# Patient Record
Sex: FEMALE | Race: WHITE | Employment: OTHER | URBAN - METROPOLITAN AREA
[De-identification: names, ages, dates, MRNs, and addresses within clinical notes are randomized per-mention and may not be internally consistent; named-entity substitution may affect disease eponyms.]

---

## 2017-05-24 ENCOUNTER — HOSPITAL ENCOUNTER (OUTPATIENT)
Dept: MRI IMAGING | Age: 67
Discharge: HOME | End: 2017-05-24
Payer: MEDICARE

## 2017-05-24 VITALS — BODY MASS INDEX: 39.87 KG/M2 | WEIGHT: 270 LBS

## 2017-05-24 DIAGNOSIS — M75.102 TEAR OF LEFT ROTATOR CUFF, UNSPECIFIED TEAR EXTENT: ICD-10-CM

## 2017-05-24 PROCEDURE — 73221 MRI JOINT UPR EXTREM W/O DYE: CPT

## 2017-06-01 ENCOUNTER — OFFICE VISIT (OUTPATIENT)
Dept: SURGERY | Age: 67
End: 2017-06-01

## 2017-06-01 VITALS
SYSTOLIC BLOOD PRESSURE: 138 MMHG | WEIGHT: 271 LBS | DIASTOLIC BLOOD PRESSURE: 66 MMHG | HEIGHT: 69 IN | BODY MASS INDEX: 40.14 KG/M2 | HEART RATE: 60 BPM

## 2017-06-01 DIAGNOSIS — R73.9 HYPERGLYCEMIA: ICD-10-CM

## 2017-06-01 DIAGNOSIS — M75.102 TEAR OF LEFT ROTATOR CUFF, UNSPECIFIED TEAR EXTENT: ICD-10-CM

## 2017-06-01 DIAGNOSIS — E66.01 MORBID OBESITY DUE TO EXCESS CALORIES (HCC): ICD-10-CM

## 2017-06-01 DIAGNOSIS — E27.40 ADRENAL INSUFFICIENCY (HCC): Primary | ICD-10-CM

## 2017-06-01 DIAGNOSIS — E04.9 GOITER: ICD-10-CM

## 2017-06-01 DIAGNOSIS — M81.0 OSTEOPOROSIS: ICD-10-CM

## 2017-06-01 PROBLEM — I10 HYPERTENSION: Status: ACTIVE | Noted: 2017-06-01

## 2017-06-01 PROBLEM — M06.9 RA (RHEUMATOID ARTHRITIS) (HCC): Status: ACTIVE | Noted: 2017-06-01

## 2017-06-01 PROBLEM — I25.10 CAD (CORONARY ARTERY DISEASE): Status: ACTIVE | Noted: 2017-06-01

## 2017-06-01 PROBLEM — M75.80 ROTATOR CUFF TENDINITIS: Status: ACTIVE | Noted: 2017-06-01

## 2017-06-01 PROCEDURE — 1123F ACP DISCUSS/DSCN MKR DOCD: CPT | Performed by: INTERNAL MEDICINE

## 2017-06-01 PROCEDURE — 4040F PNEUMOC VAC/ADMIN/RCVD: CPT | Performed by: INTERNAL MEDICINE

## 2017-06-01 PROCEDURE — 99203 OFFICE O/P NEW LOW 30 MIN: CPT | Performed by: INTERNAL MEDICINE

## 2017-06-01 PROCEDURE — 4005F PHARM THX FOR OP RXD: CPT | Performed by: INTERNAL MEDICINE

## 2017-06-01 PROCEDURE — G8400 PT W/DXA NO RESULTS DOC: HCPCS | Performed by: INTERNAL MEDICINE

## 2017-06-01 PROCEDURE — 1090F PRES/ABSN URINE INCON ASSESS: CPT | Performed by: INTERNAL MEDICINE

## 2017-06-01 PROCEDURE — G8417 CALC BMI ABV UP PARAM F/U: HCPCS | Performed by: INTERNAL MEDICINE

## 2017-06-01 PROCEDURE — 1036F TOBACCO NON-USER: CPT | Performed by: INTERNAL MEDICINE

## 2017-06-01 PROCEDURE — 3014F SCREEN MAMMO DOC REV: CPT | Performed by: INTERNAL MEDICINE

## 2017-06-01 PROCEDURE — G8598 ASA/ANTIPLAT THER USED: HCPCS | Performed by: INTERNAL MEDICINE

## 2017-06-01 PROCEDURE — G8427 DOCREV CUR MEDS BY ELIG CLIN: HCPCS | Performed by: INTERNAL MEDICINE

## 2017-06-01 PROCEDURE — 3017F COLORECTAL CA SCREEN DOC REV: CPT | Performed by: INTERNAL MEDICINE

## 2017-06-01 RX ORDER — OXYCODONE HYDROCHLORIDE AND ACETAMINOPHEN 5; 325 MG/1; MG/1
1 TABLET ORAL EVERY 8 HOURS PRN
COMMUNITY
End: 2018-03-28

## 2017-06-01 RX ORDER — PAROXETINE HYDROCHLORIDE 20 MG/1
20 TABLET, FILM COATED ORAL EVERY MORNING
COMMUNITY
End: 2018-03-28

## 2017-06-01 RX ORDER — ANTIOX #8/OM3/DHA/EPA/LUT/ZEAX 250-2.5 MG
1 CAPSULE ORAL 2 TIMES DAILY
COMMUNITY

## 2017-06-01 RX ORDER — CLOPIDOGREL BISULFATE 75 MG/1
75 TABLET ORAL DAILY
COMMUNITY
End: 2021-01-08 | Stop reason: ALTCHOICE

## 2017-06-01 RX ORDER — ALBUTEROL SULFATE 90 UG/1
2 AEROSOL, METERED RESPIRATORY (INHALATION) EVERY 6 HOURS PRN
COMMUNITY

## 2017-06-01 RX ORDER — AZATHIOPRINE 50 MG/1
100 TABLET ORAL DAILY
COMMUNITY

## 2017-06-01 RX ORDER — NITROGLYCERIN 0.4 MG/1
0.4 TABLET SUBLINGUAL EVERY 5 MIN PRN
COMMUNITY

## 2017-06-01 RX ORDER — ATORVASTATIN CALCIUM 20 MG/1
20 TABLET, FILM COATED ORAL DAILY
COMMUNITY
End: 2018-06-18 | Stop reason: DRUGHIGH

## 2017-06-01 RX ORDER — CETIRIZINE HYDROCHLORIDE 10 MG/1
10 TABLET ORAL DAILY
COMMUNITY

## 2017-06-01 RX ORDER — DILTIAZEM HYDROCHLORIDE 300 MG/1
300 CAPSULE, EXTENDED RELEASE ORAL DAILY
COMMUNITY
End: 2019-09-24

## 2017-06-01 ASSESSMENT — ENCOUNTER SYMPTOMS
TROUBLE SWALLOWING: 1
VOMITING: 0
EYES NEGATIVE: 1

## 2017-06-05 ENCOUNTER — TELEPHONE (OUTPATIENT)
Dept: SURGERY | Age: 67
End: 2017-06-05

## 2017-06-12 DIAGNOSIS — E04.9 GOITER: ICD-10-CM

## 2017-06-12 DIAGNOSIS — R73.9 HYPERGLYCEMIA: ICD-10-CM

## 2017-06-12 LAB
ANION GAP SERPL CALCULATED.3IONS-SCNC: 11 MEQ/L (ref 7–13)
BUN BLDV-MCNC: 15 MG/DL (ref 8–23)
CALCIUM SERPL-MCNC: 9.6 MG/DL (ref 8.6–10.2)
CHLORIDE BLD-SCNC: 99 MEQ/L (ref 98–107)
CO2: 29 MEQ/L (ref 22–29)
CREAT SERPL-MCNC: 0.86 MG/DL (ref 0.5–0.9)
GFR AFRICAN AMERICAN: >60
GFR NON-AFRICAN AMERICAN: >60
GLUCOSE BLD-MCNC: 102 MG/DL (ref 74–109)
HBA1C MFR BLD: 6.5 % (ref 4.8–5.9)
POTASSIUM SERPL-SCNC: 3.7 MEQ/L (ref 3.5–5.1)
SODIUM BLD-SCNC: 139 MEQ/L (ref 132–144)
T4 FREE: 1.21 NG/DL (ref 0.93–1.7)
TSH SERPL DL<=0.05 MIU/L-ACNC: 4.46 UIU/ML (ref 0.27–4.2)

## 2017-06-13 ENCOUNTER — HOSPITAL ENCOUNTER (OUTPATIENT)
Dept: ULTRASOUND IMAGING | Age: 67
Discharge: HOME | End: 2017-06-13
Payer: MEDICARE

## 2017-06-13 ENCOUNTER — HOSPITAL ENCOUNTER (OUTPATIENT)
Dept: WOMENS IMAGING | Age: 67
Discharge: HOME | End: 2017-06-13
Payer: MEDICARE

## 2017-06-13 DIAGNOSIS — M81.0 OSTEOPOROSIS: ICD-10-CM

## 2017-06-13 PROCEDURE — 77080 DXA BONE DENSITY AXIAL: CPT

## 2017-06-13 PROCEDURE — 76536 US EXAM OF HEAD AND NECK: CPT

## 2017-07-10 ENCOUNTER — OFFICE VISIT (OUTPATIENT)
Dept: SURGERY | Age: 67
End: 2017-07-10

## 2017-07-10 VITALS
WEIGHT: 271 LBS | SYSTOLIC BLOOD PRESSURE: 138 MMHG | BODY MASS INDEX: 40.14 KG/M2 | HEIGHT: 69 IN | HEART RATE: 82 BPM | DIASTOLIC BLOOD PRESSURE: 78 MMHG

## 2017-07-10 DIAGNOSIS — E27.40 ADRENAL INSUFFICIENCY (HCC): ICD-10-CM

## 2017-07-10 DIAGNOSIS — E11.8 UNCONTROLLED TYPE 2 DIABETES MELLITUS WITH COMPLICATION, UNSPECIFIED LONG TERM INSULIN USE STATUS: Primary | ICD-10-CM

## 2017-07-10 DIAGNOSIS — E11.65 UNCONTROLLED TYPE 2 DIABETES MELLITUS WITH COMPLICATION, UNSPECIFIED LONG TERM INSULIN USE STATUS: Primary | ICD-10-CM

## 2017-07-10 DIAGNOSIS — E04.2 MULTIPLE THYROID NODULES: ICD-10-CM

## 2017-07-10 DIAGNOSIS — E66.09 NON MORBID OBESITY DUE TO EXCESS CALORIES: ICD-10-CM

## 2017-07-10 DIAGNOSIS — E03.9 HYPOTHYROIDISM, UNSPECIFIED TYPE: ICD-10-CM

## 2017-07-10 DIAGNOSIS — R61 HYPERHIDROSIS: ICD-10-CM

## 2017-07-10 PROCEDURE — 3014F SCREEN MAMMO DOC REV: CPT | Performed by: INTERNAL MEDICINE

## 2017-07-10 PROCEDURE — 3017F COLORECTAL CA SCREEN DOC REV: CPT | Performed by: INTERNAL MEDICINE

## 2017-07-10 PROCEDURE — 1090F PRES/ABSN URINE INCON ASSESS: CPT | Performed by: INTERNAL MEDICINE

## 2017-07-10 PROCEDURE — G8417 CALC BMI ABV UP PARAM F/U: HCPCS | Performed by: INTERNAL MEDICINE

## 2017-07-10 PROCEDURE — G8598 ASA/ANTIPLAT THER USED: HCPCS | Performed by: INTERNAL MEDICINE

## 2017-07-10 PROCEDURE — 4040F PNEUMOC VAC/ADMIN/RCVD: CPT | Performed by: INTERNAL MEDICINE

## 2017-07-10 PROCEDURE — 99214 OFFICE O/P EST MOD 30 MIN: CPT | Performed by: INTERNAL MEDICINE

## 2017-07-10 PROCEDURE — G8399 PT W/DXA RESULTS DOCUMENT: HCPCS | Performed by: INTERNAL MEDICINE

## 2017-07-10 PROCEDURE — G8427 DOCREV CUR MEDS BY ELIG CLIN: HCPCS | Performed by: INTERNAL MEDICINE

## 2017-07-10 PROCEDURE — 1123F ACP DISCUSS/DSCN MKR DOCD: CPT | Performed by: INTERNAL MEDICINE

## 2017-07-10 PROCEDURE — 1036F TOBACCO NON-USER: CPT | Performed by: INTERNAL MEDICINE

## 2017-07-10 PROCEDURE — 3046F HEMOGLOBIN A1C LEVEL >9.0%: CPT | Performed by: INTERNAL MEDICINE

## 2017-07-10 RX ORDER — LEVOTHYROXINE SODIUM 0.03 MG/1
25 TABLET ORAL DAILY
Qty: 30 TABLET | Refills: 3 | Status: SHIPPED | OUTPATIENT
Start: 2017-07-10 | End: 2017-10-25 | Stop reason: SDUPTHER

## 2017-07-10 RX ORDER — BLOOD-GLUCOSE METER
EACH MISCELLANEOUS
Qty: 1 KIT | Refills: 0 | Status: SHIPPED | OUTPATIENT
Start: 2017-07-10 | End: 2017-07-12 | Stop reason: SDUPTHER

## 2017-07-10 RX ORDER — LANCETS 33 GAUGE
EACH MISCELLANEOUS
Qty: 100 EACH | Refills: 3 | Status: SHIPPED | OUTPATIENT
Start: 2017-07-10 | End: 2017-07-12 | Stop reason: SDUPTHER

## 2017-07-10 ASSESSMENT — ENCOUNTER SYMPTOMS: TROUBLE SWALLOWING: 1

## 2017-07-12 PROBLEM — E66.09 NON MORBID OBESITY DUE TO EXCESS CALORIES: Status: ACTIVE | Noted: 2017-07-12

## 2017-07-12 RX ORDER — BLOOD-GLUCOSE METER
EACH MISCELLANEOUS
Qty: 1 KIT | Refills: 0 | Status: SHIPPED | OUTPATIENT
Start: 2017-07-12

## 2017-07-12 RX ORDER — LANCETS 33 GAUGE
EACH MISCELLANEOUS
Qty: 100 EACH | Refills: 3 | Status: SHIPPED | OUTPATIENT
Start: 2017-07-12

## 2017-08-07 ENCOUNTER — TELEPHONE (OUTPATIENT)
Dept: SURGERY | Age: 67
End: 2017-08-07

## 2017-08-23 ENCOUNTER — OFFICE VISIT (OUTPATIENT)
Dept: SURGERY | Age: 67
End: 2017-08-23

## 2017-08-23 VITALS
HEIGHT: 69 IN | HEART RATE: 64 BPM | WEIGHT: 266 LBS | BODY MASS INDEX: 39.4 KG/M2 | DIASTOLIC BLOOD PRESSURE: 74 MMHG | SYSTOLIC BLOOD PRESSURE: 138 MMHG

## 2017-08-23 DIAGNOSIS — E27.40 ADRENAL INSUFFICIENCY (HCC): ICD-10-CM

## 2017-08-23 DIAGNOSIS — E03.9 HYPOTHYROIDISM, UNSPECIFIED TYPE: ICD-10-CM

## 2017-08-23 LAB — GLUCOSE BLD-MCNC: 132 MG/DL

## 2017-08-23 PROCEDURE — 82962 GLUCOSE BLOOD TEST: CPT | Performed by: INTERNAL MEDICINE

## 2017-08-23 PROCEDURE — 1090F PRES/ABSN URINE INCON ASSESS: CPT | Performed by: INTERNAL MEDICINE

## 2017-08-23 PROCEDURE — 1036F TOBACCO NON-USER: CPT | Performed by: INTERNAL MEDICINE

## 2017-08-23 PROCEDURE — 1123F ACP DISCUSS/DSCN MKR DOCD: CPT | Performed by: INTERNAL MEDICINE

## 2017-08-23 PROCEDURE — 3046F HEMOGLOBIN A1C LEVEL >9.0%: CPT | Performed by: INTERNAL MEDICINE

## 2017-08-23 PROCEDURE — 4040F PNEUMOC VAC/ADMIN/RCVD: CPT | Performed by: INTERNAL MEDICINE

## 2017-08-23 PROCEDURE — 3014F SCREEN MAMMO DOC REV: CPT | Performed by: INTERNAL MEDICINE

## 2017-08-23 PROCEDURE — 3017F COLORECTAL CA SCREEN DOC REV: CPT | Performed by: INTERNAL MEDICINE

## 2017-08-23 PROCEDURE — G8417 CALC BMI ABV UP PARAM F/U: HCPCS | Performed by: INTERNAL MEDICINE

## 2017-08-23 PROCEDURE — G8399 PT W/DXA RESULTS DOCUMENT: HCPCS | Performed by: INTERNAL MEDICINE

## 2017-08-23 PROCEDURE — G8598 ASA/ANTIPLAT THER USED: HCPCS | Performed by: INTERNAL MEDICINE

## 2017-08-23 PROCEDURE — G8427 DOCREV CUR MEDS BY ELIG CLIN: HCPCS | Performed by: INTERNAL MEDICINE

## 2017-08-23 PROCEDURE — 99213 OFFICE O/P EST LOW 20 MIN: CPT | Performed by: INTERNAL MEDICINE

## 2017-10-11 DIAGNOSIS — E03.9 HYPOTHYROIDISM, UNSPECIFIED TYPE: ICD-10-CM

## 2017-10-11 LAB
ANION GAP SERPL CALCULATED.3IONS-SCNC: 15 MEQ/L (ref 7–13)
BUN BLDV-MCNC: 11 MG/DL (ref 8–23)
CALCIUM SERPL-MCNC: 9.3 MG/DL (ref 8.6–10.2)
CHLORIDE BLD-SCNC: 99 MEQ/L (ref 98–107)
CO2: 29 MEQ/L (ref 22–29)
CREAT SERPL-MCNC: 0.73 MG/DL (ref 0.5–0.9)
GFR AFRICAN AMERICAN: >60
GFR NON-AFRICAN AMERICAN: >60
GLUCOSE BLD-MCNC: 101 MG/DL (ref 74–109)
HBA1C MFR BLD: 6.2 % (ref 4.8–5.9)
POTASSIUM SERPL-SCNC: 3.2 MEQ/L (ref 3.5–5.1)
SODIUM BLD-SCNC: 143 MEQ/L (ref 132–144)
T4 FREE: 1.19 NG/DL (ref 0.93–1.7)
TSH SERPL DL<=0.05 MIU/L-ACNC: 5.83 UIU/ML (ref 0.27–4.2)

## 2017-10-25 ENCOUNTER — OFFICE VISIT (OUTPATIENT)
Dept: SURGERY | Age: 67
End: 2017-10-25

## 2017-10-25 VITALS
HEIGHT: 69 IN | HEART RATE: 69 BPM | WEIGHT: 273 LBS | BODY MASS INDEX: 40.43 KG/M2 | DIASTOLIC BLOOD PRESSURE: 81 MMHG | SYSTOLIC BLOOD PRESSURE: 172 MMHG

## 2017-10-25 DIAGNOSIS — E87.6 HYPOKALEMIA: ICD-10-CM

## 2017-10-25 DIAGNOSIS — E27.40 ADRENAL INSUFFICIENCY (HCC): ICD-10-CM

## 2017-10-25 DIAGNOSIS — E03.9 HYPOTHYROIDISM, UNSPECIFIED TYPE: ICD-10-CM

## 2017-10-25 LAB — GLUCOSE BLD-MCNC: 103 MG/DL

## 2017-10-25 PROCEDURE — G8427 DOCREV CUR MEDS BY ELIG CLIN: HCPCS | Performed by: INTERNAL MEDICINE

## 2017-10-25 PROCEDURE — 3044F HG A1C LEVEL LT 7.0%: CPT | Performed by: INTERNAL MEDICINE

## 2017-10-25 PROCEDURE — 3014F SCREEN MAMMO DOC REV: CPT | Performed by: INTERNAL MEDICINE

## 2017-10-25 PROCEDURE — 4040F PNEUMOC VAC/ADMIN/RCVD: CPT | Performed by: INTERNAL MEDICINE

## 2017-10-25 PROCEDURE — 1090F PRES/ABSN URINE INCON ASSESS: CPT | Performed by: INTERNAL MEDICINE

## 2017-10-25 PROCEDURE — G8598 ASA/ANTIPLAT THER USED: HCPCS | Performed by: INTERNAL MEDICINE

## 2017-10-25 PROCEDURE — 3017F COLORECTAL CA SCREEN DOC REV: CPT | Performed by: INTERNAL MEDICINE

## 2017-10-25 PROCEDURE — 82962 GLUCOSE BLOOD TEST: CPT | Performed by: INTERNAL MEDICINE

## 2017-10-25 PROCEDURE — 99213 OFFICE O/P EST LOW 20 MIN: CPT | Performed by: INTERNAL MEDICINE

## 2017-10-25 PROCEDURE — G8484 FLU IMMUNIZE NO ADMIN: HCPCS | Performed by: INTERNAL MEDICINE

## 2017-10-25 PROCEDURE — 1036F TOBACCO NON-USER: CPT | Performed by: INTERNAL MEDICINE

## 2017-10-25 PROCEDURE — G8399 PT W/DXA RESULTS DOCUMENT: HCPCS | Performed by: INTERNAL MEDICINE

## 2017-10-25 PROCEDURE — G8417 CALC BMI ABV UP PARAM F/U: HCPCS | Performed by: INTERNAL MEDICINE

## 2017-10-25 PROCEDURE — 1123F ACP DISCUSS/DSCN MKR DOCD: CPT | Performed by: INTERNAL MEDICINE

## 2017-10-25 RX ORDER — PREDNISONE 10 MG/1
TABLET ORAL
Qty: 30 TABLET | Refills: 1
Start: 2017-10-25 | End: 2020-10-15 | Stop reason: SDUPTHER

## 2017-10-25 RX ORDER — LEVOTHYROXINE SODIUM 0.05 MG/1
50 TABLET ORAL DAILY
Qty: 90 TABLET | Refills: 3 | Status: SHIPPED | OUTPATIENT
Start: 2017-10-25 | End: 2017-12-27 | Stop reason: SDUPTHER

## 2017-10-25 RX ORDER — POTASSIUM CHLORIDE 20 MEQ/1
20 TABLET, EXTENDED RELEASE ORAL DAILY
Qty: 90 TABLET | Refills: 3 | Status: SHIPPED | OUTPATIENT
Start: 2017-10-25 | End: 2019-03-12 | Stop reason: SDUPTHER

## 2017-10-25 RX ORDER — VALSARTAN 80 MG/1
80 TABLET ORAL DAILY
COMMUNITY
End: 2019-09-24

## 2017-10-25 NOTE — PROGRESS NOTES
Subjective:      Patient ID: Bin Geller is a 78 y.o. female. Diabetes   She presents for her follow-up diabetic visit. She has type 2 diabetes mellitus. Hypoglycemia symptoms include nervousness/anxiousness. Associated symptoms include fatigue and weakness. Risk factors for coronary artery disease include diabetes mellitus, obesity, sedentary lifestyle and post-menopausal. Current diabetic treatment includes oral agent (monotherapy) and insulin injections (metformin lantus plus novolog ). She is currently taking insulin pre-breakfast, pre-lunch, pre-dinner and at bedtime. Her weight is fluctuating minimally. She never participates in exercise. Her overall blood glucose range is 130-140 mg/dl. (Lab Results       Component                Value               Date                       LABA1C                   6.2 (H)             10/11/2017              )   Other   This is a chronic (Adrenal insufficiency) problem. The current episode started more than 1 year ago. The problem occurs intermittently. The problem has been waxing and waning. Associated symptoms include diaphoresis, fatigue and weakness. Exacerbated by: Long-term prednisone use. Treatments tried: prednisone 5 mg daily.          S/p left shoulder surgery       Hypothyroidism on replacement with synthroid 25 mcg daily         Patient Active Problem List   Diagnosis    Rotator cuff tendinitis    RA (rheumatoid arthritis) ([address]    Hypertension    CAD (coronary artery disease)    Morbid obesity due to excess calories ([address]    Non morbid obesity due to excess calories    Uncontrolled type 2 diabetes mellitus (HCC)         Allergies   Allergen Reactions    Latex     Accupril [Quinapril Hcl]     Other      ACE BANDAGES    Tape [Adhesive Tape]      PLASTIC TAPE AND ACE BANDAGES       Current Outpatient Prescriptions:     valsartan (DIOVAN) 80 MG tablet, Take 80 mg by mouth daily, Disp: , Rfl:     insulin glargine (LANTUS SOLOSTAR) 100 UNIT/ML injection pen, Inject 10 Units into the skin daily (with breakfast), Disp: , Rfl:     insulin lispro (HUMALOG KWIKPEN) 100 UNIT/ML pen, Inject into the skin See Admin Instructions Inject subcut per sliding scale, Disp: , Rfl:     glucose blood VI test strips (ONETOUCH VERIO) strip, Use bid to check bs Dx e11.65, Disp: 100 each, Rfl: 3    Blood Glucose Monitoring Suppl (ONETOUCH VERIO) w/Device KIT, Give 1 meter to check bs Dx E11.65, Disp: 1 kit, Rfl: 0    ONETOUCH DELICA LANCETS 92M MISC, Use bid to check bs Dx E11.65, Disp: 100 each, Rfl: 03    levothyroxine (SYNTHROID) 25 MCG tablet, Take 1 tablet by mouth Daily, Disp: 30 tablet, Rfl: 3    azaTHIOprine (IMURAN) 50 MG tablet, Take 100 mg by mouth daily, Disp: , Rfl:     diltiazem (TIAZAC) 300 MG extended release capsule, Take 300 mg by mouth daily, Disp: , Rfl:     nitroGLYCERIN (NITROSTAT) 0.4 MG SL tablet, Place 0.4 mg under the tongue every 5 minutes as needed for Chest pain up to max of 3 total doses.  If no relief after 1 dose, call 911., Disp: , Rfl:     clopidogrel (PLAVIX) 75 MG tablet, Take 75 mg by mouth daily, Disp: , Rfl:     atorvastatin (LIPITOR) 20 MG tablet, Take 20 mg by mouth daily, Disp: , Rfl:     PARoxetine (PAXIL) 20 MG tablet, Take 20 mg by mouth every morning, Disp: , Rfl:     oxyCODONE-acetaminophen (PERCOCET) 5-325 MG per tablet, Take 1 tablet by mouth every 8 hours as needed for Pain ., Disp: , Rfl:     Multiple Vitamins-Minerals (PRESERVISION AREDS 2) CAPS, Take 1 capsule by mouth 2 times daily, Disp: , Rfl:     aspirin 81 MG tablet, Take 81 mg by mouth daily, Disp: , Rfl:     cetirizine (ZYRTEC) 10 MG tablet, Take 10 mg by mouth 2 times daily, Disp: , Rfl:     cimetidine (TAGAMET HB) 200 MG tablet, Take 200 mg by mouth 2 times daily, Disp: , Rfl:     Lactobacillus (PROBIOTIC ACIDOPHILUS PO), Take by mouth daily, Disp: , Rfl:     FOLIC ACID PO, Take 671 mg by mouth 2 times daily, Disp: , Rfl:     B Complex Vitamins Ref Range: 8 - 23 mg/dL 11   Creatinine Latest Ref Range: 0.50 - 0.90 mg/dL 0.73   Anion Gap Latest Ref Range: 7 - 13 mEq/L 15 (H)   GFR Non- Latest Ref Range: >60  >60.0   GFR African American Latest Ref Range: >60  >60.0   Glucose Latest Ref Range: 74 - 109 mg/dL 101   Calcium Latest Ref Range: 8.6 - 10.2 mg/dL 9.3   Hemoglobin A1C Latest Ref Range: 4.8 - 5.9 % 6.2 (H)   TSH Latest Ref Range: 0.270 - 4.200 uIU/mL 5.830 (H)   T4 Free Latest Ref Range: 0.93 - 1.70 ng/dL 1.19     Assessment:      1. Uncontrolled type 2 diabetes mellitus with complication, without long-term current use of insulin ([facility]  POCT Glucose    Basic Metabolic Panel    Internal Referral to Diabetic [facility]    CANCELED: Internal Referral to Diabetic [facility]   2. Hypothyroidism, unspecified type  T4, Free    TSH without Reflex   3. Hypokalemia  Internal Referral to [facility]abetic [facility]    CANCELED: Internal Referral to Diabetic [facility]   4.  Adrenal insufficiency ([facility]             Plan:      Orders Placed This Encounter   Procedures    T4, Free     Standing Status:   Future     Standing Expiration Date:   10/25/2018    TSH without Reflex     Standing Status:   Future     Standing Expiration Date:   10/25/2018    Basic Metabolic Panel     Standing Status:   Future     Standing Expiration Date:   10/25/2018    Internal Referral to [facility]     Referral Priority:   Routine     Referral Type:   Consult for Advice and Opinion     Referral Reason:   Specialty Services Required     Number of Visits Requested:   1    POCT Glucose     Orders Placed This Encounter   Medications    levothyroxine (SYNTHROID) 50 MCG tablet     Sig: Take 1 tablet by mouth Daily     Dispense:  90 tablet     Refill:  03    potassium chloride (KLOR-CON M) 20 MEQ extended release tablet     Sig: Take 1 tablet by mouth daily     Dispense:  90 tablet     Refill:  3    insulin glargine (LANTUS SOLOSTAR) 100 UNIT/ML injection pen     Sig: Inject 10 Units into the skin nightly     Dispense:  5 Pen     Refill:  3    predniSONE (DELTASONE) 10 MG tablet     Sig: Once a day     Dispense:  30 tablet     Refill:  01     The current medical regimen is effective;  continue present plan and medications.

## 2017-11-02 ENCOUNTER — HOSPITAL ENCOUNTER (OUTPATIENT)
Dept: DIABETES SERVICES | Age: 67
Setting detail: THERAPIES SERIES
Discharge: HOME | End: 2017-11-02
Payer: MEDICARE

## 2017-11-07 ENCOUNTER — APPOINTMENT (OUTPATIENT)
Dept: DIABETES SERVICES | Age: 67
End: 2017-11-07
Payer: MEDICARE

## 2017-11-08 ENCOUNTER — APPOINTMENT (OUTPATIENT)
Dept: DIABETES SERVICES | Age: 67
End: 2017-11-08
Payer: MEDICARE

## 2017-11-09 ENCOUNTER — APPOINTMENT (OUTPATIENT)
Dept: DIABETES SERVICES | Age: 67
End: 2017-11-09
Payer: MEDICARE

## 2017-11-21 ENCOUNTER — HOSPITAL ENCOUNTER (OUTPATIENT)
Dept: DIABETES SERVICES | Age: 67
Setting detail: THERAPIES SERIES
Discharge: HOME | End: 2017-11-21
Payer: MEDICARE

## 2017-11-21 VITALS — BODY MASS INDEX: 40.43 KG/M2 | WEIGHT: 273 LBS | HEIGHT: 69 IN

## 2017-11-21 PROCEDURE — G0108 DIAB MANAGE TRN  PER INDIV: HCPCS

## 2017-11-21 ASSESSMENT — PATIENT HEALTH QUESTIONNAIRE - PHQ9
6. FEELING BAD ABOUT YOURSELF - OR THAT YOU ARE A FAILURE OR HAVE LET YOURSELF OR YOUR FAMILY DOWN: 2
8. MOVING OR SPEAKING SO SLOWLY THAT OTHER PEOPLE COULD HAVE NOTICED. OR THE OPPOSITE, BEING SO FIGETY OR RESTLESS THAT YOU HAVE BEEN MOVING AROUND A LOT MORE THAN USUAL: 0
9. THOUGHTS THAT YOU WOULD BE BETTER OFF DEAD, OR OF HURTING YOURSELF: 0
10. IF YOU CHECKED OFF ANY PROBLEMS, HOW DIFFICULT HAVE THESE PROBLEMS MADE IT FOR YOU TO DO YOUR WORK, TAKE CARE OF THINGS AT HOME, OR GET ALONG WITH OTHER PEOPLE: 1
3. TROUBLE FALLING OR STAYING ASLEEP: 3
1. LITTLE INTEREST OR PLEASURE IN DOING THINGS: 1
SUM OF ALL RESPONSES TO PHQ QUESTIONS 1-9: 14
5. POOR APPETITE OR OVEREATING: 2
7. TROUBLE CONCENTRATING ON THINGS, SUCH AS READING THE NEWSPAPER OR WATCHING TELEVISION: 2
SUM OF ALL RESPONSES TO PHQ9 QUESTIONS 1 & 2: 2
4. FEELING TIRED OR HAVING LITTLE ENERGY: 3
2. FEELING DOWN, DEPRESSED OR HOPELESS: 1

## 2017-11-21 NOTE — PROGRESS NOTES
Diabetes Self- Management Education Program Assessment and Education Record-   Also see Diabetic Screening    Patient, Chayito Carrion,  here for diabetes self-management education  visit/ assessment. Today's visit was in an individual setting. Diet History :  Diet Questionnaire and typical meal /portion sheet completed  []Yes  [x] NO    Goals setting:  Initial Goal Set with Patient  [x]Yes  [] NO  (SEE  EDUCATION AND GOALS)    MEDICAL HISTORY:  Past Medical History:   Diagnosis Date    CAD (coronary artery disease)     [facility] mellitus [facility]     DJD (degenerative joint disease)     Hypertension     RA (rheumatoid arthritis) ([facility]     Rotator cuff tendinitis     RIGHT     No family history on file. Latex; Accupril [quinapril hcl]; Other; and Tape [adhesive tape]     There is no immunization history on file for this patient.     Current Medications  Current Outpatient Prescriptions   Medication Sig Dispense Refill    valsartan (DIOVAN) 80 MG tablet Take 80 mg by mouth daily      levothyroxine (SYNTHROID) 50 MCG tablet Take 1 tablet by mouth Daily 90 tablet 03    potassium chloride (KLOR-CON M) 20 MEQ extended release tablet Take 1 tablet by mouth daily 90 tablet 3    insulin glargine (LANTUS SOLOSTAR) 100 UNIT/ML injection pen Inject 10 Units into the skin nightly 5 Pen 3    predniSONE (DELTASONE) 10 MG tablet Once a day 30 tablet 01    insulin lispro (HUMALOG KWIKPEN) 100 UNIT/ML pen Inject into the skin See Admin Instructions Inject subcut per sliding scale      glucose blood VI test strips (ONETOUCH VERIO) strip Use bid to check bs Dx e11.65 100 each 3    Blood Glucose Monitoring Suppl (ONETOUCH VERIO) w/Device KIT Give 1 meter to check bs Dx E11.65 1 kit 0    ONETOUCH DELICA LANCETS 85U MISC Use bid to check bs Dx E11.65 100 each 03    azaTHIOprine (IMURAN) 50 MG tablet Take 100 mg by mouth daily      diltiazem (TIAZAC) 300 MG extended release capsule Take 300 mg by mouth daily      Encounters:   10/25/17 (!) 172/81   08/23/17 138/74   07/10/17 138/78        Cholesterol ( LDL under  100)   No results found for: LDLCALC, LDLCHOLESTEROL, LDLDIRECT    4 . Smoking ? []Yes   [x]No    5. Taking an Asprin daily? [x]Yes   []No            Diabetes Self- Management Education Record    Participant Name: Kike Sinclair  Referring Provider: Pj Trejo MD   Assessment/Evaluation Ratings:  1=Needs Instruction   4=Demonstrates Understanding/Competency  2=Needs Review   NC=Not Covered    3=Comprehends Key Points  N/A=Not Applicable    Topics/Learning Objectives Initial Assessment Date:   Instr. Date Reinforce Date Post- session Eval Comments   Diabetes disease process & Treatment process: Define diabetes & pre-diabetes; Identify own type of diabetes; role of the pancreas; signs/symptoms; diagnostic criteria; prevention & treatment options; contributing factors. 11-21-17  #2  Shona Hassan RN      77-03-83 Briefly discussed the pathophysiology of diabetes. Touched on the role of the pancreas and liver. Shona Hassan RN     Incorporating nutritional management into lifestyle: Describe effect of type, amount & timing of food on blood glucose; Describe basic meal planning techniques & current nutrition guideline   11-21-17  #1  Shona Hassan RN      What to eat - Food groups, When to eat - timing of meals and snacks, and How much to eat - portions control. calories/ day   CHO choices/ meal   CHO choices/  day   grams of protein /day   gram of fat /day     Correctly read food labels & demonstrate CHO counting & portion control with personalized meal plan. Identify dining out strategies, & dietary sick day guidelines. 11-21-17  #1  Shona Hassan RN         Incorporating physical activity into lifestyle:   Verbalize effect of exercise on blood glucose levels; benefits of regular exercise; safety considerations; contraindications; maintenance of activity.    7242-31  #1  Ciro Gonzales FRANCES Roberto      28-57-60 Briefly touched on the need to increase activity as able. Discussed chair exercises. Elizabeth Arriaga RN     Using medications safely:  Identify effects of diabetes medicines on blood glucose levels; List diabetes medication taken, action & side effects;    11-21-17  #1  Elizabeth Arriaga RN         Insulin / Injectable - Appropriate injection sites; proper storage; supplies needed; proper technique; safe needle disposal guidelines. 74-70-33  #1  Elizabeth Arriaga RN      37-48-89 Taking Lantas and Humalog. Doing pen/needle patency check with Humalog but not Lantus. Discussed the need to do both. Holding for 10 seconds after injection before pulling needle out. Elizabeth Arriaga RN     Monitoring blood glucose, interpreting and using results:  Identify recommended & personal blood glucose targets; importance of testing; testing supplies; HgbA1C target levels; Factors affecting blood glucose; Importance of logging blood glucose levels for pattern recognition; ketone testing; safe lancet disposal.   11-21-17  #1  Elizabeth Arriaga RN      63-07-82 Testing 3 times a day instead of 4 and not logging. Discussed the need to check BG and log results to help use the info to take better care of herself. BG guidelines discussed. Disposing of lancets/needles correctly. Elizabeth Arriaga RN     Prevention, detection & treatment of acute complications:  Identify symptoms of hyper & hypoglycemia, and prevention & treatment strategies. 11-21-17  #1  Elizabeth Arriaga RN      5258-67 Discussed signs and symptoms and causes of low and high BG. Also discussed Rule of 15 and handouts given. Elizabeth Arriaga RN     Describe sick day guidelines & indications for physician notification. Identify short term consequences of poor control.    11-21-17  #1  Elizabeth Arriaga RN         Prevention, detection & treatment of chronic complications:  Define the natural course of diabetes & describe the relationship of blood glucose levels to long term complications of diabetes. Identify preventative measures & standards of care. 11-21-17  #1  Kishor Faulkner RN      7369-32 Has tingling in toes, unsure if neuropathy or from RA. Has a nonhealing leg ulcer and being worked up for PVD. Described what sounds like sleep apnea and recommended she talk to doctor about a sleep study. Kishor Faulkner RN     Developing strategies to address psychosocial issues:  Describe feelings about living with diabetes; Describe how stress, depression & anxiety affect blood glucose; Identify coping strategies; Identify support needed & support network available. 11-21-17  #1  Kishor Faulkner RN      3377-22 Scored high on the depression screen. Is being treated for depression. Stress from multiple health issues. Kishor Faulkner RN     Developing strategies to promote health/change behavior: Identify 7 self-care behaviors; Personal health risk factors; Benefits, challenges & strategies for behavioral change;    11-21-17  #1  Kishor Faulkner RN           Individualized goal selection. My goal , to help me improve my health, I will:   1.      2.       Plan  Follow-up Appointments planned in group setting. Next Appointment in 1 weeks. Instruction Method: [x]Lecture/Discussion  []Power Point Presentation  [x]Handouts  []Return Demonstration      Education Materials/Equipment Provided:      [x]Self-Management  - Initial Assessment - Where do I Begin booklet and Counting Carbs from the ADA.     []Self-Management  Class 1 - On the Road to better managing your diabetes - Packet of Handouts from Diabetes Department    [] Self-Management  Class 2 - Meal Plan,  Choose your Foods - Food Lists for [employer] and Nutrition in the [employer] - fast facts about fast food    [] Self-Management  Class 3 -  Diabetes ID card,  foot care tips sheet,  Continuing Your Journey with Diabetes, Individualized Diabetes report card, Sick Day Rules, Diabetes Cookbooks, Magazines and Pedometers when available    []Glucose Meter     []Insulin Kit     []Other      Encounter Type Date Start Time End Time Comments No Show Dates   Assessment 11-21-17  Sherrell Mayes RN     [id number] Has many health issues and is on prednisone daily.       Class 1 - Understanding diabetes         Class 2- Nutrition and diabetes          Class 3 - Preventing Complications         Individual MNT         3 Month Follow-up      []In Person  []Telephone    Meter Instrx        Insulin Instrx      []Pen  []Vial & Syringe      DSMS Support Plan:  Follow-up plan:     [] MNT referral request / Appointment     [] Annual update referral request and appointment after      []ADA  Where do I Begin, Living with Type 2 Diabetes ADA home support program     [] [address] [place] [address] [phone]     [] Fit Walks : FREE \[facility]\"" Zone or [facility]    []  Diabetes support Group      []   Emotional Support      [] Joana on phone      []  Internet web sites - ADA and D- life    []  Journals/Magazines    []  Other ___________________________      Post Education Referrals:      [] [address] information sheet and [address] [facility] , [address]       [] Dental care    [] Podiatrist     []  Opthamologist      [x]Sleep Study    Sherrell Mayes RN

## 2017-11-28 ENCOUNTER — HOSPITAL ENCOUNTER (OUTPATIENT)
Dept: DIABETES SERVICES | Age: 67
Setting detail: THERAPIES SERIES
Discharge: HOME | End: 2017-11-28
Payer: MEDICARE

## 2017-11-28 PROCEDURE — G0109 DIAB MANAGE TRN IND/GROUP: HCPCS

## 2017-11-29 ENCOUNTER — HOSPITAL ENCOUNTER (OUTPATIENT)
Dept: DIABETES SERVICES | Age: 67
Setting detail: THERAPIES SERIES
Discharge: HOME | End: 2017-11-29
Payer: MEDICARE

## 2017-11-29 PROCEDURE — G0109 DIAB MANAGE TRN IND/GROUP: HCPCS

## 2017-11-29 NOTE — PROGRESS NOTES
nitroGLYCERIN (NITROSTAT) 0.4 MG SL tablet Place 0.4 mg under the tongue every 5 minutes as needed for Chest pain up to max of 3 total doses. If no relief after 1 dose, call 911.  clopidogrel (PLAVIX) 75 MG tablet Take 75 mg by mouth daily      atorvastatin (LIPITOR) 20 MG tablet Take 20 mg by mouth daily      PARoxetine (PAXIL) 20 MG tablet Take 20 mg by mouth every morning      oxyCODONE-acetaminophen (PERCOCET) 5-325 MG per tablet Take 1 tablet by mouth every 8 hours as needed for Pain .  Multiple Vitamins-Minerals (PRESERVISION AREDS 2) CAPS Take 1 capsule by mouth 2 times daily      aspirin 81 MG tablet Take 81 mg by mouth daily      cetirizine (ZYRTEC) 10 MG tablet Take 10 mg by mouth 2 times daily      cimetidine (TAGAMET HB) 200 MG tablet Take 200 mg by mouth 2 times daily      Lactobacillus (PROBIOTIC ACIDOPHILUS PO) Take by mouth daily      FOLIC ACID PO Take 985 mg by mouth 2 times daily      B Complex Vitamins (VITAMIN B COMPLEX PO) Take by mouth daily      Cholecalciferol (VITAMIN D3) 5000 UNITS TABS Take 1 tablet by mouth daily      albuterol sulfate HFA (VENTOLIN HFA) 108 (90 BASE) MCG/ACT inhaler Inhale 2 puffs into the lungs every 6 hours as needed for Wheezing      furosemide (LASIX) 40 MG tablet Take 40 mg by mouth daily.  methotrexate 2.5 MG tablet Take 2.5 mg by mouth See Admin Instructions TAKE 8 TABS EVERY WEEK       No current facility-administered medications for this encounter.    :     Comments:  Allergies:     Allergies   Allergen Reactions    Latex     Accupril [Quinapril Hcl]     Other      ACE BANDAGES    Tape [place] Tape]      PLASTIC TAPE AND ACE BANDAGES       Diabetes 5  / Health Status    A1C blood level - at goal < 7%   Lab Results   Component Value Date    LABA1C 6.2 (H) 10/11/2017    LABA1C 6.5 (H) 06/12/2017     Lab Results   Component Value Date    CREATININE 0.73 10/11/2017       Blood pressure (140/90)  Or less  BP Readings from Last 3 Encounters:   10/25/17 (!) 172/81   08/23/17 138/74   07/10/17 138/78        Cholesterol ( LDL under  100)   No results found for: LDLCALC, LDLCHOLESTEROL, LDLDIRECT    4 . Smoking ? []Yes   [x]No    5. Taking an Asprin daily? [x]Yes   []No            Diabetes Self- Management Education Record    Participant Name: Rolf Lopez  Referring Provider: Wendi Gonzalez MD   Assessment/Evaluation Ratings:  1=Needs Instruction   4=Demonstrates Understanding/Competency  2=Needs Review   NC=Not Covered    3=Comprehends Key Points  N/A=Not Applicable    Topics/Learning Objectives Initial Assessment Date:   Instr. Date Reinforce Date Post- session Eval Comments   Diabetes disease process & Treatment process: Define diabetes & pre-diabetes; Identify own type of diabetes; role of the pancreas; signs/symptoms; diagnostic criteria; prevention & treatment options; contributing factors. 11-21-17  #2  Renate Clay RN   [birth year]  Renate Clay RN     60-21-14 Briefly discussed the pathophysiology of diabetes. Touched on the role of the pancreas and liver. Renate Clay RN    20-13-46 Discussed pathophysiology of DM, types, risk factors, treatment options and insulin resistance. Rentae Clay RN     Incorporating nutritional management into lifestyle: Describe effect of type, amount & timing of food on blood glucose; Describe basic meal planning techniques & current nutrition guideline   11-21-17  #1  Renate Clay RN      What to eat - Food groups, When to eat - timing of meals and snacks, and How much to eat - portions control. calories/ day   CHO choices/ meal   CHO choices/  day   grams of protein /day   gram of fat /day     Correctly read food labels & demonstrate CHO counting & portion control with personalized meal plan. Identify dining out strategies, & dietary sick day guidelines.    11-21-17  #1  Renate Clay RN         Incorporating physical activity into lifestyle:   Verbalize effect of exercise on blood glucose levels; benefits of regular exercise; safety considerations; contraindications; maintenance of activity. [id number]  #1  Katja Ash RN   77-84-05  Katja Ash RN     07-67-58 Briefly touched on the need to increase activity as able. Discussed chair exercises. Katja Ash RN    40-17-07  Reviewed the importance of physical activity, effect on BG and risks associated with activity. Food and exercises discussed. Katja Ash RN     Using medications safely:  Identify effects of diabetes medicines on blood glucose levels; List diabetes medication taken, action & side effects;    11-21-17  #1  Katja Ash RN         Insulin / Injectable - Appropriate injection sites; proper storage; supplies needed; proper technique; safe needle disposal guidelines. [id number]  #1  Katja Ash RN      78-31-02 Taking Lantas and Humalog. Doing pen/needle patency check with Humalog but not Lantus. Discussed the need to do both. Holding for 10 seconds after injection before pulling needle out. Katja Ash RN     Monitoring blood glucose, interpreting and using results:  Identify recommended & personal blood glucose targets; importance of testing; testing supplies; HgbA1C target levels; Factors affecting blood glucose; Importance of logging blood glucose levels for pattern recognition; ketone testing; safe lancet disposal.   11-21-17  #1  Katja Ash RN   11-28-17  Katja Ash RN     14-27-99 Testing 3 times a day instead of 4 and not logging. Discussed the need to check BG and log results to help use the info to take better care of herself. BG guidelines discussed. Disposing of lancets/needles correctly. Katja Ash RN    94-49-79 Discussed how to use results of  BG monitoring to manage diabetes. Discussed proper testing technique and proper lancet disposal.  Discussed when to test and what target numbers they should be aiming for.   Encouraged to

## 2017-11-30 ENCOUNTER — HOSPITAL ENCOUNTER (OUTPATIENT)
Dept: DIABETES SERVICES | Age: 67
Setting detail: THERAPIES SERIES
Discharge: HOME | End: 2017-11-30
Payer: MEDICARE

## 2017-11-30 PROCEDURE — G0109 DIAB MANAGE TRN IND/GROUP: HCPCS

## 2017-11-30 NOTE — PROGRESS NOTES
Diabetes Self- Management Education Program Assessment and Education Record-   Also see Diabetic Screening    Patient, Liza Wallis,  here for diabetes self-management education  visit/ assessment. Today's visit was in an individual setting. Diet History :  Diet Questionnaire and typical meal /portion sheet completed  []Yes  [x] NO    Goals setting:  Initial Goal Set with Patient  [x]Yes  [] NO  (SEE  EDUCATION AND GOALS)    MEDICAL HISTORY:  Past Medical History:   Diagnosis Date    CAD (coronary artery disease)     [facility] ([facility]     DJD (degenerative joint disease)     Hypertension     RA (rheumatoid arthritis) ([facility]     Rotator cuff tendinitis     RIGHT     No family history on file. Latex; Accupril [quinapril hcl]; Other; and Tape [adhesive tape]     There is no immunization history on file for this patient.     Current Medications  Current Outpatient Prescriptions   Medication Sig Dispense Refill    valsartan (DIOVAN) 80 MG tablet Take 80 mg by mouth daily      levothyroxine (SYNTHROID) 50 MCG tablet Take 1 tablet by mouth Daily 90 tablet 03    potassium chloride (KLOR-CON M) 20 MEQ extended release tablet Take 1 tablet by mouth daily 90 tablet 3    insulin glargine (LANTUS SOLOSTAR) 100 UNIT/ML injection pen Inject 10 Units into the skin nightly 5 Pen 3    predniSONE (DELTASONE) 10 MG tablet Once a day 30 tablet 01    insulin lispro (HUMALOG KWIKPEN) 100 UNIT/ML pen Inject into the skin See Admin Instructions Inject subcut per sliding scale      glucose blood VI test strips (ONETOUCH VERIO) strip Use bid to check bs Dx e11.65 100 each 3    Blood Glucose Monitoring Suppl (ONETOUCH VERIO) w/Device KIT Give 1 meter to check bs Dx E11.65 1 kit 0    ONETOUCH DELICA LANCETS 35K MISC Use bid to check bs Dx E11.65 100 each 03    azaTHIOprine (IMURAN) 50 MG tablet Take 100 mg by mouth daily      diltiazem (TIAZAC) 300 MG extended release capsule Take 300 mg by mouth daily      Encounters:   10/25/17 (!) 172/81   08/23/17 138/74   07/10/17 138/78        Cholesterol ( LDL under  100)   No results found for: LDLCALC, LDLCHOLESTEROL, LDLDIRECT    4 . Smoking ? []Yes   [x]No    5. Taking an Asprin daily? [x]Yes   []No            Diabetes Self- Management Education Record    Participant Name: Ramirez Landaverde  Referring Provider: Audrey Barboza MD   Assessment/Evaluation Ratings:  1=Needs Instruction   4=Demonstrates Understanding/Competency  2=Needs Review   NC=Not Covered    3=Comprehends Key Points  N/A=Not Applicable    Topics/Learning Objectives Initial Assessment Date:   Instr. Date Reinforce Date Post- session Eval Comments   Diabetes disease process & Treatment process: Define diabetes & pre-diabetes; Identify own type of diabetes; role of the pancreas; signs/symptoms; diagnostic criteria; prevention & treatment options; contributing factors. 11-21-17  #2  Tobin Julian RN   95-48-92  Tobin Julian RN     87-03-59 Briefly discussed the pathophysiology of diabetes. Touched on the role of the pancreas and liver. Tobin Julian RN    13-00-39 Discussed pathophysiology of DM, types, risk factors, treatment options and insulin resistance. Tobin Julian RN     Incorporating nutritional management into lifestyle: Describe effect of type, amount & timing of food on blood glucose; Describe basic meal planning techniques & current nutrition guideline   11-21-17  #1  Tobin Julian RN   52-83-96  Provided with 1600 calorie meal pattern, 13 carb. exchanges per day for weight loss. Rolly Alamo RDN, LD   What to eat - Food groups, When to eat - timing of meals and snacks, and How much to eat - portions control. calories/ day   CHO choices/ meal   CHO choices/  day   grams of protein /day   gram of fat /day     Correctly read food labels & demonstrate CHO counting & portion control with personalized meal plan.  Identify dining out strategies, & dietary sick day guidelines. 11-21-17  #1  Tobin Julian RN   11-29-17  Rolly Alamo RDN, LD   04-53-64  Detailed instruction about identifying serving size, servings per container, grams of total carbohydrate and how to convert to carbohydrate exchanges. Defined low sodium and low fat guidelines and how to use food label to identify sodium, fat and saturated fat  Rolly Alamo RDN, LD   Incorporating physical activity into lifestyle:   Verbalize effect of exercise on blood glucose levels; benefits of regular exercise; safety considerations; contraindications; maintenance of activity. 58-19-42  #1  Tobin Julian RN   4350-49  Tobin Julian RN     8216-40 Briefly touched on the need to increase activity as able. Discussed chair exercises. Tobin Julian RN    48-76-62  Reviewed the importance of physical activity, effect on BG and risks associated with activity. Food and exercises discussed. Tobin Julian RN     Using medications safely:  Identify effects of diabetes medicines on blood glucose levels; List diabetes medication taken, action & side effects;    11-21-17  #1  Tobin Julian RN         Insulin / Injectable - Appropriate injection sites; proper storage; supplies needed; proper technique; safe needle disposal guidelines. 04-58-63  #1  Tobin Julian RN      64-11-71 Taking Lantas and Humalog. Doing pen/needle patency check with Humalog but not Lantus. Discussed the need to do both. Holding for 10 seconds after injection before pulling needle out. Tobin Julian RN     Monitoring blood glucose, interpreting and using results:  Identify recommended & personal blood glucose targets; importance of testing; testing supplies; HgbA1C target levels; Factors affecting blood glucose;  Importance of logging blood glucose levels for pattern recognition; ketone testing; safe lancet disposal.   11-21-17  #1  Tobin Julian RN   11-28-17  Tobin Julian RN     14-77-29 Testing 3 times a day instead of 4 and not logging. Discussed the need to check BG and log results to help use the info to take better care of herself. BG guidelines discussed. Disposing of lancets/needles correctly. Bharathi Fowler RN    11-91-36 Discussed how to use results of  BG monitoring to manage diabetes. Discussed proper testing technique and proper lancet disposal.  Discussed when to test and what target numbers they should be aiming for. Encouraged to log results and keep a food diary. Discussed current A1c results and what the A1c is and what it measures. Bharathi Fowler RN     Prevention, detection & treatment of acute complications:  Identify symptoms of hyper & hypoglycemia, and prevention & treatment strategies. 11-21-17  #1  Bharathi Fowler RN   7780-60  Bharathi Fowler RN     00-91-76 Discussed signs and symptoms and causes of low and high BG. Also discussed Rule of 15 and handouts given. Bharathi Fowler RN    70-45-09  Reviewed causes of high and low BG. Discussed strategies to prevent. Bharathi Fowler RN     Describe sick day guidelines & indications for physician notification. Identify short term consequences of poor control. 11-21-17  #1  Bharathi Fowler RN         Prevention, detection & treatment of chronic complications:  Define the natural course of diabetes & describe the relationship of blood glucose levels to long term complications of diabetes. Identify preventative measures & standards of care. 11-21-17  #1  Bharathi Fowler RN      22-59-36 Has tingling in toes, unsure if neuropathy or from RA. Has a nonhealing leg ulcer and being worked up for PVD. Described what sounds like sleep apnea and recommended she talk to doctor about a sleep study. Bharathi Fowler RN     Developing strategies to address psychosocial issues:  Describe feelings about living with diabetes; Describe how stress, depression & anxiety affect blood glucose; Identify coping strategies;  Identify support needed & support network available. 11-21-17  #1  Robin Thomas RN   74-38-39  Robin Thomas RN     23-32-14 Scored high on the depression screen. Is being treated for depression. Stress from multiple health issues. Robin Thomas RN    49-31-04 Reviewed healthy coping and unhealthy coping with the group. Discussed what ways of coping each person usually uses and brainstormed ways to change to a healthy coping mechanism with the group. Stressed the importance of stress reduction and the negative effects of stress on the body including elevated BG. Community resources and support networks reviewed and handout provided. Robin Thomas RN     Developing strategies to promote health/change behavior: Identify 7 self-care behaviors; Personal health risk factors; Benefits, challenges & strategies for behavioral change;    11-21-17  #1  Robin Thomas RN           Individualized goal selection. My goal , to help me improve my health, I will:   1.      2.       Plan  Follow-up Appointments planned in group setting. Next Appointment in 1 days. Instruction Method: [x]Lecture/Discussion  [x]Power Point Presentation  [x]Handouts  []Return Demonstration      Education Materials/Equipment Provided:      [x]Self-Management  - Initial Assessment - Where do I Begin booklet and Counting Carbs from the ADA.     [x]Self-Management  Class 1 - On the Road to better managing your diabetes - Packet of Handouts from Diabetes Department    [x] Self-Management  Class 2 - Meal Plan,  Choose your Foods - Food Lists for [employer] and Nutrition in the [place] [address] - fast facts about fast food    [] Self-Management  Class 3 -  Diabetes ID card,  foot care tips sheet,  Continuing Your Journey with Diabetes, Individualized Diabetes report card, Sick Day Rules, Diabetes Cookbooks, Magazines and Pedometers when available    []Glucose Meter     []Insulin Kit     []Other      Encounter Type Date Start Time End Time

## 2017-12-01 NOTE — PROGRESS NOTES
nitroGLYCERIN (NITROSTAT) 0.4 MG SL tablet Place 0.4 mg under the tongue every 5 minutes as needed for Chest pain up to max of 3 total doses. If no relief after 1 dose, call 911.  clopidogrel (PLAVIX) 75 MG tablet Take 75 mg by mouth daily      atorvastatin (LIPITOR) 20 MG tablet Take 20 mg by mouth daily      PARoxetine (PAXIL) 20 MG tablet Take 20 mg by mouth every morning      oxyCODONE-acetaminophen (PERCOCET) 5-325 MG per tablet Take 1 tablet by mouth every 8 hours as needed for Pain .  Multiple Vitamins-Minerals (PRESERVISION AREDS 2) CAPS Take 1 capsule by mouth 2 times daily      aspirin 81 MG tablet Take 81 mg by mouth daily      cetirizine (ZYRTEC) 10 MG tablet Take 10 mg by mouth 2 times daily      cimetidine (TAGAMET HB) 200 MG tablet Take 200 mg by mouth 2 times daily      Lactobacillus (PROBIOTIC ACIDOPHILUS PO) Take by mouth daily      FOLIC ACID PO Take 976 mg by mouth 2 times daily      B Complex Vitamins (VITAMIN B COMPLEX PO) Take by mouth daily      Cholecalciferol (VITAMIN D3) 5000 UNITS TABS Take 1 tablet by mouth daily      albuterol sulfate HFA (VENTOLIN HFA) 108 (90 BASE) MCG/ACT inhaler Inhale 2 puffs into the lungs every 6 hours as needed for Wheezing      furosemide (LASIX) 40 MG tablet Take 40 mg by mouth daily.  methotrexate 2.5 MG tablet Take 2.5 mg by mouth See Admin Instructions TAKE 8 TABS EVERY WEEK       No current facility-administered medications for this encounter.    :     Comments:  Allergies:     Allergies   Allergen Reactions    Latex     Accupril [Quinapril Hcl]     Other      ACE BANDAGES    Tape Gwendalyn Kocher Tape]      PLASTIC TAPE AND ACE BANDAGES       Diabetes 5  / Health Status    A1C blood level - at goal < 7%   Lab Results   Component Value Date    LABA1C 6.2 (H) 10/11/2017    LABA1C 6.5 (H) 06/12/2017     Lab Results   Component Value Date    CREATININE 0.73 10/11/2017       Blood pressure (140/90)  Or less  BP Readings from Last 3 guidelines. 11-21-17  #1  Sumner Hatchet, RN   11-29-17  Xi Salazar RDN, [place]   09-01-12  Detailed instruction about identifying serving size, servings per container, grams of total carbohydrate and how to convert to carbohydrate exchanges. Defined low sodium and low fat guidelines and how to use food label to identify sodium, fat and saturated fat  Xi Salazar RDN, LD   Incorporating physical activity into lifestyle:   Verbalize effect of exercise on blood glucose levels; benefits of regular exercise; safety considerations; contraindications; maintenance of activity. 54-52-12  #1  Sumner Hatchet, RN   6143-34  Sumner Hatchet, RN     51-33-93 Briefly touched on the need to increase activity as able. Discussed chair exercises. Sumner Hatchet, RN    28-91-90  Reviewed the importance of physical activity, effect on BG and risks associated with activity. Food and exercises discussed. Sumner Hatchet, RN     Using medications safely:  Identify effects of diabetes medicines on blood glucose levels; List diabetes medication taken, action & side effects;    11-21-17  #1  Sumner Hatchet, RN   11-30-17  Sumner Hatchet, RN     52-89-20 Discussed all medication classes with group and modes of action including insulin and other injectables. Patient aware of BG medication they are taking, side effects and mode of action. Sumner Hatchet, RN     Insulin / Injectable - Appropriate injection sites; proper storage; supplies needed; proper technique; safe needle disposal guidelines. 12-91-96  #1  Sumner Hatchet, RN   -1  Sumner Hatchet, RN     10-13-64 Taking Lantas and Humalog. Doing pen/needle patency check with Humalog but not Lantus. Discussed the need to do both. Holding for 10 seconds after injection before pulling needle out. Sumner Hatchet, RN    89-72-43 Discussed insulin stigma and proper technique including site selection and self injection. Reviewed both pen and vial/syringe use. Discussed needle disposal and proper insulin storage. Bharathi Fowler RN     Monitoring blood glucose, interpreting and using results:  Identify recommended & personal blood glucose targets; importance of testing; testing supplies; HgbA1C target levels; Factors affecting blood glucose; Importance of logging blood glucose levels for pattern recognition; ketone testing; safe lancet disposal.   11-21-17  #1  Bharathi Fowler RN   11-28-17  Bharathi Fowler RN     99-23-46 Testing 3 times a day instead of 4 and not logging. Discussed the need to check BG and log results to help use the info to take better care of herself. BG guidelines discussed. Disposing of lancets/needles correctly. Bharathi Fowler RN    29-27-02 Discussed how to use results of  BG monitoring to manage diabetes. Discussed proper testing technique and proper lancet disposal.  Discussed when to test and what target numbers they should be aiming for. Encouraged to log results and keep a food diary. Discussed current A1c results and what the A1c is and what it measures. Bharathi Fowler RN     Prevention, detection & treatment of acute complications:  Identify symptoms of hyper & hypoglycemia, and prevention & treatment strategies. 11-21-17  #1  Bharathi Fowler RN   87-11-32  Bharathi Fowler RN     38-52-91 Discussed signs and symptoms and causes of low and high BG. Also discussed Rule of 15 and handouts given. Bharathi Fowler RN    99-35-06  Reviewed causes of high and low BG. Discussed strategies to prevent. Bharathi Fowler RN     Describe sick day guidelines & indications for physician notification. Identify short term consequences of poor control. 48-71-43  #1  Bharathi Fowler RN   11-30-17  Bharathi Fowler RN     02-92-93 Reviewed sick days with group and what to do when sick. Including continuing medications, tips for staying hydrated and when to call the doctor. Handout given.  Bharathi Fowler RN     Prevention, detection & treatment of chronic complications:  Define the natural course of diabetes & describe the relationship of blood glucose levels to long term complications of diabetes. Identify preventative measures & standards of care. 11-21-17  #1  Renate Clay RN   11-30-17  Renate Clay RN     [id number] Has tingling in toes, unsure if neuropathy or from RA. Has a nonhealing leg ulcer and being worked up for PVD. Described what sounds like sleep apnea and recommended she talk to doctor about a sleep study. Renate Clay RN  73-48-14 Discussed A1c,blood pressure and lipid panel. Discussed need to keep blood glucose in target range to decrease risk of complications. Daily foot care discussed as well as other system complications. Needs lipid panel drawn. Renate Clay RN     Developing strategies to address psychosocial issues:  Describe feelings about living with diabetes; Describe how stress, depression & anxiety affect blood glucose; Identify coping strategies; Identify support needed & support network available. 11-21-17  #1  Renate Clay RN   47-01-51  Renate Clay RN     94-02-15 Scored high on the depression screen. Is being treated for depression. Stress from multiple health issues. Renate Clay RN    50-66-87 Reviewed healthy coping and unhealthy coping with the group. Discussed what ways of coping each person usually uses and brainstormed ways to change to a healthy coping mechanism with the group. Stressed the importance of stress reduction and the negative effects of stress on the body including elevated BG. Community resources and support networks reviewed and handout provided. Renate Clay RN     Developing strategies to promote health/change behavior: Identify 7 self-care behaviors; Personal health risk factors;  Benefits, challenges & strategies for behavioral change;    11-21-17  #1  Renate Clay RN   11-30-17  Renate Clay RN     52-03-79 Discussed & Syringe      DSMS Support Plan:  Follow-up plan:     [] MNT referral request / Appointment     [] Annual update referral request and appointment after      [x]ADA  Where do I Begin, Living with Type 2 Diabetes ADA home support program     [x] [address]     [] Fit Walks : [facility] [employer] or [facility]    []  Diabetes support Group      []   Emotional Support      [x] Joana on phone      []  Internet web sites - ADA and D- life    [x]  Journals/Magazines    []  Other ___________________________      Post Education Referrals:      [] [facility] information sheet and [address], located within [facility] , 21       [] Dental care    [x] Podiatrist-See within 3 months     []  Opthamologist      [x]Sleep Study  Gavin Ram, PONCHON, LD

## 2017-12-23 DIAGNOSIS — E03.9 HYPOTHYROIDISM, UNSPECIFIED TYPE: ICD-10-CM

## 2017-12-23 LAB
ANION GAP SERPL CALCULATED.3IONS-SCNC: 11 MEQ/L (ref 7–13)
BUN BLDV-MCNC: 13 MG/DL (ref 8–23)
CALCIUM SERPL-MCNC: 9.4 MG/DL (ref 8.6–10.2)
CHLORIDE BLD-SCNC: 103 MEQ/L (ref 98–107)
CO2: 29 MEQ/L (ref 22–29)
CREAT SERPL-MCNC: 0.77 MG/DL (ref 0.5–0.9)
GFR AFRICAN AMERICAN: >60
GFR NON-AFRICAN AMERICAN: >60
GLUCOSE BLD-MCNC: 95 MG/DL (ref 74–109)
POTASSIUM SERPL-SCNC: 4.2 MEQ/L (ref 3.5–5.1)
SODIUM BLD-SCNC: 143 MEQ/L (ref 132–144)
T4 FREE: 1.2 NG/DL (ref 0.93–1.7)
TSH SERPL DL<=0.05 MIU/L-ACNC: 4.36 UIU/ML (ref 0.27–4.2)

## 2017-12-27 ENCOUNTER — OFFICE VISIT (OUTPATIENT)
Dept: SURGERY | Age: 67
End: 2017-12-27

## 2017-12-27 VITALS
WEIGHT: 274 LBS | SYSTOLIC BLOOD PRESSURE: 138 MMHG | DIASTOLIC BLOOD PRESSURE: 80 MMHG | BODY MASS INDEX: 40.58 KG/M2 | HEIGHT: 69 IN | HEART RATE: 72 BPM

## 2017-12-27 DIAGNOSIS — E03.9 HYPOTHYROIDISM, UNSPECIFIED TYPE: ICD-10-CM

## 2017-12-27 DIAGNOSIS — E27.40 ADRENAL INSUFFICIENCY (HCC): Primary | ICD-10-CM

## 2017-12-27 LAB — GLUCOSE BLD-MCNC: 98 MG/DL

## 2017-12-27 PROCEDURE — 3014F SCREEN MAMMO DOC REV: CPT | Performed by: INTERNAL MEDICINE

## 2017-12-27 PROCEDURE — 82962 GLUCOSE BLOOD TEST: CPT | Performed by: INTERNAL MEDICINE

## 2017-12-27 PROCEDURE — G8598 ASA/ANTIPLAT THER USED: HCPCS | Performed by: INTERNAL MEDICINE

## 2017-12-27 PROCEDURE — 4040F PNEUMOC VAC/ADMIN/RCVD: CPT | Performed by: INTERNAL MEDICINE

## 2017-12-27 PROCEDURE — G8417 CALC BMI ABV UP PARAM F/U: HCPCS | Performed by: INTERNAL MEDICINE

## 2017-12-27 PROCEDURE — G8484 FLU IMMUNIZE NO ADMIN: HCPCS | Performed by: INTERNAL MEDICINE

## 2017-12-27 PROCEDURE — 3044F HG A1C LEVEL LT 7.0%: CPT | Performed by: INTERNAL MEDICINE

## 2017-12-27 PROCEDURE — G8399 PT W/DXA RESULTS DOCUMENT: HCPCS | Performed by: INTERNAL MEDICINE

## 2017-12-27 PROCEDURE — 3017F COLORECTAL CA SCREEN DOC REV: CPT | Performed by: INTERNAL MEDICINE

## 2017-12-27 PROCEDURE — G8427 DOCREV CUR MEDS BY ELIG CLIN: HCPCS | Performed by: INTERNAL MEDICINE

## 2017-12-27 PROCEDURE — 1123F ACP DISCUSS/DSCN MKR DOCD: CPT | Performed by: INTERNAL MEDICINE

## 2017-12-27 PROCEDURE — 1090F PRES/ABSN URINE INCON ASSESS: CPT | Performed by: INTERNAL MEDICINE

## 2017-12-27 PROCEDURE — 99213 OFFICE O/P EST LOW 20 MIN: CPT | Performed by: INTERNAL MEDICINE

## 2017-12-27 PROCEDURE — 1036F TOBACCO NON-USER: CPT | Performed by: INTERNAL MEDICINE

## 2017-12-27 RX ORDER — LEVOTHYROXINE SODIUM 0.07 MG/1
75 TABLET ORAL DAILY
Qty: 30 TABLET | Refills: 6 | Status: SHIPPED | OUTPATIENT
Start: 2017-12-27 | End: 2018-06-18 | Stop reason: SDUPTHER

## 2017-12-31 NOTE — PROGRESS NOTES
release tablet, Take 1 tablet by mouth daily, Disp: 90 tablet, Rfl: 3    insulin glargine (LANTUS SOLOSTAR) 100 UNIT/ML injection pen, Inject 10 Units into the skin nightly, Disp: 5 Pen, Rfl: 3    predniSONE (DELTASONE) 10 MG tablet, Once a day, Disp: 30 tablet, Rfl: 01    glucose blood VI test strips (ONETOUCH VERIO) strip, Use bid to check bs Dx e11.65, Disp: 100 each, Rfl: 3    Blood Glucose Monitoring Suppl (ONETOUCH VERIO) w/Device KIT, Give 1 meter to check bs Dx E11.65, Disp: 1 kit, Rfl: 0    ONETOUCH DELICA LANCETS 87B MISC, Use bid to check bs Dx E11.65, Disp: 100 each, Rfl: 03    azaTHIOprine (IMURAN) 50 MG tablet, Take 100 mg by mouth daily, Disp: , Rfl:     diltiazem (TIAZAC) 300 MG extended release capsule, Take 300 mg by mouth daily, Disp: , Rfl:     nitroGLYCERIN (NITROSTAT) 0.4 MG SL tablet, Place 0.4 mg under the tongue every 5 minutes as needed for Chest pain up to max of 3 total doses.  If no relief after 1 dose, call 911., Disp: , Rfl:     clopidogrel (PLAVIX) 75 MG tablet, Take 75 mg by mouth daily, Disp: , Rfl:     atorvastatin (LIPITOR) 20 MG tablet, Take 20 mg by mouth daily, Disp: , Rfl:     PARoxetine (PAXIL) 20 MG tablet, Take 20 mg by mouth every morning, Disp: , Rfl:     oxyCODONE-acetaminophen (PERCOCET) 5-325 MG per tablet, Take 1 tablet by mouth every 8 hours as needed for Pain ., Disp: , Rfl:     Multiple Vitamins-Minerals (PRESERVISION AREDS 2) CAPS, Take 1 capsule by mouth 2 times daily, Disp: , Rfl:     aspirin 81 MG tablet, Take 81 mg by mouth daily, Disp: , Rfl:     cetirizine (ZYRTEC) 10 MG tablet, Take 10 mg by mouth 2 times daily, Disp: , Rfl:     cimetidine (TAGAMET HB) 200 MG tablet, Take 200 mg by mouth 2 times daily, Disp: , Rfl:     Lactobacillus (PROBIOTIC ACIDOPHILUS PO), Take by mouth daily, Disp: , Rfl:     FOLIC ACID PO, Take 993 mg by mouth 2 times daily, Disp: , Rfl:     B Complex Vitamins (VITAMIN B COMPLEX PO), Take by mouth daily, Disp: , Rfl:   Cholecalciferol (VITAMIN D3) 5000 UNITS TABS, Take 1 tablet by mouth daily, Disp: , Rfl:     albuterol sulfate HFA (VENTOLIN HFA) 108 (90 BASE) MCG/ACT inhaler, Inhale 2 puffs into the lungs every 6 hours as needed for Wheezing, Disp: , Rfl:     furosemide (LASIX) 40 MG tablet, Take 40 mg by mouth daily. , Disp: , Rfl:     methotrexate 2.5 MG tablet, Take 2.5 mg by mouth See Admin Instructions TAKE 8 TABS EVERY WEEK, Disp: , Rfl:     valsartan (DIOVAN) 80 MG tablet, Take 80 mg by mouth daily, Disp: , Rfl:     Review of Systems   Constitutional: Positive for fatigue and unexpected weight change. Psychiatric/Behavioral: Positive for dysphoric mood. The patient is nervous/anxious. All other systems reviewed and are negative. Vitals:    12/27/17 1102   BP: 138/80   Site: Right Arm   Position: Sitting   Cuff Size: Large Adult   Pulse: 72   Weight: 274 lb (124.3 kg)   Height: 5' 9\" (1.753 m)       Objective:   Physical Exam   Constitutional: She appears well-developed and well-nourished. HENT:   Head: Normocephalic and atraumatic. Cardiovascular: Normal rate. Abdominal:   Obese    Neurological: She is alert. Skin: Skin is warm and dry. Psychiatric: She has a normal mood and affect. Results for Fred Huang (MRN [id number]) as of 12/31/2017 16:05   Ref.  Range 12/23/2017 10:06   Sodium Latest Ref Range: 132 - 144 mEq/L 143   Potassium Latest Ref Range: 3.5 - 5.1 mEq/L 4.2   Chloride Latest Ref Range: 98 - 107 mEq/L 103   CO2 Latest Ref Range: 22 - 29 mEq/L 29   BUN Latest Ref Range: 8 - 23 mg/dL 13   Creatinine Latest Ref Range: 0.50 - 0.90 mg/dL 0.77   Anion Gap Latest Ref Range: 7 - 13 mEq/L 11   GFR Non- Latest Ref Range: >60  >60.0   GFR African American Latest Ref Range: >60  >60.0   Glucose Latest Ref Range: 74 - 109 mg/dL 95   Calcium Latest Ref Range: 8.6 - 10.2 mg/dL 9.4   TSH Latest Ref Range: 0.270 - 4.200 uIU/mL 4.360 (H)   T4 Free Latest Ref Range: 0.93 - 1.70 ng/dL 1.20       Assessment:      1. Adrenal insufficiency ([facility]     2. Hypothyroidism, unspecified type  T4, Free    TSH without Reflex   3.  Uncontrolled type 2 diabetes mellitus with complication, with long-term current use of insulin ([facility]             Plan:      Orders Placed This Encounter   Procedures    T4, Free     Standing Status:   Future     Standing Expiration Date:   12/27/2018    TSH without Reflex     Standing Status:   Future     Standing Expiration Date:   12/27/2018    Basic Metabolic Panel     Standing Status:   Future     Standing Expiration Date:   12/27/2018    Hemoglobin A1C     Standing Status:   Future     Standing Expiration Date:   12/27/2018    POCT Glucose     Increase synthroid dose   Orders Placed This Encounter   Medications    levothyroxine (SYNTHROID) 75 MCG tablet     Sig: Take 1 tablet by mouth Daily     Dispense:  30 tablet     Refill:  06    insulin lispro (HUMALOG KWIKPEN) 100 UNIT/ML pen     Sig: Less than 150 none  151-200 2 units  201-250 4 units   251-300 6 units   301-400 8 units     Dispense:  5 pen     Refill:  3     Adjust Humalog coverage   continue current dose of lantus 10 units at bedtime plus prednisone 10 mg daily

## 2018-03-12 DIAGNOSIS — E03.9 HYPOTHYROIDISM, UNSPECIFIED TYPE: ICD-10-CM

## 2018-03-12 LAB
ANION GAP SERPL CALCULATED.3IONS-SCNC: 15 MEQ/L (ref 7–13)
BUN BLDV-MCNC: 16 MG/DL (ref 8–23)
CALCIUM SERPL-MCNC: 9.8 MG/DL (ref 8.6–10.2)
CHLORIDE BLD-SCNC: 102 MEQ/L (ref 98–107)
CO2: 26 MEQ/L (ref 22–29)
CREAT SERPL-MCNC: 0.93 MG/DL (ref 0.5–0.9)
GFR AFRICAN AMERICAN: >60
GFR NON-AFRICAN AMERICAN: >60
GLUCOSE BLD-MCNC: 87 MG/DL (ref 74–109)
HBA1C MFR BLD: 6.8 % (ref 4.8–5.9)
POTASSIUM SERPL-SCNC: 4 MEQ/L (ref 3.5–5.1)
SODIUM BLD-SCNC: 143 MEQ/L (ref 132–144)
T4 FREE: 1.37 NG/DL (ref 0.93–1.7)
TSH SERPL DL<=0.05 MIU/L-ACNC: 2.96 UIU/ML (ref 0.27–4.2)

## 2018-03-28 ENCOUNTER — OFFICE VISIT (OUTPATIENT)
Dept: ENDOCRINOLOGY | Age: 68
End: 2018-03-28
Payer: MEDICARE

## 2018-03-28 VITALS
HEART RATE: 82 BPM | DIASTOLIC BLOOD PRESSURE: 82 MMHG | WEIGHT: 276 LBS | SYSTOLIC BLOOD PRESSURE: 138 MMHG | HEIGHT: 69 IN | BODY MASS INDEX: 40.88 KG/M2

## 2018-03-28 DIAGNOSIS — E03.9 HYPOTHYROIDISM, UNSPECIFIED TYPE: ICD-10-CM

## 2018-03-28 DIAGNOSIS — E27.40 ADRENAL INSUFFICIENCY (HCC): ICD-10-CM

## 2018-03-28 LAB — GLUCOSE BLD-MCNC: 149 MG/DL

## 2018-03-28 PROCEDURE — 99213 OFFICE O/P EST LOW 20 MIN: CPT | Performed by: INTERNAL MEDICINE

## 2018-03-28 PROCEDURE — G8427 DOCREV CUR MEDS BY ELIG CLIN: HCPCS | Performed by: INTERNAL MEDICINE

## 2018-03-28 PROCEDURE — 82962 GLUCOSE BLOOD TEST: CPT | Performed by: INTERNAL MEDICINE

## 2018-03-28 PROCEDURE — 3014F SCREEN MAMMO DOC REV: CPT | Performed by: INTERNAL MEDICINE

## 2018-03-28 PROCEDURE — 3017F COLORECTAL CA SCREEN DOC REV: CPT | Performed by: INTERNAL MEDICINE

## 2018-03-28 PROCEDURE — 3044F HG A1C LEVEL LT 7.0%: CPT | Performed by: INTERNAL MEDICINE

## 2018-03-28 PROCEDURE — G8417 CALC BMI ABV UP PARAM F/U: HCPCS | Performed by: INTERNAL MEDICINE

## 2018-03-28 PROCEDURE — 1090F PRES/ABSN URINE INCON ASSESS: CPT | Performed by: INTERNAL MEDICINE

## 2018-03-28 PROCEDURE — G8399 PT W/DXA RESULTS DOCUMENT: HCPCS | Performed by: INTERNAL MEDICINE

## 2018-03-28 PROCEDURE — G8598 ASA/ANTIPLAT THER USED: HCPCS | Performed by: INTERNAL MEDICINE

## 2018-03-28 PROCEDURE — 4040F PNEUMOC VAC/ADMIN/RCVD: CPT | Performed by: INTERNAL MEDICINE

## 2018-03-28 PROCEDURE — G8484 FLU IMMUNIZE NO ADMIN: HCPCS | Performed by: INTERNAL MEDICINE

## 2018-03-28 PROCEDURE — 1036F TOBACCO NON-USER: CPT | Performed by: INTERNAL MEDICINE

## 2018-03-28 PROCEDURE — 1123F ACP DISCUSS/DSCN MKR DOCD: CPT | Performed by: INTERNAL MEDICINE

## 2018-03-28 ASSESSMENT — ENCOUNTER SYMPTOMS: TROUBLE SWALLOWING: 1

## 2018-03-28 NOTE — PROGRESS NOTES
Subjective:      Patient ID: Henry Saba is a 80 y.o. female. Diabetes   She presents for her follow-up diabetic visit. She has type 2 diabetes mellitus. Her disease course has been stable. Hypoglycemia symptoms include nervousness/anxiousness. Associated symptoms include fatigue. Risk factors for coronary artery disease include diabetes mellitus, obesity, sedentary lifestyle and post-menopausal. Current diabetic treatment includes insulin injections (metformin lantus plus humalog ). She is currently taking insulin pre-breakfast, pre-lunch, pre-dinner and at bedtime. Her weight is fluctuating minimally. She never participates in exercise. Her overall blood glucose range is 140-180 mg/dl. (Lab Results       Component                Value               Date                       LABA1C                   6.8 (H)             03/12/2018            ) An ACE inhibitor/angiotensin II receptor blocker is being taken. Other   This is a chronic (Adrenal insufficiency) problem. The current episode started more than 1 year ago. The problem occurs intermittently. The problem has been waxing and waning. Associated symptoms include fatigue. Exacerbated by: Long-term prednisone use. Treatments tried: prednisone            Hypothyroidism on replacement with synthroid reviewed labs    Obesity Body mass index is 40.76 kg/m².  weight has increased     Pt c/o dysphagia         Patient Active Problem List   Diagnosis    Rotator cuff tendinitis    RA (rheumatoid arthritis) ([address]    Hypertension    CAD (coronary artery disease)    Morbid obesity due to excess calories ([address]    Non morbid obesity due to excess calories    Uncontrolled type 2 diabetes mellitus (HCC)         Allergies   Allergen Reactions    Latex     Accupril [Quinapril Hcl]     Other      ACE BANDAGES    Tape [Adhesive Tape]      PLASTIC TAPE AND ACE BANDAGES       Current Outpatient Prescriptions:     insulin glargine (LANTUS SOLOSTAR) 100 UNIT/ML injection pen, Inject 10 Units into the skin nightly, Disp: 5 pen, Rfl: 3    levothyroxine (SYNTHROID) 75 MCG tablet, Take 1 tablet by mouth Daily, Disp: 30 tablet, Rfl: 06    insulin lispro (HUMALOG KWIKPEN) 100 UNIT/ML pen, Less than 150 none 151-200 2 units 201-250 4 units  251-300 6 units  301-400 8 units, Disp: 5 pen, Rfl: 3    valsartan (DIOVAN) 80 MG tablet, Take 80 mg by mouth daily, Disp: , Rfl:     potassium chloride (KLOR-CON M) 20 MEQ extended release tablet, Take 1 tablet by mouth daily, Disp: 90 tablet, Rfl: 3    predniSONE (DELTASONE) 10 MG tablet, Once a day, Disp: 30 tablet, Rfl: 01    glucose blood VI test strips (ONETOUCH VERIO) strip, Use bid to check bs Dx e11.65, Disp: 100 each, Rfl: 3    Blood Glucose Monitoring Suppl (ONETOUCH VERIO) w/Device KIT, Give 1 meter to check bs Dx E11.65, Disp: 1 kit, Rfl: 0    ONETOUCH DELICA LANCETS 73Y MISC, Use bid to check bs Dx E11.65, Disp: 100 each, Rfl: 03    azaTHIOprine (IMURAN) 50 MG tablet, Take 100 mg by mouth daily, Disp: , Rfl:     diltiazem (TIAZAC) 300 MG extended release capsule, Take 300 mg by mouth daily, Disp: , Rfl:     nitroGLYCERIN (NITROSTAT) 0.4 MG SL tablet, Place 0.4 mg under the tongue every 5 minutes as needed for Chest pain up to max of 3 total doses.  If no relief after 1 dose, call 911., Disp: , Rfl:     clopidogrel (PLAVIX) 75 MG tablet, Take 75 mg by mouth daily, Disp: , Rfl:     atorvastatin (LIPITOR) 20 MG tablet, Take 20 mg by mouth daily, Disp: , Rfl:     Multiple Vitamins-Minerals (PRESERVISION AREDS 2) CAPS, Take 1 capsule by mouth 2 times daily, Disp: , Rfl:     aspirin 81 MG tablet, Take 81 mg by mouth daily, Disp: , Rfl:     cetirizine (ZYRTEC) 10 MG tablet, Take 10 mg by mouth 2 times daily, Disp: , Rfl:     cimetidine (TAGAMET HB) 200 MG tablet, Take 200 mg by mouth 2 times daily, Disp: , Rfl:     Lactobacillus (PROBIOTIC ACIDOPHILUS PO), Take by mouth daily, Disp: , Rfl:     FOLIC ACID PO, Take 926 mg by mouth 2 times daily, Disp: , Rfl:     B Complex Vitamins (VITAMIN B COMPLEX PO), Take by mouth daily, Disp: , Rfl:     Cholecalciferol (VITAMIN D3) 5000 UNITS TABS, Take 1 tablet by mouth daily, Disp: , Rfl:     albuterol sulfate HFA (VENTOLIN HFA) 108 (90 BASE) MCG/ACT inhaler, Inhale 2 puffs into the lungs every 6 hours as needed for Wheezing, Disp: , Rfl:     furosemide (LASIX) 40 MG tablet, Take 40 mg by mouth daily. , Disp: , Rfl:     methotrexate 2.5 MG tablet, Take 2.5 mg by mouth See Admin Instructions TAKE 8 TABS EVERY WEEK, Disp: , Rfl:     Review of Systems   Constitutional: Positive for fatigue and unexpected weight change. HENT: Positive for trouble swallowing. Psychiatric/Behavioral: Positive for dysphoric mood. The patient is nervous/anxious. All other systems reviewed and are negative. Vitals:    03/28/18 0954   BP: 138/82   Site: Left Arm   Position: Sitting   Cuff Size: Large Adult   Pulse: 82   Weight: 276 lb (125.2 kg)   Height: 5' 9\" (1.753 m)       Objective:   Physical Exam   Constitutional: She appears well-developed and well-nourished. HENT:   Head: Normocephalic and atraumatic. Cardiovascular: Normal rate. Pulmonary/Chest: Effort normal.   Abdominal:   Obese    Musculoskeletal: She exhibits edema. Neurological: She is alert. Skin: Skin is warm and dry. Psychiatric: She has a normal mood and affect. Assessment:      1. Uncontrolled type 2 diabetes mellitus with complication, without long-term current use of insulin (HCC)  POCT Glucose    Hemoglobin A1C    Microalbumin / Creatinine Urine Ratio   2. Hypothyroidism, unspecified type  Basic Metabolic Panel    T4, Free    TSH without Reflex    US HEAD NECK SOFT TISSUE THYROID   3. Adrenal insufficiency (HCC)             Plan:        The current medical regimen is effective;  continue present plan and medications.     Orders Placed This Encounter   Procedures    US HEAD NECK SOFT TISSUE THYROID

## 2018-04-14 ENCOUNTER — HOSPITAL ENCOUNTER (OUTPATIENT)
Dept: ULTRASOUND IMAGING | Age: 68
Discharge: HOME | End: 2018-04-16
Payer: MEDICARE

## 2018-04-14 DIAGNOSIS — E03.9 HYPOTHYROIDISM, UNSPECIFIED TYPE: ICD-10-CM

## 2018-04-14 PROCEDURE — 76536 US EXAM OF HEAD AND NECK: CPT

## 2018-05-17 ENCOUNTER — TELEPHONE (OUTPATIENT)
Dept: DIABETES SERVICES | Age: 68
End: 2018-05-17

## 2018-05-21 DIAGNOSIS — E03.9 HYPOTHYROIDISM, UNSPECIFIED TYPE: ICD-10-CM

## 2018-05-21 LAB
ANION GAP SERPL CALCULATED.3IONS-SCNC: 16 MEQ/L (ref 7–13)
BUN BLDV-MCNC: 13 MG/DL (ref 8–23)
CALCIUM SERPL-MCNC: 9.9 MG/DL (ref 8.6–10.2)
CHLORIDE BLD-SCNC: 101 MEQ/L (ref 98–107)
CO2: 29 MEQ/L (ref 22–29)
CREAT SERPL-MCNC: 0.78 MG/DL (ref 0.5–0.9)
CREATININE URINE: 166.9 MG/DL
GFR AFRICAN AMERICAN: >60
GFR NON-AFRICAN AMERICAN: >60
GLUCOSE BLD-MCNC: 116 MG/DL (ref 74–109)
HBA1C MFR BLD: 6.9 % (ref 4.8–5.9)
MICROALBUMIN UR-MCNC: 17.9 MG/DL
MICROALBUMIN/CREAT UR-RTO: 107.2 MG/G (ref 0–30)
POTASSIUM SERPL-SCNC: 4.1 MEQ/L (ref 3.5–5.1)
SODIUM BLD-SCNC: 146 MEQ/L (ref 132–144)
T4 FREE: 1.42 NG/DL (ref 0.93–1.7)
TSH SERPL DL<=0.05 MIU/L-ACNC: 2.93 UIU/ML (ref 0.27–4.2)

## 2018-06-18 ENCOUNTER — OFFICE VISIT (OUTPATIENT)
Dept: ENDOCRINOLOGY | Age: 68
End: 2018-06-18
Payer: MEDICARE

## 2018-06-18 VITALS
SYSTOLIC BLOOD PRESSURE: 134 MMHG | DIASTOLIC BLOOD PRESSURE: 80 MMHG | HEART RATE: 86 BPM | HEIGHT: 69 IN | WEIGHT: 276 LBS | BODY MASS INDEX: 40.88 KG/M2

## 2018-06-18 DIAGNOSIS — E27.40 ADRENAL INSUFFICIENCY (HCC): ICD-10-CM

## 2018-06-18 DIAGNOSIS — E03.8 OTHER SPECIFIED HYPOTHYROIDISM: ICD-10-CM

## 2018-06-18 DIAGNOSIS — E03.9 HYPOTHYROIDISM, UNSPECIFIED TYPE: ICD-10-CM

## 2018-06-18 LAB — GLUCOSE BLD-MCNC: 267 MG/DL

## 2018-06-18 PROCEDURE — 82962 GLUCOSE BLOOD TEST: CPT | Performed by: INTERNAL MEDICINE

## 2018-06-18 PROCEDURE — G8399 PT W/DXA RESULTS DOCUMENT: HCPCS | Performed by: INTERNAL MEDICINE

## 2018-06-18 PROCEDURE — 3044F HG A1C LEVEL LT 7.0%: CPT | Performed by: INTERNAL MEDICINE

## 2018-06-18 PROCEDURE — 1123F ACP DISCUSS/DSCN MKR DOCD: CPT | Performed by: INTERNAL MEDICINE

## 2018-06-18 PROCEDURE — G8417 CALC BMI ABV UP PARAM F/U: HCPCS | Performed by: INTERNAL MEDICINE

## 2018-06-18 PROCEDURE — G8598 ASA/ANTIPLAT THER USED: HCPCS | Performed by: INTERNAL MEDICINE

## 2018-06-18 PROCEDURE — G8427 DOCREV CUR MEDS BY ELIG CLIN: HCPCS | Performed by: INTERNAL MEDICINE

## 2018-06-18 PROCEDURE — 99214 OFFICE O/P EST MOD 30 MIN: CPT | Performed by: INTERNAL MEDICINE

## 2018-06-18 PROCEDURE — 2022F DILAT RTA XM EVC RTNOPTHY: CPT | Performed by: INTERNAL MEDICINE

## 2018-06-18 PROCEDURE — 1036F TOBACCO NON-USER: CPT | Performed by: INTERNAL MEDICINE

## 2018-06-18 PROCEDURE — 1090F PRES/ABSN URINE INCON ASSESS: CPT | Performed by: INTERNAL MEDICINE

## 2018-06-18 PROCEDURE — 3017F COLORECTAL CA SCREEN DOC REV: CPT | Performed by: INTERNAL MEDICINE

## 2018-06-18 PROCEDURE — 4040F PNEUMOC VAC/ADMIN/RCVD: CPT | Performed by: INTERNAL MEDICINE

## 2018-06-18 RX ORDER — ATORVASTATIN CALCIUM 40 MG/1
40 TABLET, FILM COATED ORAL DAILY
COMMUNITY

## 2018-06-18 RX ORDER — LEVOTHYROXINE SODIUM 0.07 MG/1
75 TABLET ORAL DAILY
Qty: 90 TABLET | Refills: 3 | Status: SHIPPED | OUTPATIENT
Start: 2018-06-18 | End: 2018-08-15 | Stop reason: SDUPTHER

## 2018-06-20 PROBLEM — E03.8 OTHER SPECIFIED HYPOTHYROIDISM: Status: ACTIVE | Noted: 2018-06-20

## 2018-06-20 PROBLEM — E27.40 ADRENAL INSUFFICIENCY (HCC): Status: ACTIVE | Noted: 2018-06-20

## 2018-08-15 DIAGNOSIS — E03.9 HYPOTHYROIDISM, UNSPECIFIED TYPE: ICD-10-CM

## 2018-08-15 RX ORDER — LEVOTHYROXINE SODIUM 0.07 MG/1
75 TABLET ORAL DAILY
Qty: 90 TABLET | Refills: 3 | Status: SHIPPED | OUTPATIENT
Start: 2018-08-15 | End: 2019-03-18 | Stop reason: SDUPTHER

## 2018-09-13 DIAGNOSIS — E03.9 HYPOTHYROIDISM, UNSPECIFIED TYPE: ICD-10-CM

## 2018-09-13 LAB
ANION GAP SERPL CALCULATED.3IONS-SCNC: 15 MEQ/L (ref 7–13)
BUN BLDV-MCNC: 12 MG/DL (ref 8–23)
CALCIUM SERPL-MCNC: 9.4 MG/DL (ref 8.6–10.2)
CHLORIDE BLD-SCNC: 100 MEQ/L (ref 98–107)
CO2: 28 MEQ/L (ref 22–29)
CREAT SERPL-MCNC: 0.87 MG/DL (ref 0.5–0.9)
GFR AFRICAN AMERICAN: >60
GFR NON-AFRICAN AMERICAN: >60
GLUCOSE BLD-MCNC: 98 MG/DL (ref 74–109)
HBA1C MFR BLD: 8.2 % (ref 4.8–5.9)
POTASSIUM SERPL-SCNC: 3.7 MEQ/L (ref 3.5–5.1)
SODIUM BLD-SCNC: 143 MEQ/L (ref 132–144)
T4 FREE: 1.56 NG/DL (ref 0.93–1.7)
TSH SERPL DL<=0.05 MIU/L-ACNC: 3.34 UIU/ML (ref 0.27–4.2)

## 2018-09-18 ENCOUNTER — OFFICE VISIT (OUTPATIENT)
Dept: ENDOCRINOLOGY | Age: 68
End: 2018-09-18
Payer: MEDICARE

## 2018-09-18 VITALS
DIASTOLIC BLOOD PRESSURE: 85 MMHG | WEIGHT: 271 LBS | HEART RATE: 84 BPM | HEIGHT: 69 IN | BODY MASS INDEX: 40.14 KG/M2 | SYSTOLIC BLOOD PRESSURE: 137 MMHG

## 2018-09-18 DIAGNOSIS — B35.1 ONYCHOMYCOSIS: ICD-10-CM

## 2018-09-18 DIAGNOSIS — E27.1 ADRENAL INSUFFICIENCY (ADDISON'S DISEASE) (HCC): ICD-10-CM

## 2018-09-18 DIAGNOSIS — E03.9 HYPOTHYROIDISM, UNSPECIFIED TYPE: ICD-10-CM

## 2018-09-18 LAB — GLUCOSE BLD-MCNC: 200 MG/DL

## 2018-09-18 PROCEDURE — G8598 ASA/ANTIPLAT THER USED: HCPCS | Performed by: INTERNAL MEDICINE

## 2018-09-18 PROCEDURE — G8427 DOCREV CUR MEDS BY ELIG CLIN: HCPCS | Performed by: INTERNAL MEDICINE

## 2018-09-18 PROCEDURE — 82962 GLUCOSE BLOOD TEST: CPT | Performed by: INTERNAL MEDICINE

## 2018-09-18 PROCEDURE — 4040F PNEUMOC VAC/ADMIN/RCVD: CPT | Performed by: INTERNAL MEDICINE

## 2018-09-18 PROCEDURE — G8399 PT W/DXA RESULTS DOCUMENT: HCPCS | Performed by: INTERNAL MEDICINE

## 2018-09-18 PROCEDURE — 1036F TOBACCO NON-USER: CPT | Performed by: INTERNAL MEDICINE

## 2018-09-18 PROCEDURE — 1090F PRES/ABSN URINE INCON ASSESS: CPT | Performed by: INTERNAL MEDICINE

## 2018-09-18 PROCEDURE — 99213 OFFICE O/P EST LOW 20 MIN: CPT | Performed by: INTERNAL MEDICINE

## 2018-09-18 PROCEDURE — 1123F ACP DISCUSS/DSCN MKR DOCD: CPT | Performed by: INTERNAL MEDICINE

## 2018-09-18 PROCEDURE — 3045F PR MOST RECENT HEMOGLOBIN A1C LEVEL 7.0-9.0%: CPT | Performed by: INTERNAL MEDICINE

## 2018-09-18 PROCEDURE — G8417 CALC BMI ABV UP PARAM F/U: HCPCS | Performed by: INTERNAL MEDICINE

## 2018-09-18 PROCEDURE — 3017F COLORECTAL CA SCREEN DOC REV: CPT | Performed by: INTERNAL MEDICINE

## 2018-09-18 PROCEDURE — 2022F DILAT RTA XM EVC RTNOPTHY: CPT | Performed by: INTERNAL MEDICINE

## 2018-09-18 PROCEDURE — 1101F PT FALLS ASSESS-DOCD LE1/YR: CPT | Performed by: INTERNAL MEDICINE

## 2018-09-18 RX ORDER — GABAPENTIN 300 MG/1
300 CAPSULE ORAL 3 TIMES DAILY
Qty: 90 CAPSULE | Refills: 3 | Status: SHIPPED | OUTPATIENT
Start: 2018-09-18 | End: 2018-09-21 | Stop reason: SDUPTHER

## 2018-09-21 RX ORDER — GABAPENTIN 300 MG/1
CAPSULE ORAL
Qty: 90 CAPSULE | Refills: 3 | Status: SHIPPED | OUTPATIENT
Start: 2018-09-21 | End: 2019-03-18 | Stop reason: SDUPTHER

## 2018-09-23 NOTE — PROGRESS NOTES
Subjective:      Patient ID: Arelis Leal is a 80 y.o. female. 3 month f/u   Diabetes   She presents for her follow-up diabetic visit. Her disease course has been worsening. Diabetic complications include heart disease and peripheral neuropathy. Risk factors for coronary artery disease include diabetes mellitus, obesity, sedentary lifestyle and post-menopausal. Current diabetic treatment includes insulin injections (lantus plus humalog ). She is currently taking insulin pre-breakfast, pre-lunch, pre-dinner and at bedtime. Her bedtime blood glucose range is generally >200 mg/dl. Her overall blood glucose range is 140-180 mg/dl. (Higher post prandial sugars   Lab Results       Component                Value               Date                       LABA1C                   8.2 (H)             09/13/2018            ) An ACE inhibitor/angiotensin II receptor blocker is being taken. Other   This is a chronic (hypothyroidsm ) problem. The current episode started more than 1 year ago. Associated symptoms include arthralgias. Treatments tried: synthyroid  The treatment provided moderate relief. Adrenal insufficiency on prednisone       Reviewed labs    Results for Elba Landa (MRN [id number]) as of 9/23/2018 17:42   Ref.  Range 9/13/2018 10:21   Sodium Latest Ref Range: 132 - 144 mEq/L 143   Potassium Latest Ref Range: 3.5 - 5.1 mEq/L 3.7   Chloride Latest Ref Range: 98 - 107 mEq/L 100   CO2 Latest Ref Range: 22 - 29 mEq/L 28   BUN Latest Ref Range: 8 - 23 mg/dL 12   Creatinine Latest Ref Range: 0.50 - 0.90 mg/dL 0.87   Anion Gap Latest Ref Range: 7 - 13 mEq/L 15 (H)   GFR Non- Latest Ref Range: >60  >60.0   GFR African American Latest Ref Range: >60  >60.0   Glucose Latest Ref Range: 74 - 109 mg/dL 98   Calcium Latest Ref Range: 8.6 - 10.2 mg/dL 9.4   TSH Latest Ref Range: 0.270 - 4.200 uIU/mL 3.340   T4 Free Latest Ref Range: 0.93 - 1.70 ng/dL 1.56     Morbid obesity Body mass index is 40.02 Normal range of motion. She exhibits edema. Feet:    Neurological: She is alert. Skin: Skin is warm. Psychiatric: She has a normal mood and affect. Assessment:       Diagnosis Orders   1. Uncontrolled type 2 diabetes mellitus with complication, without long-term current use of insulin ([facility])  POCT Glucose    Basic Metabolic Panel    Hemoglobin A1C   2. Hypothyroidism, unspecified type  Basic Metabolic Panel    TSH without Reflex    T4   3. Adrenal insufficiency (Barbour's disease) ([facility]     4. Onychomycosis             Plan:      Orders Placed This Encounter   Procedures    Basic Metabolic Panel     Standing Status:   Future     Standing Expiration Date:   2019    Hemoglobin A1C     Standing Status:   Future     Standing Expiration Date:   2019    TSH without Reflex     Standing Status:   Future     Standing Expiration Date:   2019    T4     Standing Status:   Future     Standing Expiration Date:   2019    POCT Glucose     Adjust Humalog dose   Orders Placed This Encounter   Medications    insulin lispro (HUMALOG KWIKPEN) 100 UNIT/ML pen     Si units plus sliding scale   Less than 150 none  151-200 2 units  201-250 4 units   251-300 6 units   301-400 8 units     Dispense:  5 pen     Refill:  3    DISCONTD: gabapentin (NEURONTIN) 300 MG capsule     Sig: Take 1 capsule by mouth 3 times daily for 61 days. .     Dispense:  90 capsule     Refill:  3    gabapentin (NEURONTIN) 300 MG capsule     Sig: Take 1 capsule 3x daily.      Dispense:  90 capsule     Refill:  3     Continue lantus 10 units at bedtime   Continue synthroid 75 mcg daily   Continue prednisone 10 mg daily            Arla Merlin, MD

## 2018-12-14 DIAGNOSIS — E03.9 HYPOTHYROIDISM, UNSPECIFIED TYPE: ICD-10-CM

## 2018-12-14 LAB
ANION GAP SERPL CALCULATED.3IONS-SCNC: 15 MEQ/L (ref 7–13)
BUN BLDV-MCNC: 11 MG/DL (ref 8–23)
CALCIUM SERPL-MCNC: 9.9 MG/DL (ref 8.6–10.2)
CHLORIDE BLD-SCNC: 99 MEQ/L (ref 98–107)
CO2: 30 MEQ/L (ref 22–29)
CREAT SERPL-MCNC: 0.73 MG/DL (ref 0.5–0.9)
GFR AFRICAN AMERICAN: >60
GFR NON-AFRICAN AMERICAN: >60
GLUCOSE BLD-MCNC: 96 MG/DL (ref 74–109)
HBA1C MFR BLD: 6.9 % (ref 4.8–5.9)
POTASSIUM SERPL-SCNC: 3.6 MEQ/L (ref 3.5–5.1)
SODIUM BLD-SCNC: 144 MEQ/L (ref 132–144)
T4 TOTAL: 6.6 UG/DL (ref 4.5–11.7)
TSH SERPL DL<=0.05 MIU/L-ACNC: 1.92 UIU/ML (ref 0.27–4.2)

## 2018-12-18 ENCOUNTER — OFFICE VISIT (OUTPATIENT)
Dept: ENDOCRINOLOGY | Age: 68
End: 2018-12-18
Payer: MEDICARE

## 2018-12-18 VITALS
SYSTOLIC BLOOD PRESSURE: 166 MMHG | WEIGHT: 275 LBS | OXYGEN SATURATION: 93 % | BODY MASS INDEX: 41.68 KG/M2 | HEART RATE: 97 BPM | DIASTOLIC BLOOD PRESSURE: 107 MMHG | HEIGHT: 68 IN

## 2018-12-18 DIAGNOSIS — E27.40 ADRENAL INSUFFICIENCY (HCC): ICD-10-CM

## 2018-12-18 DIAGNOSIS — E11.641 UNCONTROLLED TYPE 2 DIABETES MELLITUS WITH HYPOGLYCEMIA AND COMA (HCC): Primary | ICD-10-CM

## 2018-12-18 DIAGNOSIS — E03.8 OTHER SPECIFIED HYPOTHYROIDISM: ICD-10-CM

## 2018-12-18 LAB — GLUCOSE BLD-MCNC: 114 MG/DL

## 2018-12-18 PROCEDURE — 3044F HG A1C LEVEL LT 7.0%: CPT | Performed by: INTERNAL MEDICINE

## 2018-12-18 PROCEDURE — G8417 CALC BMI ABV UP PARAM F/U: HCPCS | Performed by: INTERNAL MEDICINE

## 2018-12-18 PROCEDURE — 1123F ACP DISCUSS/DSCN MKR DOCD: CPT | Performed by: INTERNAL MEDICINE

## 2018-12-18 PROCEDURE — 1101F PT FALLS ASSESS-DOCD LE1/YR: CPT | Performed by: INTERNAL MEDICINE

## 2018-12-18 PROCEDURE — G8484 FLU IMMUNIZE NO ADMIN: HCPCS | Performed by: INTERNAL MEDICINE

## 2018-12-18 PROCEDURE — 4040F PNEUMOC VAC/ADMIN/RCVD: CPT | Performed by: INTERNAL MEDICINE

## 2018-12-18 PROCEDURE — 3017F COLORECTAL CA SCREEN DOC REV: CPT | Performed by: INTERNAL MEDICINE

## 2018-12-18 PROCEDURE — 99213 OFFICE O/P EST LOW 20 MIN: CPT | Performed by: INTERNAL MEDICINE

## 2018-12-18 PROCEDURE — G8427 DOCREV CUR MEDS BY ELIG CLIN: HCPCS | Performed by: INTERNAL MEDICINE

## 2018-12-18 PROCEDURE — G8598 ASA/ANTIPLAT THER USED: HCPCS | Performed by: INTERNAL MEDICINE

## 2018-12-18 PROCEDURE — 82962 GLUCOSE BLOOD TEST: CPT | Performed by: INTERNAL MEDICINE

## 2018-12-18 PROCEDURE — G8399 PT W/DXA RESULTS DOCUMENT: HCPCS | Performed by: INTERNAL MEDICINE

## 2018-12-18 PROCEDURE — 1036F TOBACCO NON-USER: CPT | Performed by: INTERNAL MEDICINE

## 2018-12-18 PROCEDURE — 2022F DILAT RTA XM EVC RTNOPTHY: CPT | Performed by: INTERNAL MEDICINE

## 2018-12-18 PROCEDURE — 1090F PRES/ABSN URINE INCON ASSESS: CPT | Performed by: INTERNAL MEDICINE

## 2018-12-18 NOTE — PROGRESS NOTES
 Microalbumin / Creatinine Urine Ratio     Standing Status:   Future     Standing Expiration Date:   12/18/2019    T4, Free     Standing Status:   Future     Standing Expiration Date:   12/18/2019    TSH with Reflex     Standing Status:   Future     Standing Expiration Date:   12/18/2019    POCT Glucose     Orders Placed This Encounter   Medications    blood glucose test strips (ONETOUCH VERIO) strip     Sig: Use qid to check bs Dx e11.65     Dispense:  200 each     Refill:  3     Continue lantus 10 units hs Humalog 2 units tid synthroid 75 mcg daily plus prednisone 75 mcg daily             Randal Jaimes MD

## 2018-12-25 ASSESSMENT — ENCOUNTER SYMPTOMS: BACK PAIN: 1

## 2019-03-12 ENCOUNTER — TELEPHONE (OUTPATIENT)
Dept: ENDOCRINOLOGY | Age: 69
End: 2019-03-12

## 2019-03-12 DIAGNOSIS — E11.641 UNCONTROLLED TYPE 2 DIABETES MELLITUS WITH HYPOGLYCEMIA AND COMA (HCC): ICD-10-CM

## 2019-03-12 DIAGNOSIS — I10 ESSENTIAL HYPERTENSION: Primary | ICD-10-CM

## 2019-03-12 DIAGNOSIS — E03.8 OTHER SPECIFIED HYPOTHYROIDISM: ICD-10-CM

## 2019-03-12 LAB
ANION GAP SERPL CALCULATED.3IONS-SCNC: 18 MEQ/L (ref 9–15)
BUN BLDV-MCNC: 15 MG/DL (ref 8–23)
CALCIUM SERPL-MCNC: 9.5 MG/DL (ref 8.5–9.9)
CHLORIDE BLD-SCNC: 102 MEQ/L (ref 95–107)
CO2: 26 MEQ/L (ref 20–31)
CREAT SERPL-MCNC: 0.83 MG/DL (ref 0.5–0.9)
CREATININE URINE: 116.3 MG/DL
GFR AFRICAN AMERICAN: >60
GFR NON-AFRICAN AMERICAN: >60
GLUCOSE BLD-MCNC: 119 MG/DL (ref 70–99)
HBA1C MFR BLD: 6.7 % (ref 4.8–5.9)
MICROALBUMIN UR-MCNC: 9.4 MG/DL
MICROALBUMIN/CREAT UR-RTO: 80.8 MG/G (ref 0–30)
POTASSIUM SERPL-SCNC: 3 MEQ/L (ref 3.4–4.9)
SODIUM BLD-SCNC: 146 MEQ/L (ref 135–144)
T4 FREE: 1.25 NG/DL (ref 0.84–1.68)
TSH REFLEX: 4.19 UIU/ML (ref 0.44–3.86)

## 2019-03-12 RX ORDER — POTASSIUM CHLORIDE 20 MEQ/1
20 TABLET, EXTENDED RELEASE ORAL DAILY
Qty: 30 TABLET | Refills: 0 | Status: SHIPPED | OUTPATIENT
Start: 2019-03-12

## 2019-03-18 ENCOUNTER — OFFICE VISIT (OUTPATIENT)
Dept: ENDOCRINOLOGY | Age: 69
End: 2019-03-18
Payer: MEDICARE

## 2019-03-18 VITALS
SYSTOLIC BLOOD PRESSURE: 139 MMHG | WEIGHT: 274 LBS | DIASTOLIC BLOOD PRESSURE: 82 MMHG | HEIGHT: 68 IN | HEART RATE: 76 BPM | BODY MASS INDEX: 41.52 KG/M2

## 2019-03-18 DIAGNOSIS — E27.40 ADRENAL INSUFFICIENCY (HCC): ICD-10-CM

## 2019-03-18 DIAGNOSIS — E87.6 HYPOKALEMIA: ICD-10-CM

## 2019-03-18 DIAGNOSIS — E11.641 UNCONTROLLED TYPE 2 DIABETES MELLITUS WITH HYPOGLYCEMIA AND COMA (HCC): Primary | ICD-10-CM

## 2019-03-18 DIAGNOSIS — E03.9 HYPOTHYROIDISM, UNSPECIFIED TYPE: ICD-10-CM

## 2019-03-18 LAB — GLUCOSE BLD-MCNC: 158 MG/DL

## 2019-03-18 PROCEDURE — G8399 PT W/DXA RESULTS DOCUMENT: HCPCS | Performed by: INTERNAL MEDICINE

## 2019-03-18 PROCEDURE — G8484 FLU IMMUNIZE NO ADMIN: HCPCS | Performed by: INTERNAL MEDICINE

## 2019-03-18 PROCEDURE — 1036F TOBACCO NON-USER: CPT | Performed by: INTERNAL MEDICINE

## 2019-03-18 PROCEDURE — G8428 CUR MEDS NOT DOCUMENT: HCPCS | Performed by: INTERNAL MEDICINE

## 2019-03-18 PROCEDURE — 1123F ACP DISCUSS/DSCN MKR DOCD: CPT | Performed by: INTERNAL MEDICINE

## 2019-03-18 PROCEDURE — 3017F COLORECTAL CA SCREEN DOC REV: CPT | Performed by: INTERNAL MEDICINE

## 2019-03-18 PROCEDURE — 1090F PRES/ABSN URINE INCON ASSESS: CPT | Performed by: INTERNAL MEDICINE

## 2019-03-18 PROCEDURE — 99213 OFFICE O/P EST LOW 20 MIN: CPT | Performed by: INTERNAL MEDICINE

## 2019-03-18 PROCEDURE — 1101F PT FALLS ASSESS-DOCD LE1/YR: CPT | Performed by: INTERNAL MEDICINE

## 2019-03-18 PROCEDURE — 4040F PNEUMOC VAC/ADMIN/RCVD: CPT | Performed by: INTERNAL MEDICINE

## 2019-03-18 PROCEDURE — 82962 GLUCOSE BLOOD TEST: CPT | Performed by: INTERNAL MEDICINE

## 2019-03-18 PROCEDURE — G8598 ASA/ANTIPLAT THER USED: HCPCS | Performed by: INTERNAL MEDICINE

## 2019-03-18 PROCEDURE — 3044F HG A1C LEVEL LT 7.0%: CPT | Performed by: INTERNAL MEDICINE

## 2019-03-18 PROCEDURE — G8417 CALC BMI ABV UP PARAM F/U: HCPCS | Performed by: INTERNAL MEDICINE

## 2019-03-18 PROCEDURE — 2022F DILAT RTA XM EVC RTNOPTHY: CPT | Performed by: INTERNAL MEDICINE

## 2019-03-18 RX ORDER — GABAPENTIN 300 MG/1
CAPSULE ORAL
Qty: 90 CAPSULE | Refills: 3 | Status: SHIPPED | OUTPATIENT
Start: 2019-03-18 | End: 2019-11-13 | Stop reason: SDUPTHER

## 2019-03-18 RX ORDER — SULFASALAZINE 500 MG/1
500 TABLET ORAL 2 TIMES DAILY
COMMUNITY

## 2019-03-18 RX ORDER — LEVOTHYROXINE SODIUM 0.07 MG/1
75 TABLET ORAL DAILY
Qty: 90 TABLET | Refills: 3 | Status: SHIPPED | OUTPATIENT
Start: 2019-03-18 | End: 2019-06-18 | Stop reason: SDUPTHER

## 2019-06-12 DIAGNOSIS — E87.6 HYPOKALEMIA: ICD-10-CM

## 2019-06-12 DIAGNOSIS — E11.641 UNCONTROLLED TYPE 2 DIABETES MELLITUS WITH HYPOGLYCEMIA AND COMA (HCC): ICD-10-CM

## 2019-06-12 LAB
ANION GAP SERPL CALCULATED.3IONS-SCNC: 12 MEQ/L (ref 9–15)
BUN BLDV-MCNC: 21 MG/DL (ref 8–23)
CALCIUM SERPL-MCNC: 9.9 MG/DL (ref 8.5–9.9)
CHLORIDE BLD-SCNC: 104 MEQ/L (ref 95–107)
CO2: 26 MEQ/L (ref 20–31)
CREAT SERPL-MCNC: 0.83 MG/DL (ref 0.5–0.9)
CREATININE URINE: 31.2 MG/DL
GFR AFRICAN AMERICAN: >60
GFR NON-AFRICAN AMERICAN: >60
GLUCOSE BLD-MCNC: 125 MG/DL (ref 70–99)
HBA1C MFR BLD: 7.4 % (ref 4.8–5.9)
MICROALBUMIN UR-MCNC: 1.5 MG/DL
MICROALBUMIN/CREAT UR-RTO: 48.1 MG/G (ref 0–30)
POTASSIUM SERPL-SCNC: 3.8 MEQ/L (ref 3.4–4.9)
SODIUM BLD-SCNC: 142 MEQ/L (ref 135–144)

## 2019-06-18 ENCOUNTER — OFFICE VISIT (OUTPATIENT)
Dept: ENDOCRINOLOGY | Age: 69
End: 2019-06-18
Payer: MEDICARE

## 2019-06-18 VITALS
SYSTOLIC BLOOD PRESSURE: 102 MMHG | HEIGHT: 68 IN | WEIGHT: 274 LBS | DIASTOLIC BLOOD PRESSURE: 69 MMHG | HEART RATE: 71 BPM | BODY MASS INDEX: 41.52 KG/M2

## 2019-06-18 DIAGNOSIS — E11.641 UNCONTROLLED TYPE 2 DIABETES MELLITUS WITH HYPOGLYCEMIA AND COMA (HCC): Primary | ICD-10-CM

## 2019-06-18 DIAGNOSIS — E03.9 HYPOTHYROIDISM, UNSPECIFIED TYPE: ICD-10-CM

## 2019-06-18 LAB
CHP ED QC CHECK: NORMAL
GLUCOSE BLD-MCNC: 263 MG/DL

## 2019-06-18 PROCEDURE — G8598 ASA/ANTIPLAT THER USED: HCPCS | Performed by: INTERNAL MEDICINE

## 2019-06-18 PROCEDURE — G8427 DOCREV CUR MEDS BY ELIG CLIN: HCPCS | Performed by: INTERNAL MEDICINE

## 2019-06-18 PROCEDURE — 2022F DILAT RTA XM EVC RTNOPTHY: CPT | Performed by: INTERNAL MEDICINE

## 2019-06-18 PROCEDURE — G8399 PT W/DXA RESULTS DOCUMENT: HCPCS | Performed by: INTERNAL MEDICINE

## 2019-06-18 PROCEDURE — 3045F PR MOST RECENT HEMOGLOBIN A1C LEVEL 7.0-9.0%: CPT | Performed by: INTERNAL MEDICINE

## 2019-06-18 PROCEDURE — 1123F ACP DISCUSS/DSCN MKR DOCD: CPT | Performed by: INTERNAL MEDICINE

## 2019-06-18 PROCEDURE — 3017F COLORECTAL CA SCREEN DOC REV: CPT | Performed by: INTERNAL MEDICINE

## 2019-06-18 PROCEDURE — 99213 OFFICE O/P EST LOW 20 MIN: CPT | Performed by: INTERNAL MEDICINE

## 2019-06-18 PROCEDURE — 4040F PNEUMOC VAC/ADMIN/RCVD: CPT | Performed by: INTERNAL MEDICINE

## 2019-06-18 PROCEDURE — 1090F PRES/ABSN URINE INCON ASSESS: CPT | Performed by: INTERNAL MEDICINE

## 2019-06-18 PROCEDURE — 82962 GLUCOSE BLOOD TEST: CPT | Performed by: INTERNAL MEDICINE

## 2019-06-18 PROCEDURE — 1036F TOBACCO NON-USER: CPT | Performed by: INTERNAL MEDICINE

## 2019-06-18 PROCEDURE — G8417 CALC BMI ABV UP PARAM F/U: HCPCS | Performed by: INTERNAL MEDICINE

## 2019-06-18 RX ORDER — LEVOTHYROXINE SODIUM 0.07 MG/1
75 TABLET ORAL DAILY
Qty: 90 TABLET | Refills: 3 | Status: SHIPPED | OUTPATIENT
Start: 2019-06-18 | End: 2019-11-12 | Stop reason: SDUPTHER

## 2019-06-18 NOTE — PROGRESS NOTES
Subjective:      Patient ID: Katelyn Fields is a 77 y.o. female. 3 month f/u on diabetes hypothyroidism rosey's   Diabetes   She presents for her follow-up diabetic visit. Her disease course has been worsening. Associated symptoms include fatigue. Diabetic complications include heart disease and peripheral neuropathy. Risk factors for coronary artery disease include obesity. Current diabetic treatment includes insulin injections (lantus plus humalog ). She is currently taking insulin pre-breakfast, pre-lunch, pre-dinner and at bedtime. Her overall blood glucose range is 140-180 mg/dl. (Lab Results       Component                Value               Date                       LABA1C                   7.4 (H)             06/12/2019              ) An ACE inhibitor/angiotensin II receptor blocker is being taken. Other   This is a chronic (hypothyroidsm ) problem. The current episode started more than 1 year ago. Associated symptoms include arthralgias and fatigue. Treatments tried: synthyroid  The treatment provided moderate relief. Adrenal insufficiency on prednisone 10 mg daily       Had eye exam 4/2019 at [facility]     ASSESSMENT/PLAN:  Last dilated exam 4/23/2019    H35.3132 Nonexudative age-related macular degeneration, bilateral, intermediate dry stage (primary encounter diagnosis)  History of injections right eye 2018 x 1  OCT with drusen but no subretinal fluid /IRF  Has been stable for over a year  - AREDS/Amsler    H25.13 Nuclear sclerosis of both eyes  Comment: Stable  - Continue to monitor    H43.813 Posterior vitreous detachment of both eyes  Comment: Stable  - Retinal detachment precautions    Follow-up 6 months with exam and OCT, sooner PRN    Any documentation recorded by the scribe accurately reflects the service I personally performed and the decisions made by myself, Guillermina Villeda.  Tenzin Kennedy MD. I have confirmed and edited as necessary the relevant ophthalmic history, ROS, and the neuro exam findings as obtained by others. I have seen and examined Ashish Melanie. I have discussed the case and the management of this patient's care with the Resident/Fellow, if applicable. I also have reviewed and agree with the assessment and plan as stated above and agree with all of its relevant components. Zion Bradley MD                Results for Rafat Jefferson (MRN [id number]) as of 6/18/2019 11:34   Ref. Range 6/12/2019 08:16 6/12/2019 08:23 6/12/2019 10:19 6/18/2019 11:24   Sodium Latest Ref Range: 135 - 144 mEq/L 142      Potassium Latest Ref Range: 3.4 - 4.9 mEq/L 3.8      Chloride Latest Ref Range: 95 - 107 mEq/L 104      CO2 Latest Ref Range: 20 - 31 mEq/L 26      BUN Latest Ref Range: 8 - 23 mg/dL 21      Creatinine Latest Ref Range: 0.50 - 0.90 mg/dL 0.83      Anion Gap Latest Ref Range: 9 - 15 mEq/L 12      GFR Non- Latest Ref Range: >60  >60.0      GFR African American Latest Ref Range: >60  >60.0      Glucose Latest Units: mg/dL 125 (H)   263   Calcium Latest Ref Range: 8.5 - 9.9 mg/dL 9.9      Hemoglobin A1C Latest Ref Range: 4.8 - 5.9 %   7.4 (H)    Creatinine, Ur Latest Ref Range: Not Established mg/dL  31.2     Microalbumin Creatinine Ratio Latest Ref Range: 0.0 - 30.0 mg/G  48.1 (H)     Microalbumin, Random Urine Latest Ref Range: Not Established mg/dL  1.50               Morbid obesity Body mass index is 41.66 kg/m².       Patient Active Problem List   Diagnosis    Rotator cuff tendinitis    RA (rheumatoid arthritis) ([address]    Hypertension    CAD (coronary artery disease)    Morbid obesity due to excess calories ([address]    Non morbid obesity due to excess calories    Uncontrolled type 2 diabetes mellitus (HCC)    Other specified hypothyroidism    Adrenal insufficiency (HCC)     Allergies   Allergen Reactions    Latex     Accupril [Quinapril Hcl]     Other      ACE BANDAGES    Tape [Adhesive Tape]      PLASTIC TAPE AND ACE BANDAGES       Current Outpatient Medications:    glycopyrrolate-formoterol (BEVESPI AEROSPHERE) 9-4.8 MCG/ACT AERO, Inhale 2 puffs into the lungs 2 times daily, Disp: , Rfl:     sulfaSALAzine (AZULFIDINE) 500 MG tablet, Take 500 mg by mouth 4 times daily, Disp: , Rfl:     levothyroxine (SYNTHROID) 75 MCG tablet, Take 1 tablet by mouth Daily, Disp: 90 tablet, Rfl: 3    gabapentin (NEURONTIN) 300 MG capsule, Take 1 capsule 3x daily, Disp: 90 capsule, Rfl: 3    potassium chloride (KLOR-CON M) 20 MEQ extended release tablet, Take 1 tablet by mouth daily, Disp: 30 tablet, Rfl: 0    blood glucose test strips (ONETOUCH VERIO) strip, Use qid to check bs Dx e11.65, Disp: 200 each, Rfl: 3    insulin lispro (HUMALOG KWIKPEN) 100 UNIT/ML pen, 2 units plus sliding scale  Less than 150 none 151-200 2 units 201-250 4 units  251-300 6 units  301-400 8 units, Disp: 5 pen, Rfl: 3    atorvastatin (LIPITOR) 40 MG tablet, Take 40 mg by mouth daily, Disp: , Rfl:     insulin glargine (LANTUS SOLOSTAR) 100 UNIT/ML injection pen, Inject 10 Units into the skin nightly, Disp: 5 pen, Rfl: 3    valsartan (DIOVAN) 80 MG tablet, Take 80 mg by mouth daily, Disp: , Rfl:     predniSONE (DELTASONE) 10 MG tablet, Once a day, Disp: 30 tablet, Rfl: 01    Blood Glucose Monitoring Suppl (ONETOUCH VERIO) w/Device KIT, Give 1 meter to check bs Dx E11.65, Disp: 1 kit, Rfl: 0    ONETOUCH DELICA LANCETS 38X MISC, Use bid to check bs Dx E11.65, Disp: 100 each, Rfl: 03    azaTHIOprine (IMURAN) 50 MG tablet, Take 100 mg by mouth daily, Disp: , Rfl:     diltiazem (TIAZAC) 300 MG extended release capsule, Take 300 mg by mouth daily, Disp: , Rfl:     nitroGLYCERIN (NITROSTAT) 0.4 MG SL tablet, Place 0.4 mg under the tongue every 5 minutes as needed for Chest pain up to max of 3 total doses.  If no relief after 1 dose, call 911., Disp: , Rfl:     clopidogrel (PLAVIX) 75 MG tablet, Take 75 mg by mouth daily, Disp: , Rfl:     Multiple Vitamins-Minerals (PRESERVISION AREDS 2) CAPS, Take 1 capsule by mouth 2 times daily, Disp: , Rfl:     aspirin 81 MG tablet, Take 81 mg by mouth daily, Disp: , Rfl:     cetirizine (ZYRTEC) 10 MG tablet, Take 10 mg by mouth 2 times daily, Disp: , Rfl:     cimetidine (TAGAMET HB) 200 MG tablet, Take 200 mg by mouth 2 times daily, Disp: , Rfl:     Lactobacillus (PROBIOTIC ACIDOPHILUS PO), Take by mouth daily, Disp: , Rfl:     FOLIC ACID PO, Take 867 mg by mouth 2 times daily, Disp: , Rfl:     B Complex Vitamins (VITAMIN B COMPLEX PO), Take by mouth daily, Disp: , Rfl:     Cholecalciferol (VITAMIN D3) 5000 UNITS TABS, Take 1 tablet by mouth daily, Disp: , Rfl:     albuterol sulfate HFA (VENTOLIN HFA) 108 (90 BASE) MCG/ACT inhaler, Inhale 2 puffs into the lungs every 6 hours as needed for Wheezing, Disp: , Rfl:     furosemide (LASIX) 40 MG tablet, Take 40 mg by mouth daily. , Disp: , Rfl:     methotrexate 2.5 MG tablet, Take 2.5 mg by mouth See Admin Instructions TAKE 8 TABS EVERY WEEK, Disp: , Rfl:     Review of Systems   Constitutional: Positive for fatigue. Musculoskeletal: Positive for arthralgias. All other systems reviewed and are negative. Vitals:    06/18/19 1119   BP: 102/69   Pulse: 71   Weight: 274 lb (124.3 kg)   Height: 5' 8\" (1.727 m)       Objective:   Physical Exam   Constitutional: She appears well-developed and well-nourished. HENT:   Head: Normocephalic and atraumatic. Cardiovascular: Normal rate and normal heart sounds. Pulmonary/Chest: Breath sounds normal.   Musculoskeletal: She exhibits edema. Neurological: She is alert. Psychiatric: She has a normal mood and affect. Assessment:       Diagnosis Orders   1. Uncontrolled type 2 diabetes mellitus with hypoglycemia and coma ([facility] [address]  Basic Metabolic Panel    Hemoglobin A1C    Microalbumin / Creatinine Urine Ratio    POCT Glucose   2.  Hypothyroidism, unspecified type  levothyroxine (SYNTHROID) 75 MCG tablet    T4, Free    TSH without Reflex           Plan:      Orders Placed This Encounter   Procedures    Basic Metabolic Panel     Standing Status:   Future     Standing Expiration Date:   6/18/2020    Hemoglobin A1C     Standing Status:   Future     Standing Expiration Date:   6/18/2020    Microalbumin / Creatinine Urine Ratio     Standing Status:   Future     Standing Expiration Date:   6/18/2020    T4, Free     Standing Status:   Future     Standing Expiration Date:   6/18/2020    TSH without Reflex     Standing Status:   Future     Standing Expiration Date:   6/18/2020    POCT Glucose     Orders Placed This Encounter   Medications    levothyroxine (SYNTHROID) 75 MCG tablet     Sig: Take 1 tablet by mouth Daily     Dispense:  90 tablet     Refill:  3     Continue lantus 10 units hs humalog sliding scale plus prednisone 10 mg daily             Rukhsana Mercado MD

## 2019-09-16 DIAGNOSIS — E03.9 HYPOTHYROIDISM, UNSPECIFIED TYPE: ICD-10-CM

## 2019-09-16 DIAGNOSIS — E11.641 UNCONTROLLED TYPE 2 DIABETES MELLITUS WITH HYPOGLYCEMIA AND COMA (HCC): ICD-10-CM

## 2019-09-16 LAB
ANION GAP SERPL CALCULATED.3IONS-SCNC: 11 MEQ/L (ref 9–15)
BUN BLDV-MCNC: 16 MG/DL (ref 8–23)
CALCIUM SERPL-MCNC: 10.3 MG/DL (ref 8.5–9.9)
CHLORIDE BLD-SCNC: 107 MEQ/L (ref 95–107)
CO2: 26 MEQ/L (ref 20–31)
CREAT SERPL-MCNC: 0.82 MG/DL (ref 0.5–0.9)
CREATININE URINE: 130.7 MG/DL
GFR AFRICAN AMERICAN: >60
GFR NON-AFRICAN AMERICAN: >60
GLUCOSE BLD-MCNC: 119 MG/DL (ref 70–99)
HBA1C MFR BLD: 6.4 % (ref 4.8–5.9)
MICROALBUMIN UR-MCNC: 24.8 MG/DL
MICROALBUMIN/CREAT UR-RTO: 189.7 MG/G (ref 0–30)
POTASSIUM SERPL-SCNC: 4.7 MEQ/L (ref 3.4–4.9)
SODIUM BLD-SCNC: 144 MEQ/L (ref 135–144)
T4 FREE: 0.99 NG/DL (ref 0.84–1.68)
TSH SERPL DL<=0.05 MIU/L-ACNC: 1.93 UIU/ML (ref 0.44–3.86)

## 2019-09-18 ENCOUNTER — OFFICE VISIT (OUTPATIENT)
Dept: ENDOCRINOLOGY | Age: 69
End: 2019-09-18
Payer: MEDICARE

## 2019-09-18 VITALS
BODY MASS INDEX: 41.36 KG/M2 | DIASTOLIC BLOOD PRESSURE: 83 MMHG | WEIGHT: 272 LBS | HEART RATE: 92 BPM | SYSTOLIC BLOOD PRESSURE: 139 MMHG | OXYGEN SATURATION: 96 %

## 2019-09-18 DIAGNOSIS — E11.641 UNCONTROLLED TYPE 2 DIABETES MELLITUS WITH HYPOGLYCEMIA AND COMA (HCC): Primary | ICD-10-CM

## 2019-09-18 DIAGNOSIS — E03.8 OTHER SPECIFIED HYPOTHYROIDISM: ICD-10-CM

## 2019-09-18 DIAGNOSIS — E27.40 ADRENAL INSUFFICIENCY (HCC): ICD-10-CM

## 2019-09-18 LAB
CHP ED QC CHECK: NORMAL
GLUCOSE BLD-MCNC: 239 MG/DL

## 2019-09-18 PROCEDURE — 1036F TOBACCO NON-USER: CPT | Performed by: INTERNAL MEDICINE

## 2019-09-18 PROCEDURE — G8427 DOCREV CUR MEDS BY ELIG CLIN: HCPCS | Performed by: INTERNAL MEDICINE

## 2019-09-18 PROCEDURE — 2022F DILAT RTA XM EVC RTNOPTHY: CPT | Performed by: INTERNAL MEDICINE

## 2019-09-18 PROCEDURE — 82962 GLUCOSE BLOOD TEST: CPT | Performed by: INTERNAL MEDICINE

## 2019-09-18 PROCEDURE — 99213 OFFICE O/P EST LOW 20 MIN: CPT | Performed by: INTERNAL MEDICINE

## 2019-09-18 PROCEDURE — 3044F HG A1C LEVEL LT 7.0%: CPT | Performed by: INTERNAL MEDICINE

## 2019-09-18 PROCEDURE — 3017F COLORECTAL CA SCREEN DOC REV: CPT | Performed by: INTERNAL MEDICINE

## 2019-09-18 PROCEDURE — 1123F ACP DISCUSS/DSCN MKR DOCD: CPT | Performed by: INTERNAL MEDICINE

## 2019-09-18 PROCEDURE — G8417 CALC BMI ABV UP PARAM F/U: HCPCS | Performed by: INTERNAL MEDICINE

## 2019-09-18 PROCEDURE — G8399 PT W/DXA RESULTS DOCUMENT: HCPCS | Performed by: INTERNAL MEDICINE

## 2019-09-18 PROCEDURE — 4040F PNEUMOC VAC/ADMIN/RCVD: CPT | Performed by: INTERNAL MEDICINE

## 2019-09-18 PROCEDURE — 1090F PRES/ABSN URINE INCON ASSESS: CPT | Performed by: INTERNAL MEDICINE

## 2019-09-18 PROCEDURE — G8598 ASA/ANTIPLAT THER USED: HCPCS | Performed by: INTERNAL MEDICINE

## 2019-09-24 ENCOUNTER — HOSPITAL ENCOUNTER (OUTPATIENT)
Age: 69
Setting detail: OBSERVATION
Discharge: HOME | End: 2019-09-27
Attending: ORTHOPAEDIC SURGERY | Admitting: ORTHOPAEDIC SURGERY
Payer: MEDICARE

## 2019-09-24 ENCOUNTER — APPOINTMENT (OUTPATIENT)
Dept: GENERAL RADIOLOGY | Age: 69
End: 2019-09-24
Payer: MEDICARE

## 2019-09-24 DIAGNOSIS — S73.005A CLOSED DISLOCATION OF LEFT HIP, INITIAL ENCOUNTER (HCC): ICD-10-CM

## 2019-09-24 DIAGNOSIS — S73.005A: Primary | ICD-10-CM

## 2019-09-24 LAB
ABO/RH: NORMAL
ALBUMIN SERPL-MCNC: 3.4 G/DL (ref 3.5–4.6)
ALP BLD-CCNC: 53 U/L (ref 40–130)
ALT SERPL-CCNC: 20 U/L (ref 0–33)
ANION GAP SERPL CALCULATED.3IONS-SCNC: 14 MEQ/L (ref 9–15)
ANISOCYTOSIS: ABNORMAL
ANTIBODY SCREEN: NORMAL
APTT: 20.7 SEC (ref 24.4–36.8)
AST SERPL-CCNC: 19 U/L (ref 0–35)
BACTERIA: NEGATIVE /HPF
BANDED NEUTROPHILS RELATIVE PERCENT: 5 % (ref 5–11)
BASOPHILS ABSOLUTE: 0 K/UL (ref 0–0.2)
BASOPHILS RELATIVE PERCENT: 0.7 %
BILIRUB SERPL-MCNC: 0.6 MG/DL (ref 0.2–0.7)
BILIRUBIN URINE: NEGATIVE
BLOOD, URINE: ABNORMAL
BUN BLDV-MCNC: 13 MG/DL (ref 8–23)
CALCIUM SERPL-MCNC: 9.2 MG/DL (ref 8.5–9.9)
CHLORIDE BLD-SCNC: 105 MEQ/L (ref 95–107)
CLARITY: CLEAR
CO2: 22 MEQ/L (ref 20–31)
COLOR: YELLOW
CREAT SERPL-MCNC: 0.73 MG/DL (ref 0.5–0.9)
EOSINOPHILS ABSOLUTE: 0 K/UL (ref 0–0.7)
EOSINOPHILS RELATIVE PERCENT: 0.1 %
EPITHELIAL CELLS, UA: ABNORMAL /HPF (ref 0–5)
GFR AFRICAN AMERICAN: >60
GFR NON-AFRICAN AMERICAN: >60
GLOBULIN: 2.2 G/DL (ref 2.3–3.5)
GLUCOSE BLD-MCNC: 128 MG/DL (ref 60–115)
GLUCOSE BLD-MCNC: 147 MG/DL (ref 70–99)
GLUCOSE URINE: NEGATIVE MG/DL
HCT VFR BLD CALC: 35.1 % (ref 37–47)
HEMOGLOBIN: 11.7 G/DL (ref 12–16)
HYALINE CASTS: ABNORMAL /HPF (ref 0–5)
INR BLD: 1.2
KETONES, URINE: NEGATIVE MG/DL
LEUKOCYTE ESTERASE, URINE: NEGATIVE
LYMPHOCYTES ABSOLUTE: 1 K/UL (ref 1–4.8)
LYMPHOCYTES RELATIVE PERCENT: 13 %
MACROCYTES: ABNORMAL
MCH RBC QN AUTO: 36.7 PG (ref 27–31.3)
MCHC RBC AUTO-ENTMCNC: 33.2 % (ref 33–37)
MCV RBC AUTO: 110.6 FL (ref 82–100)
MONOCYTES ABSOLUTE: 0.2 K/UL (ref 0.2–0.8)
MONOCYTES RELATIVE PERCENT: 2.9 %
NEUTROPHILS ABSOLUTE: 6.2 K/UL (ref 1.4–6.5)
NEUTROPHILS RELATIVE PERCENT: 79 %
NITRITE, URINE: NEGATIVE
OVALOCYTES: ABNORMAL
PDW BLD-RTO: 16 % (ref 11.5–14.5)
PERFORMED ON: ABNORMAL
PH UA: 7.5 (ref 5–9)
PLATELET # BLD: 174 K/UL (ref 130–400)
PLATELET SLIDE REVIEW: NORMAL
POIKILOCYTES: ABNORMAL
POTASSIUM SERPL-SCNC: 4.1 MEQ/L (ref 3.4–4.9)
PROTEIN UA: NEGATIVE MG/DL
PROTHROMBIN TIME: 15.3 SEC (ref 12.3–14.9)
RBC # BLD: 3.18 M/UL (ref 4.2–5.4)
RBC UA: >100 /HPF (ref 0–5)
SODIUM BLD-SCNC: 141 MEQ/L (ref 135–144)
SPECIFIC GRAVITY UA: 1.01 (ref 1–1.03)
TEAR DROP CELLS: ABNORMAL
TOTAL PROTEIN: 5.6 G/DL (ref 6.3–8)
URINE REFLEX TO CULTURE: YES
UROBILINOGEN, URINE: 1 E.U./DL
WBC # BLD: 7.4 K/UL (ref 4.8–10.8)
WBC UA: ABNORMAL /HPF (ref 0–5)

## 2019-09-24 PROCEDURE — 36415 COLL VENOUS BLD VENIPUNCTURE: CPT

## 2019-09-24 PROCEDURE — 2580000003 HC RX 258: Performed by: PHYSICIAN ASSISTANT

## 2019-09-24 PROCEDURE — 85025 COMPLETE CBC W/AUTO DIFF WBC: CPT

## 2019-09-24 PROCEDURE — 80053 COMPREHEN METABOLIC PANEL: CPT

## 2019-09-24 PROCEDURE — 96376 TX/PRO/DX INJ SAME DRUG ADON: CPT

## 2019-09-24 PROCEDURE — 71101 X-RAY EXAM UNILAT RIBS/CHEST: CPT

## 2019-09-24 PROCEDURE — 2580000003 HC RX 258: Performed by: ORTHOPAEDIC SURGERY

## 2019-09-24 PROCEDURE — 81001 URINALYSIS AUTO W/SCOPE: CPT

## 2019-09-24 PROCEDURE — 99152 MOD SED SAME PHYS/QHP 5/>YRS: CPT

## 2019-09-24 PROCEDURE — 6360000002 HC RX W HCPCS: Performed by: PHYSICIAN ASSISTANT

## 2019-09-24 PROCEDURE — 86900 BLOOD TYPING SEROLOGIC ABO: CPT

## 2019-09-24 PROCEDURE — 6830039000 HC L3 TRAUMA ALERT

## 2019-09-24 PROCEDURE — 96375 TX/PRO/DX INJ NEW DRUG ADDON: CPT

## 2019-09-24 PROCEDURE — 2500000003 HC RX 250 WO HCPCS: Performed by: PHYSICIAN ASSISTANT

## 2019-09-24 PROCEDURE — 96374 THER/PROPH/DIAG INJ IV PUSH: CPT

## 2019-09-24 PROCEDURE — G0378 HOSPITAL OBSERVATION PER HR: HCPCS

## 2019-09-24 PROCEDURE — 85610 PROTHROMBIN TIME: CPT

## 2019-09-24 PROCEDURE — 85730 THROMBOPLASTIN TIME PARTIAL: CPT

## 2019-09-24 PROCEDURE — 6360000002 HC RX W HCPCS: Performed by: ORTHOPAEDIC SURGERY

## 2019-09-24 PROCEDURE — 86850 RBC ANTIBODY SCREEN: CPT

## 2019-09-24 PROCEDURE — 99285 EMERGENCY DEPT VISIT HI MDM: CPT

## 2019-09-24 PROCEDURE — 73502 X-RAY EXAM HIP UNI 2-3 VIEWS: CPT

## 2019-09-24 PROCEDURE — 27265 TREAT HIP DISLOCATION: CPT

## 2019-09-24 PROCEDURE — 86901 BLOOD TYPING SEROLOGIC RH(D): CPT

## 2019-09-24 RX ORDER — NICOTINE POLACRILEX 4 MG
15 LOZENGE BUCCAL PRN
Status: DISCONTINUED | OUTPATIENT
Start: 2019-09-24 | End: 2019-09-27 | Stop reason: HOSPADM

## 2019-09-24 RX ORDER — TRAZODONE HYDROCHLORIDE 50 MG/1
50 TABLET ORAL NIGHTLY
COMMUNITY

## 2019-09-24 RX ORDER — METOPROLOL TARTRATE 50 MG/1
100 TABLET, FILM COATED ORAL 2 TIMES DAILY
COMMUNITY

## 2019-09-24 RX ORDER — METOPROLOL TARTRATE 5 MG/5ML
2.5 INJECTION INTRAVENOUS EVERY 6 HOURS
Status: DISCONTINUED | OUTPATIENT
Start: 2019-09-25 | End: 2019-09-26

## 2019-09-24 RX ORDER — DIPHENOXYLATE HYDROCHLORIDE AND ATROPINE SULFATE 2.5; .025 MG/1; MG/1
1 TABLET ORAL DAILY PRN
COMMUNITY

## 2019-09-24 RX ORDER — ETOMIDATE 2 MG/ML
30 INJECTION INTRAVENOUS ONCE
Status: COMPLETED | OUTPATIENT
Start: 2019-09-24 | End: 2019-09-24

## 2019-09-24 RX ORDER — ONDANSETRON 2 MG/ML
4 INJECTION INTRAMUSCULAR; INTRAVENOUS ONCE
Status: COMPLETED | OUTPATIENT
Start: 2019-09-24 | End: 2019-09-24

## 2019-09-24 RX ORDER — DIGOXIN 125 MCG
125 TABLET ORAL DAILY
COMMUNITY

## 2019-09-24 RX ORDER — MORPHINE SULFATE 2 MG/ML
4 INJECTION, SOLUTION INTRAMUSCULAR; INTRAVENOUS ONCE
Status: COMPLETED | OUTPATIENT
Start: 2019-09-24 | End: 2019-09-24

## 2019-09-24 RX ORDER — HYDRALAZINE HYDROCHLORIDE 20 MG/ML
10 INJECTION INTRAMUSCULAR; INTRAVENOUS EVERY 6 HOURS PRN
Status: DISCONTINUED | OUTPATIENT
Start: 2019-09-24 | End: 2019-09-27 | Stop reason: HOSPADM

## 2019-09-24 RX ORDER — OXYCODONE HYDROCHLORIDE AND ACETAMINOPHEN 5; 325 MG/1; MG/1
1 TABLET ORAL EVERY 4 HOURS PRN
Status: DISCONTINUED | OUTPATIENT
Start: 2019-09-24 | End: 2019-09-27 | Stop reason: HOSPADM

## 2019-09-24 RX ORDER — TIZANIDINE 4 MG/1
4 TABLET ORAL EVERY 8 HOURS PRN
COMMUNITY

## 2019-09-24 RX ORDER — ESCITALOPRAM OXALATE 20 MG/1
20 TABLET ORAL DAILY
COMMUNITY

## 2019-09-24 RX ORDER — SODIUM CHLORIDE 0.9 % (FLUSH) 0.9 %
10 SYRINGE (ML) INJECTION PRN
Status: DISCONTINUED | OUTPATIENT
Start: 2019-09-24 | End: 2019-09-25

## 2019-09-24 RX ORDER — DEXTROSE MONOHYDRATE 50 MG/ML
100 INJECTION, SOLUTION INTRAVENOUS PRN
Status: DISCONTINUED | OUTPATIENT
Start: 2019-09-24 | End: 2019-09-27 | Stop reason: HOSPADM

## 2019-09-24 RX ORDER — SODIUM CHLORIDE 9 MG/ML
INJECTION, SOLUTION INTRAVENOUS CONTINUOUS
Status: DISCONTINUED | OUTPATIENT
Start: 2019-09-24 | End: 2019-09-24 | Stop reason: HOSPADM

## 2019-09-24 RX ORDER — SODIUM CHLORIDE 9 MG/ML
INJECTION, SOLUTION INTRAVENOUS CONTINUOUS
Status: DISCONTINUED | OUTPATIENT
Start: 2019-09-24 | End: 2019-09-27 | Stop reason: HOSPADM

## 2019-09-24 RX ORDER — ONDANSETRON 2 MG/ML
4 INJECTION INTRAMUSCULAR; INTRAVENOUS EVERY 6 HOURS PRN
Status: DISCONTINUED | OUTPATIENT
Start: 2019-09-24 | End: 2019-09-27 | Stop reason: HOSPADM

## 2019-09-24 RX ORDER — OXYCODONE HYDROCHLORIDE AND ACETAMINOPHEN 5; 325 MG/1; MG/1
2 TABLET ORAL EVERY 4 HOURS PRN
Status: DISCONTINUED | OUTPATIENT
Start: 2019-09-24 | End: 2019-09-27 | Stop reason: HOSPADM

## 2019-09-24 RX ORDER — DILTIAZEM HYDROCHLORIDE 300 MG/1
300 CAPSULE, COATED, EXTENDED RELEASE ORAL DAILY
COMMUNITY

## 2019-09-24 RX ORDER — SODIUM CHLORIDE 0.9 % (FLUSH) 0.9 %
10 SYRINGE (ML) INJECTION EVERY 12 HOURS SCHEDULED
Status: DISCONTINUED | OUTPATIENT
Start: 2019-09-24 | End: 2019-09-25

## 2019-09-24 RX ORDER — DEXTROSE MONOHYDRATE 25 G/50ML
12.5 INJECTION, SOLUTION INTRAVENOUS PRN
Status: DISCONTINUED | OUTPATIENT
Start: 2019-09-24 | End: 2019-09-27 | Stop reason: HOSPADM

## 2019-09-24 RX ORDER — LISINOPRIL 20 MG/1
20 TABLET ORAL DAILY
COMMUNITY

## 2019-09-24 RX ORDER — ACETAMINOPHEN 500 MG
1000 TABLET ORAL ONCE
Status: DISCONTINUED | OUTPATIENT
Start: 2019-09-24 | End: 2019-09-25

## 2019-09-24 RX ADMIN — HYDROMORPHONE HYDROCHLORIDE 0.5 MG: 1 INJECTION, SOLUTION INTRAMUSCULAR; INTRAVENOUS; SUBCUTANEOUS at 18:04

## 2019-09-24 RX ADMIN — ETOMIDATE 20 MG: 2 INJECTION, SOLUTION INTRAVENOUS at 18:44

## 2019-09-24 RX ADMIN — SODIUM CHLORIDE: 9 INJECTION, SOLUTION INTRAVENOUS at 16:52

## 2019-09-24 RX ADMIN — ONDANSETRON 4 MG: 2 INJECTION INTRAMUSCULAR; INTRAVENOUS at 18:37

## 2019-09-24 RX ADMIN — SODIUM CHLORIDE: 9 INJECTION, SOLUTION INTRAVENOUS at 22:28

## 2019-09-24 RX ADMIN — MORPHINE SULFATE 4 MG: 2 INJECTION, SOLUTION INTRAMUSCULAR; INTRAVENOUS at 16:52

## 2019-09-24 RX ADMIN — ONDANSETRON 4 MG: 2 INJECTION INTRAMUSCULAR; INTRAVENOUS at 16:52

## 2019-09-24 RX ADMIN — HYDROMORPHONE HYDROCHLORIDE 0.5 MG: 1 INJECTION, SOLUTION INTRAMUSCULAR; INTRAVENOUS; SUBCUTANEOUS at 18:53

## 2019-09-24 RX ADMIN — HYDROMORPHONE HYDROCHLORIDE 0.5 MG: 1 INJECTION, SOLUTION INTRAMUSCULAR; INTRAVENOUS; SUBCUTANEOUS at 22:28

## 2019-09-24 ASSESSMENT — ENCOUNTER SYMPTOMS
EYE PAIN: 0
TROUBLE SWALLOWING: 0
ABDOMINAL PAIN: 0
COLOR CHANGE: 0
SHORTNESS OF BREATH: 0
ALLERGIC/IMMUNOLOGIC NEGATIVE: 1
APNEA: 0

## 2019-09-24 ASSESSMENT — PAIN SCALES - GENERAL
PAINLEVEL_OUTOF10: 10

## 2019-09-24 ASSESSMENT — PAIN DESCRIPTION - DESCRIPTORS: DESCRIPTORS: SHARP

## 2019-09-24 ASSESSMENT — PAIN DESCRIPTION - ORIENTATION: ORIENTATION: LEFT

## 2019-09-24 ASSESSMENT — PAIN DESCRIPTION - PAIN TYPE
TYPE: ACUTE PAIN

## 2019-09-24 ASSESSMENT — PAIN DESCRIPTION - FREQUENCY: FREQUENCY: CONTINUOUS

## 2019-09-24 ASSESSMENT — PAIN DESCRIPTION - ONSET: ONSET: ON-GOING

## 2019-09-24 ASSESSMENT — PAIN DESCRIPTION - LOCATION: LOCATION: HIP

## 2019-09-24 NOTE — ED PROVIDER NOTES
(HUMALOG KWIKPEN) 100 UNIT/ML pen 2 units plus sliding scale   Less than 150 none  151-200 2 units  201-250 4 units   251-300 6 units   301-400 8 units  Qty: 5 pen, Refills: 3      atorvastatin (LIPITOR) 40 MG tablet Take 40 mg by mouth daily      insulin glargine (LANTUS SOLOSTAR) 100 UNIT/ML injection pen Inject 10 Units into the skin nightly  Qty: 5 pen, Refills: 3      predniSONE (DELTASONE) 10 MG tablet Once a day  Qty: 30 tablet, Refills: 01      azaTHIOprine (IMURAN) 50 MG tablet Take 100 mg by mouth daily      clopidogrel (PLAVIX) 75 MG tablet Take 75 mg by mouth daily      Multiple Vitamins-Minerals (PRESERVISION AREDS 2) CAPS Take 1 capsule by mouth 2 times daily      cetirizine (ZYRTEC) 10 MG tablet Take 10 mg by mouth daily       Lactobacillus (PROBIOTIC ACIDOPHILUS PO) Take by mouth daily      FOLIC ACID PO Take 081 mg by mouth 2 times daily      B Complex Vitamins (VITAMIN B COMPLEX PO) Take by mouth daily      Cholecalciferol (VITAMIN D3) 5000 UNITS TABS Take 1 tablet by mouth daily      albuterol sulfate HFA (VENTOLIN HFA) 108 (90 BASE) MCG/ACT inhaler Inhale 2 puffs into the lungs every 6 hours as needed for Wheezing      methotrexate 2.5 MG tablet Take 2.5 mg by mouth once a week TAKE 8 TABS EVERY WEEK FRIDAY      traZODone (DESYREL) 50 MG tablet Take 50 mg by mouth nightly tAKE 1-2 TAB AT hs      diphenoxylate-atropine (LOMOTIL) 2.5-0.025 MG per tablet Take 1 tablet by mouth daily as needed for Diarrhea.  Indications: FOR DIARRHEA      tiZANidine (ZANAFLEX) 4 MG tablet Take 4 mg by mouth every 8 hours as needed (DO NOT EXCEED 3 DOSES IN 24HRS)      glycopyrrolate-formoterol (BEVESPI AEROSPHERE) 9-4.8 MCG/ACT AERO Inhale 2 puffs into the lungs 2 times daily      blood glucose test strips (ONETOUCH VERIO) strip Use qid to check bs Dx e11.65  Qty: 200 each, Refills: 3      Blood Glucose Monitoring Suppl (ONETOUCH VERIO) w/Device KIT Give 1 meter to check bs Dx E11.65  Qty: 1 kit, Refills: 0 WILITOUCH DELICA LANCETS 97U MISC Use bid to check bs Dx E11.65  Qty: 100 each, Refills: 03      nitroGLYCERIN (NITROSTAT) 0.4 MG SL tablet Place 0.4 mg under the tongue every 5 minutes as needed for Chest pain up to max of 3 total doses. If no relief after 1 dose, call 911.      aspirin 81 MG tablet Take 81 mg by mouth daily      cimetidine (TAGAMET HB) 200 MG tablet Take 200 mg by mouth 2 times daily      furosemide (LASIX) 40 MG tablet Take 40 mg by mouth daily as needed              ALLERGIES     Latex; Accupril [quinapril hcl]; Other; and Tape [adhesive tape]    FAMILY HISTORY     History reviewed. No pertinent family history.        SOCIAL HISTORY       Social History     Socioeconomic History    Marital status:      Spouse name: None    Number of children: None    Years of education: None    Highest education level: None   Occupational History    None   Social Needs    Financial resource strain: None    Food insecurity:     Worry: None     Inability: None    Transportation needs:     Medical: None     Non-medical: None   Tobacco Use    Smoking status: Former Smoker    Smokeless tobacco: Never Used    Tobacco comment: QUIT AT AGE 37   Substance and Sexual Activity    Alcohol use: Not Currently    Drug use: No    Sexual activity: Not Currently   Lifestyle    Physical activity:     Days per week: None     Minutes per session: None    Stress: None   Relationships    Social connections:     Talks on phone: None     Gets together: None     Attends Gnosticist service: None     Active member of club or organization: None     Attends meetings of clubs or organizations: None     Relationship status: None    Intimate partner violence:     Fear of current or ex partner: None     Emotionally abused: None     Physically abused: None     Forced sexual activity: None   Other Topics Concern    None   Social History Narrative    None       SCREENINGS    [place] Coma Scale  Eye Opening: LEFT    (Results Pending)   XR RIBS RIGHT INCLUDE CHEST (MIN 3 VIEWS)    (Results Pending)       LABS:  Labs Reviewed   COMPREHENSIVE METABOLIC PANEL - Abnormal; Notable for the following components:       Result Value    Glucose 147 (*)     Total Protein 5.6 (*)     Alb 3.4 (*)     Globulin 2.2 (*)     All other components within normal limits   CBC WITH AUTO DIFFERENTIAL - Abnormal; Notable for the following components:    RBC 3.18 (*)     Hemoglobin 11.7 (*)     Hematocrit 35.1 (*)     .6 (*)     MCH 36.7 (*)     RDW 16.0 (*)     All other components within normal limits   URINE RT REFLEX TO CULTURE - Abnormal; Notable for the following components:    Blood, Urine LARGE (*)     All other components within normal limits   PROTIME-INR - Abnormal; Notable for the following components:    Protime 15.3 (*)     All other components within normal limits   APTT - Abnormal; Notable for the following components:    aPTT 20.7 (*)     All other components within normal limits   MICROSCOPIC URINALYSIS - Abnormal; Notable for the following components:    RBC, UA >100 (*)     All other components within normal limits   POCT GLUCOSE - Abnormal; Notable for the following components:    POC Glucose 128 (*)     All other components within normal limits   CBC   BASIC METABOLIC PANEL W/ REFLEX TO MG FOR LOW K   URINE RT REFLEX TO CULTURE   POCT GLUCOSE   TYPE AND SCREEN   TYPE AND SCREEN       All other labs were within normal range or not returnedas of this dictation. EMERGENCYDEPARTMENT COURSE and DIFFERENTIAL DIAGNOSIS/MDM:   Vitals:    Vitals:    09/24/19 1855 09/24/19 1903 09/24/19 1905 09/24/19 2144   BP: (!) 135/11  (!) 157/76 (!) 146/83   Pulse: 105 98 83 84   Resp: 22 22 14 18   Temp:    98.1 °F (36.7 °C)   TempSrc:    Oral   SpO2: 98% 100%  100%   Weight:       Height:           REASSESSMENT        Patient presented with a dislocation of her left hip prosthesis.   Sedation was performed but hip was not able to be 09/24/2019 07:53:53 PM      PATIENT REFERRED TO:  Mona Sauceda [phone]            DISCHARGE MEDICATIONS:  Current Discharge Medication List          (Please note that portions of this note were completed with a voice recognition program.  Efforts were made to edit the dictations but occasionally words are mis-transcribed.)    ALE Ga PA-C  09/25/19 0854

## 2019-09-24 NOTE — PROGRESS NOTES
Subjective:      Patient ID: Delano Denise is a 77 y.o. female. 3 month f/u on diabetes hypothyroidism and adrenal insuffiencey   Diabetes   She presents for her follow-up diabetic visit. She has type 2 diabetes mellitus. Symptoms are stable. Diabetic complications include heart disease. Current diabetic treatment includes insulin injections (           Lantus plus Humalog sliding scale). She is currently taking insulin pre-lunch, pre-breakfast, pre-dinner and at bedtime. Her overall blood glucose range is 110-130 mg/dl. (Lab Results       Component                Value               Date                       LABA1C                   6.4 (H)             09/16/2019              )        reviewed eye exam 4/2019      H35.3132 Nonexudative age-related macular degeneration, bilateral, intermediate dry stage (primary encounter diagnosis)  History of injections right eye 2018 x 1  OCT with drusen but no subretinal fluid /IRF  Has been stable for over a year  - AREDS/Amsler    H25.13 Nuclear sclerosis of both eyes  Comment: Stable  - Continue to monitor    H43.813 Posterior vitreous detachment of both eyes  Comment: Stable  - Retinal detachment precautions    Follow-up 6 months with exam and OCT, sooner PRN             Hypothyroidism on synthroid 75 mcg daily stable      adrenal insuffiencey on prednisone 10 mfg daily           Results for Krysten Haynes (MRN [id number]) as of 9/24/2019 07:29   Ref.  Range 9/16/2019 09:21 9/16/2019 09:23   Sodium Latest Ref Range: 135 - 144 mEq/L 144    Potassium Latest Ref Range: 3.4 - 4.9 mEq/L 4.7    Chloride Latest Ref Range: 95 - 107 mEq/L 107    CO2 Latest Ref Range: 20 - 31 mEq/L 26    BUN Latest Ref Range: 8 - 23 mg/dL 16    Creatinine Latest Ref Range: 0.50 - 0.90 mg/dL 0.82    Anion Gap Latest Ref Range: 9 - 15 mEq/L 11    GFR Non- Latest Ref Range: >60  >60.0    GFR African American Latest Ref Range: >60  >60.0    Glucose Latest Ref Range: 70 - 99 mg/dL 119 daily, Disp: 90 capsule, Rfl: 3      Review of Systems      Vitals:    09/18/19 1143 09/18/19 1144   BP: (!) 146/82 139/83   Site: Right Upper Arm Right Upper Arm   Position: Sitting Sitting   Cuff Size: Large Adult Large Adult   Pulse: 92    SpO2: 96%    Weight: 272 lb (123.4 kg)        Objective:   Physical Exam   Constitutional: She appears well-developed and well-nourished. HENT:   Head: Normocephalic and atraumatic. Neck: Neck supple. Cardiovascular: Normal rate. Abdominal:   Obese    Musculoskeletal: Normal range of motion. Neurological: She is alert. Psychiatric: She has a normal mood and affect. Assessment:       Diagnosis Orders   1. Uncontrolled type 2 diabetes mellitus with hypoglycemia and coma ([facility]  POCT Glucose    Basic Metabolic Panel    Hemoglobin A1C   2. Other specified hypothyroidism  T4, Free    TSH without Reflex   3.  Adrenal insufficiency ([facility] [address]             Plan:      Orders Placed This Encounter   Procedures    Basic Metabolic Panel     Standing Status:   Future     Standing Expiration Date:   9/18/2020    Hemoglobin A1C     Standing Status:   Future     Standing Expiration Date:   9/18/2020    T4, Free     Standing Status:   Future     Standing Expiration Date:   9/18/2020    TSH without Reflex     Standing Status:   Future     Standing Expiration Date:   9/18/2020    POCT Glucose     Continue lantus 10 units hs Humalog sliding scale  plus synthroid 75 mcg daily   Continue prednisone 10 mg daily           Mell Rob MD

## 2019-09-25 ENCOUNTER — ANESTHESIA (OUTPATIENT)
Dept: POSTOP/PACU | Age: 69
End: 2019-09-25
Payer: MEDICARE

## 2019-09-25 ENCOUNTER — APPOINTMENT (OUTPATIENT)
Dept: GENERAL RADIOLOGY | Age: 69
End: 2019-09-25
Payer: MEDICARE

## 2019-09-25 ENCOUNTER — ANESTHESIA EVENT (OUTPATIENT)
Dept: POSTOP/PACU | Age: 69
End: 2019-09-25
Payer: MEDICARE

## 2019-09-25 ENCOUNTER — APPOINTMENT (OUTPATIENT)
Dept: POSTOP/PACU | Age: 69
End: 2019-09-25
Payer: MEDICARE

## 2019-09-25 VITALS
OXYGEN SATURATION: 100 % | DIASTOLIC BLOOD PRESSURE: 64 MMHG | RESPIRATION RATE: 12 BRPM | SYSTOLIC BLOOD PRESSURE: 109 MMHG

## 2019-09-25 LAB
ANION GAP SERPL CALCULATED.3IONS-SCNC: 9 MEQ/L (ref 9–15)
BUN BLDV-MCNC: 10 MG/DL (ref 8–23)
CALCIUM SERPL-MCNC: 9.1 MG/DL (ref 8.5–9.9)
CHLORIDE BLD-SCNC: 110 MEQ/L (ref 95–107)
CO2: 24 MEQ/L (ref 20–31)
CREAT SERPL-MCNC: 0.72 MG/DL (ref 0.5–0.9)
EKG ATRIAL RATE: 56 BPM
EKG Q-T INTERVAL: 368 MS
EKG QRS DURATION: 82 MS
EKG QTC CALCULATION (BAZETT): 405 MS
EKG R AXIS: 40 DEGREES
EKG T AXIS: -8 DEGREES
EKG VENTRICULAR RATE: 73 BPM
GFR AFRICAN AMERICAN: >60
GFR NON-AFRICAN AMERICAN: >60
GLUCOSE BLD-MCNC: 105 MG/DL (ref 60–115)
GLUCOSE BLD-MCNC: 114 MG/DL (ref 70–99)
GLUCOSE BLD-MCNC: 119 MG/DL (ref 60–115)
GLUCOSE BLD-MCNC: 174 MG/DL (ref 60–115)
GLUCOSE BLD-MCNC: 191 MG/DL (ref 60–115)
HCT VFR BLD CALC: 32.2 % (ref 37–47)
HEMOGLOBIN: 10.6 G/DL (ref 12–16)
MCH RBC QN AUTO: 36.5 PG (ref 27–31.3)
MCHC RBC AUTO-ENTMCNC: 32.9 % (ref 33–37)
MCV RBC AUTO: 110.9 FL (ref 82–100)
PDW BLD-RTO: 15.9 % (ref 11.5–14.5)
PERFORMED ON: ABNORMAL
PERFORMED ON: NORMAL
PLATELET # BLD: 162 K/UL (ref 130–400)
POTASSIUM REFLEX MAGNESIUM: 3.8 MEQ/L (ref 3.4–4.9)
RBC # BLD: 2.9 M/UL (ref 4.2–5.4)
SODIUM BLD-SCNC: 143 MEQ/L (ref 135–144)
WBC # BLD: 7.3 K/UL (ref 4.8–10.8)

## 2019-09-25 PROCEDURE — 2500000003 HC RX 250 WO HCPCS: Performed by: NURSE PRACTITIONER

## 2019-09-25 PROCEDURE — 3600000001 HC SURGERY LEVEL 1  BASE

## 2019-09-25 PROCEDURE — 80048 BASIC METABOLIC PNL TOTAL CA: CPT

## 2019-09-25 PROCEDURE — 2580000003 HC RX 258: Performed by: NURSE PRACTITIONER

## 2019-09-25 PROCEDURE — 7100000000 HC PACU RECOVERY - FIRST 15 MIN

## 2019-09-25 PROCEDURE — 96375 TX/PRO/DX INJ NEW DRUG ADDON: CPT

## 2019-09-25 PROCEDURE — 97162 PT EVAL MOD COMPLEX 30 MIN: CPT

## 2019-09-25 PROCEDURE — 3700000000 HC ANESTHESIA ATTENDED CARE

## 2019-09-25 PROCEDURE — 3700000001 HC ADD 15 MINUTES (ANESTHESIA)

## 2019-09-25 PROCEDURE — 93010 ELECTROCARDIOGRAM REPORT: CPT | Performed by: INTERNAL MEDICINE

## 2019-09-25 PROCEDURE — G0378 HOSPITAL OBSERVATION PER HR: HCPCS

## 2019-09-25 PROCEDURE — 2580000003 HC RX 258: Performed by: NURSE ANESTHETIST, CERTIFIED REGISTERED

## 2019-09-25 PROCEDURE — 6370000000 HC RX 637 (ALT 250 FOR IP): Performed by: NURSE PRACTITIONER

## 2019-09-25 PROCEDURE — 7100000001 HC PACU RECOVERY - ADDTL 15 MIN

## 2019-09-25 PROCEDURE — 6360000002 HC RX W HCPCS: Performed by: NURSE ANESTHETIST, CERTIFIED REGISTERED

## 2019-09-25 PROCEDURE — 97535 SELF CARE MNGMENT TRAINING: CPT

## 2019-09-25 PROCEDURE — 2500000003 HC RX 250 WO HCPCS: Performed by: HOSPITALIST

## 2019-09-25 PROCEDURE — 85027 COMPLETE CBC AUTOMATED: CPT

## 2019-09-25 PROCEDURE — 97166 OT EVAL MOD COMPLEX 45 MIN: CPT

## 2019-09-25 PROCEDURE — 96376 TX/PRO/DX INJ SAME DRUG ADON: CPT

## 2019-09-25 PROCEDURE — 36415 COLL VENOUS BLD VENIPUNCTURE: CPT

## 2019-09-25 PROCEDURE — 73501 X-RAY EXAM HIP UNI 1 VIEW: CPT

## 2019-09-25 PROCEDURE — 93005 ELECTROCARDIOGRAM TRACING: CPT | Performed by: ORTHOPAEDIC SURGERY

## 2019-09-25 PROCEDURE — 6360000002 HC RX W HCPCS: Performed by: ORTHOPAEDIC SURGERY

## 2019-09-25 RX ORDER — LISINOPRIL 20 MG/1
20 TABLET ORAL DAILY
Status: DISCONTINUED | OUTPATIENT
Start: 2019-09-26 | End: 2019-09-25

## 2019-09-25 RX ORDER — CLOPIDOGREL BISULFATE 75 MG/1
75 TABLET ORAL DAILY
Status: DISCONTINUED | OUTPATIENT
Start: 2019-09-25 | End: 2019-09-25

## 2019-09-25 RX ORDER — ATORVASTATIN CALCIUM 40 MG/1
40 TABLET, FILM COATED ORAL DAILY
Status: DISCONTINUED | OUTPATIENT
Start: 2019-09-25 | End: 2019-09-27 | Stop reason: HOSPADM

## 2019-09-25 RX ORDER — ESCITALOPRAM OXALATE 10 MG/1
20 TABLET ORAL DAILY
Status: DISCONTINUED | OUTPATIENT
Start: 2019-09-26 | End: 2019-09-25

## 2019-09-25 RX ORDER — LISINOPRIL 20 MG/1
20 TABLET ORAL DAILY
Status: DISCONTINUED | OUTPATIENT
Start: 2019-09-25 | End: 2019-09-25

## 2019-09-25 RX ORDER — METOPROLOL TARTRATE 50 MG/1
100 TABLET, FILM COATED ORAL 2 TIMES DAILY
Status: DISCONTINUED | OUTPATIENT
Start: 2019-09-25 | End: 2019-09-27 | Stop reason: HOSPADM

## 2019-09-25 RX ORDER — FUROSEMIDE 40 MG/1
40 TABLET ORAL DAILY
Status: DISCONTINUED | OUTPATIENT
Start: 2019-09-25 | End: 2019-09-27 | Stop reason: HOSPADM

## 2019-09-25 RX ORDER — AZATHIOPRINE 50 MG/1
100 TABLET ORAL DAILY
Status: DISCONTINUED | OUTPATIENT
Start: 2019-09-26 | End: 2019-09-25

## 2019-09-25 RX ORDER — PREDNISONE 1 MG/1
20 TABLET ORAL DAILY
Status: DISCONTINUED | OUTPATIENT
Start: 2019-09-25 | End: 2019-09-27 | Stop reason: HOSPADM

## 2019-09-25 RX ORDER — PREDNISONE 1 MG/1
20 TABLET ORAL DAILY
Status: DISCONTINUED | OUTPATIENT
Start: 2019-09-25 | End: 2019-09-25

## 2019-09-25 RX ORDER — INSULIN GLARGINE 100 [IU]/ML
10 INJECTION, SOLUTION SUBCUTANEOUS NIGHTLY
Status: DISCONTINUED | OUTPATIENT
Start: 2019-09-25 | End: 2019-09-25

## 2019-09-25 RX ORDER — AZATHIOPRINE 50 MG/1
100 TABLET ORAL DAILY
Status: DISCONTINUED | OUTPATIENT
Start: 2019-09-25 | End: 2019-09-27 | Stop reason: HOSPADM

## 2019-09-25 RX ORDER — DIGOXIN 125 MCG
125 TABLET ORAL DAILY
Status: DISCONTINUED | OUTPATIENT
Start: 2019-09-26 | End: 2019-09-25

## 2019-09-25 RX ORDER — LEVOTHYROXINE SODIUM 0.07 MG/1
75 TABLET ORAL DAILY
Status: DISCONTINUED | OUTPATIENT
Start: 2019-09-25 | End: 2019-09-25

## 2019-09-25 RX ORDER — ASPIRIN 81 MG/1
81 TABLET, CHEWABLE ORAL DAILY
Status: DISCONTINUED | OUTPATIENT
Start: 2019-09-25 | End: 2019-09-25

## 2019-09-25 RX ORDER — ESCITALOPRAM OXALATE 20 MG/1
20 TABLET ORAL DAILY
Status: DISCONTINUED | OUTPATIENT
Start: 2019-09-25 | End: 2019-09-25

## 2019-09-25 RX ORDER — GABAPENTIN 300 MG/1
300 CAPSULE ORAL 3 TIMES DAILY
Status: DISCONTINUED | OUTPATIENT
Start: 2019-09-25 | End: 2019-09-25

## 2019-09-25 RX ORDER — INSULIN GLARGINE 100 [IU]/ML
10 INJECTION, SOLUTION SUBCUTANEOUS NIGHTLY
Status: DISCONTINUED | OUTPATIENT
Start: 2019-09-25 | End: 2019-09-27 | Stop reason: HOSPADM

## 2019-09-25 RX ORDER — AZATHIOPRINE 50 MG/1
100 TABLET ORAL DAILY
Status: DISCONTINUED | OUTPATIENT
Start: 2019-09-25 | End: 2019-09-25

## 2019-09-25 RX ORDER — POTASSIUM CHLORIDE 20 MEQ/1
20 TABLET, EXTENDED RELEASE ORAL DAILY
Status: DISCONTINUED | OUTPATIENT
Start: 2019-09-26 | End: 2019-09-25

## 2019-09-25 RX ORDER — FUROSEMIDE 40 MG/1
40 TABLET ORAL DAILY
Status: DISCONTINUED | OUTPATIENT
Start: 2019-09-25 | End: 2019-09-25

## 2019-09-25 RX ORDER — TIZANIDINE 4 MG/1
4 TABLET ORAL EVERY 8 HOURS PRN
Status: DISCONTINUED | OUTPATIENT
Start: 2019-09-25 | End: 2019-09-27 | Stop reason: HOSPADM

## 2019-09-25 RX ORDER — POTASSIUM CHLORIDE 20 MEQ/1
20 TABLET, EXTENDED RELEASE ORAL DAILY
Status: DISCONTINUED | OUTPATIENT
Start: 2019-09-25 | End: 2019-09-27 | Stop reason: HOSPADM

## 2019-09-25 RX ORDER — GABAPENTIN 300 MG/1
300 CAPSULE ORAL 3 TIMES DAILY
Status: DISCONTINUED | OUTPATIENT
Start: 2019-09-25 | End: 2019-09-27 | Stop reason: HOSPADM

## 2019-09-25 RX ORDER — SULFASALAZINE 500 MG/1
500 TABLET ORAL 2 TIMES DAILY
Status: DISCONTINUED | OUTPATIENT
Start: 2019-09-25 | End: 2019-09-27 | Stop reason: HOSPADM

## 2019-09-25 RX ORDER — FUROSEMIDE 40 MG/1
40 TABLET ORAL DAILY
Status: DISCONTINUED | OUTPATIENT
Start: 2019-09-26 | End: 2019-09-25

## 2019-09-25 RX ORDER — DILTIAZEM HYDROCHLORIDE 300 MG/1
300 CAPSULE, COATED, EXTENDED RELEASE ORAL DAILY
Status: DISCONTINUED | OUTPATIENT
Start: 2019-09-25 | End: 2019-09-25

## 2019-09-25 RX ORDER — ESCITALOPRAM OXALATE 10 MG/1
20 TABLET ORAL DAILY
Status: DISCONTINUED | OUTPATIENT
Start: 2019-09-25 | End: 2019-09-27 | Stop reason: HOSPADM

## 2019-09-25 RX ORDER — SUCCINYLCHOLINE/SOD CL,ISO/PF 100 MG/5ML
SYRINGE (ML) INTRAVENOUS PRN
Status: DISCONTINUED | OUTPATIENT
Start: 2019-09-25 | End: 2019-09-25 | Stop reason: SDUPTHER

## 2019-09-25 RX ORDER — ATORVASTATIN CALCIUM 40 MG/1
40 TABLET, FILM COATED ORAL DAILY
Status: DISCONTINUED | OUTPATIENT
Start: 2019-09-25 | End: 2019-09-25

## 2019-09-25 RX ORDER — ASPIRIN 81 MG/1
81 TABLET, CHEWABLE ORAL DAILY
Status: DISCONTINUED | OUTPATIENT
Start: 2019-09-26 | End: 2019-09-25

## 2019-09-25 RX ORDER — ATORVASTATIN CALCIUM 40 MG/1
40 TABLET, FILM COATED ORAL DAILY
Status: DISCONTINUED | OUTPATIENT
Start: 2019-09-26 | End: 2019-09-25

## 2019-09-25 RX ORDER — TRAZODONE HYDROCHLORIDE 50 MG/1
100 TABLET ORAL NIGHTLY
Status: DISCONTINUED | OUTPATIENT
Start: 2019-09-25 | End: 2019-09-27 | Stop reason: HOSPADM

## 2019-09-25 RX ORDER — LISINOPRIL 20 MG/1
20 TABLET ORAL DAILY
Status: DISCONTINUED | OUTPATIENT
Start: 2019-09-25 | End: 2019-09-27

## 2019-09-25 RX ORDER — SODIUM CHLORIDE 9 MG/ML
INJECTION, SOLUTION INTRAVENOUS CONTINUOUS PRN
Status: DISCONTINUED | OUTPATIENT
Start: 2019-09-25 | End: 2019-09-25 | Stop reason: SDUPTHER

## 2019-09-25 RX ORDER — DIGOXIN 125 MCG
125 TABLET ORAL DAILY
Status: DISCONTINUED | OUTPATIENT
Start: 2019-09-25 | End: 2019-09-27 | Stop reason: HOSPADM

## 2019-09-25 RX ORDER — LEVOTHYROXINE SODIUM 0.07 MG/1
75 TABLET ORAL DAILY
Status: DISCONTINUED | OUTPATIENT
Start: 2019-09-26 | End: 2019-09-27 | Stop reason: HOSPADM

## 2019-09-25 RX ORDER — PROPOFOL 10 MG/ML
INJECTION, EMULSION INTRAVENOUS PRN
Status: DISCONTINUED | OUTPATIENT
Start: 2019-09-25 | End: 2019-09-25 | Stop reason: SDUPTHER

## 2019-09-25 RX ORDER — ASPIRIN 81 MG/1
81 TABLET, CHEWABLE ORAL DAILY
Status: DISCONTINUED | OUTPATIENT
Start: 2019-09-25 | End: 2019-09-27 | Stop reason: HOSPADM

## 2019-09-25 RX ORDER — DIGOXIN 125 MCG
125 TABLET ORAL DAILY
Status: DISCONTINUED | OUTPATIENT
Start: 2019-09-25 | End: 2019-09-25

## 2019-09-25 RX ORDER — CLOPIDOGREL BISULFATE 75 MG/1
75 TABLET ORAL DAILY
Status: DISCONTINUED | OUTPATIENT
Start: 2019-09-25 | End: 2019-09-27 | Stop reason: HOSPADM

## 2019-09-25 RX ORDER — POTASSIUM CHLORIDE 20 MEQ/1
20 TABLET, EXTENDED RELEASE ORAL DAILY
Status: DISCONTINUED | OUTPATIENT
Start: 2019-09-25 | End: 2019-09-25

## 2019-09-25 RX ADMIN — Medication 50 MG: at 07:47

## 2019-09-25 RX ADMIN — GABAPENTIN 300 MG: 300 CAPSULE ORAL at 20:27

## 2019-09-25 RX ADMIN — METOPROLOL TARTRATE 100 MG: 50 TABLET, FILM COATED ORAL at 20:28

## 2019-09-25 RX ADMIN — TIZANIDINE 4 MG: 4 TABLET ORAL at 20:28

## 2019-09-25 RX ADMIN — SODIUM CHLORIDE: 9 INJECTION, SOLUTION INTRAVENOUS at 07:45

## 2019-09-25 RX ADMIN — LISINOPRIL 20 MG: 20 TABLET ORAL at 11:56

## 2019-09-25 RX ADMIN — HYDROMORPHONE HYDROCHLORIDE 0.5 MG: 1 INJECTION, SOLUTION INTRAMUSCULAR; INTRAVENOUS; SUBCUTANEOUS at 01:52

## 2019-09-25 RX ADMIN — GLYCOPYRROLATE AND FORMOTEROL FUMARATE 2 PUFF: 9; 4.8 AEROSOL, METERED RESPIRATORY (INHALATION) at 20:19

## 2019-09-25 RX ADMIN — INSULIN LISPRO 2 UNITS: 100 INJECTION, SOLUTION INTRAVENOUS; SUBCUTANEOUS at 20:28

## 2019-09-25 RX ADMIN — METOPROLOL TARTRATE 2.5 MG: 1 INJECTION, SOLUTION INTRAVENOUS at 00:23

## 2019-09-25 RX ADMIN — HYDROMORPHONE HYDROCHLORIDE 0.5 MG: 1 INJECTION, SOLUTION INTRAMUSCULAR; INTRAVENOUS; SUBCUTANEOUS at 05:07

## 2019-09-25 RX ADMIN — METOPROLOL TARTRATE 2.5 MG: 1 INJECTION, SOLUTION INTRAVENOUS at 05:22

## 2019-09-25 RX ADMIN — METOPROLOL TARTRATE 2.5 MG: 1 INJECTION, SOLUTION INTRAVENOUS at 02:05

## 2019-09-25 RX ADMIN — INSULIN GLARGINE 10 UNITS: 100 INJECTION, SOLUTION SUBCUTANEOUS at 20:28

## 2019-09-25 RX ADMIN — ESCITALOPRAM OXALATE 20 MG: 10 TABLET ORAL at 11:58

## 2019-09-25 RX ADMIN — ASPIRIN 81 MG 81 MG: 81 TABLET ORAL at 11:59

## 2019-09-25 RX ADMIN — AZATHIOPRINE 100 MG: 50 TABLET ORAL at 11:56

## 2019-09-25 RX ADMIN — OXYCODONE HYDROCHLORIDE AND ACETAMINOPHEN 1 TABLET: 5; 325 TABLET ORAL at 21:52

## 2019-09-25 RX ADMIN — Medication 125 MCG: at 11:56

## 2019-09-25 RX ADMIN — SODIUM CHLORIDE: 9 INJECTION, SOLUTION INTRAVENOUS at 20:29

## 2019-09-25 RX ADMIN — PREDNISONE 20 MG: 1 TABLET ORAL at 11:57

## 2019-09-25 RX ADMIN — FUROSEMIDE 40 MG: 40 TABLET ORAL at 11:56

## 2019-09-25 RX ADMIN — OXYCODONE HYDROCHLORIDE AND ACETAMINOPHEN 2 TABLET: 5; 325 TABLET ORAL at 10:16

## 2019-09-25 RX ADMIN — PROPOFOL 180 MG: 10 INJECTION, EMULSION INTRAVENOUS at 07:47

## 2019-09-25 RX ADMIN — POTASSIUM CHLORIDE 20 MEQ: 20 TABLET, EXTENDED RELEASE ORAL at 11:56

## 2019-09-25 RX ADMIN — CLOPIDOGREL BISULFATE 75 MG: 75 TABLET ORAL at 11:57

## 2019-09-25 RX ADMIN — ATORVASTATIN CALCIUM 40 MG: 40 TABLET, FILM COATED ORAL at 11:57

## 2019-09-25 RX ADMIN — GABAPENTIN 300 MG: 300 CAPSULE ORAL at 15:45

## 2019-09-25 ASSESSMENT — PAIN SCALES - GENERAL
PAINLEVEL_OUTOF10: 0
PAINLEVEL_OUTOF10: 7
PAINLEVEL_OUTOF10: 10
PAINLEVEL_OUTOF10: 10
PAINLEVEL_OUTOF10: 7
PAINLEVEL_OUTOF10: 9

## 2019-09-25 NOTE — PROGRESS NOTES
Physical Therapy Med Surg Initial Assessment  [facility]       NAME: Mendy Hdez  : 1950 (77 y.o.)  MRN: [id number]  CODE STATUS: Full Code    Date of Service: 2019    Patient Diagnosis(es): Closed dislocation of left hip, initial encounter [facility]   Chief Complaint   Patient presents with    Fall     Pt states she fell while bending over to pick something up     Patient Active Problem List    Diagnosis Date Noted    Closed dislocation of left hip ([address] 2019    Other specified hypothyroidism 2018    Adrenal insufficiency ([address] 2018    Uncontrolled type 2 diabetes mellitus ([address] 2017    Rotator cuff tendinitis 2017    RA (rheumatoid arthritis) ([address] 2017    Hypertension 2017    CAD (coronary artery disease) 2017    Morbid obesity due to excess calories ([address] 2017        Past Medical History:   Diagnosis Date    A-fib ([address]     Aplasia of adrenal gland     CAD (coronary artery disease)     Diabetes mellitus ([address]     DJD (degenerative joint disease)     HLD (hyperlipidemia)     Hypertension     Hypothyroidism     Obesity     CHANA (obstructive sleep apnea)     RA (rheumatoid arthritis) ([address]     Rotator cuff tendinitis     RIGHT     Past Surgical History:   Procedure Laterality Date    CARPAL TUNNEL RELEASE Left      SECTION      CHOLECYSTECTOMY      CORONARY ANGIOPLASTY WITH STENT PLACEMENT  2019    prior caths as well, total 7 stents    JOINT REPLACEMENT      BILAT KNEE, LEFT SHOULDER AND HIP AS WELL       Chart Reviewed: Yes  Patient assessed for rehabilitation services?: Yes  Family / Caregiver Present: Yes(dtr)  General Comment  Comments: Pt awake in bed, agreeable to PT eval    Restrictions:  Restrictions/Precautions: Fall Risk, Weight Bearing  Lower Extremity Weight Bearing Restrictions  Left Lower Extremity Weight Bearing: Weight Bearing As navigate >4 steps with supervision    [facility] (6 CLICK) Karan [address] 28 Inpatient Mobility Raw Score : 16     Therapy Time:   Individual   Time In 3576   Time Out 1335   Minutes 30       Bed mob/transfers: 12 min    Daisy Garcia PT, 09/25/19 at 3:59 PM

## 2019-09-25 NOTE — ANESTHESIA PRE PROCEDURE
by mouth daily Once a day 10/25/17  Yes Delilah Denney MD   azaTHIOprine (IMURAN) 50 MG tablet Take 100 mg by mouth daily   Yes Historical Provider, MD   clopidogrel (PLAVIX) 75 MG tablet Take 75 mg by mouth daily   Yes Historical Provider, MD   Multiple Vitamins-Minerals (PRESERVISION AREDS 2) CAPS Take 1 capsule by mouth 2 times daily   Yes Historical Provider, MD   cetirizine (ZYRTEC) 10 MG tablet Take 10 mg by mouth daily    Yes Historical Provider, MD   Lactobacillus (PROBIOTIC ACIDOPHILUS PO) Take by mouth daily   Yes Historical Provider, MD   FOLIC ACID PO Take 783 mg by mouth 2 times daily   Yes Historical Provider, MD   B Complex Vitamins (VITAMIN B COMPLEX PO) Take by mouth daily   Yes Historical Provider, MD   Cholecalciferol (VITAMIN D3) 5000 UNITS TABS Take 1 tablet by mouth daily   Yes Historical Provider, MD   albuterol sulfate HFA (VENTOLIN HFA) 108 (90 BASE) MCG/ACT inhaler Inhale 2 puffs into the lungs every 6 hours as needed for Wheezing   Yes Historical Provider, MD   methotrexate 2.5 MG tablet Take 2.5 mg by mouth once a week TAKE 8 TABS EVERY WEEK FRIDAY   Yes Historical Provider, MD   traZODone (DESYREL) 50 MG tablet Take 50 mg by mouth nightly tAKE 1-2 TAB AT hs    Historical Provider, MD   diphenoxylate-atropine (LOMOTIL) 2.5-0.025 MG per tablet Take 1 tablet by mouth daily as needed for Diarrhea.  Indications: FOR DIARRHEA    Historical Provider, MD   tiZANidine (ZANAFLEX) 4 MG tablet Take 4 mg by mouth every 8 hours as needed (DO NOT EXCEED 3 DOSES IN 24HRS)    Historical Provider, MD   glycopyrrolate-formoterol (BEVESPI AEROSPHERE) 9-4.8 MCG/ACT AERO Inhale 2 puffs into the lungs 2 times daily    Historical Provider, MD   blood glucose test strips (ONETOUCH VERIO) strip Use qid to check bs Dx e11.65 12/18/18   Delilah Denney MD   Blood Glucose Monitoring Suppl (Justo William) w/Device KIT Give 1 meter to check bs Dx E11.65 7/12/17   MD MATHEW Jamison DELICA LANCETS 32G MISC Use bid to 09/24/2019    APTT 20.7 09/24/2019       HCG (If Applicable): No results found for: PREGTESTUR, PREGSERUM, HCG, HCGQUANT     ABGs: No results found for: PHART, PO2ART, SZI8BJV, TOP9BTM, BEART, X8PCOJFU     Type & Screen (If Applicable):  No results found for: LABABO, LABRH    Anesthesia Evaluation    Airway: Mallampati: II  TM distance: >3 FB   Neck ROM: full  Mouth opening: > = 3 FB Dental:    (+) edentulous      Pulmonary: breath sounds clear to auscultation  (+) sleep apnea:                             Cardiovascular:    (+) hypertension:, CAD:,         Rhythm: regular                      Neuro/Psych:   Negative Neuro/Psych ROS              GI/Hepatic/Renal:             Endo/Other:    (+) DiabetesType II DM, , hypothyroidism: arthritis:., .                 Abdominal:           Vascular: negative vascular ROS. Anesthesia Plan      MAC     ASA 2       Induction: intravenous. MIPS: Prophylactic antiemetics administered. Anesthetic plan and risks discussed with patient. Plan discussed with CRNA.     Attending anesthesiologist reviewed and agrees with Pre Eval content              Terry Matthews MD   9/25/2019

## 2019-09-25 NOTE — OP NOTE
Preoperative diagnosis: Left total hip arthroplasty dislocation    Postop diagnosis: Above    Procedure: Closed reduction left total hip arthroplasty dislocation    Blood loss: None    Surgeon: Jeremy Garcia M.D. Asst.: Leti CARDOZA The physician assistant was present through the entire case. Given the nature of the disease process and the procedure to be performed a skilled surgical assistant was necessary during the case. The assistant was necessary in order to hold retractors and directly assist in the operation. A certified scrub tech was at the back table managing instruments and supplies for the surgical case. Anesthesia: General    HPI: Patient dislocated her left hip. All of the risks benefits and potential complications of operative versus nonoperative treatment were discussed with the patient. Operative risks discussed included but were not limited to anesthesia complications, infections, excessive bleeding, damaged to uninjured healthy structures, and failure of surgery requiring the need for reoperation resulting in chronic pain and disability. The patient completely understands those risks and wished to proceed with operative intervention. Operative implants: None    Operative procedure: The patient was placed under general anesthesia in the preoperative holding area. Anesthesia maintain control the patient's had neck and maintain the patient's airway. Using an assistant to stabilize the pelvis the hip was flexed internally rotated and there was traction provided towards the ceiling which easily reduced the hip without excessive tension. There was an audible and visualize reduction. Leg lengths are equal post reduction. A foam wedge was placed after the reduction. X-rays were taken which showed nice reduction of the left total hip arthroplasty.     The patient tolerated the procedure well and maintained normal oxygen saturation levels throughout and after the procedure. This dictation was created using voice recognition software. If there are any questions about inaccuracies please do not hesitate to contact me.

## 2019-09-25 NOTE — PROGRESS NOTES
[facility] OCCUPATIONAL THERAPY EVALUATION - ACUTE     NAME: Mendy Hdez  : 1950 (77 y.o.)  MRN: [id number]  CODE STATUS: Full Code    Date of Service: 2019    Patient Diagnosis(es): Closed dislocation of left hip, initial encounter [facility]) [S73.005A]   Chief Complaint   Patient presents with    Fall     Pt states she fell while bending over to pick something up     Patient Active Problem List    Diagnosis Date Noted    Closed dislocation of left hip ([address] 2019    Other specified hypothyroidism 2018    Adrenal insufficiency ([address] 2018    Uncontrolled type 2 diabetes mellitus ([address] 2017    Rotator cuff tendinitis 2017    RA (rheumatoid arthritis) ([address] 2017    Hypertension 2017    CAD (coronary artery disease) 2017    Morbid obesity due to excess calories ([address] 2017        Past Medical History:   Diagnosis Date    A-fib ([address]     Aplasia of adrenal gland     CAD (coronary artery disease)     Diabetes mellitus ([address]     DJD (degenerative joint disease)     HLD (hyperlipidemia)     Hypertension     Hypothyroidism     Obesity     CHANA (obstructive sleep apnea)     RA (rheumatoid arthritis) (HCC)     Rotator cuff tendinitis     RIGHT     Past Surgical History:   Procedure Laterality Date    CARPAL TUNNEL RELEASE Left      SECTION      CHOLECYSTECTOMY      CORONARY ANGIOPLASTY WITH STENT PLACEMENT  2019    prior caths as well, total 7 stents    JOINT REPLACEMENT      BILAT KNEE, LEFT SHOULDER AND HIP AS WELL        Restrictions  Restrictions/Precautions: Fall Risk, Weight Bearing  Lower Extremity Weight Bearing Restrictions  Left Lower Extremity Weight Bearing: Weight Bearing As Tolerated  Position Activity Restriction  Hip Precautions: Posterior hip precautions  Other position/activity restrictions: abductor brace at all times     Safety Devices: Safety Devices  Safety Devices in place: Yes  Type of devices:  All

## 2019-09-25 NOTE — CONSULTS
History:   Diagnosis Date    [facility]     Aplasia of adrenal gland     CAD (coronary artery disease)     Diabetes mellitus ([facility]     DJD (degenerative joint disease)     HLD (hyperlipidemia)     Hypertension     Hypothyroidism     Obesity     CHANA (obstructive sleep apnea)     RA (rheumatoid arthritis) (HCC)     Rotator cuff tendinitis     RIGHT       Past Surgical History:   Procedure Laterality Date    CARPAL TUNNEL RELEASE Left      SECTION      CHOLECYSTECTOMY      CORONARY ANGIOPLASTY WITH STENT PLACEMENT  2019    prior caths as well, total 7 stents    JOINT REPLACEMENT      BILAT KNEE, LEFT SHOULDER AND HIP AS WELL       Prior to Admission medications    Medication Sig Start Date End Date Taking?  Authorizing Provider   diltiazem (CARDIZEM CD) 300 MG extended release capsule Take 300 mg by mouth daily   Yes Historical Provider, MD   apixaban (ELIQUIS) 5 MG TABS tablet Take 5 mg by mouth 2 times daily   Yes Historical Provider, MD   metoprolol tartrate (LOPRESSOR) 50 MG tablet Take 100 mg by mouth 2 times daily    Yes Historical Provider, MD   lisinopril (PRINIVIL;ZESTRIL) 20 MG tablet Take 20 mg by mouth daily   Yes Historical Provider, MD   escitalopram (LEXAPRO) 20 MG tablet Take 20 mg by mouth daily   Yes Historical Provider, MD   digoxin (LANOXIN) 125 MCG tablet Take 125 mcg by mouth daily   Yes Historical Provider, MD   levothyroxine (SYNTHROID) 75 MCG tablet Take 1 tablet by mouth Daily 19  Yes Fouzia Unger MD   sulfaSALAzine (AZULFIDINE) 500 MG tablet Take 500 mg by mouth 2 times daily    Yes Historical Provider, MD   gabapentin (NEURONTIN) 300 MG capsule Take 1 capsule 3x daily 3/18/19 9/24/19 Yes Fouzia Unger MD   potassium chloride (KLOR-CON M) 20 MEQ extended release tablet Take 1 tablet by mouth daily 3/12/19  Yes Fouzia Unger MD   insulin lispro (HUMALOG KWIKPEN) 100 UNIT/ML pen 2 units plus sliding scale   Less than 150 none  151-200 2 units  201-250 4 units insecurity:     Worry: Not on file     Inability: Not on file    Transportation needs:     Medical: Not on file     Non-medical: Not on file   Tobacco Use    Smoking status: Former Smoker    Smokeless tobacco: Never Used    Tobacco comment: QUIT AT AGE 41   Substance and Sexual Activity    Alcohol use: Not Currently    Drug use: No    Sexual activity: Not Currently   Lifestyle    Physical activity:     Days per week: Not on file     Minutes per session: Not on file    Stress: Not on file   Relationships    Social connections:     Talks on phone: Not on file     Gets together: Not on file     Attends Church service: Not on file     Active member of club or organization: Not on file     Attends meetings of clubs or organizations: Not on file     Relationship status: Not on file    Intimate partner violence:     Fear of current or ex partner: Not on file     Emotionally abused: Not on file     Physically abused: Not on file     Forced sexual activity: Not on file   Other Topics Concern    Not on file   Social History Narrative    Not on file       History reviewed. No pertinent family history. Review Of Systems:   ROS as noted in HPI, 12 point ROS reviewed and otherwise negative.     Physical Exam:  Vitals:    09/24/19 1903 09/24/19 1905 09/24/19 2144 09/25/19 0022   BP:  (!) 157/76 (!) 146/83 (!) 150/53   Pulse: 98 83 84 71   Resp: 22 14 18 18   Temp:   98.1 °F (36.7 °C) 97.2 °F (36.2 °C)   TempSrc:   Oral Oral   SpO2: 100%  100% 98%   Weight:       Height:           CONSTITUTIONAL:  awake, alert, cooperative, appears older than stated age and morbidlyobese  LUNGS:  no increased work of breathing, good air exchange and clear to auscultation  CARDIOVASCULAR:  regularly irregular rhythm, normal S1 and S2 and nonpitting edema BLE  ABDOMEN:  normal bowel sounds, soft, non-distended and non-tender  MUSCULOSKELETAL: Decreased range of motion left lower extremity, left hip tender to touch, right hip tender aspirin Plavix  Plan:  Continue statin postop  Continue antiplatelets postop  Cardiac and diabetic diet postop    4. Afib  Assessment: currently afib, rate controlled, on BB and anticoagulation  Plan:  Cardiology to follow  IV Lopressor every 6 while n.p.o. Resume home meds postop  Telemetry    5. DM  Assessment: mild hyperglycemia  Plan:  Accu-Cheks every 6 hours while n.p.o. Switch to [facility] at bedtime when diet initiated  SSI  Hypoglycemia protocol  Continue home insulin regimen postop    6.  RA  Assessment: Currently on prednisone and Imuran  Plan:  Continue those postop, give prednisone in am    Patient seen and examined on 9/24 at 11:30 PM    Electronically signed by ELODIA Kumar - CNP on 9/25/19 at 2:24 AM    Dr. Leonard Nichols MD - supervising physician

## 2019-09-26 LAB
ANION GAP SERPL CALCULATED.3IONS-SCNC: 15 MEQ/L (ref 9–15)
BUN BLDV-MCNC: 10 MG/DL (ref 8–23)
CALCIUM SERPL-MCNC: 8.9 MG/DL (ref 8.5–9.9)
CHLORIDE BLD-SCNC: 106 MEQ/L (ref 95–107)
CO2: 22 MEQ/L (ref 20–31)
CREAT SERPL-MCNC: 0.77 MG/DL (ref 0.5–0.9)
GFR AFRICAN AMERICAN: >60
GFR NON-AFRICAN AMERICAN: >60
GLUCOSE BLD-MCNC: 156 MG/DL (ref 60–115)
GLUCOSE BLD-MCNC: 176 MG/DL (ref 60–115)
GLUCOSE BLD-MCNC: 260 MG/DL (ref 60–115)
GLUCOSE BLD-MCNC: 89 MG/DL (ref 60–115)
GLUCOSE BLD-MCNC: 99 MG/DL (ref 70–99)
HCT VFR BLD CALC: 34.2 % (ref 37–47)
HEMOGLOBIN: 11.3 G/DL (ref 12–16)
MCH RBC QN AUTO: 36.8 PG (ref 27–31.3)
MCHC RBC AUTO-ENTMCNC: 32.9 % (ref 33–37)
MCV RBC AUTO: 112 FL (ref 82–100)
PDW BLD-RTO: 16.3 % (ref 11.5–14.5)
PERFORMED ON: ABNORMAL
PERFORMED ON: NORMAL
PLATELET # BLD: 181 K/UL (ref 130–400)
POTASSIUM REFLEX MAGNESIUM: 4 MEQ/L (ref 3.4–4.9)
RBC # BLD: 3.05 M/UL (ref 4.2–5.4)
SODIUM BLD-SCNC: 143 MEQ/L (ref 135–144)
WBC # BLD: 9.9 K/UL (ref 4.8–10.8)

## 2019-09-26 PROCEDURE — G0378 HOSPITAL OBSERVATION PER HR: HCPCS

## 2019-09-26 PROCEDURE — 80048 BASIC METABOLIC PNL TOTAL CA: CPT

## 2019-09-26 PROCEDURE — 94640 AIRWAY INHALATION TREATMENT: CPT

## 2019-09-26 PROCEDURE — 2700000000 HC OXYGEN THERAPY PER DAY

## 2019-09-26 PROCEDURE — 97116 GAIT TRAINING THERAPY: CPT

## 2019-09-26 PROCEDURE — 36415 COLL VENOUS BLD VENIPUNCTURE: CPT

## 2019-09-26 PROCEDURE — 85027 COMPLETE CBC AUTOMATED: CPT

## 2019-09-26 PROCEDURE — 6370000000 HC RX 637 (ALT 250 FOR IP): Performed by: NURSE PRACTITIONER

## 2019-09-26 PROCEDURE — 97535 SELF CARE MNGMENT TRAINING: CPT

## 2019-09-26 RX ORDER — OXYCODONE HYDROCHLORIDE AND ACETAMINOPHEN 5; 325 MG/1; MG/1
1 TABLET ORAL EVERY 6 HOURS PRN
Qty: 28 TABLET | Refills: 0 | Status: ON HOLD | OUTPATIENT
Start: 2019-09-26 | End: 2019-10-06 | Stop reason: HOSPADM

## 2019-09-26 RX ADMIN — GABAPENTIN 300 MG: 300 CAPSULE ORAL at 20:52

## 2019-09-26 RX ADMIN — TIZANIDINE 4 MG: 4 TABLET ORAL at 11:58

## 2019-09-26 RX ADMIN — AZATHIOPRINE 100 MG: 50 TABLET ORAL at 10:30

## 2019-09-26 RX ADMIN — OXYCODONE HYDROCHLORIDE AND ACETAMINOPHEN 1 TABLET: 5; 325 TABLET ORAL at 06:42

## 2019-09-26 RX ADMIN — GABAPENTIN 300 MG: 300 CAPSULE ORAL at 10:30

## 2019-09-26 RX ADMIN — GABAPENTIN 300 MG: 300 CAPSULE ORAL at 15:38

## 2019-09-26 RX ADMIN — GLYCOPYRROLATE AND FORMOTEROL FUMARATE 2 PUFF: 9; 4.8 AEROSOL, METERED RESPIRATORY (INHALATION) at 07:41

## 2019-09-26 RX ADMIN — ESCITALOPRAM OXALATE 20 MG: 10 TABLET ORAL at 10:43

## 2019-09-26 RX ADMIN — ATORVASTATIN CALCIUM 40 MG: 40 TABLET, FILM COATED ORAL at 21:01

## 2019-09-26 RX ADMIN — LEVOTHYROXINE SODIUM 75 MCG: 0.07 TABLET ORAL at 06:41

## 2019-09-26 RX ADMIN — Medication 125 MCG: at 15:37

## 2019-09-26 RX ADMIN — CLOPIDOGREL BISULFATE 75 MG: 75 TABLET ORAL at 10:29

## 2019-09-26 RX ADMIN — POTASSIUM CHLORIDE 20 MEQ: 20 TABLET, EXTENDED RELEASE ORAL at 10:29

## 2019-09-26 RX ADMIN — INSULIN GLARGINE 10 UNITS: 100 INJECTION, SOLUTION SUBCUTANEOUS at 20:53

## 2019-09-26 RX ADMIN — OXYCODONE HYDROCHLORIDE AND ACETAMINOPHEN 2 TABLET: 5; 325 TABLET ORAL at 20:52

## 2019-09-26 RX ADMIN — INSULIN LISPRO 6 UNITS: 100 INJECTION, SOLUTION INTRAVENOUS; SUBCUTANEOUS at 17:21

## 2019-09-26 RX ADMIN — PREDNISONE 20 MG: 1 TABLET ORAL at 10:29

## 2019-09-26 RX ADMIN — GLYCOPYRROLATE AND FORMOTEROL FUMARATE 2 PUFF: 9; 4.8 AEROSOL, METERED RESPIRATORY (INHALATION) at 20:50

## 2019-09-26 RX ADMIN — METOPROLOL TARTRATE 100 MG: 50 TABLET, FILM COATED ORAL at 10:30

## 2019-09-26 ASSESSMENT — PAIN DESCRIPTION - PAIN TYPE
TYPE: ACUTE PAIN
TYPE: ACUTE PAIN

## 2019-09-26 ASSESSMENT — PAIN DESCRIPTION - ORIENTATION
ORIENTATION: RIGHT
ORIENTATION: RIGHT

## 2019-09-26 ASSESSMENT — PAIN DESCRIPTION - LOCATION
LOCATION: RIB CAGE
LOCATION: RIB CAGE

## 2019-09-26 ASSESSMENT — PAIN SCALES - GENERAL
PAINLEVEL_OUTOF10: 7
PAINLEVEL_OUTOF10: 2
PAINLEVEL_OUTOF10: 3
PAINLEVEL_OUTOF10: 7

## 2019-09-26 NOTE — PROGRESS NOTES
times    SUBJECTIVE:  General  Chart Reviewed: Yes  Family / Caregiver Present: Yes(daughter)  Subjective  Subjective: \"Okay, I want to be able to go home. \"     Pre Pain Assessment:  Pre Treatment Pain Screening  Pain at present: 7  Scale Used: Numeric Score  Intervention List: Patient able to continue with treatment  Pain Screening  Patient Currently in Pain: Yes       Post Pain Assessment:   Pain Assessment  Pain Assessment: 0-10  Pain Level: 7  Pain Type: Acute pain  Pain Location: Rib cage  Pain Orientation: Right       OBJECTIVE:         Bed mobility  Supine to Sit: Supervision  Sit to Supine: Supervision  Comment: bed flat, vc's for maintaining hip precautions. Transfers  Sit to Stand: Contact guard assistance;Minimal Assistance  Stand to sit: Supervision  Bed to Chair: Supervision  Comment: Demonstration provided for ergonomic sequencing with operative LE extended out to allow for increased trunk flexion. Pt with increased difficulty rising to stand d/t B shoulder weakness. With B knees flexed, pt able to complete with CGA, however comes close to 90degree flexion limit. Pt and daughter educated on difficulties from lower softer chairs and advised pt to avoid at this time. Daughter instructed to how properly assist pt with standing should she need manual lifting assist, pt and daughter both verbalize understanding and state they are comfortable with pt going home. Ambulation  Ambulation?: Yes  Ambulation 1  Surface: level tile  Device: Rolling Walker  Other Apparatus: O2(abductor brace. )  Assistance: Stand by assistance  Quality of Gait: Pt utilizes step to pattern. Fatigues quickly. Gait Deviations: Decreased step height;Decreased step length  Distance: 25' x2   Comments: distance not the focus. Stairs/Curb  Stairs?: Yes  Stairs  # Steps : 4  Rails: Bilateral  Assistance: Stand by assistance  Comment: vc's for sequencing and technique, daughter instructed on how to guard/assist pt on stairs.  pt safe.                                 ASSESSMENT:  Body structures, Functions, Activity limitations: Decreased functional mobility ; Decreased coordination;Decreased balance;Decreased strength    Assessment: pt and daughter educated on hip precautions, transfer technique, stair technique, and proper brace fitting. Both daughter and pt comfortable with pt going home at this level of care.      Activity Tolerance  Activity Tolerance: Patient Tolerated treatment well       Discharge Recommendations:  Patient would benefit from continued therapy after discharge    Goals:  Long term goals  Long term goal 1: indep with bed mobility  Long term goal 2: supervision with transfers  Long term goal 3: pt to ambulate >50 ft with supervision FWW  Long term goal 4: pt to navigate >4 steps with supervision  Patient Goals   Patient goals : to go home before Friday    PLAN:   Plan  Times per week: 5-7  Times per day: (1-2)  Current Treatment Recommendations: Strengthening, Functional Mobility Training, Neuromuscular Re-education, Home Exercise Program, Equipment Evaluation, Education, & procurement, Transfer Training, Gait Training, Safety Education & Training, Balance Training, Endurance Training, Stair training, Patient/Caregiver Education & Training  Plan Comment: cont per POC  Safety Devices  Type of devices: Bed alarm in place, Call light within reach, Nurse notified     Murrel Libman ([address] [place] [address]) Jed 95 Raw Score : 19      Therapy Time   Individual   Time In 1304   Time Out 1344   Minutes 40     Gait: 25  BM/Trsf: [address] PTA, 09/26/19 at 2:09 PM

## 2019-09-26 NOTE — DISCHARGE INSTR - COC
Discharge:   Respiratory Treatments: ***  Oxygen Therapy:  {Therapy; copd oxygen:80163}  Ventilator:    { CC Vent XRXU:390804446}    Rehab Therapies: {THERAPEUTIC INTERVENTION:2427828101}  Weight Bearing Status/Restrictions: 50Daphne ANDERSEN Weight Bearin}  Other Medical Equipment (for information only, NOT a DME order):  {EQUIPMENT:342315682}  Other Treatments: ***    Patient's personal belongings (please select all that are sent with patient):  {P DME Belongings:790455137}    RN SIGNATURE:  {Esignature:827451867}    CASE MANAGEMENT/SOCIAL WORK SECTION    Inpatient Status Date: ***    Readmission Risk Assessment Score:  Readmission Risk              Risk of Unplanned Readmission:        17           Discharging to Facility/ Agency   · Name:   · Address:  · Phone:  · Fax:    Dialysis Facility (if applicable)   · Name:  · Address:  · Dialysis Schedule:  · Phone:  · Fax:    / signature: {Esignature:938644232}    PHYSICIAN SECTION    Prognosis: {Prognosis:5935326733}    Condition at Discharge: 50Daphne Moulton Patient Condition:916996736}    Rehab Potential (if transferring to Rehab): {Prognosis:8297327244}    Recommended Labs or Other Treatments After Discharge: ***    Physician Certification: I certify the above information and transfer of Kelechi Lee  is necessary for the continuing treatment of the diagnosis listed and that she requires {Admit to Appropriate Level of Care:21453} for {GREATER/LESS:580070834} 30 days.      Update Admission H&P: {P DME Changes in UUYUT:619082489}    PHYSICIAN SIGNATURE:  Electronically signed by Elisha De La Garza MD on 19 at 9:34 AM

## 2019-09-26 NOTE — PROGRESS NOTES
Physical Therapy Med Surg Daily Treatment Note  Facility/Department: [facility]  Room: D947/H440-64       NAME: Randolm Goodell  : 1950 (77 y.o.)  MRN: [id number]  CODE STATUS: Full Code    Date of Service: 2019    Patient Diagnosis(es): Closed dislocation of left hip, initial encounter [facility]) [S73.005A]   Chief Complaint   Patient presents with    Fall     Pt states she fell while bending over to pick something up     Patient Active Problem List    Diagnosis Date Noted    Closed dislocation of left hip ([address] 2019    Other specified hypothyroidism 2018    Adrenal insufficiency ([address] 2018    Uncontrolled type 2 diabetes mellitus ([address] 2017    Rotator cuff tendinitis 2017    RA (rheumatoid arthritis) ([address] 2017    Hypertension 2017    CAD (coronary artery disease) 2017    Morbid obesity due to excess calories ([address] 2017        Past Medical History:   Diagnosis Date    A-fib ([address]     Aplasia of adrenal gland     CAD (coronary artery disease)     Diabetes mellitus ([address]     DJD (degenerative joint disease)     HLD (hyperlipidemia)     Hypertension     Hypothyroidism     Obesity     CHANA (obstructive sleep apnea)     RA (rheumatoid arthritis) (HCC)     Rotator cuff tendinitis     RIGHT     Past Surgical History:   Procedure Laterality Date    CARPAL TUNNEL RELEASE Left      SECTION      CHOLECYSTECTOMY      CORONARY ANGIOPLASTY WITH STENT PLACEMENT  2019    prior caths as well, total 7 stents    JOINT REPLACEMENT      BILAT KNEE, LEFT SHOULDER AND HIP AS WELL         Restrictions  Restrictions/Precautions: Fall Risk, Weight Bearing  Lower Extremity Weight Bearing Restrictions  Left Lower Extremity Weight Bearing: Weight Bearing As Tolerated  Position Activity Restriction  Hip Precautions: Posterior hip precautions  Other position/activity restrictions: abductor brace at all times    Subjective

## 2019-09-26 NOTE — PROGRESS NOTES
PM medications and assessment completed at this time. Pt complaining of pain 6/10, medicated with zanaflex. Pt has generalized bruising throughout. Pt has no complaints of excess numbness/tingling in her legs. Lungs are clear. Bowel sounds are active. Pt has no other stated needs at this time. 2155 pt still c/o pain 7/10. Medicated with percocet  9/26/2019 q4hr assessment unchanged. vss  0400 assessment unchanged.  vss

## 2019-09-26 NOTE — CARE COORDINATION
PT WILL BE DC HOME TODAY WITH [address] PT WILL STAY WITH HER DTR, ADDRESS IS . Carlotta Ledbetter "[place]" [address] PHONE NUMBER VERIFIED IN Epic IS CORRECT

## 2019-09-27 ENCOUNTER — HOSPITAL ENCOUNTER (INPATIENT)
Age: 69
LOS: 9 days | Discharge: HOME HEALTH | DRG: 560 | End: 2019-10-07
Attending: EMERGENCY MEDICINE | Admitting: PHYSICAL MEDICINE & REHABILITATION
Payer: MEDICARE

## 2019-09-27 VITALS
OXYGEN SATURATION: 95 % | BODY MASS INDEX: 42.59 KG/M2 | TEMPERATURE: 98.6 F | SYSTOLIC BLOOD PRESSURE: 139 MMHG | HEIGHT: 68 IN | WEIGHT: 281 LBS | HEART RATE: 56 BPM | DIASTOLIC BLOOD PRESSURE: 76 MMHG | RESPIRATION RATE: 18 BRPM

## 2019-09-27 DIAGNOSIS — T84.021A DISLOCATION OF INTERNAL LEFT HIP PROSTHESIS, INITIAL ENCOUNTER (HCC): ICD-10-CM

## 2019-09-27 DIAGNOSIS — R53.81 DECLINING FUNCTIONAL STATUS: Primary | ICD-10-CM

## 2019-09-27 DIAGNOSIS — M05.711 RHEUMATOID ARTHRITIS INVOLVING RIGHT SHOULDER WITH POSITIVE RHEUMATOID FACTOR (HCC): ICD-10-CM

## 2019-09-27 PROBLEM — S73.005D HIP DISLOCATION, LEFT, SUBSEQUENT ENCOUNTER: Status: ACTIVE | Noted: 2019-09-27

## 2019-09-27 LAB
ANION GAP SERPL CALCULATED.3IONS-SCNC: 7 MEQ/L (ref 9–15)
BUN BLDV-MCNC: 16 MG/DL (ref 8–23)
CALCIUM SERPL-MCNC: 8.6 MG/DL (ref 8.5–9.9)
CHLORIDE BLD-SCNC: 105 MEQ/L (ref 95–107)
CO2: 25 MEQ/L (ref 20–31)
CREAT SERPL-MCNC: 0.86 MG/DL (ref 0.5–0.9)
GFR AFRICAN AMERICAN: >60
GFR NON-AFRICAN AMERICAN: >60
GLUCOSE BLD-MCNC: 128 MG/DL (ref 60–115)
GLUCOSE BLD-MCNC: 147 MG/DL (ref 60–115)
GLUCOSE BLD-MCNC: 158 MG/DL (ref 70–99)
HCT VFR BLD CALC: 27.4 % (ref 37–47)
HEMOGLOBIN: 9.3 G/DL (ref 12–16)
MCH RBC QN AUTO: 37.4 PG (ref 27–31.3)
MCHC RBC AUTO-ENTMCNC: 34 % (ref 33–37)
MCV RBC AUTO: 110 FL (ref 82–100)
PDW BLD-RTO: 16.2 % (ref 11.5–14.5)
PERFORMED ON: ABNORMAL
PERFORMED ON: ABNORMAL
PLATELET # BLD: 161 K/UL (ref 130–400)
POTASSIUM REFLEX MAGNESIUM: 4.4 MEQ/L (ref 3.4–4.9)
RBC # BLD: 2.49 M/UL (ref 4.2–5.4)
SODIUM BLD-SCNC: 137 MEQ/L (ref 135–144)
WBC # BLD: 8.5 K/UL (ref 4.8–10.8)

## 2019-09-27 PROCEDURE — 80048 BASIC METABOLIC PNL TOTAL CA: CPT

## 2019-09-27 PROCEDURE — 99284 EMERGENCY DEPT VISIT MOD MDM: CPT

## 2019-09-27 PROCEDURE — G0378 HOSPITAL OBSERVATION PER HR: HCPCS

## 2019-09-27 PROCEDURE — 85027 COMPLETE CBC AUTOMATED: CPT

## 2019-09-27 PROCEDURE — 6360000002 HC RX W HCPCS: Performed by: NURSE PRACTITIONER

## 2019-09-27 PROCEDURE — 96375 TX/PRO/DX INJ NEW DRUG ADDON: CPT

## 2019-09-27 PROCEDURE — 2700000000 HC OXYGEN THERAPY PER DAY

## 2019-09-27 PROCEDURE — 97116 GAIT TRAINING THERAPY: CPT

## 2019-09-27 PROCEDURE — 94640 AIRWAY INHALATION TREATMENT: CPT

## 2019-09-27 PROCEDURE — 36415 COLL VENOUS BLD VENIPUNCTURE: CPT

## 2019-09-27 PROCEDURE — 6370000000 HC RX 637 (ALT 250 FOR IP): Performed by: NURSE PRACTITIONER

## 2019-09-27 PROCEDURE — 97535 SELF CARE MNGMENT TRAINING: CPT

## 2019-09-27 PROCEDURE — 94618 PULMONARY STRESS TESTING: CPT

## 2019-09-27 RX ORDER — LISINOPRIL 20 MG/1
20 TABLET ORAL DAILY
Status: DISCONTINUED | OUTPATIENT
Start: 2019-09-28 | End: 2019-09-27 | Stop reason: HOSPADM

## 2019-09-27 RX ORDER — FUROSEMIDE 10 MG/ML
40 INJECTION INTRAMUSCULAR; INTRAVENOUS ONCE
Status: COMPLETED | OUTPATIENT
Start: 2019-09-27 | End: 2019-09-27

## 2019-09-27 RX ADMIN — TIZANIDINE 4 MG: 4 TABLET ORAL at 02:40

## 2019-09-27 RX ADMIN — GABAPENTIN 300 MG: 300 CAPSULE ORAL at 10:19

## 2019-09-27 RX ADMIN — FUROSEMIDE 40 MG: 10 INJECTION, SOLUTION INTRAMUSCULAR; INTRAVENOUS at 09:22

## 2019-09-27 RX ADMIN — OXYCODONE HYDROCHLORIDE AND ACETAMINOPHEN 1 TABLET: 5; 325 TABLET ORAL at 13:35

## 2019-09-27 RX ADMIN — AZATHIOPRINE 100 MG: 50 TABLET ORAL at 10:11

## 2019-09-27 RX ADMIN — GLYCOPYRROLATE AND FORMOTEROL FUMARATE 2 PUFF: 9; 4.8 AEROSOL, METERED RESPIRATORY (INHALATION) at 07:56

## 2019-09-27 RX ADMIN — CLOPIDOGREL BISULFATE 75 MG: 75 TABLET ORAL at 10:13

## 2019-09-27 RX ADMIN — OXYCODONE HYDROCHLORIDE AND ACETAMINOPHEN 1 TABLET: 5; 325 TABLET ORAL at 09:22

## 2019-09-27 RX ADMIN — ESCITALOPRAM OXALATE 20 MG: 10 TABLET ORAL at 10:21

## 2019-09-27 RX ADMIN — INSULIN LISPRO 2 UNITS: 100 INJECTION, SOLUTION INTRAVENOUS; SUBCUTANEOUS at 13:36

## 2019-09-27 RX ADMIN — ASPIRIN 81 MG 81 MG: 81 TABLET ORAL at 09:22

## 2019-09-27 RX ADMIN — PREDNISONE 20 MG: 1 TABLET ORAL at 10:24

## 2019-09-27 RX ADMIN — LEVOTHYROXINE SODIUM 75 MCG: 0.07 TABLET ORAL at 06:46

## 2019-09-27 RX ADMIN — Medication 125 MCG: at 10:14

## 2019-09-27 RX ADMIN — METOPROLOL TARTRATE 100 MG: 50 TABLET, FILM COATED ORAL at 10:24

## 2019-09-27 RX ADMIN — POTASSIUM CHLORIDE 20 MEQ: 20 TABLET, EXTENDED RELEASE ORAL at 10:23

## 2019-09-27 ASSESSMENT — PAIN DESCRIPTION - PAIN TYPE: TYPE: ACUTE PAIN

## 2019-09-27 ASSESSMENT — ENCOUNTER SYMPTOMS
TROUBLE SWALLOWING: 0
CHEST TIGHTNESS: 0
RHINORRHEA: 0
COUGH: 0
VOMITING: 0
BACK PAIN: 0
BLOOD IN STOOL: 0
NAUSEA: 0
SHORTNESS OF BREATH: 0
ABDOMINAL PAIN: 0
EYE PAIN: 0
DIARRHEA: 0
WHEEZING: 0

## 2019-09-27 ASSESSMENT — PAIN DESCRIPTION - DESCRIPTORS: DESCRIPTORS: SHARP

## 2019-09-27 ASSESSMENT — PAIN DESCRIPTION - LOCATION: LOCATION: RIB CAGE

## 2019-09-27 ASSESSMENT — PAIN DESCRIPTION - ORIENTATION: ORIENTATION: RIGHT

## 2019-09-27 ASSESSMENT — PAIN SCALES - GENERAL
PAINLEVEL_OUTOF10: 5
PAINLEVEL_OUTOF10: 5
PAINLEVEL_OUTOF10: 4
PAINLEVEL_OUTOF10: 7

## 2019-09-27 ASSESSMENT — PAIN DESCRIPTION - FREQUENCY: FREQUENCY: CONTINUOUS

## 2019-09-27 NOTE — PROGRESS NOTES
Reviewed: Yes  Family / Caregiver Present: Yes(jesseugher)  Subjective  Subjective: \"i will do what i need to do to go to the wedding\"  General Comment  Comments: \"i'm coming back this evening and going to rehab unit\"  Pre Treatment Pain Screening  Pain at present: 5  Scale Used: Numeric Score  Intervention List: Patient able to continue with treatment    Pain Screening  Patient Currently in Pain: Yes     Pain Reassessment: 5  Pain Assessment  Pain Assessment: 0-10  Pain Level: 5  Pain Type: Acute pain  Pain Location: Rib cage  Pain Orientation: Right  Pain Descriptors: Sharp  Pain Frequency: Continuous   Orientation  Orientation  Overall Orientation Status: Within Functional Limits    Objective   Bed mobility  Supine to Sit: Supervision  Sit to Supine: Supervision    Transfers  Sit to Stand: Stand by assistance;Contact guard assistance  Stand to sit: Stand by assistance;Contact guard assistance  Car Transfer: (declined to practice Befadyn Charlien transfer at this time)  Comment: pt very safe and aware of what she needs to do for proper sit to stand transfer. pt aware of how brace should be placed and daughter present to adjust as needed. Ambulation  Ambulation?: Yes  Ambulation 1  Surface: level tile  Device: Rolling Walker  Other Apparatus: (abductor brace)  Assistance: Stand by assistance  Quality of Gait: step to pattern, increased wb through upper extremities, no lob with change in direction, follows hip prec well. fatigues easily. Gait Deviations: Increased SYL; Decreased step length  Distance: 25 feet x 2 with turns  Comments: distance not primary focus  Stairs/Curb  Stairs? : (pt declined steps at this time. states she will not have to do them today.)     Assessment pt and family feel confident they can manage getting into [facility] and being at wedding this evening. Pt will be able to use wc at the wedding and has std walker.  Pt states she is aware of what she needs to do to be safe and daughter is educated in proper brace alignment. Pt did not want to practice steps at this time. Declined van transfer, said she has no trouble with it.          Discharge Recommendations:  Patient would benefit from continued therapy after discharge    Goals  Long term goals  Long term goal 1: indep with bed mobility  Long term goal 2: supervision with transfers  Long term goal 3: pt to ambulate >50 ft with supervision FWW  Long term goal 4: pt to navigate >4 steps with supervision  Patient Goals   Patient goals : to go home before Friday    Plan    Plan  Times per week: 5-7  Times per day: (1-2)  Current Treatment Recommendations: Strengthening, Functional Mobility Training, Neuromuscular Re-education, Home Exercise Program, Equipment Evaluation, Education, & procurement, Transfer Training, Gait Training, Safety Education & Training, Balance Training, Endurance Training, Stair training, Patient/Caregiver Education & Training  Plan Comment: cont per POC  Safety Devices  Type of devices: Bed alarm in place, Call light within reach, Nurse notified     Lucho Barraza (6 CLICK) [address] without Stair Climbing Raw Score : 15     Therapy Time   Individual   Time In 1135   Time Out 1158   Minutes 23   10 minutes gt  5 minute neuro  8 minutes transfer          [facility] KATHY HESTER PTA, 09/27/19 at 12:00 PM

## 2019-09-27 NOTE — PROGRESS NOTES
Oral Daily    clopidogrel  75 mg Oral Daily    digoxin  125 mcg Oral Daily    diltiazem  300 mg Oral Daily    escitalopram  20 mg Oral Daily    furosemide  40 mg Oral Daily    potassium chloride  20 mEq Oral Daily    predniSONE  20 mg Oral Daily    insulin lispro  0-12 Units Subcutaneous Q6H     PRN Meds: tiZANidine, oxyCODONE-acetaminophen **OR** oxyCODONE-acetaminophen, ondansetron, glucose, dextrose, glucagon (rDNA), dextrose, hydrALAZINE    Labs:   Recent Labs     09/25/19  0540 09/26/19 0526 09/27/19  0521   WBC 7.3 9.9 8.5   HGB 10.6* 11.3* 9.3*   HCT 32.2* 34.2* 27.4*    181 161     Recent Labs     09/25/19  0540 09/26/19  0526 09/27/19  0521    143 137   K 3.8 4.0 4.4   * 106 105   CO2 24 22 25   BUN 10 10 16   CREATININE 0.72 0.77 0.86   CALCIUM 9.1 8.9 8.6     Recent Labs     09/24/19  1943   AST 19   ALT 20   BILITOT 0.6   ALKPHOS 53     Recent Labs     09/24/19 1943   INR 1.2     No results for input(s): Guillermina Azar in the last 72 hours. Urinalysis:   Lab Results   Component Value Date    NITRU Negative 09/24/2019    WBCUA 3-5 09/24/2019    BACTERIA Negative 09/24/2019    RBCUA >100 09/24/2019    BLOODU LARGE 09/24/2019    SPECGRAV 1.009 09/24/2019    GLUCOSEU Negative 09/24/2019    GLUCOSEU NEG 09/26/2011       Radiology:   Most recent    Chest CT      WITH CONTRAST:No results found for this or any previous visit. WITHOUT CONTRAST: No results found for this or any previous visit. CXR      2-view: No results found for this or any previous visit. Portable: No results found for this or any previous visit. Echo No results found for this or any previous visit.           Assessment/Plan:    Active Hospital Problems    Diagnosis Date Noted    Closed dislocation of left hip ([facility] [S73.005A] 09/24/2019     1- s/p reduction in OR left hip - await brace, place when able, dc soon   9/26 - planned dc today, however held due to family request eval of desaturation,

## 2019-09-27 NOTE — PROGRESS NOTES
diltiazem  300 mg Oral Daily    escitalopram  20 mg Oral Daily    furosemide  40 mg Oral Daily    lisinopril  20 mg Oral Daily    potassium chloride  20 mEq Oral Daily    predniSONE  20 mg Oral Daily    insulin lispro  0-12 Units Subcutaneous Q6H     PRN Meds: tiZANidine, oxyCODONE-acetaminophen **OR** oxyCODONE-acetaminophen, ondansetron, glucose, dextrose, glucagon (rDNA), dextrose, hydrALAZINE    Labs:   Recent Labs     09/24/19 1943 09/25/19 0540 09/26/19 0526   WBC 7.4 7.3 9.9   HGB 11.7* 10.6* 11.3*   HCT 35.1* 32.2* 34.2*    162 181     Recent Labs     09/24/19 1943 09/25/19 0540 09/26/19 0526    143 143   K 4.1 3.8 4.0    110* 106   CO2 22 24 22   BUN 13 10 10   CREATININE 0.73 0.72 0.77   CALCIUM 9.2 9.1 8.9     Recent Labs     09/24/19 1943   AST 19   ALT 20   BILITOT 0.6   ALKPHOS 53     Recent Labs     09/24/19 1943   INR 1.2     No results for input(s): Oleksandr Ferrell in the last 72 hours. Urinalysis:   Lab Results   Component Value Date    NITRU Negative 09/24/2019    WBCUA 3-5 09/24/2019    BACTERIA Negative 09/24/2019    RBCUA >100 09/24/2019    BLOODU LARGE 09/24/2019    SPECGRAV 1.009 09/24/2019    GLUCOSEU Negative 09/24/2019    GLUCOSEU NEG 09/26/2011       Radiology:   Most recent    Chest CT      WITH CONTRAST:No results found for this or any previous visit. WITHOUT CONTRAST: No results found for this or any previous visit. CXR      2-view: No results found for this or any previous visit. Portable: No results found for this or any previous visit. Echo No results found for this or any previous visit.           Assessment/Plan:    Active Hospital Problems    Diagnosis Date Noted    Closed dislocation of left hip ([facility] [S73.005A] 09/24/2019     1- s/p reduction in OR left hip - await brace, place when able, dc soon   9/26 - planned dc today, however held due to family request eval of desaturation, home o2 eval    2 - HTN - cont to monitor bp,

## 2019-09-27 NOTE — CARE COORDINATION
Spoke with Juju regarding discharge planning. Patient wants to leave for her sons wedding which is today and then will come back tonight and be a direct admit to rehab. Spoke at length with Leana Barbosa and this was arranged and agreed on with Dr Prateek Wang. Patient and family aware of plan. Will follow.

## 2019-09-27 NOTE — PROGRESS NOTES
Physical Therapy Missed Treatment   Facility/Department: [facility] MED SURG V752/O603-15    NAME: Mónica Coffey  Patient Status:   : 1950 (77 y.o.)  MRN: [id number]  Account: [de-identified]  Gender: female        [] Patient Declines PT Treatment            [x] Patient Unavailable:       Pt asked to get washed up and dressed before having PT. Called PCA to help her. Will return since pt is planning to go home for her sons wedding today.    Electronically signed by Concetta Gaona PTA on 19 at 9:19 AM

## 2019-09-28 PROBLEM — Z74.09 MOBILITY IMPAIRED: Status: ACTIVE | Noted: 2019-09-28

## 2019-09-28 PROBLEM — S73.035A ANTERIOR DISLOCATION OF LEFT HIP (HCC): Status: ACTIVE | Noted: 2019-09-28

## 2019-09-28 PROBLEM — T84.021A DISPLACEMENT OF INTERNAL LEFT HIP PROSTHESIS (HCC): Status: ACTIVE | Noted: 2019-09-28

## 2019-09-28 LAB
BACTERIA: NEGATIVE /HPF
BILIRUBIN URINE: NEGATIVE
BLOOD, URINE: ABNORMAL
CLARITY: CLEAR
COLOR: YELLOW
EPITHELIAL CELLS, UA: ABNORMAL /HPF (ref 0–5)
GLUCOSE BLD-MCNC: 128 MG/DL (ref 60–115)
GLUCOSE BLD-MCNC: 133 MG/DL (ref 60–115)
GLUCOSE BLD-MCNC: 179 MG/DL (ref 60–115)
GLUCOSE URINE: NEGATIVE MG/DL
HYALINE CASTS: ABNORMAL /HPF (ref 0–5)
KETONES, URINE: NEGATIVE MG/DL
LEUKOCYTE ESTERASE, URINE: ABNORMAL
NITRITE, URINE: NEGATIVE
PERFORMED ON: ABNORMAL
PH UA: 6 (ref 5–9)
PROTEIN UA: NEGATIVE MG/DL
RBC UA: ABNORMAL /HPF (ref 0–5)
SPECIFIC GRAVITY UA: 1.01 (ref 1–1.03)
UROBILINOGEN, URINE: 1 E.U./DL
WBC UA: ABNORMAL /HPF (ref 0–5)

## 2019-09-28 PROCEDURE — 81001 URINALYSIS AUTO W/SCOPE: CPT

## 2019-09-28 PROCEDURE — 97166 OT EVAL MOD COMPLEX 45 MIN: CPT

## 2019-09-28 PROCEDURE — 2500000003 HC RX 250 WO HCPCS: Performed by: PHYSICAL MEDICINE & REHABILITATION

## 2019-09-28 PROCEDURE — 97162 PT EVAL MOD COMPLEX 30 MIN: CPT

## 2019-09-28 PROCEDURE — 97110 THERAPEUTIC EXERCISES: CPT

## 2019-09-28 PROCEDURE — 87086 URINE CULTURE/COLONY COUNT: CPT

## 2019-09-28 PROCEDURE — 6370000000 HC RX 637 (ALT 250 FOR IP): Performed by: PHYSICAL MEDICINE & REHABILITATION

## 2019-09-28 PROCEDURE — 92610 EVALUATE SWALLOWING FUNCTION: CPT

## 2019-09-28 PROCEDURE — 6360000002 HC RX W HCPCS: Performed by: PHYSICAL MEDICINE & REHABILITATION

## 2019-09-28 PROCEDURE — 94640 AIRWAY INHALATION TREATMENT: CPT

## 2019-09-28 PROCEDURE — 1180000000 HC REHAB R&B

## 2019-09-28 PROCEDURE — 97116 GAIT TRAINING THERAPY: CPT

## 2019-09-28 PROCEDURE — 94664 DEMO&/EVAL PT USE INHALER: CPT

## 2019-09-28 PROCEDURE — 97535 SELF CARE MNGMENT TRAINING: CPT

## 2019-09-28 RX ORDER — TIZANIDINE 4 MG/1
4 TABLET ORAL EVERY 8 HOURS PRN
Status: DISCONTINUED | OUTPATIENT
Start: 2019-09-28 | End: 2019-09-28

## 2019-09-28 RX ORDER — VITAMIN B COMPLEX
1 CAPSULE ORAL DAILY
Status: DISCONTINUED | OUTPATIENT
Start: 2019-09-28 | End: 2019-10-07 | Stop reason: HOSPADM

## 2019-09-28 RX ORDER — TRAZODONE HYDROCHLORIDE 50 MG/1
50 TABLET ORAL NIGHTLY PRN
Status: DISCONTINUED | OUTPATIENT
Start: 2019-09-28 | End: 2019-10-07 | Stop reason: HOSPADM

## 2019-09-28 RX ORDER — FUROSEMIDE 40 MG/1
40 TABLET ORAL DAILY PRN
Status: DISCONTINUED | OUTPATIENT
Start: 2019-09-28 | End: 2019-10-07 | Stop reason: HOSPADM

## 2019-09-28 RX ORDER — NITROGLYCERIN 0.4 MG/1
0.4 TABLET SUBLINGUAL EVERY 5 MIN PRN
Status: DISCONTINUED | OUTPATIENT
Start: 2019-09-28 | End: 2019-10-07 | Stop reason: HOSPADM

## 2019-09-28 RX ORDER — SENNA PLUS 8.6 MG/1
1 TABLET ORAL DAILY PRN
Status: DISCONTINUED | OUTPATIENT
Start: 2019-09-28 | End: 2019-10-07 | Stop reason: HOSPADM

## 2019-09-28 RX ORDER — AZATHIOPRINE 50 MG/1
100 TABLET ORAL DAILY
Status: DISCONTINUED | OUTPATIENT
Start: 2019-09-28 | End: 2019-10-07 | Stop reason: HOSPADM

## 2019-09-28 RX ORDER — ATORVASTATIN CALCIUM 40 MG/1
40 TABLET, FILM COATED ORAL NIGHTLY
Status: DISCONTINUED | OUTPATIENT
Start: 2019-09-28 | End: 2019-10-07 | Stop reason: HOSPADM

## 2019-09-28 RX ORDER — INSULIN GLARGINE 100 [IU]/ML
10 INJECTION, SOLUTION SUBCUTANEOUS NIGHTLY
Status: DISCONTINUED | OUTPATIENT
Start: 2019-09-28 | End: 2019-10-07 | Stop reason: HOSPADM

## 2019-09-28 RX ORDER — FOLIC ACID 1 MG/1
800 TABLET ORAL NIGHTLY
Status: DISCONTINUED | OUTPATIENT
Start: 2019-09-28 | End: 2019-10-07 | Stop reason: HOSPADM

## 2019-09-28 RX ORDER — DIGOXIN 125 MCG
125 TABLET ORAL DAILY
Status: DISCONTINUED | OUTPATIENT
Start: 2019-09-28 | End: 2019-10-07 | Stop reason: HOSPADM

## 2019-09-28 RX ORDER — POTASSIUM CHLORIDE 20 MEQ/1
20 TABLET, EXTENDED RELEASE ORAL DAILY
Status: DISCONTINUED | OUTPATIENT
Start: 2019-09-28 | End: 2019-10-07 | Stop reason: HOSPADM

## 2019-09-28 RX ORDER — FAMOTIDINE 20 MG/1
20 TABLET, FILM COATED ORAL 2 TIMES DAILY
Status: DISCONTINUED | OUTPATIENT
Start: 2019-09-28 | End: 2019-10-07 | Stop reason: HOSPADM

## 2019-09-28 RX ORDER — POVIDONE-IODINE 7.5 MG/ML
SOLUTION TOPICAL 2 TIMES DAILY
Status: DISCONTINUED | OUTPATIENT
Start: 2019-09-28 | End: 2019-10-07 | Stop reason: HOSPADM

## 2019-09-28 RX ORDER — NICOTINE POLACRILEX 4 MG
15 LOZENGE BUCCAL PRN
Status: DISCONTINUED | OUTPATIENT
Start: 2019-09-28 | End: 2019-10-07 | Stop reason: HOSPADM

## 2019-09-28 RX ORDER — SULFASALAZINE 500 MG/1
500 TABLET ORAL 2 TIMES DAILY
Status: DISCONTINUED | OUTPATIENT
Start: 2019-09-28 | End: 2019-10-07 | Stop reason: HOSPADM

## 2019-09-28 RX ORDER — OXYCODONE HYDROCHLORIDE AND ACETAMINOPHEN 5; 325 MG/1; MG/1
1 TABLET ORAL EVERY 6 HOURS PRN
Status: DISCONTINUED | OUTPATIENT
Start: 2019-09-28 | End: 2019-09-29

## 2019-09-28 RX ORDER — CIPROFLOXACIN 500 MG/1
500 TABLET, FILM COATED ORAL EVERY 12 HOURS SCHEDULED
Status: DISCONTINUED | OUTPATIENT
Start: 2019-09-28 | End: 2019-09-30

## 2019-09-28 RX ORDER — METOPROLOL TARTRATE 50 MG/1
100 TABLET, FILM COATED ORAL 2 TIMES DAILY
Status: DISCONTINUED | OUTPATIENT
Start: 2019-09-28 | End: 2019-10-07 | Stop reason: HOSPADM

## 2019-09-28 RX ORDER — GABAPENTIN 300 MG/1
300 CAPSULE ORAL 3 TIMES DAILY
Status: DISCONTINUED | OUTPATIENT
Start: 2019-09-28 | End: 2019-10-07 | Stop reason: HOSPADM

## 2019-09-28 RX ORDER — CLOPIDOGREL BISULFATE 75 MG/1
75 TABLET ORAL DAILY
Status: DISCONTINUED | OUTPATIENT
Start: 2019-09-29 | End: 2019-10-07 | Stop reason: HOSPADM

## 2019-09-28 RX ORDER — ALBUTEROL SULFATE 90 UG/1
2 AEROSOL, METERED RESPIRATORY (INHALATION) EVERY 6 HOURS PRN
Status: DISCONTINUED | OUTPATIENT
Start: 2019-09-28 | End: 2019-10-07 | Stop reason: HOSPADM

## 2019-09-28 RX ORDER — ESCITALOPRAM OXALATE 20 MG/1
20 TABLET ORAL DAILY
Status: DISCONTINUED | OUTPATIENT
Start: 2019-09-28 | End: 2019-10-07 | Stop reason: HOSPADM

## 2019-09-28 RX ORDER — DEXTROSE MONOHYDRATE 50 MG/ML
100 INJECTION, SOLUTION INTRAVENOUS PRN
Status: DISCONTINUED | OUTPATIENT
Start: 2019-09-28 | End: 2019-10-07 | Stop reason: HOSPADM

## 2019-09-28 RX ORDER — LEVOTHYROXINE SODIUM 0.07 MG/1
75 TABLET ORAL DAILY
Status: DISCONTINUED | OUTPATIENT
Start: 2019-09-28 | End: 2019-10-07 | Stop reason: HOSPADM

## 2019-09-28 RX ORDER — ASPIRIN 81 MG/1
81 TABLET, CHEWABLE ORAL DAILY
Status: DISCONTINUED | OUTPATIENT
Start: 2019-09-28 | End: 2019-10-07 | Stop reason: HOSPADM

## 2019-09-28 RX ORDER — DIPHENOXYLATE HYDROCHLORIDE AND ATROPINE SULFATE 2.5; .025 MG/1; MG/1
1 TABLET ORAL DAILY PRN
Status: DISCONTINUED | OUTPATIENT
Start: 2019-09-28 | End: 2019-10-07 | Stop reason: HOSPADM

## 2019-09-28 RX ORDER — FUROSEMIDE 40 MG/1
40 TABLET ORAL DAILY
Status: DISCONTINUED | OUTPATIENT
Start: 2019-09-28 | End: 2019-10-07 | Stop reason: HOSPADM

## 2019-09-28 RX ORDER — LISINOPRIL 20 MG/1
20 TABLET ORAL DAILY
Status: DISCONTINUED | OUTPATIENT
Start: 2019-09-28 | End: 2019-10-07 | Stop reason: HOSPADM

## 2019-09-28 RX ORDER — INSULIN GLARGINE 100 [IU]/ML
10 INJECTION, SOLUTION SUBCUTANEOUS ONCE
Status: DISCONTINUED | OUTPATIENT
Start: 2019-09-28 | End: 2019-09-28

## 2019-09-28 RX ORDER — DEXTROSE MONOHYDRATE 25 G/50ML
12.5 INJECTION, SOLUTION INTRAVENOUS PRN
Status: DISCONTINUED | OUTPATIENT
Start: 2019-09-28 | End: 2019-10-07 | Stop reason: HOSPADM

## 2019-09-28 RX ORDER — CETIRIZINE HYDROCHLORIDE 10 MG/1
10 TABLET ORAL DAILY
Status: DISCONTINUED | OUTPATIENT
Start: 2019-09-28 | End: 2019-10-07 | Stop reason: HOSPADM

## 2019-09-28 RX ORDER — DILTIAZEM HYDROCHLORIDE 300 MG/1
300 CAPSULE, COATED, EXTENDED RELEASE ORAL DAILY
Status: DISCONTINUED | OUTPATIENT
Start: 2019-09-28 | End: 2019-10-07 | Stop reason: HOSPADM

## 2019-09-28 RX ORDER — PREDNISONE 10 MG/1
10 TABLET ORAL DAILY
Status: DISCONTINUED | OUTPATIENT
Start: 2019-09-28 | End: 2019-10-07 | Stop reason: HOSPADM

## 2019-09-28 RX ORDER — L. ACIDOPHILUS/L.BULGARICUS 1MM CELL
1 TABLET ORAL DAILY
Status: DISCONTINUED | OUTPATIENT
Start: 2019-09-28 | End: 2019-10-07 | Stop reason: HOSPADM

## 2019-09-28 RX ORDER — LISINOPRIL 20 MG/1
20 TABLET ORAL DAILY
Status: DISCONTINUED | OUTPATIENT
Start: 2019-09-28 | End: 2019-09-28

## 2019-09-28 RX ADMIN — LACTOBACILLUS TAB 1 TABLET: TAB at 09:26

## 2019-09-28 RX ADMIN — INSULIN GLARGINE 10 UNITS: 100 INJECTION, SOLUTION SUBCUTANEOUS at 20:45

## 2019-09-28 RX ADMIN — METOPROLOL TARTRATE 100 MG: 50 TABLET ORAL at 09:25

## 2019-09-28 RX ADMIN — ATORVASTATIN CALCIUM 40 MG: 40 TABLET, FILM COATED ORAL at 20:42

## 2019-09-28 RX ADMIN — CETIRIZINE HYDROCHLORIDE 10 MG: 10 TABLET, FILM COATED ORAL at 09:28

## 2019-09-28 RX ADMIN — SULFASALAZINE 500 MG: 500 TABLET ORAL at 09:28

## 2019-09-28 RX ADMIN — FOLIC ACID 1000 MCG: 1 TABLET ORAL at 20:42

## 2019-09-28 RX ADMIN — GABAPENTIN 300 MG: 300 CAPSULE ORAL at 14:24

## 2019-09-28 RX ADMIN — PROVIDONE IODINE: 7.5 STICK TOPICAL at 21:02

## 2019-09-28 RX ADMIN — DIGOXIN 125 MCG: 125 TABLET ORAL at 09:25

## 2019-09-28 RX ADMIN — SULFASALAZINE 500 MG: 500 TABLET ORAL at 20:42

## 2019-09-28 RX ADMIN — INSULIN LISPRO 1 UNITS: 100 INJECTION, SOLUTION INTRAVENOUS; SUBCUTANEOUS at 20:45

## 2019-09-28 RX ADMIN — GABAPENTIN 300 MG: 300 CAPSULE ORAL at 02:33

## 2019-09-28 RX ADMIN — PREDNISONE 10 MG: 10 TABLET ORAL at 09:25

## 2019-09-28 RX ADMIN — TIZANIDINE 4 MG: 4 TABLET ORAL at 14:28

## 2019-09-28 RX ADMIN — APIXABAN 5 MG: 5 TABLET, FILM COATED ORAL at 20:42

## 2019-09-28 RX ADMIN — ESCITALOPRAM OXALATE 20 MG: 20 TABLET, FILM COATED ORAL at 09:26

## 2019-09-28 RX ADMIN — GABAPENTIN 300 MG: 300 CAPSULE ORAL at 09:26

## 2019-09-28 RX ADMIN — Medication 5000 UNITS: at 09:26

## 2019-09-28 RX ADMIN — POTASSIUM CHLORIDE 20 MEQ: 20 TABLET, EXTENDED RELEASE ORAL at 09:28

## 2019-09-28 RX ADMIN — LEVOTHYROXINE SODIUM 75 MCG: 75 TABLET ORAL at 07:02

## 2019-09-28 RX ADMIN — FAMOTIDINE 20 MG: 20 TABLET ORAL at 20:42

## 2019-09-28 RX ADMIN — LISINOPRIL 20 MG: 20 TABLET ORAL at 09:27

## 2019-09-28 RX ADMIN — FAMOTIDINE 20 MG: 20 TABLET ORAL at 09:26

## 2019-09-28 RX ADMIN — APIXABAN 5 MG: 5 TABLET, FILM COATED ORAL at 02:33

## 2019-09-28 RX ADMIN — APIXABAN 5 MG: 5 TABLET, FILM COATED ORAL at 09:27

## 2019-09-28 RX ADMIN — Medication 1 CAPSULE: at 09:26

## 2019-09-28 RX ADMIN — Medication 20 MG: at 17:38

## 2019-09-28 RX ADMIN — OXYCODONE HYDROCHLORIDE AND ACETAMINOPHEN 1 TABLET: 5; 325 TABLET ORAL at 20:45

## 2019-09-28 RX ADMIN — FUROSEMIDE 40 MG: 40 TABLET ORAL at 14:24

## 2019-09-28 RX ADMIN — AZATHIOPRINE 100 MG: 50 TABLET ORAL at 09:25

## 2019-09-28 RX ADMIN — CIPROFLOXACIN 500 MG: 500 TABLET, FILM COATED ORAL at 20:42

## 2019-09-28 RX ADMIN — DILTIAZEM HYDROCHLORIDE 300 MG: 300 CAPSULE, COATED, EXTENDED RELEASE ORAL at 09:26

## 2019-09-28 RX ADMIN — ASPIRIN 81 MG 81 MG: 81 TABLET ORAL at 09:30

## 2019-09-28 RX ADMIN — GLYCOPYRROLATE AND FORMOTEROL FUMARATE 2 PUFF: 9; 4.8 AEROSOL, METERED RESPIRATORY (INHALATION) at 16:02

## 2019-09-28 RX ADMIN — GLYCOPYRROLATE AND FORMOTEROL FUMARATE 2 PUFF: 9; 4.8 AEROSOL, METERED RESPIRATORY (INHALATION) at 04:57

## 2019-09-28 RX ADMIN — GABAPENTIN 300 MG: 300 CAPSULE ORAL at 20:42

## 2019-09-28 RX ADMIN — OXYCODONE HYDROCHLORIDE AND ACETAMINOPHEN 1 TABLET: 5; 325 TABLET ORAL at 02:13

## 2019-09-28 ASSESSMENT — PAIN DESCRIPTION - LOCATION
LOCATION: HIP
LOCATION: HIP
LOCATION: LEG
LOCATION: LEG

## 2019-09-28 ASSESSMENT — PAIN SCALES - GENERAL
PAINLEVEL_OUTOF10: 7
PAINLEVEL_OUTOF10: 7
PAINLEVEL_OUTOF10: 9
PAINLEVEL_OUTOF10: 0
PAINLEVEL_OUTOF10: 5
PAINLEVEL_OUTOF10: 0
PAINLEVEL_OUTOF10: 8

## 2019-09-28 ASSESSMENT — PAIN DESCRIPTION - FREQUENCY
FREQUENCY: INTERMITTENT
FREQUENCY: CONTINUOUS
FREQUENCY: INTERMITTENT

## 2019-09-28 ASSESSMENT — PAIN DESCRIPTION - PAIN TYPE
TYPE: ACUTE PAIN
TYPE: CHRONIC PAIN

## 2019-09-28 ASSESSMENT — PAIN DESCRIPTION - DESCRIPTORS
DESCRIPTORS: ACHING;DISCOMFORT
DESCRIPTORS: ACHING
DESCRIPTORS: ACHING

## 2019-09-28 ASSESSMENT — PAIN DESCRIPTION - ORIENTATION
ORIENTATION: LEFT

## 2019-09-28 ASSESSMENT — PAIN - FUNCTIONAL ASSESSMENT: PAIN_FUNCTIONAL_ASSESSMENT: ACTIVITIES ARE NOT PREVENTED

## 2019-09-28 ASSESSMENT — PAIN DESCRIPTION - ONSET
ONSET: ON-GOING
ONSET: ON-GOING

## 2019-09-29 LAB
GLUCOSE BLD-MCNC: 151 MG/DL (ref 60–115)
GLUCOSE BLD-MCNC: 203 MG/DL (ref 60–115)
GLUCOSE BLD-MCNC: 223 MG/DL (ref 60–115)
GLUCOSE BLD-MCNC: 92 MG/DL (ref 60–115)
PERFORMED ON: ABNORMAL
PERFORMED ON: NORMAL

## 2019-09-29 PROCEDURE — 1180000000 HC REHAB R&B

## 2019-09-29 PROCEDURE — 92523 SPEECH SOUND LANG COMPREHEN: CPT

## 2019-09-29 PROCEDURE — 2700000000 HC OXYGEN THERAPY PER DAY

## 2019-09-29 PROCEDURE — 97116 GAIT TRAINING THERAPY: CPT

## 2019-09-29 PROCEDURE — 2500000003 HC RX 250 WO HCPCS: Performed by: PHYSICAL MEDICINE & REHABILITATION

## 2019-09-29 PROCEDURE — 94640 AIRWAY INHALATION TREATMENT: CPT

## 2019-09-29 PROCEDURE — 97110 THERAPEUTIC EXERCISES: CPT

## 2019-09-29 PROCEDURE — 6360000002 HC RX W HCPCS: Performed by: PHYSICAL MEDICINE & REHABILITATION

## 2019-09-29 PROCEDURE — 6370000000 HC RX 637 (ALT 250 FOR IP): Performed by: PHYSICAL MEDICINE & REHABILITATION

## 2019-09-29 RX ORDER — OXYCODONE HYDROCHLORIDE 5 MG/1
5 TABLET ORAL EVERY 6 HOURS PRN
Status: DISCONTINUED | OUTPATIENT
Start: 2019-09-29 | End: 2019-09-30

## 2019-09-29 RX ORDER — MENTHOL 0 G/G
POWDER TOPICAL 2 TIMES DAILY
Status: DISCONTINUED | OUTPATIENT
Start: 2019-09-29 | End: 2019-10-07 | Stop reason: HOSPADM

## 2019-09-29 RX ADMIN — ATORVASTATIN CALCIUM 40 MG: 40 TABLET, FILM COATED ORAL at 21:03

## 2019-09-29 RX ADMIN — AZATHIOPRINE 100 MG: 50 TABLET ORAL at 09:05

## 2019-09-29 RX ADMIN — GABAPENTIN 300 MG: 300 CAPSULE ORAL at 14:38

## 2019-09-29 RX ADMIN — CIPROFLOXACIN 500 MG: 500 TABLET, FILM COATED ORAL at 09:05

## 2019-09-29 RX ADMIN — PROVIDONE IODINE: 7.5 STICK TOPICAL at 22:00

## 2019-09-29 RX ADMIN — CETIRIZINE HYDROCHLORIDE 10 MG: 10 TABLET, FILM COATED ORAL at 09:05

## 2019-09-29 RX ADMIN — METOPROLOL TARTRATE 100 MG: 50 TABLET ORAL at 09:04

## 2019-09-29 RX ADMIN — DILTIAZEM HYDROCHLORIDE 300 MG: 300 CAPSULE, COATED, EXTENDED RELEASE ORAL at 09:05

## 2019-09-29 RX ADMIN — SULFASALAZINE 500 MG: 500 TABLET ORAL at 21:03

## 2019-09-29 RX ADMIN — ASPIRIN 81 MG 81 MG: 81 TABLET ORAL at 09:06

## 2019-09-29 RX ADMIN — GLYCOPYRROLATE AND FORMOTEROL FUMARATE 2 PUFF: 9; 4.8 AEROSOL, METERED RESPIRATORY (INHALATION) at 17:48

## 2019-09-29 RX ADMIN — LACTOBACILLUS TAB 1 TABLET: TAB at 09:05

## 2019-09-29 RX ADMIN — METOPROLOL TARTRATE 100 MG: 50 TABLET ORAL at 21:03

## 2019-09-29 RX ADMIN — CIPROFLOXACIN 500 MG: 500 TABLET, FILM COATED ORAL at 21:03

## 2019-09-29 RX ADMIN — GABAPENTIN 300 MG: 300 CAPSULE ORAL at 21:03

## 2019-09-29 RX ADMIN — Medication 5000 UNITS: at 09:05

## 2019-09-29 RX ADMIN — PREDNISONE 10 MG: 10 TABLET ORAL at 09:04

## 2019-09-29 RX ADMIN — POTASSIUM CHLORIDE 20 MEQ: 20 TABLET, EXTENDED RELEASE ORAL at 09:04

## 2019-09-29 RX ADMIN — INSULIN LISPRO 1 UNITS: 100 INJECTION, SOLUTION INTRAVENOUS; SUBCUTANEOUS at 21:10

## 2019-09-29 RX ADMIN — LEVOTHYROXINE SODIUM 75 MCG: 75 TABLET ORAL at 06:32

## 2019-09-29 RX ADMIN — Medication 1 CAPSULE: at 09:06

## 2019-09-29 RX ADMIN — APIXABAN 5 MG: 5 TABLET, FILM COATED ORAL at 09:06

## 2019-09-29 RX ADMIN — OXYCODONE HYDROCHLORIDE 5 MG: 5 TABLET ORAL at 21:09

## 2019-09-29 RX ADMIN — OXYCODONE HYDROCHLORIDE AND ACETAMINOPHEN 1 TABLET: 5; 325 TABLET ORAL at 06:34

## 2019-09-29 RX ADMIN — FAMOTIDINE 20 MG: 20 TABLET ORAL at 09:05

## 2019-09-29 RX ADMIN — GABAPENTIN 300 MG: 300 CAPSULE ORAL at 09:04

## 2019-09-29 RX ADMIN — ESCITALOPRAM OXALATE 20 MG: 20 TABLET, FILM COATED ORAL at 09:05

## 2019-09-29 RX ADMIN — MENTHOL: 0 POWDER TOPICAL at 14:38

## 2019-09-29 RX ADMIN — MENTHOL: 0 POWDER TOPICAL at 22:00

## 2019-09-29 RX ADMIN — FOLIC ACID 1000 MCG: 1 TABLET ORAL at 21:03

## 2019-09-29 RX ADMIN — INSULIN GLARGINE 10 UNITS: 100 INJECTION, SOLUTION SUBCUTANEOUS at 21:09

## 2019-09-29 RX ADMIN — PROVIDONE IODINE: 7.5 STICK TOPICAL at 09:06

## 2019-09-29 RX ADMIN — GLYCOPYRROLATE AND FORMOTEROL FUMARATE 2 PUFF: 9; 4.8 AEROSOL, METERED RESPIRATORY (INHALATION) at 05:02

## 2019-09-29 RX ADMIN — FUROSEMIDE 40 MG: 40 TABLET ORAL at 09:04

## 2019-09-29 RX ADMIN — LISINOPRIL 20 MG: 20 TABLET ORAL at 09:05

## 2019-09-29 RX ADMIN — FAMOTIDINE 20 MG: 20 TABLET ORAL at 21:04

## 2019-09-29 RX ADMIN — SULFASALAZINE 500 MG: 500 TABLET ORAL at 09:04

## 2019-09-29 RX ADMIN — APIXABAN 5 MG: 5 TABLET, FILM COATED ORAL at 21:04

## 2019-09-29 RX ADMIN — CLOPIDOGREL BISULFATE 75 MG: 75 TABLET ORAL at 09:05

## 2019-09-29 RX ADMIN — DIGOXIN 125 MCG: 125 TABLET ORAL at 09:05

## 2019-09-29 ASSESSMENT — PAIN SCALES - GENERAL
PAINLEVEL_OUTOF10: 4
PAINLEVEL_OUTOF10: 7
PAINLEVEL_OUTOF10: 0
PAINLEVEL_OUTOF10: 4
PAINLEVEL_OUTOF10: 4
PAINLEVEL_OUTOF10: 0
PAINLEVEL_OUTOF10: 8

## 2019-09-29 ASSESSMENT — PAIN - FUNCTIONAL ASSESSMENT
PAIN_FUNCTIONAL_ASSESSMENT: ACTIVITIES ARE NOT PREVENTED
PAIN_FUNCTIONAL_ASSESSMENT: ACTIVITIES ARE NOT PREVENTED

## 2019-09-29 ASSESSMENT — PAIN DESCRIPTION - ORIENTATION
ORIENTATION: LEFT
ORIENTATION: RIGHT;LEFT
ORIENTATION: LEFT
ORIENTATION: LEFT

## 2019-09-29 ASSESSMENT — PAIN DESCRIPTION - LOCATION
LOCATION: HIP

## 2019-09-29 ASSESSMENT — PAIN DESCRIPTION - DESCRIPTORS
DESCRIPTORS: ACHING

## 2019-09-29 ASSESSMENT — PAIN DESCRIPTION - ONSET: ONSET: ON-GOING

## 2019-09-29 ASSESSMENT — PAIN DESCRIPTION - PAIN TYPE
TYPE: CHRONIC PAIN
TYPE: CHRONIC PAIN

## 2019-09-29 ASSESSMENT — PAIN DESCRIPTION - FREQUENCY
FREQUENCY: INTERMITTENT
FREQUENCY: INTERMITTENT

## 2019-09-30 LAB
GLUCOSE BLD-MCNC: 114 MG/DL (ref 60–115)
GLUCOSE BLD-MCNC: 179 MG/DL (ref 60–115)
GLUCOSE BLD-MCNC: 223 MG/DL (ref 60–115)
GLUCOSE BLD-MCNC: 233 MG/DL (ref 60–115)
PERFORMED ON: ABNORMAL
PERFORMED ON: NORMAL
URINE CULTURE, ROUTINE: NORMAL

## 2019-09-30 PROCEDURE — 6360000002 HC RX W HCPCS: Performed by: PHYSICAL MEDICINE & REHABILITATION

## 2019-09-30 PROCEDURE — 97116 GAIT TRAINING THERAPY: CPT

## 2019-09-30 PROCEDURE — 97110 THERAPEUTIC EXERCISES: CPT

## 2019-09-30 PROCEDURE — 2500000003 HC RX 250 WO HCPCS: Performed by: PHYSICAL MEDICINE & REHABILITATION

## 2019-09-30 PROCEDURE — 1180000000 HC REHAB R&B

## 2019-09-30 PROCEDURE — 2700000000 HC OXYGEN THERAPY PER DAY

## 2019-09-30 PROCEDURE — 94640 AIRWAY INHALATION TREATMENT: CPT

## 2019-09-30 PROCEDURE — 6370000000 HC RX 637 (ALT 250 FOR IP): Performed by: PHYSICAL MEDICINE & REHABILITATION

## 2019-09-30 PROCEDURE — 97535 SELF CARE MNGMENT TRAINING: CPT

## 2019-09-30 PROCEDURE — 97127 HC SP THER IVNTJ W/FOCUS COG FUNCJ: CPT

## 2019-09-30 RX ORDER — OXYCODONE HYDROCHLORIDE 5 MG/1
5 TABLET ORAL EVERY 6 HOURS PRN
Status: DISCONTINUED | OUTPATIENT
Start: 2019-09-30 | End: 2019-10-07 | Stop reason: HOSPADM

## 2019-09-30 RX ORDER — CIPROFLOXACIN 500 MG/1
500 TABLET, FILM COATED ORAL EVERY 12 HOURS SCHEDULED
Status: COMPLETED | OUTPATIENT
Start: 2019-09-30 | End: 2019-10-03

## 2019-09-30 RX ADMIN — ASPIRIN 81 MG 81 MG: 81 TABLET ORAL at 08:57

## 2019-09-30 RX ADMIN — OXYCODONE HYDROCHLORIDE 5 MG: 5 TABLET ORAL at 03:10

## 2019-09-30 RX ADMIN — GLYCOPYRROLATE AND FORMOTEROL FUMARATE 2 PUFF: 9; 4.8 AEROSOL, METERED RESPIRATORY (INHALATION) at 05:31

## 2019-09-30 RX ADMIN — CETIRIZINE HYDROCHLORIDE 10 MG: 10 TABLET, FILM COATED ORAL at 08:57

## 2019-09-30 RX ADMIN — LACTOBACILLUS TAB 1 TABLET: TAB at 08:55

## 2019-09-30 RX ADMIN — MENTHOL: 0 POWDER TOPICAL at 08:59

## 2019-09-30 RX ADMIN — FAMOTIDINE 20 MG: 20 TABLET ORAL at 08:57

## 2019-09-30 RX ADMIN — GABAPENTIN 300 MG: 300 CAPSULE ORAL at 14:06

## 2019-09-30 RX ADMIN — Medication 5000 UNITS: at 08:57

## 2019-09-30 RX ADMIN — GLYCOPYRROLATE AND FORMOTEROL FUMARATE 2 PUFF: 9; 4.8 AEROSOL, METERED RESPIRATORY (INHALATION) at 16:11

## 2019-09-30 RX ADMIN — POTASSIUM CHLORIDE 20 MEQ: 20 TABLET, EXTENDED RELEASE ORAL at 08:56

## 2019-09-30 RX ADMIN — APIXABAN 5 MG: 5 TABLET, FILM COATED ORAL at 20:49

## 2019-09-30 RX ADMIN — LEVOTHYROXINE SODIUM 75 MCG: 75 TABLET ORAL at 06:22

## 2019-09-30 RX ADMIN — SULFASALAZINE 500 MG: 500 TABLET ORAL at 20:49

## 2019-09-30 RX ADMIN — ESCITALOPRAM OXALATE 20 MG: 20 TABLET, FILM COATED ORAL at 08:58

## 2019-09-30 RX ADMIN — Medication 1 CAPSULE: at 08:56

## 2019-09-30 RX ADMIN — LISINOPRIL 20 MG: 20 TABLET ORAL at 08:56

## 2019-09-30 RX ADMIN — CLOPIDOGREL BISULFATE 75 MG: 75 TABLET ORAL at 08:56

## 2019-09-30 RX ADMIN — GABAPENTIN 300 MG: 300 CAPSULE ORAL at 08:56

## 2019-09-30 RX ADMIN — GABAPENTIN 300 MG: 300 CAPSULE ORAL at 20:49

## 2019-09-30 RX ADMIN — OXYCODONE HYDROCHLORIDE 5 MG: 5 TABLET ORAL at 20:49

## 2019-09-30 RX ADMIN — APIXABAN 5 MG: 5 TABLET, FILM COATED ORAL at 08:57

## 2019-09-30 RX ADMIN — DILTIAZEM HYDROCHLORIDE 300 MG: 300 CAPSULE, COATED, EXTENDED RELEASE ORAL at 08:56

## 2019-09-30 RX ADMIN — CIPROFLOXACIN 500 MG: 500 TABLET, FILM COATED ORAL at 20:49

## 2019-09-30 RX ADMIN — FAMOTIDINE 20 MG: 20 TABLET ORAL at 20:49

## 2019-09-30 RX ADMIN — METOPROLOL TARTRATE 100 MG: 50 TABLET ORAL at 20:49

## 2019-09-30 RX ADMIN — MENTHOL: 0 POWDER TOPICAL at 20:59

## 2019-09-30 RX ADMIN — SULFASALAZINE 500 MG: 500 TABLET ORAL at 08:58

## 2019-09-30 RX ADMIN — PROVIDONE IODINE: 7.5 STICK TOPICAL at 20:58

## 2019-09-30 RX ADMIN — INSULIN LISPRO 1 UNITS: 100 INJECTION, SOLUTION INTRAVENOUS; SUBCUTANEOUS at 20:49

## 2019-09-30 RX ADMIN — FOLIC ACID 1000 MCG: 1 TABLET ORAL at 20:49

## 2019-09-30 RX ADMIN — DIGOXIN 125 MCG: 125 TABLET ORAL at 08:57

## 2019-09-30 RX ADMIN — AZATHIOPRINE 100 MG: 50 TABLET ORAL at 08:55

## 2019-09-30 RX ADMIN — PROVIDONE IODINE: 7.5 STICK TOPICAL at 08:59

## 2019-09-30 RX ADMIN — ATORVASTATIN CALCIUM 40 MG: 40 TABLET, FILM COATED ORAL at 20:49

## 2019-09-30 RX ADMIN — METOPROLOL TARTRATE 100 MG: 50 TABLET ORAL at 08:56

## 2019-09-30 RX ADMIN — TRAZODONE HYDROCHLORIDE 50 MG: 50 TABLET ORAL at 20:49

## 2019-09-30 RX ADMIN — PREDNISONE 10 MG: 10 TABLET ORAL at 08:57

## 2019-09-30 RX ADMIN — FUROSEMIDE 40 MG: 40 TABLET ORAL at 08:57

## 2019-09-30 RX ADMIN — CIPROFLOXACIN 500 MG: 500 TABLET, FILM COATED ORAL at 08:57

## 2019-09-30 RX ADMIN — INSULIN GLARGINE 10 UNITS: 100 INJECTION, SOLUTION SUBCUTANEOUS at 20:49

## 2019-09-30 RX ADMIN — OXYCODONE HYDROCHLORIDE 5 MG: 5 TABLET ORAL at 09:05

## 2019-09-30 SDOH — HEALTH STABILITY: PHYSICAL HEALTH: ON AVERAGE, HOW MANY DAYS PER WEEK DO YOU ENGAGE IN MODERATE TO STRENUOUS EXERCISE (LIKE A BRISK WALK)?: 0 DAYS

## 2019-09-30 SDOH — HEALTH STABILITY: MENTAL HEALTH
STRESS IS WHEN SOMEONE FEELS TENSE, NERVOUS, ANXIOUS, OR CAN'T SLEEP AT NIGHT BECAUSE THEIR MIND IS TROUBLED. HOW STRESSED ARE YOU?: RATHER MUCH

## 2019-09-30 SDOH — SOCIAL STABILITY: SOCIAL NETWORK: HOW OFTEN DO YOU GET TOGETHER WITH FRIENDS OR RELATIVES?: MORE THAN THREE TIMES A WEEK

## 2019-09-30 SDOH — SOCIAL STABILITY: SOCIAL NETWORK: HOW OFTEN DO YOU ATTEND CHURCH OR RELIGIOUS SERVICES?: NEVER

## 2019-09-30 SDOH — SOCIAL STABILITY: SOCIAL NETWORK
IN A TYPICAL WEEK, HOW MANY TIMES DO YOU TALK ON THE PHONE WITH FAMILY, FRIENDS, OR NEIGHBORS?: MORE THAN THREE TIMES A WEEK

## 2019-09-30 SDOH — HEALTH STABILITY: PHYSICAL HEALTH: ON AVERAGE, HOW MANY MINUTES DO YOU ENGAGE IN EXERCISE AT THIS LEVEL?: 0 MIN

## 2019-09-30 SDOH — SOCIAL STABILITY: SOCIAL NETWORK
DO YOU BELONG TO ANY CLUBS OR ORGANIZATIONS SUCH AS CHURCH GROUPS UNIONS, FRATERNAL OR ATHLETIC GROUPS, OR SCHOOL GROUPS?: NO

## 2019-09-30 SDOH — SOCIAL STABILITY: SOCIAL NETWORK: ARE YOU MARRIED, WIDOWED, DIVORCED, SEPARATED, NEVER MARRIED, OR LIVING WITH A PARTNER?: MARRIED

## 2019-09-30 SDOH — SOCIAL STABILITY: SOCIAL NETWORK: HOW OFTEN DO YOU ATTENT MEETINGS OF THE CLUB OR ORGANIZATION YOU BELONG TO?: NEVER

## 2019-09-30 ASSESSMENT — PAIN SCALES - GENERAL
PAINLEVEL_OUTOF10: 7
PAINLEVEL_OUTOF10: 7
PAINLEVEL_OUTOF10: 6
PAINLEVEL_OUTOF10: 7
PAINLEVEL_OUTOF10: 4

## 2019-09-30 ASSESSMENT — PAIN DESCRIPTION - ONSET: ONSET: ON-GOING

## 2019-09-30 ASSESSMENT — PAIN DESCRIPTION - DESCRIPTORS: DESCRIPTORS: ACHING

## 2019-09-30 ASSESSMENT — PAIN DESCRIPTION - PAIN TYPE
TYPE: ACUTE PAIN
TYPE: SURGICAL PAIN

## 2019-09-30 ASSESSMENT — PAIN DESCRIPTION - LOCATION
LOCATION: HIP;BACK;CHEST
LOCATION: HIP

## 2019-09-30 ASSESSMENT — PAIN - FUNCTIONAL ASSESSMENT: PAIN_FUNCTIONAL_ASSESSMENT: ACTIVITIES ARE NOT PREVENTED

## 2019-09-30 ASSESSMENT — PAIN DESCRIPTION - PROGRESSION: CLINICAL_PROGRESSION: NOT CHANGED

## 2019-09-30 ASSESSMENT — PAIN DESCRIPTION - ORIENTATION
ORIENTATION: LEFT
ORIENTATION: LEFT;RIGHT

## 2019-09-30 ASSESSMENT — PAIN DESCRIPTION - FREQUENCY: FREQUENCY: INTERMITTENT

## 2019-10-01 LAB
GLUCOSE BLD-MCNC: 130 MG/DL (ref 60–115)
GLUCOSE BLD-MCNC: 198 MG/DL (ref 60–115)
GLUCOSE BLD-MCNC: 202 MG/DL (ref 60–115)
GLUCOSE BLD-MCNC: 216 MG/DL (ref 60–115)
PERFORMED ON: ABNORMAL

## 2019-10-01 PROCEDURE — 2500000003 HC RX 250 WO HCPCS: Performed by: PHYSICAL MEDICINE & REHABILITATION

## 2019-10-01 PROCEDURE — 97535 SELF CARE MNGMENT TRAINING: CPT

## 2019-10-01 PROCEDURE — 97110 THERAPEUTIC EXERCISES: CPT

## 2019-10-01 PROCEDURE — 97116 GAIT TRAINING THERAPY: CPT

## 2019-10-01 PROCEDURE — 6370000000 HC RX 637 (ALT 250 FOR IP): Performed by: PHYSICAL MEDICINE & REHABILITATION

## 2019-10-01 PROCEDURE — 97127 HC SP THER IVNTJ W/FOCUS COG FUNCJ: CPT

## 2019-10-01 PROCEDURE — 6360000002 HC RX W HCPCS: Performed by: PHYSICAL MEDICINE & REHABILITATION

## 2019-10-01 PROCEDURE — 1180000000 HC REHAB R&B

## 2019-10-01 PROCEDURE — 94640 AIRWAY INHALATION TREATMENT: CPT

## 2019-10-01 PROCEDURE — 97530 THERAPEUTIC ACTIVITIES: CPT

## 2019-10-01 RX ADMIN — GABAPENTIN 300 MG: 300 CAPSULE ORAL at 14:29

## 2019-10-01 RX ADMIN — DIGOXIN 125 MCG: 125 TABLET ORAL at 08:39

## 2019-10-01 RX ADMIN — FUROSEMIDE 40 MG: 40 TABLET ORAL at 08:41

## 2019-10-01 RX ADMIN — METOPROLOL TARTRATE 100 MG: 50 TABLET ORAL at 20:44

## 2019-10-01 RX ADMIN — PROVIDONE IODINE: 7.5 STICK TOPICAL at 08:43

## 2019-10-01 RX ADMIN — LACTOBACILLUS TAB 1 TABLET: TAB at 08:40

## 2019-10-01 RX ADMIN — GABAPENTIN 300 MG: 300 CAPSULE ORAL at 20:43

## 2019-10-01 RX ADMIN — ESCITALOPRAM OXALATE 20 MG: 20 TABLET, FILM COATED ORAL at 08:41

## 2019-10-01 RX ADMIN — GLYCOPYRROLATE AND FORMOTEROL FUMARATE 2 PUFF: 9; 4.8 AEROSOL, METERED RESPIRATORY (INHALATION) at 15:50

## 2019-10-01 RX ADMIN — INSULIN GLARGINE 10 UNITS: 100 INJECTION, SOLUTION SUBCUTANEOUS at 20:33

## 2019-10-01 RX ADMIN — CLOPIDOGREL BISULFATE 75 MG: 75 TABLET ORAL at 08:39

## 2019-10-01 RX ADMIN — OXYCODONE HYDROCHLORIDE 5 MG: 5 TABLET ORAL at 14:29

## 2019-10-01 RX ADMIN — SULFASALAZINE 500 MG: 500 TABLET ORAL at 08:40

## 2019-10-01 RX ADMIN — FOLIC ACID 1000 MCG: 1 TABLET ORAL at 20:43

## 2019-10-01 RX ADMIN — INSULIN LISPRO 1 UNITS: 100 INJECTION, SOLUTION INTRAVENOUS; SUBCUTANEOUS at 20:34

## 2019-10-01 RX ADMIN — APIXABAN 5 MG: 5 TABLET, FILM COATED ORAL at 20:41

## 2019-10-01 RX ADMIN — LISINOPRIL 20 MG: 20 TABLET ORAL at 08:41

## 2019-10-01 RX ADMIN — FAMOTIDINE 20 MG: 20 TABLET ORAL at 08:40

## 2019-10-01 RX ADMIN — Medication 1 CAPSULE: at 08:41

## 2019-10-01 RX ADMIN — METOPROLOL TARTRATE 100 MG: 50 TABLET ORAL at 08:40

## 2019-10-01 RX ADMIN — OXYCODONE HYDROCHLORIDE 5 MG: 5 TABLET ORAL at 22:12

## 2019-10-01 RX ADMIN — CIPROFLOXACIN 500 MG: 500 TABLET, FILM COATED ORAL at 20:42

## 2019-10-01 RX ADMIN — CETIRIZINE HYDROCHLORIDE 10 MG: 10 TABLET, FILM COATED ORAL at 08:41

## 2019-10-01 RX ADMIN — CIPROFLOXACIN 500 MG: 500 TABLET, FILM COATED ORAL at 08:40

## 2019-10-01 RX ADMIN — ATORVASTATIN CALCIUM 40 MG: 40 TABLET, FILM COATED ORAL at 20:41

## 2019-10-01 RX ADMIN — DILTIAZEM HYDROCHLORIDE 300 MG: 300 CAPSULE, COATED, EXTENDED RELEASE ORAL at 08:42

## 2019-10-01 RX ADMIN — POTASSIUM CHLORIDE 20 MEQ: 20 TABLET, EXTENDED RELEASE ORAL at 08:40

## 2019-10-01 RX ADMIN — ASPIRIN 81 MG 81 MG: 81 TABLET ORAL at 08:40

## 2019-10-01 RX ADMIN — PROVIDONE IODINE: 7.5 STICK TOPICAL at 20:46

## 2019-10-01 RX ADMIN — FAMOTIDINE 20 MG: 20 TABLET ORAL at 20:42

## 2019-10-01 RX ADMIN — MENTHOL: 0 POWDER TOPICAL at 08:42

## 2019-10-01 RX ADMIN — APIXABAN 5 MG: 5 TABLET, FILM COATED ORAL at 08:40

## 2019-10-01 RX ADMIN — SULFASALAZINE 500 MG: 500 TABLET ORAL at 20:46

## 2019-10-01 RX ADMIN — LEVOTHYROXINE SODIUM 75 MCG: 75 TABLET ORAL at 06:17

## 2019-10-01 RX ADMIN — PREDNISONE 10 MG: 10 TABLET ORAL at 08:41

## 2019-10-01 RX ADMIN — OXYCODONE HYDROCHLORIDE 5 MG: 5 TABLET ORAL at 08:41

## 2019-10-01 RX ADMIN — Medication 5000 UNITS: at 08:42

## 2019-10-01 RX ADMIN — AZATHIOPRINE 100 MG: 50 TABLET ORAL at 08:39

## 2019-10-01 RX ADMIN — GABAPENTIN 300 MG: 300 CAPSULE ORAL at 08:41

## 2019-10-01 RX ADMIN — GLYCOPYRROLATE AND FORMOTEROL FUMARATE 2 PUFF: 9; 4.8 AEROSOL, METERED RESPIRATORY (INHALATION) at 05:48

## 2019-10-01 ASSESSMENT — PAIN SCALES - GENERAL
PAINLEVEL_OUTOF10: 7
PAINLEVEL_OUTOF10: 5
PAINLEVEL_OUTOF10: 8
PAINLEVEL_OUTOF10: 7
PAINLEVEL_OUTOF10: 4
PAINLEVEL_OUTOF10: 0
PAINLEVEL_OUTOF10: 7

## 2019-10-01 ASSESSMENT — PAIN DESCRIPTION - LOCATION: LOCATION: HIP

## 2019-10-01 ASSESSMENT — PAIN DESCRIPTION - PAIN TYPE: TYPE: ACUTE PAIN

## 2019-10-01 ASSESSMENT — PAIN DESCRIPTION - ORIENTATION: ORIENTATION: LEFT

## 2019-10-02 LAB
GLUCOSE BLD-MCNC: 110 MG/DL (ref 60–115)
GLUCOSE BLD-MCNC: 161 MG/DL (ref 60–115)
GLUCOSE BLD-MCNC: 173 MG/DL (ref 60–115)
GLUCOSE BLD-MCNC: 231 MG/DL (ref 60–115)
PERFORMED ON: ABNORMAL
PERFORMED ON: NORMAL

## 2019-10-02 PROCEDURE — 92526 ORAL FUNCTION THERAPY: CPT

## 2019-10-02 PROCEDURE — 97530 THERAPEUTIC ACTIVITIES: CPT

## 2019-10-02 PROCEDURE — 6370000000 HC RX 637 (ALT 250 FOR IP): Performed by: PHYSICAL MEDICINE & REHABILITATION

## 2019-10-02 PROCEDURE — 97535 SELF CARE MNGMENT TRAINING: CPT

## 2019-10-02 PROCEDURE — 2500000003 HC RX 250 WO HCPCS: Performed by: PHYSICAL MEDICINE & REHABILITATION

## 2019-10-02 PROCEDURE — 1180000000 HC REHAB R&B

## 2019-10-02 PROCEDURE — 97110 THERAPEUTIC EXERCISES: CPT

## 2019-10-02 PROCEDURE — 6360000002 HC RX W HCPCS: Performed by: PHYSICAL MEDICINE & REHABILITATION

## 2019-10-02 PROCEDURE — 97116 GAIT TRAINING THERAPY: CPT

## 2019-10-02 PROCEDURE — 94640 AIRWAY INHALATION TREATMENT: CPT

## 2019-10-02 PROCEDURE — 94762 N-INVAS EAR/PLS OXIMTRY CONT: CPT

## 2019-10-02 RX ORDER — LACTULOSE 10 G/15ML
20 SOLUTION ORAL DAILY PRN
Status: DISCONTINUED | OUTPATIENT
Start: 2019-10-02 | End: 2019-10-07 | Stop reason: HOSPADM

## 2019-10-02 RX ORDER — LIDOCAINE 4 G/G
3 PATCH TOPICAL DAILY
Status: DISCONTINUED | OUTPATIENT
Start: 2019-10-02 | End: 2019-10-07 | Stop reason: HOSPADM

## 2019-10-02 RX ORDER — TRAMADOL HYDROCHLORIDE 50 MG/1
50 TABLET ORAL EVERY 6 HOURS PRN
Status: DISCONTINUED | OUTPATIENT
Start: 2019-10-02 | End: 2019-10-07 | Stop reason: HOSPADM

## 2019-10-02 RX ADMIN — PROVIDONE IODINE: 7.5 STICK TOPICAL at 20:47

## 2019-10-02 RX ADMIN — DIGOXIN 125 MCG: 125 TABLET ORAL at 09:01

## 2019-10-02 RX ADMIN — METOPROLOL TARTRATE 100 MG: 50 TABLET ORAL at 20:46

## 2019-10-02 RX ADMIN — FAMOTIDINE 20 MG: 20 TABLET ORAL at 09:01

## 2019-10-02 RX ADMIN — CIPROFLOXACIN 500 MG: 500 TABLET, FILM COATED ORAL at 09:01

## 2019-10-02 RX ADMIN — APIXABAN 5 MG: 5 TABLET, FILM COATED ORAL at 09:00

## 2019-10-02 RX ADMIN — Medication 5000 UNITS: at 08:59

## 2019-10-02 RX ADMIN — DILTIAZEM HYDROCHLORIDE 300 MG: 300 CAPSULE, COATED, EXTENDED RELEASE ORAL at 08:59

## 2019-10-02 RX ADMIN — APIXABAN 5 MG: 5 TABLET, FILM COATED ORAL at 20:46

## 2019-10-02 RX ADMIN — LEVOTHYROXINE SODIUM 75 MCG: 75 TABLET ORAL at 06:14

## 2019-10-02 RX ADMIN — METOPROLOL TARTRATE 100 MG: 50 TABLET ORAL at 08:58

## 2019-10-02 RX ADMIN — FOLIC ACID 1000 MCG: 1 TABLET ORAL at 20:46

## 2019-10-02 RX ADMIN — INSULIN LISPRO 1 UNITS: 100 INJECTION, SOLUTION INTRAVENOUS; SUBCUTANEOUS at 20:49

## 2019-10-02 RX ADMIN — POTASSIUM CHLORIDE 20 MEQ: 20 TABLET, EXTENDED RELEASE ORAL at 09:01

## 2019-10-02 RX ADMIN — ASPIRIN 81 MG 81 MG: 81 TABLET ORAL at 09:00

## 2019-10-02 RX ADMIN — LISINOPRIL 20 MG: 20 TABLET ORAL at 09:00

## 2019-10-02 RX ADMIN — CETIRIZINE HYDROCHLORIDE 10 MG: 10 TABLET, FILM COATED ORAL at 09:00

## 2019-10-02 RX ADMIN — Medication 1 CAPSULE: at 08:58

## 2019-10-02 RX ADMIN — GABAPENTIN 300 MG: 300 CAPSULE ORAL at 20:46

## 2019-10-02 RX ADMIN — CIPROFLOXACIN 500 MG: 500 TABLET, FILM COATED ORAL at 20:46

## 2019-10-02 RX ADMIN — GABAPENTIN 300 MG: 300 CAPSULE ORAL at 13:42

## 2019-10-02 RX ADMIN — INSULIN GLARGINE 10 UNITS: 100 INJECTION, SOLUTION SUBCUTANEOUS at 20:49

## 2019-10-02 RX ADMIN — GLYCOPYRROLATE AND FORMOTEROL FUMARATE 2 PUFF: 9; 4.8 AEROSOL, METERED RESPIRATORY (INHALATION) at 16:41

## 2019-10-02 RX ADMIN — ESCITALOPRAM OXALATE 20 MG: 20 TABLET, FILM COATED ORAL at 09:01

## 2019-10-02 RX ADMIN — GABAPENTIN 300 MG: 300 CAPSULE ORAL at 09:00

## 2019-10-02 RX ADMIN — SULFASALAZINE 500 MG: 500 TABLET ORAL at 20:46

## 2019-10-02 RX ADMIN — ATORVASTATIN CALCIUM 40 MG: 40 TABLET, FILM COATED ORAL at 20:46

## 2019-10-02 RX ADMIN — SULFASALAZINE 500 MG: 500 TABLET ORAL at 08:59

## 2019-10-02 RX ADMIN — OXYCODONE HYDROCHLORIDE 5 MG: 5 TABLET ORAL at 18:45

## 2019-10-02 RX ADMIN — MENTHOL: 0 POWDER TOPICAL at 09:04

## 2019-10-02 RX ADMIN — GLYCOPYRROLATE AND FORMOTEROL FUMARATE 2 PUFF: 9; 4.8 AEROSOL, METERED RESPIRATORY (INHALATION) at 04:54

## 2019-10-02 RX ADMIN — LACTOBACILLUS TAB 1 TABLET: TAB at 09:00

## 2019-10-02 RX ADMIN — PROVIDONE IODINE: 7.5 STICK TOPICAL at 09:05

## 2019-10-02 RX ADMIN — OXYCODONE HYDROCHLORIDE 5 MG: 5 TABLET ORAL at 11:59

## 2019-10-02 RX ADMIN — CLOPIDOGREL BISULFATE 75 MG: 75 TABLET ORAL at 09:00

## 2019-10-02 RX ADMIN — OXYCODONE HYDROCHLORIDE 5 MG: 5 TABLET ORAL at 04:39

## 2019-10-02 RX ADMIN — FAMOTIDINE 20 MG: 20 TABLET ORAL at 20:46

## 2019-10-02 RX ADMIN — FUROSEMIDE 40 MG: 40 TABLET ORAL at 09:00

## 2019-10-02 RX ADMIN — AZATHIOPRINE 100 MG: 50 TABLET ORAL at 08:58

## 2019-10-02 RX ADMIN — PREDNISONE 10 MG: 10 TABLET ORAL at 08:59

## 2019-10-02 ASSESSMENT — PAIN DESCRIPTION - FREQUENCY: FREQUENCY: CONTINUOUS

## 2019-10-02 ASSESSMENT — PAIN DESCRIPTION - DESCRIPTORS: DESCRIPTORS: ACHING;SORE

## 2019-10-02 ASSESSMENT — PAIN SCALES - GENERAL
PAINLEVEL_OUTOF10: 8
PAINLEVEL_OUTOF10: 6
PAINLEVEL_OUTOF10: 5
PAINLEVEL_OUTOF10: 8
PAINLEVEL_OUTOF10: 7
PAINLEVEL_OUTOF10: 8

## 2019-10-02 ASSESSMENT — PAIN DESCRIPTION - PAIN TYPE
TYPE: ACUTE PAIN
TYPE: ACUTE PAIN

## 2019-10-02 ASSESSMENT — PAIN DESCRIPTION - LOCATION
LOCATION: SHOULDER;RIB CAGE
LOCATION: RIB CAGE;SHOULDER

## 2019-10-02 ASSESSMENT — PAIN DESCRIPTION - ORIENTATION
ORIENTATION: RIGHT
ORIENTATION: RIGHT

## 2019-10-03 ENCOUNTER — APPOINTMENT (OUTPATIENT)
Dept: GENERAL RADIOLOGY | Age: 69
DRG: 560 | End: 2019-10-03
Payer: MEDICARE

## 2019-10-03 LAB
GLUCOSE BLD-MCNC: 119 MG/DL (ref 60–115)
GLUCOSE BLD-MCNC: 176 MG/DL (ref 60–115)
GLUCOSE BLD-MCNC: 186 MG/DL (ref 60–115)
GLUCOSE BLD-MCNC: 191 MG/DL (ref 60–115)
PERFORMED ON: ABNORMAL

## 2019-10-03 PROCEDURE — 1180000000 HC REHAB R&B

## 2019-10-03 PROCEDURE — 74230 X-RAY XM SWLNG FUNCJ C+: CPT

## 2019-10-03 PROCEDURE — 2500000003 HC RX 250 WO HCPCS: Performed by: PHYSICAL MEDICINE & REHABILITATION

## 2019-10-03 PROCEDURE — 6370000000 HC RX 637 (ALT 250 FOR IP): Performed by: PHYSICAL MEDICINE & REHABILITATION

## 2019-10-03 PROCEDURE — 97530 THERAPEUTIC ACTIVITIES: CPT

## 2019-10-03 PROCEDURE — 97535 SELF CARE MNGMENT TRAINING: CPT

## 2019-10-03 PROCEDURE — 97116 GAIT TRAINING THERAPY: CPT

## 2019-10-03 PROCEDURE — 97112 NEUROMUSCULAR REEDUCATION: CPT

## 2019-10-03 PROCEDURE — 94640 AIRWAY INHALATION TREATMENT: CPT

## 2019-10-03 PROCEDURE — 92611 MOTION FLUOROSCOPY/SWALLOW: CPT

## 2019-10-03 PROCEDURE — 6360000002 HC RX W HCPCS: Performed by: PHYSICAL MEDICINE & REHABILITATION

## 2019-10-03 RX ADMIN — GABAPENTIN 300 MG: 300 CAPSULE ORAL at 14:15

## 2019-10-03 RX ADMIN — APIXABAN 5 MG: 5 TABLET, FILM COATED ORAL at 09:17

## 2019-10-03 RX ADMIN — DIGOXIN 125 MCG: 125 TABLET ORAL at 09:18

## 2019-10-03 RX ADMIN — SULFASALAZINE 500 MG: 500 TABLET ORAL at 20:46

## 2019-10-03 RX ADMIN — INSULIN GLARGINE 10 UNITS: 100 INJECTION, SOLUTION SUBCUTANEOUS at 20:48

## 2019-10-03 RX ADMIN — CLOPIDOGREL BISULFATE 75 MG: 75 TABLET ORAL at 09:16

## 2019-10-03 RX ADMIN — BARIUM SULFATE 50 G: 0.81 POWDER, FOR SUSPENSION ORAL at 13:17

## 2019-10-03 RX ADMIN — LISINOPRIL 20 MG: 20 TABLET ORAL at 09:17

## 2019-10-03 RX ADMIN — PREDNISONE 10 MG: 10 TABLET ORAL at 09:18

## 2019-10-03 RX ADMIN — MENTHOL: 0 POWDER TOPICAL at 10:08

## 2019-10-03 RX ADMIN — AZATHIOPRINE 100 MG: 50 TABLET ORAL at 09:18

## 2019-10-03 RX ADMIN — Medication 5000 UNITS: at 09:16

## 2019-10-03 RX ADMIN — METOPROLOL TARTRATE 100 MG: 50 TABLET ORAL at 20:44

## 2019-10-03 RX ADMIN — INSULIN LISPRO 1 UNITS: 100 INJECTION, SOLUTION INTRAVENOUS; SUBCUTANEOUS at 20:48

## 2019-10-03 RX ADMIN — OXYCODONE HYDROCHLORIDE 5 MG: 5 TABLET ORAL at 11:23

## 2019-10-03 RX ADMIN — Medication 1 CAPSULE: at 09:17

## 2019-10-03 RX ADMIN — GLYCOPYRROLATE AND FORMOTEROL FUMARATE 2 PUFF: 9; 4.8 AEROSOL, METERED RESPIRATORY (INHALATION) at 18:05

## 2019-10-03 RX ADMIN — FAMOTIDINE 20 MG: 20 TABLET ORAL at 20:46

## 2019-10-03 RX ADMIN — CIPROFLOXACIN 500 MG: 500 TABLET, FILM COATED ORAL at 09:17

## 2019-10-03 RX ADMIN — PROVIDONE IODINE: 7.5 STICK TOPICAL at 10:08

## 2019-10-03 RX ADMIN — SULFASALAZINE 500 MG: 500 TABLET ORAL at 09:17

## 2019-10-03 RX ADMIN — POTASSIUM CHLORIDE 20 MEQ: 20 TABLET, EXTENDED RELEASE ORAL at 09:17

## 2019-10-03 RX ADMIN — GABAPENTIN 300 MG: 300 CAPSULE ORAL at 09:16

## 2019-10-03 RX ADMIN — FOLIC ACID 1000 MCG: 1 TABLET ORAL at 20:46

## 2019-10-03 RX ADMIN — GLYCOPYRROLATE AND FORMOTEROL FUMARATE 2 PUFF: 9; 4.8 AEROSOL, METERED RESPIRATORY (INHALATION) at 05:00

## 2019-10-03 RX ADMIN — DILTIAZEM HYDROCHLORIDE 300 MG: 300 CAPSULE, COATED, EXTENDED RELEASE ORAL at 09:18

## 2019-10-03 RX ADMIN — FUROSEMIDE 40 MG: 40 TABLET ORAL at 09:17

## 2019-10-03 RX ADMIN — ASPIRIN 81 MG 81 MG: 81 TABLET ORAL at 09:18

## 2019-10-03 RX ADMIN — LEVOTHYROXINE SODIUM 75 MCG: 75 TABLET ORAL at 06:18

## 2019-10-03 RX ADMIN — FAMOTIDINE 20 MG: 20 TABLET ORAL at 09:18

## 2019-10-03 RX ADMIN — ATORVASTATIN CALCIUM 40 MG: 40 TABLET, FILM COATED ORAL at 20:44

## 2019-10-03 RX ADMIN — ESCITALOPRAM OXALATE 20 MG: 20 TABLET, FILM COATED ORAL at 09:18

## 2019-10-03 RX ADMIN — PROVIDONE IODINE: 7.5 STICK TOPICAL at 20:48

## 2019-10-03 RX ADMIN — METOPROLOL TARTRATE 100 MG: 50 TABLET ORAL at 09:17

## 2019-10-03 RX ADMIN — APIXABAN 5 MG: 5 TABLET, FILM COATED ORAL at 20:46

## 2019-10-03 RX ADMIN — OXYCODONE HYDROCHLORIDE 5 MG: 5 TABLET ORAL at 20:56

## 2019-10-03 RX ADMIN — LACTOBACILLUS TAB 1 TABLET: TAB at 09:17

## 2019-10-03 RX ADMIN — BARIUM SULFATE 80 ML: 400 SUSPENSION ORAL at 13:17

## 2019-10-03 RX ADMIN — GABAPENTIN 300 MG: 300 CAPSULE ORAL at 20:46

## 2019-10-03 RX ADMIN — CETIRIZINE HYDROCHLORIDE 10 MG: 10 TABLET, FILM COATED ORAL at 09:18

## 2019-10-03 ASSESSMENT — PAIN DESCRIPTION - ORIENTATION
ORIENTATION: RIGHT;LEFT
ORIENTATION: RIGHT
ORIENTATION: RIGHT

## 2019-10-03 ASSESSMENT — PAIN DESCRIPTION - DESCRIPTORS
DESCRIPTORS: ACHING

## 2019-10-03 ASSESSMENT — PAIN DESCRIPTION - ONSET
ONSET: GRADUAL
ONSET: GRADUAL

## 2019-10-03 ASSESSMENT — PAIN DESCRIPTION - PAIN TYPE
TYPE: ACUTE PAIN

## 2019-10-03 ASSESSMENT — PAIN DESCRIPTION - PROGRESSION
CLINICAL_PROGRESSION: NOT CHANGED
CLINICAL_PROGRESSION: NOT CHANGED

## 2019-10-03 ASSESSMENT — PAIN DESCRIPTION - FREQUENCY
FREQUENCY: INTERMITTENT

## 2019-10-03 ASSESSMENT — PAIN SCALES - GENERAL
PAINLEVEL_OUTOF10: 8
PAINLEVEL_OUTOF10: 4
PAINLEVEL_OUTOF10: 5
PAINLEVEL_OUTOF10: 5
PAINLEVEL_OUTOF10: 4

## 2019-10-03 ASSESSMENT — PAIN DESCRIPTION - LOCATION
LOCATION: SHOULDER;FLANK
LOCATION: RIB CAGE;SHOULDER
LOCATION: RIB CAGE

## 2019-10-04 LAB
GLUCOSE BLD-MCNC: 106 MG/DL (ref 60–115)
GLUCOSE BLD-MCNC: 185 MG/DL (ref 60–115)
GLUCOSE BLD-MCNC: 214 MG/DL (ref 60–115)
GLUCOSE BLD-MCNC: 290 MG/DL (ref 60–115)
PERFORMED ON: ABNORMAL
PERFORMED ON: NORMAL

## 2019-10-04 PROCEDURE — 1180000000 HC REHAB R&B

## 2019-10-04 PROCEDURE — 97535 SELF CARE MNGMENT TRAINING: CPT

## 2019-10-04 PROCEDURE — 92526 ORAL FUNCTION THERAPY: CPT

## 2019-10-04 PROCEDURE — 97530 THERAPEUTIC ACTIVITIES: CPT

## 2019-10-04 PROCEDURE — 94640 AIRWAY INHALATION TREATMENT: CPT

## 2019-10-04 PROCEDURE — 6370000000 HC RX 637 (ALT 250 FOR IP): Performed by: PHYSICAL MEDICINE & REHABILITATION

## 2019-10-04 PROCEDURE — 97110 THERAPEUTIC EXERCISES: CPT

## 2019-10-04 PROCEDURE — 2700000000 HC OXYGEN THERAPY PER DAY

## 2019-10-04 PROCEDURE — 97116 GAIT TRAINING THERAPY: CPT

## 2019-10-04 PROCEDURE — 2500000003 HC RX 250 WO HCPCS: Performed by: PHYSICAL MEDICINE & REHABILITATION

## 2019-10-04 PROCEDURE — 6360000002 HC RX W HCPCS: Performed by: PHYSICAL MEDICINE & REHABILITATION

## 2019-10-04 RX ORDER — PREDNISONE 10 MG/1
10 TABLET ORAL DAILY
Status: COMPLETED | OUTPATIENT
Start: 2019-10-04 | End: 2019-10-06

## 2019-10-04 RX ADMIN — APIXABAN 5 MG: 5 TABLET, FILM COATED ORAL at 20:35

## 2019-10-04 RX ADMIN — LISINOPRIL 20 MG: 20 TABLET ORAL at 08:30

## 2019-10-04 RX ADMIN — ASPIRIN 81 MG 81 MG: 81 TABLET ORAL at 08:29

## 2019-10-04 RX ADMIN — LEVOTHYROXINE SODIUM 75 MCG: 75 TABLET ORAL at 06:32

## 2019-10-04 RX ADMIN — OXYCODONE HYDROCHLORIDE 5 MG: 5 TABLET ORAL at 20:35

## 2019-10-04 RX ADMIN — Medication 5000 UNITS: at 08:30

## 2019-10-04 RX ADMIN — CLOPIDOGREL BISULFATE 75 MG: 75 TABLET ORAL at 08:29

## 2019-10-04 RX ADMIN — AZATHIOPRINE 100 MG: 50 TABLET ORAL at 08:29

## 2019-10-04 RX ADMIN — INSULIN LISPRO 2 UNITS: 100 INJECTION, SOLUTION INTRAVENOUS; SUBCUTANEOUS at 20:40

## 2019-10-04 RX ADMIN — FUROSEMIDE 40 MG: 40 TABLET ORAL at 08:30

## 2019-10-04 RX ADMIN — GABAPENTIN 300 MG: 300 CAPSULE ORAL at 14:06

## 2019-10-04 RX ADMIN — ATORVASTATIN CALCIUM 40 MG: 40 TABLET, FILM COATED ORAL at 20:35

## 2019-10-04 RX ADMIN — FAMOTIDINE 20 MG: 20 TABLET ORAL at 20:35

## 2019-10-04 RX ADMIN — INSULIN GLARGINE 10 UNITS: 100 INJECTION, SOLUTION SUBCUTANEOUS at 20:40

## 2019-10-04 RX ADMIN — GLYCOPYRROLATE AND FORMOTEROL FUMARATE 2 PUFF: 9; 4.8 AEROSOL, METERED RESPIRATORY (INHALATION) at 04:43

## 2019-10-04 RX ADMIN — Medication 20 MG: at 12:34

## 2019-10-04 RX ADMIN — PROVIDONE IODINE: 7.5 STICK TOPICAL at 08:37

## 2019-10-04 RX ADMIN — OXYCODONE HYDROCHLORIDE 5 MG: 5 TABLET ORAL at 09:12

## 2019-10-04 RX ADMIN — PROVIDONE IODINE: 7.5 STICK TOPICAL at 20:44

## 2019-10-04 RX ADMIN — METOPROLOL TARTRATE 100 MG: 50 TABLET ORAL at 20:35

## 2019-10-04 RX ADMIN — SULFASALAZINE 500 MG: 500 TABLET ORAL at 08:31

## 2019-10-04 RX ADMIN — GABAPENTIN 300 MG: 300 CAPSULE ORAL at 08:30

## 2019-10-04 RX ADMIN — GABAPENTIN 300 MG: 300 CAPSULE ORAL at 20:35

## 2019-10-04 RX ADMIN — PREDNISONE 10 MG: 10 TABLET ORAL at 08:29

## 2019-10-04 RX ADMIN — GLYCOPYRROLATE AND FORMOTEROL FUMARATE 2 PUFF: 9; 4.8 AEROSOL, METERED RESPIRATORY (INHALATION) at 16:19

## 2019-10-04 RX ADMIN — SULFASALAZINE 500 MG: 500 TABLET ORAL at 20:35

## 2019-10-04 RX ADMIN — DIGOXIN 125 MCG: 125 TABLET ORAL at 08:29

## 2019-10-04 RX ADMIN — METOPROLOL TARTRATE 100 MG: 50 TABLET ORAL at 08:30

## 2019-10-04 RX ADMIN — FOLIC ACID 1000 MCG: 1 TABLET ORAL at 20:35

## 2019-10-04 RX ADMIN — APIXABAN 5 MG: 5 TABLET, FILM COATED ORAL at 08:30

## 2019-10-04 RX ADMIN — ESCITALOPRAM OXALATE 20 MG: 20 TABLET, FILM COATED ORAL at 08:29

## 2019-10-04 RX ADMIN — TRAZODONE HYDROCHLORIDE 50 MG: 50 TABLET ORAL at 20:36

## 2019-10-04 RX ADMIN — Medication 1 CAPSULE: at 08:30

## 2019-10-04 RX ADMIN — DILTIAZEM HYDROCHLORIDE 300 MG: 300 CAPSULE, COATED, EXTENDED RELEASE ORAL at 08:30

## 2019-10-04 RX ADMIN — LACTOBACILLUS TAB 1 TABLET: TAB at 08:30

## 2019-10-04 RX ADMIN — CETIRIZINE HYDROCHLORIDE 10 MG: 10 TABLET, FILM COATED ORAL at 08:30

## 2019-10-04 RX ADMIN — MENTHOL: 0 POWDER TOPICAL at 08:36

## 2019-10-04 RX ADMIN — FAMOTIDINE 20 MG: 20 TABLET ORAL at 08:29

## 2019-10-04 RX ADMIN — PREDNISONE 10 MG: 10 TABLET ORAL at 14:06

## 2019-10-04 RX ADMIN — POTASSIUM CHLORIDE 20 MEQ: 20 TABLET, EXTENDED RELEASE ORAL at 08:29

## 2019-10-04 ASSESSMENT — PAIN SCALES - GENERAL
PAINLEVEL_OUTOF10: 0
PAINLEVEL_OUTOF10: 7

## 2019-10-04 ASSESSMENT — PAIN DESCRIPTION - ORIENTATION
ORIENTATION: LEFT
ORIENTATION: RIGHT

## 2019-10-04 ASSESSMENT — PAIN DESCRIPTION - PAIN TYPE: TYPE: ACUTE PAIN

## 2019-10-04 ASSESSMENT — PAIN DESCRIPTION - LOCATION
LOCATION: SHOULDER
LOCATION: HEAD

## 2019-10-04 ASSESSMENT — PAIN DESCRIPTION - DESCRIPTORS: DESCRIPTORS: ACHING

## 2019-10-05 PROBLEM — S06.9XAA INTRACRANIAL INJURY OF OTHER AND UNSPECIFIED NATURE, WITHOUT MENTION OF OPEN INTRACRANIAL WOUND, UNSPECIFIED STATE OF CONSCIOUSNESS: Status: ACTIVE | Noted: 2019-10-05

## 2019-10-05 LAB
GLUCOSE BLD-MCNC: 134 MG/DL (ref 60–115)
GLUCOSE BLD-MCNC: 157 MG/DL (ref 60–115)
GLUCOSE BLD-MCNC: 184 MG/DL (ref 60–115)
GLUCOSE BLD-MCNC: 207 MG/DL (ref 60–115)
PERFORMED ON: ABNORMAL

## 2019-10-05 PROCEDURE — 1180000000 HC REHAB R&B

## 2019-10-05 PROCEDURE — 94640 AIRWAY INHALATION TREATMENT: CPT

## 2019-10-05 PROCEDURE — 2700000000 HC OXYGEN THERAPY PER DAY

## 2019-10-05 PROCEDURE — 6360000002 HC RX W HCPCS: Performed by: PHYSICAL MEDICINE & REHABILITATION

## 2019-10-05 PROCEDURE — 97140 MANUAL THERAPY 1/> REGIONS: CPT

## 2019-10-05 PROCEDURE — 97116 GAIT TRAINING THERAPY: CPT

## 2019-10-05 PROCEDURE — 2500000003 HC RX 250 WO HCPCS: Performed by: PHYSICAL MEDICINE & REHABILITATION

## 2019-10-05 PROCEDURE — 6370000000 HC RX 637 (ALT 250 FOR IP): Performed by: PHYSICAL MEDICINE & REHABILITATION

## 2019-10-05 PROCEDURE — 97530 THERAPEUTIC ACTIVITIES: CPT

## 2019-10-05 PROCEDURE — 97535 SELF CARE MNGMENT TRAINING: CPT

## 2019-10-05 RX ADMIN — GLYCOPYRROLATE AND FORMOTEROL FUMARATE 2 PUFF: 9; 4.8 AEROSOL, METERED RESPIRATORY (INHALATION) at 04:25

## 2019-10-05 RX ADMIN — ESCITALOPRAM OXALATE 20 MG: 20 TABLET, FILM COATED ORAL at 08:16

## 2019-10-05 RX ADMIN — SULFASALAZINE 500 MG: 500 TABLET ORAL at 22:49

## 2019-10-05 RX ADMIN — APIXABAN 5 MG: 5 TABLET, FILM COATED ORAL at 22:27

## 2019-10-05 RX ADMIN — GLYCOPYRROLATE AND FORMOTEROL FUMARATE 2 PUFF: 9; 4.8 AEROSOL, METERED RESPIRATORY (INHALATION) at 16:40

## 2019-10-05 RX ADMIN — ATORVASTATIN CALCIUM 40 MG: 40 TABLET, FILM COATED ORAL at 22:28

## 2019-10-05 RX ADMIN — PREDNISONE 10 MG: 10 TABLET ORAL at 08:24

## 2019-10-05 RX ADMIN — PROVIDONE IODINE: 7.5 STICK TOPICAL at 08:22

## 2019-10-05 RX ADMIN — PREDNISONE 10 MG: 10 TABLET ORAL at 08:18

## 2019-10-05 RX ADMIN — GABAPENTIN 300 MG: 300 CAPSULE ORAL at 08:16

## 2019-10-05 RX ADMIN — METOPROLOL TARTRATE 100 MG: 50 TABLET ORAL at 22:47

## 2019-10-05 RX ADMIN — FUROSEMIDE 40 MG: 40 TABLET ORAL at 08:17

## 2019-10-05 RX ADMIN — DIGOXIN 125 MCG: 125 TABLET ORAL at 08:17

## 2019-10-05 RX ADMIN — OXYCODONE HYDROCHLORIDE 5 MG: 5 TABLET ORAL at 08:18

## 2019-10-05 RX ADMIN — FAMOTIDINE 20 MG: 20 TABLET ORAL at 22:28

## 2019-10-05 RX ADMIN — Medication 5000 UNITS: at 08:18

## 2019-10-05 RX ADMIN — CETIRIZINE HYDROCHLORIDE 10 MG: 10 TABLET, FILM COATED ORAL at 08:16

## 2019-10-05 RX ADMIN — MENTHOL: 0 POWDER TOPICAL at 08:21

## 2019-10-05 RX ADMIN — LISINOPRIL 20 MG: 20 TABLET ORAL at 08:17

## 2019-10-05 RX ADMIN — METOPROLOL TARTRATE 100 MG: 50 TABLET ORAL at 08:17

## 2019-10-05 RX ADMIN — GABAPENTIN 300 MG: 300 CAPSULE ORAL at 22:29

## 2019-10-05 RX ADMIN — DILTIAZEM HYDROCHLORIDE 300 MG: 300 CAPSULE, COATED, EXTENDED RELEASE ORAL at 08:16

## 2019-10-05 RX ADMIN — Medication 1 CAPSULE: at 08:16

## 2019-10-05 RX ADMIN — INSULIN LISPRO 1 UNITS: 100 INJECTION, SOLUTION INTRAVENOUS; SUBCUTANEOUS at 22:44

## 2019-10-05 RX ADMIN — LEVOTHYROXINE SODIUM 75 MCG: 75 TABLET ORAL at 06:36

## 2019-10-05 RX ADMIN — SULFASALAZINE 500 MG: 500 TABLET ORAL at 08:19

## 2019-10-05 RX ADMIN — FAMOTIDINE 20 MG: 20 TABLET ORAL at 08:18

## 2019-10-05 RX ADMIN — AZATHIOPRINE 100 MG: 50 TABLET ORAL at 08:16

## 2019-10-05 RX ADMIN — FOLIC ACID 1000 MCG: 1 TABLET ORAL at 22:29

## 2019-10-05 RX ADMIN — CLOPIDOGREL BISULFATE 75 MG: 75 TABLET ORAL at 08:19

## 2019-10-05 RX ADMIN — POTASSIUM CHLORIDE 20 MEQ: 20 TABLET, EXTENDED RELEASE ORAL at 08:18

## 2019-10-05 RX ADMIN — TRAMADOL HYDROCHLORIDE 50 MG: 50 TABLET ORAL at 22:48

## 2019-10-05 RX ADMIN — GABAPENTIN 300 MG: 300 CAPSULE ORAL at 14:22

## 2019-10-05 RX ADMIN — LACTOBACILLUS TAB 1 TABLET: TAB at 08:16

## 2019-10-05 RX ADMIN — MENTHOL: 0 POWDER TOPICAL at 22:46

## 2019-10-05 RX ADMIN — APIXABAN 5 MG: 5 TABLET, FILM COATED ORAL at 08:18

## 2019-10-05 RX ADMIN — ASPIRIN 81 MG 81 MG: 81 TABLET ORAL at 08:17

## 2019-10-05 RX ADMIN — INSULIN GLARGINE 10 UNITS: 100 INJECTION, SOLUTION SUBCUTANEOUS at 22:41

## 2019-10-05 ASSESSMENT — PAIN DESCRIPTION - DESCRIPTORS
DESCRIPTORS: ACHING
DESCRIPTORS: ACHING

## 2019-10-05 ASSESSMENT — PAIN SCALES - GENERAL
PAINLEVEL_OUTOF10: 5
PAINLEVEL_OUTOF10: 7
PAINLEVEL_OUTOF10: 4
PAINLEVEL_OUTOF10: 7
PAINLEVEL_OUTOF10: 8
PAINLEVEL_OUTOF10: 8

## 2019-10-05 ASSESSMENT — PAIN DESCRIPTION - FREQUENCY
FREQUENCY: CONTINUOUS
FREQUENCY: CONTINUOUS

## 2019-10-05 ASSESSMENT — PAIN DESCRIPTION - ORIENTATION
ORIENTATION: LEFT;LOWER
ORIENTATION: LEFT
ORIENTATION: LEFT

## 2019-10-05 ASSESSMENT — PAIN DESCRIPTION - LOCATION
LOCATION: HIP
LOCATION: BACK;HIP
LOCATION: HIP

## 2019-10-05 ASSESSMENT — PAIN DESCRIPTION - PAIN TYPE
TYPE: ACUTE PAIN
TYPE: ACUTE PAIN

## 2019-10-06 LAB
GLUCOSE BLD-MCNC: 145 MG/DL (ref 60–115)
GLUCOSE BLD-MCNC: 160 MG/DL (ref 60–115)
GLUCOSE BLD-MCNC: 227 MG/DL (ref 60–115)
PERFORMED ON: ABNORMAL

## 2019-10-06 PROCEDURE — 2700000000 HC OXYGEN THERAPY PER DAY

## 2019-10-06 PROCEDURE — 99233 SBSQ HOSP IP/OBS HIGH 50: CPT | Performed by: PHYSICAL MEDICINE & REHABILITATION

## 2019-10-06 PROCEDURE — 1180000000 HC REHAB R&B

## 2019-10-06 PROCEDURE — 6360000002 HC RX W HCPCS: Performed by: PHYSICAL MEDICINE & REHABILITATION

## 2019-10-06 PROCEDURE — 2500000003 HC RX 250 WO HCPCS: Performed by: PHYSICAL MEDICINE & REHABILITATION

## 2019-10-06 PROCEDURE — 94640 AIRWAY INHALATION TREATMENT: CPT

## 2019-10-06 PROCEDURE — 94762 N-INVAS EAR/PLS OXIMTRY CONT: CPT

## 2019-10-06 PROCEDURE — 6370000000 HC RX 637 (ALT 250 FOR IP): Performed by: PHYSICAL MEDICINE & REHABILITATION

## 2019-10-06 RX ORDER — OXYCODONE HYDROCHLORIDE 5 MG/1
5 TABLET ORAL EVERY 6 HOURS PRN
Qty: 30 TABLET | Refills: 0 | Status: SHIPPED | OUTPATIENT
Start: 2019-10-06 | End: 2019-10-13

## 2019-10-06 RX ADMIN — FAMOTIDINE 20 MG: 20 TABLET ORAL at 23:47

## 2019-10-06 RX ADMIN — INSULIN GLARGINE 10 UNITS: 100 INJECTION, SOLUTION SUBCUTANEOUS at 23:51

## 2019-10-06 RX ADMIN — APIXABAN 5 MG: 5 TABLET, FILM COATED ORAL at 23:46

## 2019-10-06 RX ADMIN — GABAPENTIN 300 MG: 300 CAPSULE ORAL at 23:48

## 2019-10-06 RX ADMIN — APIXABAN 5 MG: 5 TABLET, FILM COATED ORAL at 09:10

## 2019-10-06 RX ADMIN — LISINOPRIL 20 MG: 20 TABLET ORAL at 09:11

## 2019-10-06 RX ADMIN — MENTHOL: 0 POWDER TOPICAL at 09:13

## 2019-10-06 RX ADMIN — ESCITALOPRAM OXALATE 20 MG: 20 TABLET, FILM COATED ORAL at 09:11

## 2019-10-06 RX ADMIN — ATORVASTATIN CALCIUM 40 MG: 40 TABLET, FILM COATED ORAL at 23:46

## 2019-10-06 RX ADMIN — POTASSIUM CHLORIDE 20 MEQ: 20 TABLET, EXTENDED RELEASE ORAL at 09:11

## 2019-10-06 RX ADMIN — METOPROLOL TARTRATE 100 MG: 50 TABLET ORAL at 09:09

## 2019-10-06 RX ADMIN — LACTOBACILLUS TAB 1 TABLET: TAB at 09:10

## 2019-10-06 RX ADMIN — AZATHIOPRINE 100 MG: 50 TABLET ORAL at 10:55

## 2019-10-06 RX ADMIN — LEVOTHYROXINE SODIUM 75 MCG: 75 TABLET ORAL at 07:13

## 2019-10-06 RX ADMIN — CETIRIZINE HYDROCHLORIDE 10 MG: 10 TABLET, FILM COATED ORAL at 09:10

## 2019-10-06 RX ADMIN — FUROSEMIDE 40 MG: 40 TABLET ORAL at 09:09

## 2019-10-06 RX ADMIN — PREDNISONE 10 MG: 10 TABLET ORAL at 09:13

## 2019-10-06 RX ADMIN — SULFASALAZINE 500 MG: 500 TABLET ORAL at 09:09

## 2019-10-06 RX ADMIN — GABAPENTIN 300 MG: 300 CAPSULE ORAL at 14:39

## 2019-10-06 RX ADMIN — FAMOTIDINE 20 MG: 20 TABLET ORAL at 09:11

## 2019-10-06 RX ADMIN — INSULIN LISPRO 1 UNITS: 100 INJECTION, SOLUTION INTRAVENOUS; SUBCUTANEOUS at 23:57

## 2019-10-06 RX ADMIN — PROVIDONE IODINE: 7.5 STICK TOPICAL at 09:23

## 2019-10-06 RX ADMIN — CLOPIDOGREL BISULFATE 75 MG: 75 TABLET ORAL at 09:09

## 2019-10-06 RX ADMIN — ASPIRIN 81 MG 81 MG: 81 TABLET ORAL at 09:09

## 2019-10-06 RX ADMIN — GABAPENTIN 300 MG: 300 CAPSULE ORAL at 09:09

## 2019-10-06 RX ADMIN — PREDNISONE 10 MG: 10 TABLET ORAL at 09:10

## 2019-10-06 RX ADMIN — Medication 5000 UNITS: at 09:09

## 2019-10-06 RX ADMIN — DIGOXIN 125 MCG: 125 TABLET ORAL at 09:09

## 2019-10-06 RX ADMIN — FOLIC ACID 1000 MCG: 1 TABLET ORAL at 23:47

## 2019-10-06 RX ADMIN — GLYCOPYRROLATE AND FORMOTEROL FUMARATE 2 PUFF: 9; 4.8 AEROSOL, METERED RESPIRATORY (INHALATION) at 17:19

## 2019-10-06 RX ADMIN — OXYCODONE HYDROCHLORIDE 5 MG: 5 TABLET ORAL at 12:46

## 2019-10-06 RX ADMIN — GLYCOPYRROLATE AND FORMOTEROL FUMARATE 2 PUFF: 9; 4.8 AEROSOL, METERED RESPIRATORY (INHALATION) at 04:55

## 2019-10-06 RX ADMIN — Medication 1 CAPSULE: at 09:09

## 2019-10-06 RX ADMIN — DILTIAZEM HYDROCHLORIDE 300 MG: 300 CAPSULE, COATED, EXTENDED RELEASE ORAL at 09:09

## 2019-10-06 SDOH — SOCIAL STABILITY: SOCIAL INSECURITY: WITHIN THE LAST YEAR, HAVE YOU BEEN AFRAID OF YOUR PARTNER OR EX-PARTNER?: NO

## 2019-10-06 SDOH — SOCIAL STABILITY: SOCIAL INSECURITY: WITHIN THE LAST YEAR, HAVE YOU BEEN HUMILIATED OR EMOTIONALLY ABUSED IN OTHER WAYS BY YOUR PARTNER OR EX-PARTNER?: NO

## 2019-10-06 SDOH — SOCIAL STABILITY: SOCIAL INSECURITY
WITHIN THE LAST YEAR, HAVE YOU BEEN KICKED, HIT, SLAPPED, OR OTHERWISE PHYSICALLY HURT BY YOUR PARTNER OR EX-PARTNER?: NO

## 2019-10-06 SDOH — ECONOMIC STABILITY: TRANSPORTATION INSECURITY
IN THE PAST 12 MONTHS, HAS THE LACK OF TRANSPORTATION KEPT YOU FROM MEDICAL APPOINTMENTS OR FROM GETTING MEDICATIONS?: NO

## 2019-10-06 SDOH — ECONOMIC STABILITY: FOOD INSECURITY: WITHIN THE PAST 12 MONTHS, YOU WORRIED THAT YOUR FOOD WOULD RUN OUT BEFORE YOU GOT MONEY TO BUY MORE.: NEVER TRUE

## 2019-10-06 SDOH — ECONOMIC STABILITY: TRANSPORTATION INSECURITY
IN THE PAST 12 MONTHS, HAS LACK OF TRANSPORTATION KEPT YOU FROM MEETINGS, WORK, OR FROM GETTING THINGS NEEDED FOR DAILY LIVING?: NO

## 2019-10-06 SDOH — SOCIAL STABILITY: SOCIAL INSECURITY
WITHIN THE LAST YEAR, HAVE TO BEEN RAPED OR FORCED TO HAVE ANY KIND OF SEXUAL ACTIVITY BY YOUR PARTNER OR EX-PARTNER?: NO

## 2019-10-06 SDOH — ECONOMIC STABILITY: INCOME INSECURITY: HOW HARD IS IT FOR YOU TO PAY FOR THE VERY BASICS LIKE FOOD, HOUSING, MEDICAL CARE, AND HEATING?: SOMEWHAT HARD

## 2019-10-06 SDOH — ECONOMIC STABILITY: FOOD INSECURITY: WITHIN THE PAST 12 MONTHS, THE FOOD YOU BOUGHT JUST DIDN'T LAST AND YOU DIDN'T HAVE MONEY TO GET MORE.: NEVER TRUE

## 2019-10-06 ASSESSMENT — PAIN SCALES - GENERAL: PAINLEVEL_OUTOF10: 8

## 2019-10-07 VITALS
RESPIRATION RATE: 16 BRPM | SYSTOLIC BLOOD PRESSURE: 115 MMHG | HEART RATE: 91 BPM | TEMPERATURE: 98 F | OXYGEN SATURATION: 92 % | BODY MASS INDEX: 30.97 KG/M2 | HEIGHT: 68 IN | WEIGHT: 204.37 LBS | DIASTOLIC BLOOD PRESSURE: 67 MMHG

## 2019-10-07 LAB
GLUCOSE BLD-MCNC: 101 MG/DL (ref 60–115)
GLUCOSE BLD-MCNC: 146 MG/DL (ref 60–115)
GLUCOSE BLD-MCNC: 170 MG/DL (ref 60–115)
PERFORMED ON: ABNORMAL
PERFORMED ON: ABNORMAL
PERFORMED ON: NORMAL

## 2019-10-07 PROCEDURE — 99239 HOSP IP/OBS DSCHRG MGMT >30: CPT | Performed by: PHYSICAL MEDICINE & REHABILITATION

## 2019-10-07 PROCEDURE — 97535 SELF CARE MNGMENT TRAINING: CPT

## 2019-10-07 PROCEDURE — 94618 PULMONARY STRESS TESTING: CPT

## 2019-10-07 PROCEDURE — 6360000002 HC RX W HCPCS: Performed by: PHYSICAL MEDICINE & REHABILITATION

## 2019-10-07 PROCEDURE — G0008 ADMIN INFLUENZA VIRUS VAC: HCPCS | Performed by: PHYSICAL MEDICINE & REHABILITATION

## 2019-10-07 PROCEDURE — 90686 IIV4 VACC NO PRSV 0.5 ML IM: CPT | Performed by: PHYSICAL MEDICINE & REHABILITATION

## 2019-10-07 PROCEDURE — 99222 1ST HOSP IP/OBS MODERATE 55: CPT | Performed by: INTERNAL MEDICINE

## 2019-10-07 PROCEDURE — 6370000000 HC RX 637 (ALT 250 FOR IP): Performed by: PHYSICAL MEDICINE & REHABILITATION

## 2019-10-07 PROCEDURE — 94640 AIRWAY INHALATION TREATMENT: CPT

## 2019-10-07 PROCEDURE — 97116 GAIT TRAINING THERAPY: CPT

## 2019-10-07 PROCEDURE — 2500000003 HC RX 250 WO HCPCS: Performed by: PHYSICAL MEDICINE & REHABILITATION

## 2019-10-07 RX ADMIN — Medication 5000 UNITS: at 10:25

## 2019-10-07 RX ADMIN — GABAPENTIN 300 MG: 300 CAPSULE ORAL at 10:25

## 2019-10-07 RX ADMIN — GLYCOPYRROLATE AND FORMOTEROL FUMARATE 2 PUFF: 9; 4.8 AEROSOL, METERED RESPIRATORY (INHALATION) at 05:08

## 2019-10-07 RX ADMIN — METOPROLOL TARTRATE 100 MG: 50 TABLET ORAL at 00:03

## 2019-10-07 RX ADMIN — GLYCOPYRROLATE AND FORMOTEROL FUMARATE 2 PUFF: 9; 4.8 AEROSOL, METERED RESPIRATORY (INHALATION) at 16:04

## 2019-10-07 RX ADMIN — METOPROLOL TARTRATE 100 MG: 50 TABLET ORAL at 10:25

## 2019-10-07 RX ADMIN — PROVIDONE IODINE: 7.5 STICK TOPICAL at 10:31

## 2019-10-07 RX ADMIN — LACTOBACILLUS TAB 1 TABLET: TAB at 10:24

## 2019-10-07 RX ADMIN — FAMOTIDINE 20 MG: 20 TABLET ORAL at 10:24

## 2019-10-07 RX ADMIN — GABAPENTIN 300 MG: 300 CAPSULE ORAL at 14:05

## 2019-10-07 RX ADMIN — SULFASALAZINE 500 MG: 500 TABLET ORAL at 00:04

## 2019-10-07 RX ADMIN — PREDNISONE 10 MG: 10 TABLET ORAL at 10:25

## 2019-10-07 RX ADMIN — AZATHIOPRINE 100 MG: 50 TABLET ORAL at 11:36

## 2019-10-07 RX ADMIN — LISINOPRIL 20 MG: 20 TABLET ORAL at 10:25

## 2019-10-07 RX ADMIN — OXYCODONE HYDROCHLORIDE 5 MG: 5 TABLET ORAL at 07:14

## 2019-10-07 RX ADMIN — SULFASALAZINE 500 MG: 500 TABLET ORAL at 10:24

## 2019-10-07 RX ADMIN — CETIRIZINE HYDROCHLORIDE 10 MG: 10 TABLET, FILM COATED ORAL at 10:24

## 2019-10-07 RX ADMIN — CLOPIDOGREL BISULFATE 75 MG: 75 TABLET ORAL at 10:24

## 2019-10-07 RX ADMIN — FUROSEMIDE 40 MG: 40 TABLET ORAL at 10:25

## 2019-10-07 RX ADMIN — ASPIRIN 81 MG 81 MG: 81 TABLET ORAL at 10:24

## 2019-10-07 RX ADMIN — INFLUENZA A VIRUS A/BRISBANE/02/2018 IVR-190 (H1N1) ANTIGEN (PROPIOLACTONE INACTIVATED), INFLUENZA A VIRUS A/KANSAS/14/2017 X-327 (H3N2) ANTIGEN (PROPIOLACTONE INACTIVATED), INFLUENZA B VIRUS B/MARYLAND/15/2016 ANTIGEN (PROPIOLACTONE INACTIVATED), INFLUENZA B VIRUS B/PHUKET/3073/2013 BVR-1B ANTIGEN (PROPIOLACTONE INACTIVATED) 0.5 ML: 15; 15; 15; 15 INJECTION, SUSPENSION INTRAMUSCULAR at 16:24

## 2019-10-07 RX ADMIN — LEVOTHYROXINE SODIUM 75 MCG: 75 TABLET ORAL at 07:13

## 2019-10-07 RX ADMIN — POTASSIUM CHLORIDE 20 MEQ: 20 TABLET, EXTENDED RELEASE ORAL at 10:24

## 2019-10-07 RX ADMIN — MENTHOL: 0 POWDER TOPICAL at 00:02

## 2019-10-07 RX ADMIN — APIXABAN 5 MG: 5 TABLET, FILM COATED ORAL at 10:24

## 2019-10-07 RX ADMIN — MENTHOL: 0 POWDER TOPICAL at 10:31

## 2019-10-07 RX ADMIN — Medication 1 CAPSULE: at 10:24

## 2019-10-07 RX ADMIN — ESCITALOPRAM OXALATE 20 MG: 20 TABLET, FILM COATED ORAL at 10:25

## 2019-10-07 RX ADMIN — OXYCODONE HYDROCHLORIDE 5 MG: 5 TABLET ORAL at 14:05

## 2019-10-07 RX ADMIN — DIGOXIN 125 MCG: 125 TABLET ORAL at 10:23

## 2019-10-07 RX ADMIN — DILTIAZEM HYDROCHLORIDE 300 MG: 300 CAPSULE, COATED, EXTENDED RELEASE ORAL at 10:24

## 2019-10-07 ASSESSMENT — PAIN SCALES - GENERAL
PAINLEVEL_OUTOF10: 8
PAINLEVEL_OUTOF10: 8

## 2019-11-08 ENCOUNTER — HOSPITAL ENCOUNTER (EMERGENCY)
Age: 69
Discharge: HOME | End: 2019-11-08
Attending: EMERGENCY MEDICINE
Payer: MEDICARE

## 2019-11-08 ENCOUNTER — APPOINTMENT (OUTPATIENT)
Dept: POSTOP/PACU | Age: 69
End: 2019-11-08
Payer: MEDICARE

## 2019-11-08 ENCOUNTER — APPOINTMENT (OUTPATIENT)
Dept: GENERAL RADIOLOGY | Age: 69
End: 2019-11-08
Payer: MEDICARE

## 2019-11-08 ENCOUNTER — ANESTHESIA (OUTPATIENT)
Dept: POSTOP/PACU | Age: 69
End: 2019-11-08
Payer: MEDICARE

## 2019-11-08 ENCOUNTER — ANESTHESIA EVENT (OUTPATIENT)
Dept: POSTOP/PACU | Age: 69
End: 2019-11-08
Payer: MEDICARE

## 2019-11-08 VITALS — DIASTOLIC BLOOD PRESSURE: 51 MMHG | OXYGEN SATURATION: 99 % | SYSTOLIC BLOOD PRESSURE: 110 MMHG

## 2019-11-08 VITALS
TEMPERATURE: 98 F | HEIGHT: 68 IN | SYSTOLIC BLOOD PRESSURE: 140 MMHG | RESPIRATION RATE: 15 BRPM | BODY MASS INDEX: 40.16 KG/M2 | WEIGHT: 265 LBS | OXYGEN SATURATION: 97 % | DIASTOLIC BLOOD PRESSURE: 74 MMHG | HEART RATE: 76 BPM

## 2019-11-08 DIAGNOSIS — S73.005A DISLOCATION OF LEFT HIP, INITIAL ENCOUNTER (HCC): Primary | ICD-10-CM

## 2019-11-08 LAB
ALBUMIN SERPL-MCNC: 3.5 G/DL (ref 3.5–4.6)
ALP BLD-CCNC: 53 U/L (ref 40–130)
ALT SERPL-CCNC: 13 U/L (ref 0–33)
ANION GAP SERPL CALCULATED.3IONS-SCNC: 15 MEQ/L (ref 9–15)
ANISOCYTOSIS: ABNORMAL
AST SERPL-CCNC: 33 U/L (ref 0–35)
BASOPHILS ABSOLUTE: 0 K/UL (ref 0–0.2)
BASOPHILS RELATIVE PERCENT: 0.2 %
BILIRUB SERPL-MCNC: 0.6 MG/DL (ref 0.2–0.7)
BUN BLDV-MCNC: 13 MG/DL (ref 8–23)
CALCIUM SERPL-MCNC: 10 MG/DL (ref 8.5–9.9)
CHLORIDE BLD-SCNC: 100 MEQ/L (ref 95–107)
CO2: 24 MEQ/L (ref 20–31)
CREAT SERPL-MCNC: 0.69 MG/DL (ref 0.5–0.9)
DIGOXIN LEVEL: 0.6 NG/ML (ref 0.8–2)
EOSINOPHILS ABSOLUTE: 0.1 K/UL (ref 0–0.7)
EOSINOPHILS RELATIVE PERCENT: 1 %
GFR AFRICAN AMERICAN: >60
GFR NON-AFRICAN AMERICAN: >60
GLOBULIN: 2.4 G/DL (ref 2.3–3.5)
GLUCOSE BLD-MCNC: 186 MG/DL (ref 70–99)
HCT VFR BLD CALC: 33.4 % (ref 37–47)
HEMOGLOBIN: 11.1 G/DL (ref 12–16)
INR BLD: 1.3
LYMPHOCYTES ABSOLUTE: 1.4 K/UL (ref 1–4.8)
LYMPHOCYTES RELATIVE PERCENT: 16 %
MACROCYTES: ABNORMAL
MCH RBC QN AUTO: 36 PG (ref 27–31.3)
MCHC RBC AUTO-ENTMCNC: 33.2 % (ref 33–37)
MCV RBC AUTO: 108.4 FL (ref 82–100)
MONOCYTES ABSOLUTE: 0.2 K/UL (ref 0.2–0.8)
MONOCYTES RELATIVE PERCENT: 1.9 %
NEUTROPHILS ABSOLUTE: 7.2 K/UL (ref 1.4–6.5)
NEUTROPHILS RELATIVE PERCENT: 81 %
PDW BLD-RTO: 15 % (ref 11.5–14.5)
PLATELET # BLD: 210 K/UL (ref 130–400)
PLATELET SLIDE REVIEW: NORMAL
POTASSIUM SERPL-SCNC: 4.4 MEQ/L (ref 3.4–4.9)
PROTHROMBIN TIME: 16.9 SEC (ref 12.3–14.9)
RBC # BLD: 3.08 M/UL (ref 4.2–5.4)
SLIDE REVIEW: ABNORMAL
SODIUM BLD-SCNC: 139 MEQ/L (ref 135–144)
TOTAL PROTEIN: 5.9 G/DL (ref 6.3–8)
WBC # BLD: 8.9 K/UL (ref 4.8–10.8)

## 2019-11-08 PROCEDURE — 80053 COMPREHEN METABOLIC PANEL: CPT

## 2019-11-08 PROCEDURE — 73501 X-RAY EXAM HIP UNI 1 VIEW: CPT

## 2019-11-08 PROCEDURE — 6360000002 HC RX W HCPCS: Performed by: EMERGENCY MEDICINE

## 2019-11-08 PROCEDURE — 3700000000 HC ANESTHESIA ATTENDED CARE

## 2019-11-08 PROCEDURE — 96374 THER/PROPH/DIAG INJ IV PUSH: CPT

## 2019-11-08 PROCEDURE — 85610 PROTHROMBIN TIME: CPT

## 2019-11-08 PROCEDURE — 99285 EMERGENCY DEPT VISIT HI MDM: CPT

## 2019-11-08 PROCEDURE — 96376 TX/PRO/DX INJ SAME DRUG ADON: CPT

## 2019-11-08 PROCEDURE — 96375 TX/PRO/DX INJ NEW DRUG ADDON: CPT

## 2019-11-08 PROCEDURE — 6360000002 HC RX W HCPCS: Performed by: ANESTHESIOLOGY

## 2019-11-08 PROCEDURE — 2580000003 HC RX 258: Performed by: ANESTHESIOLOGY

## 2019-11-08 PROCEDURE — 7100000001 HC PACU RECOVERY - ADDTL 15 MIN

## 2019-11-08 PROCEDURE — 85025 COMPLETE CBC W/AUTO DIFF WBC: CPT

## 2019-11-08 PROCEDURE — 73502 X-RAY EXAM HIP UNI 2-3 VIEWS: CPT

## 2019-11-08 PROCEDURE — 80162 ASSAY OF DIGOXIN TOTAL: CPT

## 2019-11-08 PROCEDURE — 36415 COLL VENOUS BLD VENIPUNCTURE: CPT

## 2019-11-08 PROCEDURE — 6360000002 HC RX W HCPCS: Performed by: NURSE ANESTHETIST, CERTIFIED REGISTERED

## 2019-11-08 PROCEDURE — 7100000000 HC PACU RECOVERY - FIRST 15 MIN

## 2019-11-08 RX ORDER — FENTANYL CITRATE 50 UG/ML
50 INJECTION, SOLUTION INTRAMUSCULAR; INTRAVENOUS EVERY 10 MIN PRN
Status: DISCONTINUED | OUTPATIENT
Start: 2019-11-08 | End: 2019-11-08 | Stop reason: HOSPADM

## 2019-11-08 RX ORDER — SODIUM CHLORIDE 0.9 % (FLUSH) 0.9 %
10 SYRINGE (ML) INJECTION EVERY 12 HOURS SCHEDULED
Status: DISCONTINUED | OUTPATIENT
Start: 2019-11-08 | End: 2019-11-08 | Stop reason: HOSPADM

## 2019-11-08 RX ORDER — MEPERIDINE HYDROCHLORIDE 25 MG/ML
12.5 INJECTION INTRAMUSCULAR; INTRAVENOUS; SUBCUTANEOUS EVERY 5 MIN PRN
Status: DISCONTINUED | OUTPATIENT
Start: 2019-11-08 | End: 2019-11-08 | Stop reason: HOSPADM

## 2019-11-08 RX ORDER — SODIUM CHLORIDE 0.9 % (FLUSH) 0.9 %
10 SYRINGE (ML) INJECTION PRN
Status: DISCONTINUED | OUTPATIENT
Start: 2019-11-08 | End: 2019-11-08 | Stop reason: HOSPADM

## 2019-11-08 RX ORDER — LIDOCAINE HYDROCHLORIDE 10 MG/ML
1 INJECTION, SOLUTION EPIDURAL; INFILTRATION; INTRACAUDAL; PERINEURAL
Status: DISCONTINUED | OUTPATIENT
Start: 2019-11-08 | End: 2019-11-08 | Stop reason: HOSPADM

## 2019-11-08 RX ORDER — SODIUM CHLORIDE, SODIUM LACTATE, POTASSIUM CHLORIDE, CALCIUM CHLORIDE 600; 310; 30; 20 MG/100ML; MG/100ML; MG/100ML; MG/100ML
INJECTION, SOLUTION INTRAVENOUS CONTINUOUS
Status: DISCONTINUED | OUTPATIENT
Start: 2019-11-08 | End: 2019-11-08 | Stop reason: HOSPADM

## 2019-11-08 RX ORDER — FENTANYL CITRATE 50 UG/ML
100 INJECTION, SOLUTION INTRAMUSCULAR; INTRAVENOUS ONCE
Status: COMPLETED | OUTPATIENT
Start: 2019-11-08 | End: 2019-11-08

## 2019-11-08 RX ORDER — ONDANSETRON 2 MG/ML
4 INJECTION INTRAMUSCULAR; INTRAVENOUS ONCE
Status: COMPLETED | OUTPATIENT
Start: 2019-11-08 | End: 2019-11-08

## 2019-11-08 RX ORDER — HYDROCODONE BITARTRATE AND ACETAMINOPHEN 5; 325 MG/1; MG/1
1 TABLET ORAL PRN
Status: DISCONTINUED | OUTPATIENT
Start: 2019-11-08 | End: 2019-11-08 | Stop reason: HOSPADM

## 2019-11-08 RX ORDER — DIPHENHYDRAMINE HYDROCHLORIDE 50 MG/ML
12.5 INJECTION INTRAMUSCULAR; INTRAVENOUS
Status: DISCONTINUED | OUTPATIENT
Start: 2019-11-08 | End: 2019-11-08 | Stop reason: HOSPADM

## 2019-11-08 RX ORDER — METOCLOPRAMIDE HYDROCHLORIDE 5 MG/ML
10 INJECTION INTRAMUSCULAR; INTRAVENOUS
Status: DISCONTINUED | OUTPATIENT
Start: 2019-11-08 | End: 2019-11-08 | Stop reason: HOSPADM

## 2019-11-08 RX ORDER — PROPOFOL 10 MG/ML
INJECTION, EMULSION INTRAVENOUS PRN
Status: DISCONTINUED | OUTPATIENT
Start: 2019-11-08 | End: 2019-11-08 | Stop reason: SDUPTHER

## 2019-11-08 RX ORDER — ONDANSETRON 2 MG/ML
4 INJECTION INTRAMUSCULAR; INTRAVENOUS
Status: DISCONTINUED | OUTPATIENT
Start: 2019-11-08 | End: 2019-11-08 | Stop reason: HOSPADM

## 2019-11-08 RX ORDER — HYDROCODONE BITARTRATE AND ACETAMINOPHEN 5; 325 MG/1; MG/1
2 TABLET ORAL PRN
Status: DISCONTINUED | OUTPATIENT
Start: 2019-11-08 | End: 2019-11-08 | Stop reason: HOSPADM

## 2019-11-08 RX ADMIN — HYDROMORPHONE HYDROCHLORIDE 1 MG: 1 INJECTION, SOLUTION INTRAMUSCULAR; INTRAVENOUS; SUBCUTANEOUS at 11:24

## 2019-11-08 RX ADMIN — HYDROMORPHONE HYDROCHLORIDE 1 MG: 1 INJECTION, SOLUTION INTRAMUSCULAR; INTRAVENOUS; SUBCUTANEOUS at 13:16

## 2019-11-08 RX ADMIN — ONDANSETRON 4 MG: 2 INJECTION INTRAMUSCULAR; INTRAVENOUS at 11:24

## 2019-11-08 RX ADMIN — PROPOFOL 100 MG: 10 INJECTION, EMULSION INTRAVENOUS at 16:42

## 2019-11-08 RX ADMIN — PROPOFOL 20 MG: 10 INJECTION, EMULSION INTRAVENOUS at 16:44

## 2019-11-08 RX ADMIN — SODIUM CHLORIDE, POTASSIUM CHLORIDE, SODIUM LACTATE AND CALCIUM CHLORIDE: 600; 310; 30; 20 INJECTION, SOLUTION INTRAVENOUS at 16:16

## 2019-11-08 RX ADMIN — SODIUM CHLORIDE, POTASSIUM CHLORIDE, SODIUM LACTATE AND CALCIUM CHLORIDE: 600; 310; 30; 20 INJECTION, SOLUTION INTRAVENOUS at 14:23

## 2019-11-08 RX ADMIN — FENTANYL CITRATE 100 MCG: 0.05 INJECTION, SOLUTION INTRAMUSCULAR; INTRAVENOUS at 12:05

## 2019-11-08 RX ADMIN — HYDROMORPHONE HYDROCHLORIDE 1 MG: 1 INJECTION, SOLUTION INTRAMUSCULAR; INTRAVENOUS; SUBCUTANEOUS at 15:30

## 2019-11-08 ASSESSMENT — PAIN DESCRIPTION - LOCATION
LOCATION: HIP

## 2019-11-08 ASSESSMENT — PAIN SCALES - GENERAL
PAINLEVEL_OUTOF10: 9
PAINLEVEL_OUTOF10: 10
PAINLEVEL_OUTOF10: 4
PAINLEVEL_OUTOF10: 7
PAINLEVEL_OUTOF10: 8
PAINLEVEL_OUTOF10: 10
PAINLEVEL_OUTOF10: 6
PAINLEVEL_OUTOF10: 10

## 2019-11-08 ASSESSMENT — PAIN DESCRIPTION - ORIENTATION
ORIENTATION: LEFT
ORIENTATION: LEFT
ORIENTATION: RIGHT
ORIENTATION: LEFT
ORIENTATION: LEFT

## 2019-11-08 ASSESSMENT — PAIN DESCRIPTION - PAIN TYPE
TYPE: ACUTE PAIN

## 2019-11-08 ASSESSMENT — ENCOUNTER SYMPTOMS
NAUSEA: 0
EYE PAIN: 0
SHORTNESS OF BREATH: 0
ABDOMINAL PAIN: 0
VOMITING: 0
CHEST TIGHTNESS: 0
SORE THROAT: 0

## 2019-11-08 ASSESSMENT — PAIN DESCRIPTION - FREQUENCY
FREQUENCY: CONTINUOUS
FREQUENCY: CONTINUOUS

## 2019-11-08 ASSESSMENT — PAIN DESCRIPTION - DESCRIPTORS
DESCRIPTORS: SHARP;STABBING
DESCRIPTORS: ACHING
DESCRIPTORS: SHARP
DESCRIPTORS: ACHING
DESCRIPTORS: CONSTANT;DULL;ACHING

## 2019-11-08 ASSESSMENT — PAIN DESCRIPTION - DIRECTION: RADIATING_TOWARDS: DOWN RIGHT LEG

## 2019-11-12 DIAGNOSIS — E03.9 HYPOTHYROIDISM, UNSPECIFIED TYPE: ICD-10-CM

## 2019-11-13 RX ORDER — GABAPENTIN 300 MG/1
CAPSULE ORAL
Qty: 90 CAPSULE | Refills: 3 | Status: SHIPPED | OUTPATIENT
Start: 2019-11-13 | End: 2020-05-26 | Stop reason: SDUPTHER

## 2019-11-13 RX ORDER — LEVOTHYROXINE SODIUM 0.07 MG/1
75 TABLET ORAL DAILY
Qty: 90 TABLET | Refills: 1 | Status: SHIPPED | OUTPATIENT
Start: 2019-11-13 | End: 2020-02-19 | Stop reason: SDUPTHER

## 2019-11-30 ENCOUNTER — APPOINTMENT (OUTPATIENT)
Dept: GENERAL RADIOLOGY | Age: 69
DRG: 560 | End: 2019-11-30
Payer: MEDICARE

## 2019-11-30 ENCOUNTER — HOSPITAL ENCOUNTER (INPATIENT)
Age: 69
LOS: 1 days | Discharge: HOME | DRG: 560 | End: 2019-12-02
Attending: EMERGENCY MEDICINE | Admitting: ORTHOPAEDIC SURGERY
Payer: MEDICARE

## 2019-11-30 DIAGNOSIS — S73.005A DISLOCATION OF LEFT HIP, INITIAL ENCOUNTER (HCC): Primary | ICD-10-CM

## 2019-11-30 DIAGNOSIS — S73.005A HIP DISLOCATION, LEFT, INITIAL ENCOUNTER (HCC): ICD-10-CM

## 2019-11-30 LAB
ANION GAP SERPL CALCULATED.3IONS-SCNC: 17 MEQ/L (ref 9–15)
BASOPHILS ABSOLUTE: 0 K/UL (ref 0–0.2)
BASOPHILS RELATIVE PERCENT: 0.3 %
BUN BLDV-MCNC: 18 MG/DL (ref 8–23)
CALCIUM SERPL-MCNC: 10.1 MG/DL (ref 8.5–9.9)
CHLORIDE BLD-SCNC: 94 MEQ/L (ref 95–107)
CHP ED QC CHECK: YES
CO2: 25 MEQ/L (ref 20–31)
CREAT SERPL-MCNC: 0.88 MG/DL (ref 0.5–0.9)
EOSINOPHILS ABSOLUTE: 0 K/UL (ref 0–0.7)
EOSINOPHILS RELATIVE PERCENT: 0.1 %
GFR AFRICAN AMERICAN: >60
GFR NON-AFRICAN AMERICAN: >60
GLUCOSE BLD-MCNC: 141 MG/DL (ref 60–115)
GLUCOSE BLD-MCNC: 213 MG/DL
GLUCOSE BLD-MCNC: 213 MG/DL (ref 60–115)
GLUCOSE BLD-MCNC: 215 MG/DL (ref 70–99)
HCT VFR BLD CALC: 32.4 % (ref 37–47)
HEMOGLOBIN: 10.9 G/DL (ref 12–16)
LYMPHOCYTES ABSOLUTE: 0.8 K/UL (ref 1–4.8)
LYMPHOCYTES RELATIVE PERCENT: 8.7 %
MCH RBC QN AUTO: 36.1 PG (ref 27–31.3)
MCHC RBC AUTO-ENTMCNC: 33.7 % (ref 33–37)
MCV RBC AUTO: 107 FL (ref 82–100)
MONOCYTES ABSOLUTE: 0.5 K/UL (ref 0.2–0.8)
MONOCYTES RELATIVE PERCENT: 5.7 %
NEUTROPHILS ABSOLUTE: 7.7 K/UL (ref 1.4–6.5)
NEUTROPHILS RELATIVE PERCENT: 85.2 %
PDW BLD-RTO: 16 % (ref 11.5–14.5)
PERFORMED ON: ABNORMAL
PERFORMED ON: ABNORMAL
PLATELET # BLD: 272 K/UL (ref 130–400)
POTASSIUM SERPL-SCNC: 3.6 MEQ/L (ref 3.4–4.9)
RBC # BLD: 3.03 M/UL (ref 4.2–5.4)
SODIUM BLD-SCNC: 136 MEQ/L (ref 135–144)
WBC # BLD: 9 K/UL (ref 4.8–10.8)

## 2019-11-30 PROCEDURE — 2580000003 HC RX 258: Performed by: EMERGENCY MEDICINE

## 2019-11-30 PROCEDURE — 99152 MOD SED SAME PHYS/QHP 5/>YRS: CPT

## 2019-11-30 PROCEDURE — 6360000002 HC RX W HCPCS: Performed by: EMERGENCY MEDICINE

## 2019-11-30 PROCEDURE — 27250 TREAT HIP DISLOCATION: CPT

## 2019-11-30 PROCEDURE — 96375 TX/PRO/DX INJ NEW DRUG ADDON: CPT

## 2019-11-30 PROCEDURE — 99285 EMERGENCY DEPT VISIT HI MDM: CPT

## 2019-11-30 PROCEDURE — 96374 THER/PROPH/DIAG INJ IV PUSH: CPT

## 2019-11-30 PROCEDURE — G0378 HOSPITAL OBSERVATION PER HR: HCPCS

## 2019-11-30 PROCEDURE — 85025 COMPLETE CBC W/AUTO DIFF WBC: CPT

## 2019-11-30 PROCEDURE — 80048 BASIC METABOLIC PNL TOTAL CA: CPT

## 2019-11-30 PROCEDURE — 6370000000 HC RX 637 (ALT 250 FOR IP): Performed by: EMERGENCY MEDICINE

## 2019-11-30 PROCEDURE — 72170 X-RAY EXAM OF PELVIS: CPT

## 2019-11-30 PROCEDURE — 96376 TX/PRO/DX INJ SAME DRUG ADON: CPT

## 2019-11-30 PROCEDURE — 36415 COLL VENOUS BLD VENIPUNCTURE: CPT

## 2019-11-30 PROCEDURE — 2500000003 HC RX 250 WO HCPCS: Performed by: EMERGENCY MEDICINE

## 2019-11-30 RX ORDER — GABAPENTIN 300 MG/1
300 CAPSULE ORAL 3 TIMES DAILY
Status: DISCONTINUED | OUTPATIENT
Start: 2019-12-01 | End: 2019-12-02 | Stop reason: HOSPADM

## 2019-11-30 RX ORDER — NICOTINE POLACRILEX 4 MG
15 LOZENGE BUCCAL PRN
Status: DISCONTINUED | OUTPATIENT
Start: 2019-11-30 | End: 2019-12-02 | Stop reason: HOSPADM

## 2019-11-30 RX ORDER — LEVOTHYROXINE SODIUM 0.07 MG/1
75 TABLET ORAL DAILY
Status: DISCONTINUED | OUTPATIENT
Start: 2019-12-01 | End: 2019-12-02 | Stop reason: HOSPADM

## 2019-11-30 RX ORDER — METOPROLOL TARTRATE 50 MG/1
100 TABLET, FILM COATED ORAL 2 TIMES DAILY
Status: DISCONTINUED | OUTPATIENT
Start: 2019-12-01 | End: 2019-12-02 | Stop reason: HOSPADM

## 2019-11-30 RX ORDER — SODIUM CHLORIDE 0.9 % (FLUSH) 0.9 %
10 SYRINGE (ML) INJECTION PRN
Status: DISCONTINUED | OUTPATIENT
Start: 2019-11-30 | End: 2019-12-02 | Stop reason: HOSPADM

## 2019-11-30 RX ORDER — ACETAMINOPHEN 325 MG/1
650 TABLET ORAL EVERY 4 HOURS PRN
Status: DISCONTINUED | OUTPATIENT
Start: 2019-11-30 | End: 2019-12-02 | Stop reason: HOSPADM

## 2019-11-30 RX ORDER — ATORVASTATIN CALCIUM 40 MG/1
40 TABLET, FILM COATED ORAL NIGHTLY
Status: DISCONTINUED | OUTPATIENT
Start: 2019-12-01 | End: 2019-12-02 | Stop reason: HOSPADM

## 2019-11-30 RX ORDER — DEXTROSE MONOHYDRATE 25 G/50ML
12.5 INJECTION, SOLUTION INTRAVENOUS PRN
Status: DISCONTINUED | OUTPATIENT
Start: 2019-11-30 | End: 2019-12-02 | Stop reason: HOSPADM

## 2019-11-30 RX ORDER — ONDANSETRON 2 MG/ML
4 INJECTION INTRAMUSCULAR; INTRAVENOUS ONCE
Status: COMPLETED | OUTPATIENT
Start: 2019-11-30 | End: 2019-11-30

## 2019-11-30 RX ORDER — ESCITALOPRAM OXALATE 20 MG/1
20 TABLET ORAL DAILY
Status: DISCONTINUED | OUTPATIENT
Start: 2019-12-01 | End: 2019-12-02 | Stop reason: HOSPADM

## 2019-11-30 RX ORDER — DEXTROSE MONOHYDRATE 50 MG/ML
100 INJECTION, SOLUTION INTRAVENOUS PRN
Status: DISCONTINUED | OUTPATIENT
Start: 2019-11-30 | End: 2019-12-02 | Stop reason: HOSPADM

## 2019-11-30 RX ORDER — SODIUM CHLORIDE 0.9 % (FLUSH) 0.9 %
10 SYRINGE (ML) INJECTION EVERY 12 HOURS SCHEDULED
Status: DISCONTINUED | OUTPATIENT
Start: 2019-11-30 | End: 2019-12-02 | Stop reason: HOSPADM

## 2019-11-30 RX ORDER — DIGOXIN 125 MCG
125 TABLET ORAL DAILY
Status: DISCONTINUED | OUTPATIENT
Start: 2019-12-01 | End: 2019-12-02 | Stop reason: HOSPADM

## 2019-11-30 RX ORDER — FUROSEMIDE 40 MG/1
40 TABLET ORAL DAILY
Status: DISCONTINUED | OUTPATIENT
Start: 2019-12-01 | End: 2019-12-02 | Stop reason: HOSPADM

## 2019-11-30 RX ORDER — ETOMIDATE 2 MG/ML
14 INJECTION INTRAVENOUS ONCE
Status: COMPLETED | OUTPATIENT
Start: 2019-11-30 | End: 2019-11-30

## 2019-11-30 RX ORDER — SODIUM CHLORIDE 0.9 % (FLUSH) 0.9 %
3 SYRINGE (ML) INJECTION EVERY 8 HOURS
Status: DISCONTINUED | OUTPATIENT
Start: 2019-11-30 | End: 2019-12-02 | Stop reason: HOSPADM

## 2019-11-30 RX ORDER — CLOPIDOGREL BISULFATE 75 MG/1
75 TABLET ORAL DAILY
Status: DISCONTINUED | OUTPATIENT
Start: 2019-12-01 | End: 2019-12-02 | Stop reason: HOSPADM

## 2019-11-30 RX ORDER — INSULIN GLARGINE 100 [IU]/ML
10 INJECTION, SOLUTION SUBCUTANEOUS NIGHTLY
Status: DISCONTINUED | OUTPATIENT
Start: 2019-12-01 | End: 2019-12-02 | Stop reason: HOSPADM

## 2019-11-30 RX ADMIN — HYDROMORPHONE HYDROCHLORIDE 0.5 MG: 1 INJECTION, SOLUTION INTRAMUSCULAR; INTRAVENOUS; SUBCUTANEOUS at 18:32

## 2019-11-30 RX ADMIN — HYDROMORPHONE HYDROCHLORIDE 0.5 MG: 1 INJECTION, SOLUTION INTRAMUSCULAR; INTRAVENOUS; SUBCUTANEOUS at 22:20

## 2019-11-30 RX ADMIN — Medication 10 ML: at 20:34

## 2019-11-30 RX ADMIN — Medication 3 ML: at 17:57

## 2019-11-30 RX ADMIN — HYDROMORPHONE HYDROCHLORIDE 0.5 MG: 1 INJECTION, SOLUTION INTRAMUSCULAR; INTRAVENOUS; SUBCUTANEOUS at 17:56

## 2019-11-30 RX ADMIN — HYDROMORPHONE HYDROCHLORIDE 0.5 MG: 1 INJECTION, SOLUTION INTRAMUSCULAR; INTRAVENOUS; SUBCUTANEOUS at 20:28

## 2019-11-30 RX ADMIN — HYDROMORPHONE HYDROCHLORIDE 1 MG: 1 INJECTION, SOLUTION INTRAMUSCULAR; INTRAVENOUS; SUBCUTANEOUS at 18:44

## 2019-11-30 RX ADMIN — HYDROMORPHONE HYDROCHLORIDE 1 MG: 1 INJECTION, SOLUTION INTRAMUSCULAR; INTRAVENOUS; SUBCUTANEOUS at 16:39

## 2019-11-30 RX ADMIN — ETOMIDATE 14 MG: 2 INJECTION, SOLUTION INTRAVENOUS at 18:34

## 2019-11-30 RX ADMIN — ONDANSETRON 4 MG: 2 INJECTION INTRAMUSCULAR; INTRAVENOUS at 16:39

## 2019-11-30 RX ADMIN — ACETAMINOPHEN 650 MG: 325 TABLET ORAL at 22:20

## 2019-11-30 ASSESSMENT — PAIN DESCRIPTION - ORIENTATION
ORIENTATION: LEFT
ORIENTATION: LEFT

## 2019-11-30 ASSESSMENT — PAIN SCALES - GENERAL
PAINLEVEL_OUTOF10: 10
PAINLEVEL_OUTOF10: 10
PAINLEVEL_OUTOF10: 9

## 2019-11-30 ASSESSMENT — PAIN DESCRIPTION - PAIN TYPE
TYPE: ACUTE PAIN
TYPE: ACUTE PAIN

## 2019-11-30 ASSESSMENT — PAIN DESCRIPTION - LOCATION
LOCATION: HIP
LOCATION: HIP

## 2019-11-30 ASSESSMENT — PAIN DESCRIPTION - DESCRIPTORS: DESCRIPTORS: ACHING;SHARP

## 2019-12-01 ENCOUNTER — ANESTHESIA EVENT (OUTPATIENT)
Dept: OPERATING ROOM | Age: 69
DRG: 560 | End: 2019-12-01
Payer: MEDICARE

## 2019-12-01 ENCOUNTER — ANESTHESIA (OUTPATIENT)
Dept: OPERATING ROOM | Age: 69
DRG: 560 | End: 2019-12-01
Payer: MEDICARE

## 2019-12-01 ENCOUNTER — APPOINTMENT (OUTPATIENT)
Dept: CT IMAGING | Age: 69
DRG: 560 | End: 2019-12-01
Payer: MEDICARE

## 2019-12-01 ENCOUNTER — APPOINTMENT (OUTPATIENT)
Dept: GENERAL RADIOLOGY | Age: 69
DRG: 560 | End: 2019-12-01
Payer: MEDICARE

## 2019-12-01 VITALS
RESPIRATION RATE: 17 BRPM | OXYGEN SATURATION: 99 % | DIASTOLIC BLOOD PRESSURE: 62 MMHG | SYSTOLIC BLOOD PRESSURE: 118 MMHG

## 2019-12-01 PROBLEM — M24.452 RECURRENT DISLOCATION OF LEFT HIP: Status: ACTIVE | Noted: 2019-12-01

## 2019-12-01 LAB
GLUCOSE BLD-MCNC: 113 MG/DL (ref 60–115)
GLUCOSE BLD-MCNC: 127 MG/DL (ref 60–115)
GLUCOSE BLD-MCNC: 134 MG/DL (ref 60–115)
GLUCOSE BLD-MCNC: 138 MG/DL (ref 60–115)
GLUCOSE BLD-MCNC: 169 MG/DL (ref 60–115)
GLUCOSE BLD-MCNC: 199 MG/DL (ref 60–115)
PERFORMED ON: ABNORMAL
PERFORMED ON: NORMAL

## 2019-12-01 PROCEDURE — 2580000003 HC RX 258: Performed by: EMERGENCY MEDICINE

## 2019-12-01 PROCEDURE — 6360000002 HC RX W HCPCS: Performed by: ORTHOPAEDIC SURGERY

## 2019-12-01 PROCEDURE — 96376 TX/PRO/DX INJ SAME DRUG ADON: CPT

## 2019-12-01 PROCEDURE — 2580000003 HC RX 258: Performed by: ORTHOPAEDIC SURGERY

## 2019-12-01 PROCEDURE — 2700000000 HC OXYGEN THERAPY PER DAY

## 2019-12-01 PROCEDURE — 6360000002 HC RX W HCPCS: Performed by: ANESTHESIOLOGY

## 2019-12-01 PROCEDURE — 7100000000 HC PACU RECOVERY - FIRST 15 MIN: Performed by: ORTHOPAEDIC SURGERY

## 2019-12-01 PROCEDURE — 3600000002 HC SURGERY LEVEL 2 BASE: Performed by: ORTHOPAEDIC SURGERY

## 2019-12-01 PROCEDURE — 70450 CT HEAD/BRAIN W/O DYE: CPT

## 2019-12-01 PROCEDURE — 2580000003 HC RX 258

## 2019-12-01 PROCEDURE — 6370000000 HC RX 637 (ALT 250 FOR IP): Performed by: ORTHOPAEDIC SURGERY

## 2019-12-01 PROCEDURE — 6360000002 HC RX W HCPCS: Performed by: EMERGENCY MEDICINE

## 2019-12-01 PROCEDURE — 94150 VITAL CAPACITY TEST: CPT

## 2019-12-01 PROCEDURE — 6370000000 HC RX 637 (ALT 250 FOR IP): Performed by: INTERNAL MEDICINE

## 2019-12-01 PROCEDURE — 76000 FLUOROSCOPY <1 HR PHYS/QHP: CPT

## 2019-12-01 PROCEDURE — 7100000001 HC PACU RECOVERY - ADDTL 15 MIN: Performed by: ORTHOPAEDIC SURGERY

## 2019-12-01 PROCEDURE — 3600000012 HC SURGERY LEVEL 2 ADDTL 15MIN: Performed by: ORTHOPAEDIC SURGERY

## 2019-12-01 PROCEDURE — 3700000000 HC ANESTHESIA ATTENDED CARE: Performed by: ORTHOPAEDIC SURGERY

## 2019-12-01 PROCEDURE — 96375 TX/PRO/DX INJ NEW DRUG ADDON: CPT

## 2019-12-01 PROCEDURE — 0QSCXZZ REPOSITION LEFT LOWER FEMUR, EXTERNAL APPROACH: ICD-10-PCS | Performed by: ORTHOPAEDIC SURGERY

## 2019-12-01 PROCEDURE — 1210000000 HC MED SURG R&B

## 2019-12-01 PROCEDURE — 6360000002 HC RX W HCPCS: Performed by: INTERNAL MEDICINE

## 2019-12-01 PROCEDURE — 3700000001 HC ADD 15 MINUTES (ANESTHESIA): Performed by: ORTHOPAEDIC SURGERY

## 2019-12-01 RX ORDER — ONDANSETRON 2 MG/ML
4 INJECTION INTRAMUSCULAR; INTRAVENOUS EVERY 6 HOURS PRN
Status: DISCONTINUED | OUTPATIENT
Start: 2019-12-01 | End: 2019-12-02 | Stop reason: HOSPADM

## 2019-12-01 RX ORDER — ONDANSETRON 2 MG/ML
4 INJECTION INTRAMUSCULAR; INTRAVENOUS
Status: DISCONTINUED | OUTPATIENT
Start: 2019-12-01 | End: 2019-12-01 | Stop reason: HOSPADM

## 2019-12-01 RX ORDER — MEPERIDINE HYDROCHLORIDE 25 MG/ML
12.5 INJECTION INTRAMUSCULAR; INTRAVENOUS; SUBCUTANEOUS EVERY 5 MIN PRN
Status: DISCONTINUED | OUTPATIENT
Start: 2019-12-01 | End: 2019-12-01 | Stop reason: HOSPADM

## 2019-12-01 RX ORDER — DIPHENHYDRAMINE HYDROCHLORIDE 50 MG/ML
12.5 INJECTION INTRAMUSCULAR; INTRAVENOUS
Status: DISCONTINUED | OUTPATIENT
Start: 2019-12-01 | End: 2019-12-01 | Stop reason: HOSPADM

## 2019-12-01 RX ORDER — HYDROCODONE BITARTRATE AND ACETAMINOPHEN 5; 325 MG/1; MG/1
1 TABLET ORAL EVERY 6 HOURS PRN
Status: DISCONTINUED | OUTPATIENT
Start: 2019-12-01 | End: 2019-12-02 | Stop reason: HOSPADM

## 2019-12-01 RX ORDER — METOCLOPRAMIDE HYDROCHLORIDE 5 MG/ML
10 INJECTION INTRAMUSCULAR; INTRAVENOUS
Status: DISCONTINUED | OUTPATIENT
Start: 2019-12-01 | End: 2019-12-01 | Stop reason: HOSPADM

## 2019-12-01 RX ORDER — ONDANSETRON 2 MG/ML
INJECTION INTRAMUSCULAR; INTRAVENOUS PRN
Status: DISCONTINUED | OUTPATIENT
Start: 2019-12-01 | End: 2019-12-01 | Stop reason: SDUPTHER

## 2019-12-01 RX ORDER — PROPOFOL 10 MG/ML
INJECTION, EMULSION INTRAVENOUS PRN
Status: DISCONTINUED | OUTPATIENT
Start: 2019-12-01 | End: 2019-12-01 | Stop reason: SDUPTHER

## 2019-12-01 RX ORDER — SODIUM CHLORIDE, SODIUM LACTATE, POTASSIUM CHLORIDE, CALCIUM CHLORIDE 600; 310; 30; 20 MG/100ML; MG/100ML; MG/100ML; MG/100ML
INJECTION, SOLUTION INTRAVENOUS CONTINUOUS
Status: DISCONTINUED | OUTPATIENT
Start: 2019-12-01 | End: 2019-12-01

## 2019-12-01 RX ORDER — SODIUM CHLORIDE 0.9 % (FLUSH) 0.9 %
10 SYRINGE (ML) INJECTION PRN
Status: DISCONTINUED | OUTPATIENT
Start: 2019-12-01 | End: 2019-12-01 | Stop reason: HOSPADM

## 2019-12-01 RX ORDER — LIDOCAINE HYDROCHLORIDE 10 MG/ML
1 INJECTION, SOLUTION EPIDURAL; INFILTRATION; INTRACAUDAL; PERINEURAL
Status: DISCONTINUED | OUTPATIENT
Start: 2019-12-01 | End: 2019-12-01 | Stop reason: HOSPADM

## 2019-12-01 RX ORDER — SUCCINYLCHOLINE CHLORIDE 20 MG/ML
INJECTION INTRAMUSCULAR; INTRAVENOUS PRN
Status: DISCONTINUED | OUTPATIENT
Start: 2019-12-01 | End: 2019-12-01 | Stop reason: SDUPTHER

## 2019-12-01 RX ORDER — FENTANYL CITRATE 50 UG/ML
50 INJECTION, SOLUTION INTRAMUSCULAR; INTRAVENOUS EVERY 10 MIN PRN
Status: DISCONTINUED | OUTPATIENT
Start: 2019-12-01 | End: 2019-12-01 | Stop reason: HOSPADM

## 2019-12-01 RX ORDER — HYDROCODONE BITARTRATE AND ACETAMINOPHEN 5; 325 MG/1; MG/1
2 TABLET ORAL EVERY 6 HOURS PRN
Status: DISCONTINUED | OUTPATIENT
Start: 2019-12-01 | End: 2019-12-02 | Stop reason: HOSPADM

## 2019-12-01 RX ORDER — SODIUM CHLORIDE 0.9 % (FLUSH) 0.9 %
10 SYRINGE (ML) INJECTION PRN
Status: DISCONTINUED | OUTPATIENT
Start: 2019-12-01 | End: 2019-12-02 | Stop reason: HOSPADM

## 2019-12-01 RX ORDER — HYDROCODONE BITARTRATE AND ACETAMINOPHEN 5; 325 MG/1; MG/1
2 TABLET ORAL PRN
Status: DISCONTINUED | OUTPATIENT
Start: 2019-12-01 | End: 2019-12-01 | Stop reason: HOSPADM

## 2019-12-01 RX ORDER — MORPHINE SULFATE 2 MG/ML
1 INJECTION, SOLUTION INTRAMUSCULAR; INTRAVENOUS
Status: DISCONTINUED | OUTPATIENT
Start: 2019-11-30 | End: 2019-12-02 | Stop reason: HOSPADM

## 2019-12-01 RX ORDER — HYDROCODONE BITARTRATE AND ACETAMINOPHEN 5; 325 MG/1; MG/1
1 TABLET ORAL PRN
Status: DISCONTINUED | OUTPATIENT
Start: 2019-12-01 | End: 2019-12-01 | Stop reason: HOSPADM

## 2019-12-01 RX ORDER — SODIUM CHLORIDE 9 MG/ML
INJECTION, SOLUTION INTRAVENOUS CONTINUOUS
Status: DISCONTINUED | OUTPATIENT
Start: 2019-12-01 | End: 2019-12-02

## 2019-12-01 RX ORDER — SODIUM CHLORIDE 0.9 % (FLUSH) 0.9 %
10 SYRINGE (ML) INJECTION EVERY 12 HOURS SCHEDULED
Status: DISCONTINUED | OUTPATIENT
Start: 2019-12-01 | End: 2019-12-01 | Stop reason: HOSPADM

## 2019-12-01 RX ORDER — SODIUM CHLORIDE, SODIUM LACTATE, POTASSIUM CHLORIDE, CALCIUM CHLORIDE 600; 310; 30; 20 MG/100ML; MG/100ML; MG/100ML; MG/100ML
INJECTION, SOLUTION INTRAVENOUS
Status: COMPLETED
Start: 2019-12-01 | End: 2019-12-01

## 2019-12-01 RX ORDER — SODIUM CHLORIDE 0.9 % (FLUSH) 0.9 %
10 SYRINGE (ML) INJECTION EVERY 12 HOURS SCHEDULED
Status: DISCONTINUED | OUTPATIENT
Start: 2019-12-01 | End: 2019-12-02 | Stop reason: HOSPADM

## 2019-12-01 RX ADMIN — HYDROMORPHONE HYDROCHLORIDE 0.5 MG: 1 INJECTION, SOLUTION INTRAMUSCULAR; INTRAVENOUS; SUBCUTANEOUS at 06:05

## 2019-12-01 RX ADMIN — SODIUM CHLORIDE: 9 INJECTION, SOLUTION INTRAVENOUS at 10:55

## 2019-12-01 RX ADMIN — Medication 10 ML: at 10:58

## 2019-12-01 RX ADMIN — INSULIN GLARGINE 10 UNITS: 100 INJECTION, SOLUTION SUBCUTANEOUS at 01:10

## 2019-12-01 RX ADMIN — METOPROLOL TARTRATE 100 MG: 50 TABLET, FILM COATED ORAL at 20:48

## 2019-12-01 RX ADMIN — GABAPENTIN 300 MG: 300 CAPSULE ORAL at 10:57

## 2019-12-01 RX ADMIN — DILTIAZEM HYDROCHLORIDE 300 MG: 120 CAPSULE, COATED, EXTENDED RELEASE ORAL at 20:48

## 2019-12-01 RX ADMIN — MORPHINE SULFATE 1 MG: 2 INJECTION, SOLUTION INTRAMUSCULAR; INTRAVENOUS at 20:50

## 2019-12-01 RX ADMIN — FUROSEMIDE 40 MG: 40 TABLET ORAL at 17:31

## 2019-12-01 RX ADMIN — SUCCINYLCHOLINE CHLORIDE 60 MG: 20 INJECTION INTRAMUSCULAR; INTRAVENOUS at 08:17

## 2019-12-01 RX ADMIN — PROPOFOL 200 MG: 10 INJECTION, EMULSION INTRAVENOUS at 08:02

## 2019-12-01 RX ADMIN — GABAPENTIN 300 MG: 300 CAPSULE ORAL at 23:32

## 2019-12-01 RX ADMIN — Medication 3 ML: at 17:33

## 2019-12-01 RX ADMIN — FENTANYL CITRATE 50 MCG: 50 INJECTION, SOLUTION INTRAMUSCULAR; INTRAVENOUS at 08:43

## 2019-12-01 RX ADMIN — MORPHINE SULFATE 1 MG: 2 INJECTION, SOLUTION INTRAMUSCULAR; INTRAVENOUS at 04:44

## 2019-12-01 RX ADMIN — HYDROCODONE BITARTRATE AND ACETAMINOPHEN 2 TABLET: 5; 325 TABLET ORAL at 17:31

## 2019-12-01 RX ADMIN — SODIUM CHLORIDE: 9 INJECTION, SOLUTION INTRAVENOUS at 23:35

## 2019-12-01 RX ADMIN — GABAPENTIN 300 MG: 300 CAPSULE ORAL at 17:32

## 2019-12-01 RX ADMIN — Medication 3 ML: at 01:11

## 2019-12-01 RX ADMIN — INSULIN GLARGINE 10 UNITS: 100 INJECTION, SOLUTION SUBCUTANEOUS at 20:49

## 2019-12-01 RX ADMIN — MORPHINE SULFATE 1 MG: 2 INJECTION, SOLUTION INTRAMUSCULAR; INTRAVENOUS at 14:45

## 2019-12-01 RX ADMIN — HYDROCODONE BITARTRATE AND ACETAMINOPHEN 2 TABLET: 5; 325 TABLET ORAL at 10:56

## 2019-12-01 RX ADMIN — SODIUM CHLORIDE, SODIUM LACTATE, POTASSIUM CHLORIDE, CALCIUM CHLORIDE 1000 ML: 600; 310; 30; 20 INJECTION, SOLUTION INTRAVENOUS at 07:57

## 2019-12-01 RX ADMIN — METOPROLOL TARTRATE 100 MG: 50 TABLET, FILM COATED ORAL at 07:47

## 2019-12-01 RX ADMIN — Medication 10 ML: at 20:53

## 2019-12-01 RX ADMIN — ONDANSETRON 4 MG: 2 INJECTION INTRAMUSCULAR; INTRAVENOUS at 08:09

## 2019-12-01 RX ADMIN — HYDROCODONE BITARTRATE AND ACETAMINOPHEN 2 TABLET: 5; 325 TABLET ORAL at 23:32

## 2019-12-01 RX ADMIN — CLOPIDOGREL BISULFATE 75 MG: 75 TABLET ORAL at 10:58

## 2019-12-01 RX ADMIN — SODIUM CHLORIDE, POTASSIUM CHLORIDE, SODIUM LACTATE AND CALCIUM CHLORIDE 1000 ML: 600; 310; 30; 20 INJECTION, SOLUTION INTRAVENOUS at 07:57

## 2019-12-01 RX ADMIN — MORPHINE SULFATE 1 MG: 2 INJECTION, SOLUTION INTRAMUSCULAR; INTRAVENOUS at 00:45

## 2019-12-01 RX ADMIN — FENTANYL CITRATE 50 MCG: 50 INJECTION, SOLUTION INTRAMUSCULAR; INTRAVENOUS at 08:53

## 2019-12-01 RX ADMIN — ESCITALOPRAM OXALATE 20 MG: 20 TABLET ORAL at 10:57

## 2019-12-01 RX ADMIN — HYDROMORPHONE HYDROCHLORIDE 0.5 MG: 1 INJECTION, SOLUTION INTRAMUSCULAR; INTRAVENOUS; SUBCUTANEOUS at 02:31

## 2019-12-01 RX ADMIN — ATORVASTATIN CALCIUM 40 MG: 40 TABLET, FILM COATED ORAL at 20:49

## 2019-12-01 ASSESSMENT — PULMONARY FUNCTION TESTS
PIF_VALUE: 11
PIF_VALUE: 16
PIF_VALUE: 11
PIF_VALUE: 1
PIF_VALUE: 3
PIF_VALUE: 2
PIF_VALUE: 26
PIF_VALUE: 16
PIF_VALUE: 21
PIF_VALUE: 23
PIF_VALUE: 13
PIF_VALUE: 19
PIF_VALUE: 26
PIF_VALUE: 3
PIF_VALUE: 11
PIF_VALUE: 16
PIF_VALUE: 2
PIF_VALUE: 27
PIF_VALUE: 25
PIF_VALUE: 12
PIF_VALUE: 13
PIF_VALUE: 34
PIF_VALUE: 11

## 2019-12-01 ASSESSMENT — PAIN SCALES - GENERAL
PAINLEVEL_OUTOF10: 5
PAINLEVEL_OUTOF10: 6
PAINLEVEL_OUTOF10: 6
PAINLEVEL_OUTOF10: 10
PAINLEVEL_OUTOF10: 6
PAINLEVEL_OUTOF10: 5
PAINLEVEL_OUTOF10: 5
PAINLEVEL_OUTOF10: 10
PAINLEVEL_OUTOF10: 7
PAINLEVEL_OUTOF10: 6
PAINLEVEL_OUTOF10: 6
PAINLEVEL_OUTOF10: 7
PAINLEVEL_OUTOF10: 10
PAINLEVEL_OUTOF10: 4
PAINLEVEL_OUTOF10: 9
PAINLEVEL_OUTOF10: 5
PAINLEVEL_OUTOF10: 10

## 2019-12-01 ASSESSMENT — PAIN DESCRIPTION - LOCATION
LOCATION: HIP
LOCATION: HIP
LOCATION: INCISION

## 2019-12-01 ASSESSMENT — PAIN DESCRIPTION - PAIN TYPE
TYPE: ACUTE PAIN
TYPE: ACUTE PAIN

## 2019-12-01 ASSESSMENT — ENCOUNTER SYMPTOMS
COUGH: 0
STRIDOR: 0
WHEEZING: 0
VOMITING: 0
SHORTNESS OF BREATH: 0
BACK PAIN: 0
NAUSEA: 0
EYE PAIN: 0

## 2019-12-01 ASSESSMENT — PAIN DESCRIPTION - ORIENTATION
ORIENTATION: LEFT

## 2019-12-02 ENCOUNTER — APPOINTMENT (OUTPATIENT)
Dept: GENERAL RADIOLOGY | Age: 69
DRG: 560 | End: 2019-12-02
Payer: MEDICARE

## 2019-12-02 VITALS
TEMPERATURE: 98.8 F | HEART RATE: 46 BPM | SYSTOLIC BLOOD PRESSURE: 118 MMHG | OXYGEN SATURATION: 99 % | RESPIRATION RATE: 16 BRPM | HEIGHT: 69 IN | WEIGHT: 260 LBS | BODY MASS INDEX: 38.51 KG/M2 | DIASTOLIC BLOOD PRESSURE: 52 MMHG

## 2019-12-02 LAB
ANION GAP SERPL CALCULATED.3IONS-SCNC: 10 MEQ/L (ref 9–15)
BASOPHILS ABSOLUTE: 0.1 K/UL (ref 0–0.2)
BASOPHILS RELATIVE PERCENT: 0.8 %
BUN BLDV-MCNC: 12 MG/DL (ref 8–23)
CALCIUM SERPL-MCNC: 8.8 MG/DL (ref 8.5–9.9)
CHLORIDE BLD-SCNC: 101 MEQ/L (ref 95–107)
CO2: 29 MEQ/L (ref 20–31)
CREAT SERPL-MCNC: 0.7 MG/DL (ref 0.5–0.9)
EOSINOPHILS ABSOLUTE: 0.2 K/UL (ref 0–0.7)
EOSINOPHILS RELATIVE PERCENT: 3.5 %
GFR AFRICAN AMERICAN: >60
GFR NON-AFRICAN AMERICAN: >60
GLUCOSE BLD-MCNC: 100 MG/DL (ref 60–115)
GLUCOSE BLD-MCNC: 184 MG/DL (ref 60–115)
GLUCOSE BLD-MCNC: 184 MG/DL (ref 60–115)
GLUCOSE BLD-MCNC: 95 MG/DL (ref 70–99)
HCT VFR BLD CALC: 28.2 % (ref 37–47)
HEMOGLOBIN: 9.6 G/DL (ref 12–16)
LYMPHOCYTES ABSOLUTE: 2.3 K/UL (ref 1–4.8)
LYMPHOCYTES RELATIVE PERCENT: 34.5 %
MACROCYTES: ABNORMAL
MAGNESIUM: 1.2 MG/DL (ref 1.7–2.4)
MCH RBC QN AUTO: 36.5 PG (ref 27–31.3)
MCHC RBC AUTO-ENTMCNC: 33.8 % (ref 33–37)
MCV RBC AUTO: 107.9 FL (ref 82–100)
MONOCYTES ABSOLUTE: 0.8 K/UL (ref 0.2–0.8)
MONOCYTES RELATIVE PERCENT: 11.5 %
NEUTROPHILS ABSOLUTE: 3.3 K/UL (ref 1.4–6.5)
NEUTROPHILS RELATIVE PERCENT: 49.7 %
PDW BLD-RTO: 16.3 % (ref 11.5–14.5)
PERFORMED ON: ABNORMAL
PERFORMED ON: ABNORMAL
PERFORMED ON: NORMAL
PLATELET # BLD: 221 K/UL (ref 130–400)
PLATELET SLIDE REVIEW: NORMAL
POTASSIUM REFLEX MAGNESIUM: 3 MEQ/L (ref 3.4–4.9)
PRO-BNP: 1320 PG/ML
PROCALCITONIN: 0.14 NG/ML (ref 0–0.15)
RBC # BLD: 2.62 M/UL (ref 4.2–5.4)
SODIUM BLD-SCNC: 140 MEQ/L (ref 135–144)
WBC # BLD: 6.6 K/UL (ref 4.8–10.8)

## 2019-12-02 PROCEDURE — 83735 ASSAY OF MAGNESIUM: CPT

## 2019-12-02 PROCEDURE — 36415 COLL VENOUS BLD VENIPUNCTURE: CPT

## 2019-12-02 PROCEDURE — 83880 ASSAY OF NATRIURETIC PEPTIDE: CPT

## 2019-12-02 PROCEDURE — 6370000000 HC RX 637 (ALT 250 FOR IP): Performed by: ORTHOPAEDIC SURGERY

## 2019-12-02 PROCEDURE — 84145 PROCALCITONIN (PCT): CPT

## 2019-12-02 PROCEDURE — 6370000000 HC RX 637 (ALT 250 FOR IP): Performed by: INTERNAL MEDICINE

## 2019-12-02 PROCEDURE — 97166 OT EVAL MOD COMPLEX 45 MIN: CPT

## 2019-12-02 PROCEDURE — 85025 COMPLETE CBC W/AUTO DIFF WBC: CPT

## 2019-12-02 PROCEDURE — 97116 GAIT TRAINING THERAPY: CPT

## 2019-12-02 PROCEDURE — 2580000003 HC RX 258: Performed by: ORTHOPAEDIC SURGERY

## 2019-12-02 PROCEDURE — 96365 THER/PROPH/DIAG IV INF INIT: CPT

## 2019-12-02 PROCEDURE — 80048 BASIC METABOLIC PNL TOTAL CA: CPT

## 2019-12-02 PROCEDURE — 6360000002 HC RX W HCPCS: Performed by: INTERNAL MEDICINE

## 2019-12-02 PROCEDURE — 97535 SELF CARE MNGMENT TRAINING: CPT

## 2019-12-02 PROCEDURE — 96366 THER/PROPH/DIAG IV INF ADDON: CPT

## 2019-12-02 PROCEDURE — 71045 X-RAY EXAM CHEST 1 VIEW: CPT

## 2019-12-02 PROCEDURE — 97162 PT EVAL MOD COMPLEX 30 MIN: CPT

## 2019-12-02 RX ORDER — POTASSIUM CHLORIDE 20 MEQ/1
40 TABLET, EXTENDED RELEASE ORAL ONCE
Status: COMPLETED | OUTPATIENT
Start: 2019-12-02 | End: 2019-12-02

## 2019-12-02 RX ORDER — POTASSIUM CHLORIDE 20 MEQ/1
20 TABLET, EXTENDED RELEASE ORAL DAILY
Status: DISCONTINUED | OUTPATIENT
Start: 2019-12-02 | End: 2019-12-02 | Stop reason: HOSPADM

## 2019-12-02 RX ORDER — MAGNESIUM SULFATE IN WATER 40 MG/ML
4 INJECTION, SOLUTION INTRAVENOUS ONCE
Status: COMPLETED | OUTPATIENT
Start: 2019-12-02 | End: 2019-12-02

## 2019-12-02 RX ORDER — LANOLIN ALCOHOL/MO/W.PET/CERES
400 CREAM (GRAM) TOPICAL 2 TIMES DAILY
Status: DISCONTINUED | OUTPATIENT
Start: 2019-12-02 | End: 2019-12-02 | Stop reason: HOSPADM

## 2019-12-02 RX ADMIN — DIGOXIN 125 MCG: 125 TABLET ORAL at 08:16

## 2019-12-02 RX ADMIN — HYDROCODONE BITARTRATE AND ACETAMINOPHEN 2 TABLET: 5; 325 TABLET ORAL at 05:58

## 2019-12-02 RX ADMIN — DILTIAZEM HYDROCHLORIDE 300 MG: 120 CAPSULE, COATED, EXTENDED RELEASE ORAL at 08:16

## 2019-12-02 RX ADMIN — MAGNESIUM SULFATE HEPTAHYDRATE 4 G: 40 INJECTION, SOLUTION INTRAVENOUS at 08:38

## 2019-12-02 RX ADMIN — Medication 10 ML: at 08:19

## 2019-12-02 RX ADMIN — LEVOTHYROXINE SODIUM 75 MCG: 75 TABLET ORAL at 05:57

## 2019-12-02 RX ADMIN — CLOPIDOGREL BISULFATE 75 MG: 75 TABLET ORAL at 08:22

## 2019-12-02 RX ADMIN — POTASSIUM CHLORIDE 20 MEQ: 20 TABLET, EXTENDED RELEASE ORAL at 08:16

## 2019-12-02 RX ADMIN — POTASSIUM CHLORIDE 40 MEQ: 20 TABLET, EXTENDED RELEASE ORAL at 06:25

## 2019-12-02 RX ADMIN — ESCITALOPRAM OXALATE 20 MG: 20 TABLET ORAL at 08:16

## 2019-12-02 RX ADMIN — GABAPENTIN 300 MG: 300 CAPSULE ORAL at 08:16

## 2019-12-02 RX ADMIN — GABAPENTIN 300 MG: 300 CAPSULE ORAL at 14:45

## 2019-12-02 RX ADMIN — HYDROCODONE BITARTRATE AND ACETAMINOPHEN 1 TABLET: 5; 325 TABLET ORAL at 12:08

## 2019-12-02 ASSESSMENT — PAIN DESCRIPTION - ORIENTATION
ORIENTATION: LOWER
ORIENTATION: LOWER

## 2019-12-02 ASSESSMENT — PAIN DESCRIPTION - PAIN TYPE
TYPE: ACUTE PAIN

## 2019-12-02 ASSESSMENT — PAIN DESCRIPTION - LOCATION
LOCATION: BACK

## 2019-12-02 ASSESSMENT — PAIN SCALES - GENERAL
PAINLEVEL_OUTOF10: 7
PAINLEVEL_OUTOF10: 4
PAINLEVEL_OUTOF10: 3
PAINLEVEL_OUTOF10: 7
PAINLEVEL_OUTOF10: 5
PAINLEVEL_OUTOF10: 5

## 2019-12-02 ASSESSMENT — PAIN DESCRIPTION - FREQUENCY: FREQUENCY: CONTINUOUS

## 2019-12-02 ASSESSMENT — PAIN DESCRIPTION - DESCRIPTORS: DESCRIPTORS: ACHING;SHARP

## 2020-02-11 DIAGNOSIS — E11.641 UNCONTROLLED TYPE 2 DIABETES MELLITUS WITH HYPOGLYCEMIA AND COMA (HCC): ICD-10-CM

## 2020-02-11 DIAGNOSIS — E03.8 OTHER SPECIFIED HYPOTHYROIDISM: ICD-10-CM

## 2020-02-11 LAB
ANION GAP SERPL CALCULATED.3IONS-SCNC: 14 MEQ/L (ref 9–15)
BUN BLDV-MCNC: 13 MG/DL (ref 8–23)
CALCIUM SERPL-MCNC: 9.7 MG/DL (ref 8.5–9.9)
CHLORIDE BLD-SCNC: 102 MEQ/L (ref 95–107)
CO2: 24 MEQ/L (ref 20–31)
CREAT SERPL-MCNC: 0.66 MG/DL (ref 0.5–0.9)
GFR AFRICAN AMERICAN: >60
GFR NON-AFRICAN AMERICAN: >60
GLUCOSE BLD-MCNC: 107 MG/DL (ref 70–99)
HBA1C MFR BLD: 7.2 % (ref 4.8–5.9)
POTASSIUM SERPL-SCNC: 4.1 MEQ/L (ref 3.4–4.9)
SODIUM BLD-SCNC: 140 MEQ/L (ref 135–144)
T4 FREE: 0.95 NG/DL (ref 0.84–1.68)
TSH SERPL DL<=0.05 MIU/L-ACNC: 5.52 UIU/ML (ref 0.44–3.86)

## 2020-02-19 ENCOUNTER — OFFICE VISIT (OUTPATIENT)
Dept: ENDOCRINOLOGY | Age: 70
End: 2020-02-19
Payer: MEDICARE

## 2020-02-19 VITALS
BODY MASS INDEX: 36.73 KG/M2 | HEART RATE: 87 BPM | SYSTOLIC BLOOD PRESSURE: 126 MMHG | HEIGHT: 69 IN | RESPIRATION RATE: 16 BRPM | DIASTOLIC BLOOD PRESSURE: 74 MMHG | OXYGEN SATURATION: 97 % | WEIGHT: 248 LBS

## 2020-02-19 LAB
CHP ED QC CHECK: NORMAL
GLUCOSE BLD-MCNC: 212 MG/DL

## 2020-02-19 PROCEDURE — 1036F TOBACCO NON-USER: CPT | Performed by: INTERNAL MEDICINE

## 2020-02-19 PROCEDURE — G8482 FLU IMMUNIZE ORDER/ADMIN: HCPCS | Performed by: INTERNAL MEDICINE

## 2020-02-19 PROCEDURE — 1090F PRES/ABSN URINE INCON ASSESS: CPT | Performed by: INTERNAL MEDICINE

## 2020-02-19 PROCEDURE — 1123F ACP DISCUSS/DSCN MKR DOCD: CPT | Performed by: INTERNAL MEDICINE

## 2020-02-19 PROCEDURE — G8399 PT W/DXA RESULTS DOCUMENT: HCPCS | Performed by: INTERNAL MEDICINE

## 2020-02-19 PROCEDURE — G8427 DOCREV CUR MEDS BY ELIG CLIN: HCPCS | Performed by: INTERNAL MEDICINE

## 2020-02-19 PROCEDURE — 2022F DILAT RTA XM EVC RTNOPTHY: CPT | Performed by: INTERNAL MEDICINE

## 2020-02-19 PROCEDURE — 4040F PNEUMOC VAC/ADMIN/RCVD: CPT | Performed by: INTERNAL MEDICINE

## 2020-02-19 PROCEDURE — 3017F COLORECTAL CA SCREEN DOC REV: CPT | Performed by: INTERNAL MEDICINE

## 2020-02-19 PROCEDURE — 82962 GLUCOSE BLOOD TEST: CPT | Performed by: INTERNAL MEDICINE

## 2020-02-19 PROCEDURE — 99213 OFFICE O/P EST LOW 20 MIN: CPT | Performed by: INTERNAL MEDICINE

## 2020-02-19 PROCEDURE — G8417 CALC BMI ABV UP PARAM F/U: HCPCS | Performed by: INTERNAL MEDICINE

## 2020-02-19 PROCEDURE — 3051F HG A1C>EQUAL 7.0%<8.0%: CPT | Performed by: INTERNAL MEDICINE

## 2020-02-19 RX ORDER — LEVOTHYROXINE SODIUM 0.07 MG/1
75 TABLET ORAL DAILY
Qty: 90 TABLET | Refills: 1 | Status: SHIPPED | OUTPATIENT
Start: 2020-02-19 | End: 2020-10-15 | Stop reason: SDUPTHER

## 2020-02-19 RX ORDER — BLOOD SUGAR DIAGNOSTIC
1 STRIP MISCELLANEOUS 3 TIMES DAILY
Qty: 100 EACH | Refills: 3 | Status: SHIPPED | OUTPATIENT
Start: 2020-02-19

## 2020-02-20 NOTE — TELEPHONE ENCOUNTER
Devon--Nicky  is asking if Humulin R can be changed   says that Novolin R. is covered and is cheaper.

## 2020-03-02 ENCOUNTER — TELEPHONE (OUTPATIENT)
Dept: ENDOCRINOLOGY | Age: 70
End: 2020-03-02

## 2020-05-26 RX ORDER — GABAPENTIN 300 MG/1
CAPSULE ORAL
Qty: 90 CAPSULE | Refills: 3 | Status: SHIPPED | OUTPATIENT
Start: 2020-05-26 | End: 2020-09-28

## 2020-06-23 ENCOUNTER — TELEMEDICINE (OUTPATIENT)
Dept: ENDOCRINOLOGY | Age: 70
End: 2020-06-23
Payer: MEDICARE

## 2020-06-23 PROCEDURE — 1123F ACP DISCUSS/DSCN MKR DOCD: CPT | Performed by: INTERNAL MEDICINE

## 2020-06-23 PROCEDURE — 3051F HG A1C>EQUAL 7.0%<8.0%: CPT | Performed by: INTERNAL MEDICINE

## 2020-06-23 PROCEDURE — G8399 PT W/DXA RESULTS DOCUMENT: HCPCS | Performed by: INTERNAL MEDICINE

## 2020-06-23 PROCEDURE — 99213 OFFICE O/P EST LOW 20 MIN: CPT | Performed by: INTERNAL MEDICINE

## 2020-06-23 PROCEDURE — 1090F PRES/ABSN URINE INCON ASSESS: CPT | Performed by: INTERNAL MEDICINE

## 2020-06-23 PROCEDURE — 2022F DILAT RTA XM EVC RTNOPTHY: CPT | Performed by: INTERNAL MEDICINE

## 2020-06-23 PROCEDURE — G8428 CUR MEDS NOT DOCUMENT: HCPCS | Performed by: INTERNAL MEDICINE

## 2020-06-23 PROCEDURE — 4040F PNEUMOC VAC/ADMIN/RCVD: CPT | Performed by: INTERNAL MEDICINE

## 2020-06-23 PROCEDURE — 3017F COLORECTAL CA SCREEN DOC REV: CPT | Performed by: INTERNAL MEDICINE

## 2020-06-23 RX ORDER — INSULIN GLARGINE 100 [IU]/ML
20 INJECTION, SOLUTION SUBCUTANEOUS NIGHTLY
Qty: 5 PEN | Refills: 3
Start: 2020-06-23 | End: 2020-07-19

## 2020-06-23 NOTE — PROGRESS NOTES
2020    TELEHEALTH EVALUATION -- Audio/Visual (During DA-18 public health emergency)    Due to [place] 19 outbreak, patient's office visit was converted to a virtual visit. Patient was contacted and agreed to proceed with a virtual visit via [address] Visit  The risks and benefits of converting to a virtual visit were discussed in light of the current infectious disease epidemic. Patient also understood that insurance coverage and co-pays are up to their individual insurance plans. HPI: Patient made aware this is a video visit billable visit agreed to proceed patient was at home I was at my office    Luci Toth (:  1950) has requested an audio/video evaluation for the following concern(s):    Follow-up on diabetes hypothyroidism patient said blood sugars have been high both fasting and postprandial no labs have been reviewed since labs done in February testing 2-3 times daily      Results for Richard Samano (MRN [id number]) as of 2020 16:36   Ref. Range 2020 08:53 2020 15:24   Sodium Latest Ref Range: 135 - 144 mEq/L 140    Potassium Latest Ref Range: 3.4 - 4.9 mEq/L 4.1    Chloride Latest Ref Range: 95 - 107 mEq/L 102    CO2 Latest Ref Range: 20 - 31 mEq/L 24    BUN Latest Ref Range: 8 - 23 mg/dL 13    Creatinine Latest Ref Range: 0.50 - 0.90 mg/dL 0.66    Anion Gap Latest Ref Range: 9 - 15 mEq/L 14    GFR Non- Latest Ref Range: >60  >60.0    GFR African American Latest Ref Range: >60  >60.0    Glucose Latest Units: mg/dL 107 (H) 212   Calcium Latest Ref Range: 8.5 - 9.9 mg/dL 9.7    Hemoglobin A1C Latest Ref Range: 4.8 - 5.9 % 7.2 (H)    TSH Latest Ref Range: 0.440 - 3.860 uIU/mL 5.520 (H)    T4 Free Latest Ref Range: 0.84 - 1.68 ng/dL 0.95        Review of Systems    Prior to Visit Medications    Medication Sig Taking?  Authorizing Provider   gabapentin (NEURONTIN) 300 MG capsule Take 1 capsule 3x daily  Willy Menon MD   insulin NPH (NOVOLIN N) 100 sulfate HFA (VENTOLIN HFA) 108 (90 BASE) MCG/ACT inhaler Inhale 2 puffs into the lungs every 6 hours as needed for Wheezing  Historical Provider, MD   furosemide (LASIX) 40 MG tablet Take 40 mg by mouth daily as needed   Historical Provider, MD   methotrexate 2.5 MG tablet Take 2.5 mg by mouth once a week TAKE 8 TABS [address]  Historical Provider, MD       Social History     Tobacco Use    Smoking status: Former Smoker    Smokeless tobacco: Never Used    Tobacco comment: QUIT AT AGE 41   Substance Use Topics    Alcohol use: Not Currently    Drug use: No            PHYSICAL EXAMINATION:  [ INSTRUCTIONS:  \"[x]\" Indicates a positive item  \"[]\" Indicates a negative item  -- DELETE ALL ITEMS NOT EXAMINED]  [] Alert  [] Oriented to person/place/time    [] No apparent distress  [] Toxic appearing    [] Face flushed appearing [] Sclera clear  [] Lips are cyanotic      [] Breathing appears normal  [] Appears tachypneic      [] Rash on visible skin    [] Cranial Nerves II-XII grossly intact    [] Motor grossly intact in visible upper extremities    [] Motor grossly intact in visible lower extremities    [] Normal Mood  [] Anxious appearing    [] Depressed appearing  [] Confused appearing      [] Poor short term memory  [] Poor long term memory    [] OTHER:      Due to this being a TeleHealth encounter, evaluation of the following organ systems is limited: Vitals/Constitutional/EENT/Resp/CV/GI//MS/Neuro/Skin/Heme-Lymph-Imm. ASSESSMENT/PLAN:     Diagnosis Orders   1. Hypothyroidism, unspecified type  T4, Free    TSH without Reflex   2.  Uncontrolled type 2 diabetes mellitus with hypoglycemia and coma (HCC)  Basic Metabolic Panel    Hemoglobin A1C     Orders Placed This Encounter   Procedures    Basic Metabolic Panel     Standing Status:   Future     Standing Expiration Date:   6/23/2021    Hemoglobin A1C     Standing Status:   Future     Standing Expiration Date:   6/23/2021    T4, Free     Standing Status:

## 2020-07-15 DIAGNOSIS — E03.9 HYPOTHYROIDISM, UNSPECIFIED TYPE: ICD-10-CM

## 2020-07-15 DIAGNOSIS — E11.641 UNCONTROLLED TYPE 2 DIABETES MELLITUS WITH HYPOGLYCEMIA AND COMA (HCC): ICD-10-CM

## 2020-07-15 LAB
ANION GAP SERPL CALCULATED.3IONS-SCNC: 10 MEQ/L (ref 9–15)
BUN BLDV-MCNC: 13 MG/DL (ref 8–23)
CALCIUM SERPL-MCNC: 9.4 MG/DL (ref 8.5–9.9)
CHLORIDE BLD-SCNC: 98 MEQ/L (ref 95–107)
CO2: 28 MEQ/L (ref 20–31)
CREAT SERPL-MCNC: 0.78 MG/DL (ref 0.5–0.9)
GFR AFRICAN AMERICAN: >60
GFR NON-AFRICAN AMERICAN: >60
GLUCOSE BLD-MCNC: 191 MG/DL (ref 70–99)
HBA1C MFR BLD: 8.7 % (ref 4.8–5.9)
POTASSIUM SERPL-SCNC: 3.3 MEQ/L (ref 3.4–4.9)
SODIUM BLD-SCNC: 136 MEQ/L (ref 135–144)
T4 FREE: 1.13 NG/DL (ref 0.84–1.68)
TSH SERPL DL<=0.05 MIU/L-ACNC: 3.5 UIU/ML (ref 0.44–3.86)

## 2020-07-16 ENCOUNTER — VIRTUAL VISIT (OUTPATIENT)
Dept: ENDOCRINOLOGY | Age: 70
End: 2020-07-16
Payer: MEDICARE

## 2020-07-16 PROCEDURE — 1090F PRES/ABSN URINE INCON ASSESS: CPT | Performed by: INTERNAL MEDICINE

## 2020-07-16 PROCEDURE — 2022F DILAT RTA XM EVC RTNOPTHY: CPT | Performed by: INTERNAL MEDICINE

## 2020-07-16 PROCEDURE — 1123F ACP DISCUSS/DSCN MKR DOCD: CPT | Performed by: INTERNAL MEDICINE

## 2020-07-16 PROCEDURE — 3017F COLORECTAL CA SCREEN DOC REV: CPT | Performed by: INTERNAL MEDICINE

## 2020-07-16 PROCEDURE — G8428 CUR MEDS NOT DOCUMENT: HCPCS | Performed by: INTERNAL MEDICINE

## 2020-07-16 PROCEDURE — 4040F PNEUMOC VAC/ADMIN/RCVD: CPT | Performed by: INTERNAL MEDICINE

## 2020-07-16 PROCEDURE — 99213 OFFICE O/P EST LOW 20 MIN: CPT | Performed by: INTERNAL MEDICINE

## 2020-07-16 PROCEDURE — G8399 PT W/DXA RESULTS DOCUMENT: HCPCS | Performed by: INTERNAL MEDICINE

## 2020-07-16 PROCEDURE — 3052F HG A1C>EQUAL 8.0%<EQUAL 9.0%: CPT | Performed by: INTERNAL MEDICINE

## 2020-07-16 NOTE — PROGRESS NOTES
2020    TELEHEALTH EVALUATION -- Audio/Visual (During NDDSJ-76 public health emergency)    Due to [place] outbreak, patient's office visit was converted to a virtual visit. Patient was contacted and agreed to proceed with a virtual visit via [email or web address]  The risks and benefits of converting to a virtual visit were discussed in light of the current infectious disease epidemic. Patient also understood that insurance coverage and co-pays are up to their individual insurance plans. HPI: Patient made aware this is a video visit billable visit agreed to proceed patient was at home I was at my office    Jorge Hager (:  1950) has requested an audio/video evaluation for the following concern(s):    Type 2 diabetes on Lantus 30 units at bedtime plus regular insulin with each meals   patient concerned about cost of her insulin  Hemoglobin A1c was 8.1 from 7.25 months ago and the blood sugars are in the 200 range    Results for Cary Formosa (MRN [id number]) as of 2020 11:09   Ref. Range 2020 08:53 2020 15:24 7/15/2020 10:32   Glucose Latest Ref Range: 70 - 99 mg/dL 107 (H) 212 191 (H)   Hemoglobin A1C Latest Ref Range: 4.8 - 5.9 % 7.2 (H)  8.7 (H)       Results for Cary Formosa (MRN [id number]) as of 2020 11:09   Ref.  Range 7/15/2020 10:32   Sodium Latest Ref Range: 135 - 144 mEq/L 136   Potassium Latest Ref Range: 3.4 - 4.9 mEq/L 3.3 (L)   Chloride Latest Ref Range: 95 - 107 mEq/L 98   CO2 Latest Ref Range: 20 - 31 mEq/L 28   BUN Latest Ref Range: 8 - 23 mg/dL 13   Creatinine Latest Ref Range: 0.50 - 0.90 mg/dL 0.78   Anion Gap Latest Ref Range: 9 - 15 mEq/L 10   GFR Non- Latest Ref Range: >60  >60.0   GFR African American Latest Ref Range: >60  >60.0   Glucose Latest Ref Range: 70 - 99 mg/dL 191 (H)   Calcium Latest Ref Range: 8.5 - 9.9 mg/dL 9.4   Hemoglobin A1C Latest Ref Range: 4.8 - 5.9 % 8.7 (H)   TSH Latest Ref Range: 0.440 - 3.860 uIU/mL 3.500   T4 Free Latest Ref Range: 0.84 - 1.68 ng/dL 1.13     Patient Active Problem List   Diagnosis    Rotator cuff tendinitis    [facility]    Hypertension    CAD (coronary artery disease)    [facility] obesity [facility] to excess calories [facility]    Uncontrolled type 2 diabetes mellitus [facility]    Other specified hypothyroidism    Adrenal insufficiency (HCC)    Closed dislocation of left hip (HCC)    Hip dislocation, left, subsequent encounter    Displacement of internal left hip prosthesis ([facility]    Mobility impaired    Anterior dislocation of left hip (HCC)    Bilateral nonexudative age-related macular degeneration    Intracranial injury of other and unspecified nature, without mention of open intracranial wound, unspecified state of consciousness    Nuclear sclerosis    Hip dislocation, left, initial encounter ([facility]    Recurrent dislocation of left hip         Review of Systems    Prior to Visit Medications    Medication Sig Taking?  Authorizing Provider   insulin glargine (LANTUS SOLOSTAR) 100 UNIT/ML injection pen Inject 20 Units into the skin nightly  Cl Suh MD   insulin regular (HUMULIN R) 100 UNIT/ML injection 10 units plus sliding scale   Less than 150 none   151-200 2 units   201-250 4 units   251-300 6 units   301-400 8 units  Cl Suh MD   gabapentin (NEURONTIN) 300 MG capsule Take 1 capsule 3x daily  Cl Suh MD   blood glucose test strips (ONETOUCH VERIO) strip USE 1 STRIP TO CHECK GLUCOSE 4 TIMES DAILY  Willy Menon MD   apixaban (ELIQUIS) 5 MG TABS tablet Take by mouth 2 times daily  Historical Provider, MD   levothyroxine (SYNTHROID) 75 MCG tablet Take 1 tablet by mouth Daily  Willy Menon MD   Insulin Syringe-Needle U-100 (AIMSCO INSULIN SYR ULTRA THIN) 31G X 5/16\" 0.3 ML MISC 1 each by Does not apply route 3 times daily  Cl Suh MD   diltiazem (CARDIZEM CD) 300 MG extended release capsule Take 300 mg by mouth daily  Historical Provider, MD   metoprolol tartrate (LOPRESSOR) 50 MG tablet Take 100 mg by mouth 2 times daily   Historical Provider, MD   lisinopril (PRINIVIL;ZESTRIL) 20 MG tablet Take 20 mg by mouth daily  Historical Provider, MD   escitalopram (LEXAPRO) 20 MG tablet Take 20 mg by mouth daily  Historical Provider, MD   digoxin (LANOXIN) 125 MCG tablet Take 125 mcg by mouth daily  Historical Provider, MD   traZODone (DESYREL) 50 MG tablet Take 50 mg by mouth nightly tAKE 1-2 TAB AT hs  Historical Provider, MD   diphenoxylate-atropine (LOMOTIL) 2.5-0.025 MG per tablet Take 1 tablet by mouth daily as needed for Diarrhea. Indications: FOR DIARRHEA  Historical Provider, MD   tiZANidine (ZANAFLEX) 4 MG tablet Take 4 mg by mouth every 8 hours as needed (DO NOT EXCEED 3 DOSES IN 24HRS)  Historical Provider, MD   glycopyrrolate-formoterol (Mary Kalin) 9-4.8 MCG/ACT AERO Inhale 2 puffs into the lungs 2 times daily  Historical Provider, MD   sulfaSALAzine (AZULFIDINE) 500 MG tablet Take 500 mg by mouth 2 times daily   Historical Provider, MD   potassium chloride (KLOR-CON M) 20 MEQ extended release tablet Take 1 tablet by mouth daily  Cl Suh MD   insulin lispro (HUMALOG KWIKPEN) 100 UNIT/ML pen 2 units plus sliding scale   Less than 150 none  151-200 2 units  201-250 4 units   251-300 6 units   301-400 8 units  Cl Suh MD   atorvastatin (LIPITOR) 40 MG tablet Take 40 mg by mouth daily  Historical Provider, MD   predniSONE (DELTASONE) 10 MG tablet Once a day  Patient taking differently: Take 20 mg by mouth daily Once a day  Cl Suh MD   Blood Glucose Monitoring Suppl (ONETOUCH VERIO) w/Device KIT Give 1 meter to check bs Dx E11.65  MD MATHEW Baum DELICA LANCETS 59B MISC Use bid to check bs Dx M55.21  Cl Suh MD   azaTHIOprine (IMURAN) 50 MG tablet Take 100 mg by mouth daily  Historical Provider, MD   nitroGLYCERIN (NITROSTAT) 0.4 MG SL tablet Place 0.4 mg under the tongue every 5 minutes as needed for Chest pain up to max of 3 total doses.  If no relief after 1 dose, call 911. Historical Provider, MD   clopidogrel (PLAVIX) 75 MG tablet Take 75 mg by mouth daily  Historical Provider, MD   Multiple Vitamins-Minerals (PRESERVISION AREDS 2) CAPS Take 1 capsule by mouth 2 times daily  Historical Provider, MD   aspirin 81 MG tablet Take 81 mg by mouth daily  Historical Provider, MD   cetirizine (ZYRTEC) 10 MG tablet Take 10 mg by mouth daily   Historical Provider, MD   cimetidine (TAGAMET HB) 200 MG tablet Take 200 mg by mouth 2 times daily  Historical Provider, MD   Lactobacillus (PROBIOTIC ACIDOPHILUS PO) Take by mouth daily  Historical Provider, MD   FOLIC ACID PO Take 285 mg by mouth 2 times daily  Historical Provider, MD   B Complex Vitamins (VITAMIN B COMPLEX PO) Take by mouth daily  Historical Provider, MD   Cholecalciferol (VITAMIN D3) 5000 UNITS TABS Take 1 tablet by mouth daily  Historical Provider, MD   albuterol sulfate HFA (VENTOLIN HFA) 108 (90 BASE) MCG/ACT inhaler Inhale 2 puffs into the lungs every 6 hours as needed for Wheezing  Historical Provider, MD   furosemide (LASIX) 40 MG tablet Take 40 mg by mouth daily as needed   Historical Provider, MD   methotrexate 2.5 MG tablet Take 2.5 mg by mouth once a week TAKE 8 TABS [address]  Historical Provider, MD       Social History     Tobacco Use    Smoking status: Former Smoker    Smokeless tobacco: Never Used    Tobacco comment: QUIT AT AGE 41   Substance Use Topics    Alcohol use: Not Currently    Drug use:  No            PHYSICAL EXAMINATION:  [ INSTRUCTIONS:  \"[x]\" Indicates a positive item  \"[]\" Indicates a negative item  -- DELETE ALL ITEMS NOT EXAMINED]  [] Alert  [] Oriented to person/place/time    [] No apparent distress  [] Toxic appearing    [] Face flushed appearing [] Sclera clear  [] Lips are cyanotic      [] Breathing appears normal  [] Appears tachypneic      [] Rash on visible skin    [] Cranial Nerves II-XII grossly intact    [] Motor grossly intact in visible upper

## 2020-09-28 RX ORDER — GABAPENTIN 300 MG/1
CAPSULE ORAL
Qty: 90 CAPSULE | Refills: 3 | Status: SHIPPED | OUTPATIENT
Start: 2020-09-28 | End: 2021-02-02

## 2020-10-12 DIAGNOSIS — E11.641 UNCONTROLLED TYPE 2 DIABETES MELLITUS WITH HYPOGLYCEMIA AND COMA (HCC): ICD-10-CM

## 2020-10-12 DIAGNOSIS — E03.9 HYPOTHYROIDISM, UNSPECIFIED TYPE: ICD-10-CM

## 2020-10-12 LAB
ANION GAP SERPL CALCULATED.3IONS-SCNC: 11 MEQ/L (ref 9–15)
BUN BLDV-MCNC: 10 MG/DL (ref 8–23)
CALCIUM SERPL-MCNC: 10.1 MG/DL (ref 8.5–9.9)
CHLORIDE BLD-SCNC: 102 MEQ/L (ref 95–107)
CO2: 27 MEQ/L (ref 20–31)
CREAT SERPL-MCNC: 0.76 MG/DL (ref 0.5–0.9)
GFR AFRICAN AMERICAN: >60
GFR NON-AFRICAN AMERICAN: >60
GLUCOSE BLD-MCNC: 103 MG/DL (ref 70–99)
HBA1C MFR BLD: 6.8 % (ref 4.8–5.9)
POTASSIUM SERPL-SCNC: 4.1 MEQ/L (ref 3.4–4.9)
SODIUM BLD-SCNC: 140 MEQ/L (ref 135–144)
T4 FREE: 1.19 NG/DL (ref 0.84–1.68)
TSH SERPL DL<=0.05 MIU/L-ACNC: 4.73 UIU/ML (ref 0.44–3.86)

## 2020-10-15 ENCOUNTER — VIRTUAL VISIT (OUTPATIENT)
Dept: ENDOCRINOLOGY | Age: 70
End: 2020-10-15
Payer: MEDICARE

## 2020-10-15 PROCEDURE — 99442 PR PHYS/QHP TELEPHONE EVALUATION 11-20 MIN: CPT | Performed by: INTERNAL MEDICINE

## 2020-10-15 RX ORDER — LEVOTHYROXINE SODIUM 0.1 MG/1
100 TABLET ORAL DAILY
Qty: 90 TABLET | Refills: 3 | Status: SHIPPED | OUTPATIENT
Start: 2020-10-15 | End: 2022-04-15 | Stop reason: SDUPTHER

## 2020-10-15 RX ORDER — PREDNISONE 10 MG/1
TABLET ORAL
Qty: 60 TABLET | Refills: 1 | Status: SHIPPED | OUTPATIENT
Start: 2020-10-15 | End: 2021-02-25 | Stop reason: SDUPTHER

## 2021-01-08 ENCOUNTER — OFFICE VISIT (OUTPATIENT)
Dept: PULMONOLOGY | Age: 71
End: 2021-01-08
Payer: MEDICARE

## 2021-01-08 VITALS
RESPIRATION RATE: 16 BRPM | TEMPERATURE: 97.9 F | HEART RATE: 69 BPM | OXYGEN SATURATION: 97 % | DIASTOLIC BLOOD PRESSURE: 72 MMHG | WEIGHT: 239 LBS | SYSTOLIC BLOOD PRESSURE: 114 MMHG | BODY MASS INDEX: 35.4 KG/M2 | HEIGHT: 69 IN

## 2021-01-08 DIAGNOSIS — G47.30 SLEEP APNEA, UNSPECIFIED TYPE: ICD-10-CM

## 2021-01-08 DIAGNOSIS — R09.02 HYPOXIA: ICD-10-CM

## 2021-01-08 DIAGNOSIS — J45.20 MILD INTERMITTENT ASTHMA WITHOUT COMPLICATION: Primary | ICD-10-CM

## 2021-01-08 DIAGNOSIS — E66.9 OBESITY (BMI 30-39.9): ICD-10-CM

## 2021-01-08 PROCEDURE — G8484 FLU IMMUNIZE NO ADMIN: HCPCS | Performed by: INTERNAL MEDICINE

## 2021-01-08 PROCEDURE — 1090F PRES/ABSN URINE INCON ASSESS: CPT | Performed by: INTERNAL MEDICINE

## 2021-01-08 PROCEDURE — 1123F ACP DISCUSS/DSCN MKR DOCD: CPT | Performed by: INTERNAL MEDICINE

## 2021-01-08 PROCEDURE — G8427 DOCREV CUR MEDS BY ELIG CLIN: HCPCS | Performed by: INTERNAL MEDICINE

## 2021-01-08 PROCEDURE — 99214 OFFICE O/P EST MOD 30 MIN: CPT | Performed by: INTERNAL MEDICINE

## 2021-01-08 PROCEDURE — 4040F PNEUMOC VAC/ADMIN/RCVD: CPT | Performed by: INTERNAL MEDICINE

## 2021-01-08 PROCEDURE — 1036F TOBACCO NON-USER: CPT | Performed by: INTERNAL MEDICINE

## 2021-01-08 PROCEDURE — G8399 PT W/DXA RESULTS DOCUMENT: HCPCS | Performed by: INTERNAL MEDICINE

## 2021-01-08 PROCEDURE — 3017F COLORECTAL CA SCREEN DOC REV: CPT | Performed by: INTERNAL MEDICINE

## 2021-01-08 PROCEDURE — G8417 CALC BMI ABV UP PARAM F/U: HCPCS | Performed by: INTERNAL MEDICINE

## 2021-01-08 RX ORDER — WARFARIN SODIUM 2 MG/1
TABLET ORAL
COMMUNITY
Start: 2020-11-12

## 2021-01-08 RX ORDER — FLUTICASONE PROPIONATE 100 MCG
BLISTER, WITH INHALATION DEVICE INHALATION DAILY
COMMUNITY

## 2021-01-08 RX ORDER — BUPROPION HYDROCHLORIDE 300 MG/1
TABLET ORAL
COMMUNITY
Start: 2020-12-13

## 2021-01-08 ASSESSMENT — ENCOUNTER SYMPTOMS
SHORTNESS OF BREATH: 1
RHINORRHEA: 0
EYE DISCHARGE: 0
EYE ITCHING: 0
WHEEZING: 1
SINUS PRESSURE: 0
CHEST TIGHTNESS: 0
DIARRHEA: 0
COUGH: 1
VOICE CHANGE: 0
TROUBLE SWALLOWING: 0
NAUSEA: 0
VOMITING: 0
SORE THROAT: 0
ABDOMINAL PAIN: 0

## 2021-01-08 NOTE — PROGRESS NOTES
Subjective:     Irwin Ramírez is a 72 y.o. female who complains today of:     Chief Complaint   Patient presents with    Follow-Up from Hospital     f/u for COPD and Hypoxia. HPI  She is on O2 with sleep 3 lit  and flovent HFA. She is not using albuterol inhaler and albuterol HFA prn   She was diagnose with asthma and COPD. She  Seen in hospital in oct 2019, never came for f/u . She has c/o snoring , she feels tired and sleepy during daytime. C/o shortness of breath  with exertion not all the time . Occasional Wheezing   Occasional Cough with  Sputum  No Hemoptysis  No Chest tightness   No Chest pain with radiation  or pleuritic pain  No Fever or chills. No Rhinorrhea and postnasal drip. Allergies:  Latex, Accupril [quinapril hcl], Bactine [lidocaine-benzalkonium], Other, and Tape [adhesive tape]  Past Medical History:   Diagnosis Date    [facility]     Aplasia of adrenal gland     CAD (coronary artery disease)     Diabetes mellitus ([facility]     DJD (degenerative joint disease)     HLD (hyperlipidemia)     Hypertension     Hypothyroidism     Obesity     CHANA (obstructive sleep apnea)     RA (rheumatoid arthritis) ([facility]     Rotator cuff tendinitis     RIGHT     Past Surgical History:   Procedure Laterality Date    CARPAL TUNNEL RELEASE Left      SECTION      CHOLECYSTECTOMY      CORONARY ANGIOPLASTY WITH STENT PLACEMENT  2019    prior caths as well, total 7 stents    HIP CLOSED REDUCTION Left 2019    HIP CLOSED REDUCTION performed by Manda Chung DO at [address], LEFT SHOULDER AND HIP AS WELL     No family history on file.   Social History     Socioeconomic History    Marital status:      Spouse name: Yasmeen Meraz Number of children: 2    Years of education: 12    Highest education level: Associate degree: academic program   Occupational History    Occupation: retired  [employer] Comment:    Social Needs    Financial resource strain: Somewhat hard    Food insecurity     Worry: Never true     Inability: Never true    Transportation needs     Medical: No     Non-medical: No   Tobacco Use    Smoking status: Former Smoker    Smokeless tobacco: Never Used    Tobacco comment: QUIT AT AGE 41   Substance and Sexual Activity    Alcohol use: Not Currently    Drug use: No    Sexual activity: Not Currently   Lifestyle    Physical activity     Days per week: 0 days     Minutes per session: 0 min    Stress: Rather much   Relationships    Social connections     Talks on phone: More than three times a week     Gets together: More than three times a week     Attends L[employer] service: Never     Active member of club or organization: No     Attends meetings of clubs or organizations: Never     Relationship status:     Intimate partner violence     Fear of current or ex partner: No     Emotionally abused: No     Physically abused: No     Forced sexual activity: No   Other Topics Concern    Not on file   Social History Narrative         Lives With: Spouse    Type of Home: 2 story  House    Home Layout: Two level, Able to Live on Main level with bedroom/bathroom    Home Access: Stairs to enter with rails    Entrance Stairs - Number of Steps: 3-4    Bathroom Shower/Tub: Tub/Shower unit    Bathroom Equipment: Shower chair, Grab bars in shower    Home Equipment: 4 wheeled walker, Cane, Electric scooter    ADL Assistance: Independent    Homemaking Assistance: Independent    Homemaking Responsibilities: Yes    Meal Prep Responsibility: Primary    Laundry Responsibility: Primary    Cleaning Responsibility: Primary    Bill Paying/Finance Responsibility: Primary    Shopping Responsibility: Primary    Health Care Management: Primary    Ambulation Assistance: Independent(cane in house; scooter for longer distances)    Transfer Assistance: Independent Leisure & Hobbies: likes to maria elena and [employer])         Review of Systems   Constitutional: Negative for chills, diaphoresis, fatigue and fever. HENT: Negative for congestion, mouth sores, nosebleeds, postnasal drip, rhinorrhea, sinus pressure, sneezing, sore throat, trouble swallowing and voice change. Eyes: Negative for discharge, itching and visual disturbance. Respiratory: Positive for cough, shortness of breath and wheezing. Negative for chest tightness. Cardiovascular: Negative for chest pain, palpitations and leg swelling. Gastrointestinal: Negative for abdominal pain, diarrhea, nausea and vomiting. Genitourinary: Negative for difficulty urinating and hematuria. Musculoskeletal: Negative for arthralgias, joint swelling and myalgias. Skin: Negative for rash. Allergic/Immunologic: Negative for environmental allergies and food allergies. Neurological: Negative for dizziness, tremors, weakness and headaches. Psychiatric/Behavioral: Positive for sleep disturbance. Negative for behavioral problems. :     Vitals:    01/08/21 1125   BP: 114/72   Pulse: 69   Resp: 16   Temp: 97.9 °F (36.6 °C)   TempSrc: Temporal   SpO2: 97%   Weight: 239 lb (108.4 kg)   Height: 5' 9\" (1.753 m)     Wt Readings from Last 3 Encounters:   01/08/21 239 lb (108.4 kg)   02/19/20 248 lb (112.5 kg)   11/30/19 260 lb (117.9 kg)         Physical Exam  Constitutional:       General: She is not in acute distress. Appearance: She is well-developed. She is obese. She is not diaphoretic. HENT:      Head: Normocephalic and atraumatic. Nose: Nose normal.   Eyes:      Pupils: Pupils are equal, round, and reactive to light. Neck:      Thyroid: No thyromegaly. Vascular: No JVD. Trachea: No tracheal deviation. Cardiovascular:      Rate and Rhythm: Normal rate and regular rhythm. Heart sounds: No murmur. No friction rub. No gallop. Pulmonary:      Effort: No respiratory distress. Breath sounds: No wheezing or rales. Comments: diminished Breath sound bilaterally. Chest:      Chest wall: No tenderness. Abdominal:      General: There is no distension. Tenderness: There is no abdominal tenderness. There is no rebound. Musculoskeletal: Normal range of motion. Lymphadenopathy:      Cervical: No cervical adenopathy. Skin:     General: Skin is warm and dry. Neurological:      Mental Status: She is alert and oriented to person, place, and time.       Coordination: Coordination normal.         Current Outpatient Medications   Medication Sig Dispense Refill    buPROPion (WELLBUTRIN XL) 300 MG extended release tablet TAKE 1 TABLET BY MOUTH ONCE DAILY      warfarin (COUMADIN) 2 MG tablet TAKE 1 TABLET BY MOUTH ONCE DAILY OR AS DIRECTED BY [facility]      fluticasone propionate (FLOVENT DISKUS) 100 MCG/BLIST AEPB inhaler Inhale into the lungs daily      levothyroxine (SYNTHROID) 100 MCG tablet Take 1 tablet by mouth Daily 90 tablet 3    predniSONE (DELTASONE) 10 MG tablet 1 1/2 po daily 60 tablet 01    insulin NPH (NOVOLIN N) 100 UNIT/ML injection vial 30 units at bedtime 1 vial 3    insulin regular (HUMULIN R) 100 UNIT/ML injection 10 units plus sliding scale   Less than 150 none   151-200 2 units   201-250 4 units   251-300 6 units   301-400 8 units 1 vial 3    blood glucose test strips (ONETOUCH VERIO) strip USE 1 STRIP TO CHECK GLUCOSE 4 TIMES DAILY 200 each 3    Insulin Syringe-Needle U-100 (AIMSCO INSULIN SYR ULTRA THIN) 31G X 5/16\" 0.3 ML MISC 1 each by Does not apply route 3 times daily 100 each 3    diltiazem (CARDIZEM CD) 300 MG extended release capsule Take 300 mg by mouth daily      metoprolol tartrate (LOPRESSOR) 50 MG tablet Take 100 mg by mouth 2 times daily       lisinopril (PRINIVIL;ZESTRIL) 20 MG tablet Take 20 mg by mouth daily      escitalopram (LEXAPRO) 20 MG tablet Take 20 mg by mouth daily  digoxin (LANOXIN) 125 MCG tablet Take 125 mcg by mouth daily      traZODone (DESYREL) 50 MG tablet Take 50 mg by mouth nightly tAKE 1-2 TAB AT hs      diphenoxylate-atropine (LOMOTIL) 2.5-0.025 MG per tablet Take 1 tablet by mouth daily as needed for Diarrhea. Indications: FOR DIARRHEA      tiZANidine (ZANAFLEX) 4 MG tablet Take 4 mg by mouth every 8 hours as needed (DO NOT EXCEED 3 DOSES IN 24HRS)      sulfaSALAzine (AZULFIDINE) 500 MG tablet Take 500 mg by mouth 2 times daily       potassium chloride (KLOR-CON M) 20 MEQ extended release tablet Take 1 tablet by mouth daily 30 tablet 0    insulin lispro (HUMALOG KWIKPEN) 100 UNIT/ML pen 2 units plus sliding scale   Less than 150 none  151-200 2 units  201-250 4 units   251-300 6 units   301-400 8 units 5 pen 3    atorvastatin (LIPITOR) 40 MG tablet Take 40 mg by mouth daily      Blood Glucose Monitoring Suppl (ONETOUCH VERIO) w/Device KIT Give 1 meter to check bs Dx E11.65 1 kit 0    ONETOUCH DELICA LANCETS 63J MISC Use bid to check bs Dx E11.65 100 each 03    azaTHIOprine (IMURAN) 50 MG tablet Take 100 mg by mouth daily      nitroGLYCERIN (NITROSTAT) 0.4 MG SL tablet Place 0.4 mg under the tongue every 5 minutes as needed for Chest pain up to max of 3 total doses. If no relief after 1 dose, call 911.       Multiple Vitamins-Minerals (PRESERVISION AREDS 2) CAPS Take 1 capsule by mouth 2 times daily      aspirin 81 MG tablet Take 81 mg by mouth daily      cetirizine (ZYRTEC) 10 MG tablet Take 10 mg by mouth daily       Lactobacillus (PROBIOTIC ACIDOPHILUS PO) Take by mouth daily      FOLIC ACID PO Take 104 mg by mouth 2 times daily      B Complex Vitamins (VITAMIN B COMPLEX PO) Take by mouth daily      Cholecalciferol (VITAMIN D3) 5000 UNITS TABS Take 1 tablet by mouth daily      albuterol sulfate HFA (VENTOLIN HFA) 108 (90 BASE) MCG/ACT inhaler Inhale 2 puffs into the lungs every 6 hours as needed for Wheezing  furosemide (LASIX) 40 MG tablet Take 40 mg by mouth daily as needed       methotrexate 2.5 MG tablet Take 2.5 mg by mouth once a week TAKE 8 TABS EVERY WEEK FRIDAY      gabapentin (NEURONTIN) 300 MG capsule TAKE ONE CAPSULE BY MOUTH THREE TIMES DAILY 90 capsule 3     No current facility-administered medications for this visit. No results found for this or any previous visit.]  Results for orders placed during the hospital encounter of 11/30/19   XR CHEST PORTABLE    Narrative EXAMINATION: XR CHEST PORTABLE    CLINICAL HISTORY: CONGESTION    COMPARISONS: APRIL 1, 2011    FINDINGS: Left shoulder replacement. Degenerative change right shoulder. Osteopenia. Cardiopericardial silhouette upper limits of normal for technique. Lungs clear. Impression NO ACUTE CARDIOPULMONARY DISEASE. Assessment/Plan:     1. Mild intermittent asthma without complication  She is on O2 with sleep 3 lit  and flovent HFA. She is not using albuterol inhaler and albuterol HFA prn   She was diagnose with asthma and COPD. She  Was Seen in hospital in oct 2019, never came for f/u . C/o shortness of breath  with exertion not all the time . Occasional Wheezing . Occasional Cough with  Sputum. Recommend patient to have complete PFT done then decide about bronchodilator therapy. - Full PFT Study With Bronchodilator; Future  - COVID-19 Ambulatory; Future    2. Sleep apnea, unspecified type  She has c/o snoring , she feels tired and sleepy during daytime. Recommend to have a sleep study done for further evaluation. She is agreeable for PSG.  - Baseline Diagnostic Sleep Study; Future    3. Hypoxia  She is on 3 L O2 with sleep. Continue O2 to keep saturation 90% above. 4. Obesity (BMI 30-39. 9)  She is advised try to lose weight. obesity related risk explained to the patient ,  Current weight:  239 lb (108.4 kg) Lbs. BMI:  Body mass index is 35.29 kg/m². Suggested weight control approaches, including dietary changes , exercise, behavioral modification. Return in about 4 weeks (around 2/5/2021) for asthma, hypoxia on O2, sleep study, pft result.       Kelli Gong MD

## 2021-01-15 ENCOUNTER — VIRTUAL VISIT (OUTPATIENT)
Dept: ENDOCRINOLOGY | Age: 71
End: 2021-01-15
Payer: MEDICARE

## 2021-01-15 DIAGNOSIS — E11.641 UNCONTROLLED TYPE 2 DIABETES MELLITUS WITH HYPOGLYCEMIA AND COMA (HCC): ICD-10-CM

## 2021-01-15 DIAGNOSIS — E03.9 HYPOTHYROIDISM, UNSPECIFIED TYPE: Primary | ICD-10-CM

## 2021-01-15 PROCEDURE — G8428 CUR MEDS NOT DOCUMENT: HCPCS | Performed by: INTERNAL MEDICINE

## 2021-01-15 PROCEDURE — 3017F COLORECTAL CA SCREEN DOC REV: CPT | Performed by: INTERNAL MEDICINE

## 2021-01-15 PROCEDURE — 3046F HEMOGLOBIN A1C LEVEL >9.0%: CPT | Performed by: INTERNAL MEDICINE

## 2021-01-15 PROCEDURE — G8399 PT W/DXA RESULTS DOCUMENT: HCPCS | Performed by: INTERNAL MEDICINE

## 2021-01-15 PROCEDURE — 1090F PRES/ABSN URINE INCON ASSESS: CPT | Performed by: INTERNAL MEDICINE

## 2021-01-15 PROCEDURE — 2022F DILAT RTA XM EVC RTNOPTHY: CPT | Performed by: INTERNAL MEDICINE

## 2021-01-15 PROCEDURE — 1123F ACP DISCUSS/DSCN MKR DOCD: CPT | Performed by: INTERNAL MEDICINE

## 2021-01-15 PROCEDURE — 99213 OFFICE O/P EST LOW 20 MIN: CPT | Performed by: INTERNAL MEDICINE

## 2021-01-15 PROCEDURE — 4040F PNEUMOC VAC/ADMIN/RCVD: CPT | Performed by: INTERNAL MEDICINE

## 2021-01-15 RX ORDER — FLASH GLUCOSE SENSOR
KIT MISCELLANEOUS
Qty: 2 EACH | Refills: 6 | Status: SHIPPED | OUTPATIENT
Start: 2021-01-15

## 2021-01-15 RX ORDER — FLASH GLUCOSE SENSOR
KIT MISCELLANEOUS
Qty: 1 DEVICE | Refills: 0 | Status: SHIPPED | OUTPATIENT
Start: 2021-01-15

## 2021-01-15 NOTE — PROGRESS NOTES
1/15/2021    TELEHEALTH EVALUATION -- Audio/Visual (During VQRZI-07 public health emergency)    Due to COVID 19 outbreak, patient's office visit was converted to a virtual visit. Patient was contacted and agreed to proceed with a virtual visit via [email or web address]  The risks and benefits of converting to a virtual visit were discussed in light of the current infectious disease epidemic. Patient also understood that insurance coverage and co-pays are up to their individual insurance plans. HPI: Video visit patient was at home I was at my office    Nicole Mix (:  1950) has requested an audio/video evaluation for the following concern(s):    Follow-up on type 2 diabetes hypothyroidism lab review patient is on levothyroxine 100 mcg daily last TSH was slightly elevated    For diabetes patient is on NPH 30 units at bedtime plus regular insulin sliding scale aggressive blood sugar 4 times daily due to labile blood sugars to adjust insulin requesting freestyle judson continuous glucose monitoring last hemoglobin A1c was 6.8    Results for Damien Samuels (MRN [id number]) as of 1/15/2021 11:49   Ref.  Range 7/15/2020 10:32 10/12/2020 09:07   Sodium Latest Ref Range: 135 - 144 mEq/L 136 140   Potassium Latest Ref Range: 3.4 - 4.9 mEq/L 3.3 (L) 4.1   Chloride Latest Ref Range: 95 - 107 mEq/L 98 102   CO2 Latest Ref Range: 20 - 31 mEq/L 28 27   BUN Latest Ref Range: 8 - 23 mg/dL 13 10   Creatinine Latest Ref Range: 0.50 - 0.90 mg/dL 0.78 0.76   Anion Gap Latest Ref Range: 9 - 15 mEq/L 10 11   GFR Non- Latest Ref Range: >60  >60.0 >60.0   GFR African American Latest Ref Range: >60  >60.0 >60.0   Glucose Latest Ref Range: 70 - 99 mg/dL 191 (H) 103 (H)   Calcium Latest Ref Range: 8.5 - 9.9 mg/dL 9.4 10.1 (H)   Hemoglobin A1C Latest Ref Range: 4.8 - 5.9 % 8.7 (H) 6.8 (H)   TSH Latest Ref Range: 0.440 - 3.860 uIU/mL 3.500 4.730 (H)   T4 Free Latest Ref Range: 0.84 - 1.68 ng/dL 1.13 1.19 Insulin Syringe-Needle U-100 (AIMSCO INSULIN SYR ULTRA THIN) 31G X 5/16\" 0.3 ML MISC 1 each by Does not apply route 3 times daily  Boston Linda MD   diltiazem (CARDIZEM CD) 300 MG extended release capsule Take 300 mg by mouth daily  Historical Provider, MD   metoprolol tartrate (LOPRESSOR) 50 MG tablet Take 100 mg by mouth 2 times daily   Historical Provider, MD   lisinopril (PRINIVIL;ZESTRIL) 20 MG tablet Take 20 mg by mouth daily  Historical Provider, MD   escitalopram (LEXAPRO) 20 MG tablet Take 20 mg by mouth daily  Historical Provider, MD   digoxin (LANOXIN) 125 MCG tablet Take 125 mcg by mouth daily  Historical Provider, MD   traZODone (DESYREL) 50 MG tablet Take 50 mg by mouth nightly tAKE 1-2 TAB AT hs  Historical Provider, MD   diphenoxylate-atropine (LOMOTIL) 2.5-0.025 MG per tablet Take 1 tablet by mouth daily as needed for Diarrhea.  Indications: FOR DIARRHEA  Historical Provider, MD   tiZANidine (ZANAFLEX) 4 MG tablet Take 4 mg by mouth every 8 hours as needed (DO NOT EXCEED 3 DOSES IN 24HRS)  Historical Provider, MD   sulfaSALAzine (AZULFIDINE) 500 MG tablet Take 500 mg by mouth 2 times daily   Historical Provider, MD   potassium chloride (KLOR-CON M) 20 MEQ extended release tablet Take 1 tablet by mouth daily  Boston Linda MD   insulin lispro (HUMALOG KWIKPEN) 100 UNIT/ML pen 2 units plus sliding scale   Less than 150 none  151-200 2 units  201-250 4 units   251-300 6 units   301-400 8 units  Boston Linda MD   atorvastatin (LIPITOR) 40 MG tablet Take 40 mg by mouth daily  Historical Provider, MD   Blood Glucose Monitoring Suppl (ONETOUCH VERIO) w/Device KIT Give 1 meter to check bs Dx E11.65  MD ALEXANDRA LeyvaUCH DELICA LANCETS 18F MISC Use bid to check bs Dx O50.49  Boston Linda MD   azaTHIOprine (IMURAN) 50 MG tablet Take 100 mg by mouth daily  Historical Provider, MD nitroGLYCERIN (NITROSTAT) 0.4 MG SL tablet Place 0.4 mg under the tongue every 5 minutes as needed for Chest pain up to max of 3 total doses. If no relief after 1 dose, call 911. Historical Provider, MD   Multiple Vitamins-Minerals (PRESERVISION AREDS 2) CAPS Take 1 capsule by mouth 2 times daily  Historical Provider, MD   aspirin 81 MG tablet Take 81 mg by mouth daily  Historical Provider, MD   cetirizine (ZYRTEC) 10 MG tablet Take 10 mg by mouth daily   Historical Provider, MD   Lactobacillus (PROBIOTIC ACIDOPHILUS PO) Take by mouth daily  Historical Provider, MD   FOLIC ACID PO Take 968 mg by mouth 2 times daily  Historical Provider, MD   B Complex Vitamins (VITAMIN B COMPLEX PO) Take by mouth daily  Historical Provider, MD   Cholecalciferol (VITAMIN D3) 5000 UNITS TABS Take 1 tablet by mouth daily  Historical Provider, MD   albuterol sulfate HFA (VENTOLIN HFA) 108 (90 BASE) MCG/ACT inhaler Inhale 2 puffs into the lungs every 6 hours as needed for Wheezing  Historical Provider, MD   furosemide (LASIX) 40 MG tablet Take 40 mg by mouth daily as needed   Historical Provider, MD   methotrexate 2.5 MG tablet Take 2.5 mg by mouth once a week TAKE 8 TABS [address]  Historical Provider, MD       Social History     Tobacco Use    Smoking status: Former Smoker    Smokeless tobacco: Never Used    Tobacco comment: QUIT AT AGE 41   Substance Use Topics    Alcohol use: Not Currently    Drug use:  No            PHYSICAL EXAMINATION:  [ INSTRUCTIONS:  \"[x]\" Indicates a positive item  \"[]\" Indicates a negative item  -- DELETE ALL ITEMS NOT EXAMINED]  [] Alert  [] Oriented to person/place/time    [] No apparent distress  [] Toxic appearing    [] Face flushed appearing [] Sclera clear  [] Lips are cyanotic      [] Breathing appears normal  [] Appears tachypneic      [] Rash on visible skin    [] Cranial Nerves II-XII grossly intact    [] Motor grossly intact in visible upper extremities [] Motor grossly intact in visible lower extremities    [] Normal Mood  [] Anxious appearing    [] Depressed appearing  [] Confused appearing      [] Poor short term memory  [] Poor long term memory    [] OTHER:      Due to this being a TeleHealth encounter, evaluation of the following organ systems is limited: Vitals/Constitutional/EENT/Resp/CV/GI//MS/Neuro/Skin/Heme-Lymph-Imm. ASSESSMENT/PLAN:     Diagnosis Orders   1. Hypothyroidism, unspecified type  T4, Free    TSH without Reflex   2. Uncontrolled type 2 diabetes mellitus with hypoglycemia and coma (HCC)  Basic Metabolic Panel    Hemoglobin A1C     Orders Placed This Encounter   Procedures    Basic Metabolic Panel     Standing Status:   Future     Standing Expiration Date:   1/15/2022    Hemoglobin A1C     Standing Status:   Future     Standing Expiration Date:   1/15/2022    T4, Free     Standing Status:   Future     Standing Expiration Date:   1/15/2022    TSH without Reflex     Standing Status:   Future     Standing Expiration Date:   1/15/2022     Orders Placed This Encounter   Medications    insulin regular (HUMULIN R) 100 UNIT/ML injection     Sig: 10 units plus sliding scale   Less than 150 none   151-200 2 units   201-250 4 units   251-300 6 units   301-400 8 units     Dispense:  1 vial     Refill:  3    Continuous Blood Gluc Sensor (FREESTYLE ELENA 14 DAY SENSOR) MISC     Sig: Every 2 weeks     Dispense:  2 each     Refill:  06    Continuous Blood Gluc  (FREESTYLE ELENA READER) JOSÉ MIGUEL     Sig: As directed     Dispense:  1 Device     Refill:  0     Continue Novolin and 30 units at bedtime plus regular insulin sliding scale and activity levothyroxine 100 mcg daily patient to follow-up in 3 months time A1c goal of 7-8          An  electronic signature was used to authenticate this note.     --Ayo Bowden MD on 1/15/2021 at 11:48 AM Pursuant to the emergency declaration under the [facility], [facility] waiver authority and the [employer] Act, this Virtual  Visit was conducted, with patient's consent, to reduce the patient's risk of exposure to COVID-19 and provide continuity of care for an established patient. Services were provided through a video synchronous discussion virtually to substitute for in-person clinic visit.

## 2021-01-15 NOTE — TELEPHONE ENCOUNTER
Pharmacy  requesting medication refill.  Please approve or deny this request.    Rx requested:  Requested Prescriptions     Pending Prescriptions Disp Refills    insulin NPH (NOVOLIN N) 100 UNIT/ML injection vial 1 vial 3     Si units at bedtime         Last Office Visit:   10/15/2020      Next Visit Date:  Future Appointments   Date Time Provider Abiodun Mota   1/15/2021 11:45 AM Peter Morales MD [facility]   2021 11:45 AM SCHEDULE, [facility] [place] [facility] EMERGENCY [facility] AT [place]   2021 12:15 PM [place] PFT ROOM [facility] PFT [address] [place] [address]   2021 11:00 AM Jayce Curtis MD [facility]

## 2021-01-19 DIAGNOSIS — E03.9 HYPOTHYROIDISM, UNSPECIFIED TYPE: ICD-10-CM

## 2021-01-19 DIAGNOSIS — E11.641 UNCONTROLLED TYPE 2 DIABETES MELLITUS WITH HYPOGLYCEMIA AND COMA (HCC): ICD-10-CM

## 2021-01-19 LAB
ANION GAP SERPL CALCULATED.3IONS-SCNC: 12 MEQ/L (ref 9–15)
BUN BLDV-MCNC: 18 MG/DL (ref 8–23)
CALCIUM SERPL-MCNC: 10.6 MG/DL (ref 8.5–9.9)
CHLORIDE BLD-SCNC: 100 MEQ/L (ref 95–107)
CO2: 28 MEQ/L (ref 20–31)
CREAT SERPL-MCNC: 0.85 MG/DL (ref 0.5–0.9)
GFR AFRICAN AMERICAN: >60
GFR NON-AFRICAN AMERICAN: >60
GLUCOSE BLD-MCNC: 128 MG/DL (ref 70–99)
HBA1C MFR BLD: 6.7 % (ref 4.8–5.9)
POTASSIUM SERPL-SCNC: 3.7 MEQ/L (ref 3.4–4.9)
SODIUM BLD-SCNC: 140 MEQ/L (ref 135–144)
T4 FREE: 1.2 NG/DL (ref 0.84–1.68)
TSH SERPL DL<=0.05 MIU/L-ACNC: 1.78 UIU/ML (ref 0.44–3.86)

## 2021-01-21 ENCOUNTER — NURSE ONLY (OUTPATIENT)
Dept: PRIMARY CARE CLINIC | Age: 71
End: 2021-01-21

## 2021-01-22 LAB
SARS-COV-2: NOT DETECTED
SOURCE: NORMAL

## 2021-01-27 ENCOUNTER — HOSPITAL ENCOUNTER (OUTPATIENT)
Dept: PULMONOLOGY | Age: 71
Discharge: HOME | End: 2021-01-27
Payer: MEDICARE

## 2021-01-27 DIAGNOSIS — J45.20 MILD INTERMITTENT ASTHMA WITHOUT COMPLICATION: ICD-10-CM

## 2021-01-27 PROCEDURE — 6360000002 HC RX W HCPCS: Performed by: INTERNAL MEDICINE

## 2021-01-27 PROCEDURE — 94729 DIFFUSING CAPACITY: CPT | Performed by: INTERNAL MEDICINE

## 2021-01-27 PROCEDURE — 94726 PLETHYSMOGRAPHY LUNG VOLUMES: CPT

## 2021-01-27 PROCEDURE — 94060 EVALUATION OF WHEEZING: CPT

## 2021-01-27 PROCEDURE — 94726 PLETHYSMOGRAPHY LUNG VOLUMES: CPT | Performed by: INTERNAL MEDICINE

## 2021-01-27 PROCEDURE — 94060 EVALUATION OF WHEEZING: CPT | Performed by: INTERNAL MEDICINE

## 2021-01-27 PROCEDURE — 94729 DIFFUSING CAPACITY: CPT

## 2021-01-27 RX ORDER — ALBUTEROL SULFATE 2.5 MG/3ML
2.5 SOLUTION RESPIRATORY (INHALATION) ONCE
Status: COMPLETED | OUTPATIENT
Start: 2021-01-27 | End: 2021-01-27

## 2021-01-27 RX ADMIN — ALBUTEROL SULFATE 2.5 MG: 2.5 SOLUTION RESPIRATORY (INHALATION) at 12:38

## 2021-01-29 NOTE — PROCEDURES
[address]                      [facility], [address] [facility]                    PULMONARY FUNCTION  Lisseth Rk Book   72 y.o.   female  Height 69 in  Weight 239 lb      Referring provider   Kane Duran MD    Reading provider   Tere El    Test meets ATS criteria for acceptability and reproducibility Yes    Diagnosis: DEVINE: Yes  Cough   Yes, wheezing Yes  Smoking   quit 35 years ago    Spirometry   FVC            2.83 L   77%  Post bronchodilator 2.81 L  76% Change 0 %  FEV1          2.28 L  81%   Post bronchodilator  2.33 L  83%   Change 2%  FEV1/FVC  80  %             Post bronchodilator  82 %  NCP63-01% 2.14 L  95%  Post bronchodilator  2.40 L  106%   Change 11%    Lung volume   SVC           2.73 L  74%   RV              1.73 L 71%   TLC            4.46  L 76%   RV/TLC      38 %    DLCO           67 %     Test interpretation     Spirometry suggests restriction, with no significant response to bronchodilator  Lung volumes confirms mild restrictive lung disease  Diffusion capacity is mildly reduced       Clinical correlation is recommended     Rebecca Dorman [facility], 1/29/2021 1:05 PM

## 2021-02-02 RX ORDER — GABAPENTIN 300 MG/1
CAPSULE ORAL
Qty: 90 CAPSULE | Refills: 3 | Status: SHIPPED | OUTPATIENT
Start: 2021-02-02 | End: 2021-06-08

## 2021-02-03 ENCOUNTER — HOSPITAL ENCOUNTER (OUTPATIENT)
Dept: SLEEP CENTER | Age: 71
Discharge: HOME | End: 2021-02-05
Payer: MEDICARE

## 2021-02-03 PROCEDURE — 95810 POLYSOM 6/> YRS 4/> PARAM: CPT

## 2021-02-03 PROCEDURE — 95810 POLYSOM 6/> YRS 4/> PARAM: CPT | Performed by: INTERNAL MEDICINE

## 2021-02-09 ENCOUNTER — OFFICE VISIT (OUTPATIENT)
Dept: PULMONOLOGY | Age: 71
End: 2021-02-09
Payer: MEDICARE

## 2021-02-09 VITALS
TEMPERATURE: 97.3 F | HEIGHT: 69 IN | OXYGEN SATURATION: 94 % | RESPIRATION RATE: 16 BRPM | SYSTOLIC BLOOD PRESSURE: 108 MMHG | HEART RATE: 64 BPM | DIASTOLIC BLOOD PRESSURE: 58 MMHG | BODY MASS INDEX: 33.77 KG/M2 | WEIGHT: 228 LBS

## 2021-02-09 DIAGNOSIS — R09.02 HYPOXIA: ICD-10-CM

## 2021-02-09 DIAGNOSIS — E66.9 OBESITY (BMI 30-39.9): ICD-10-CM

## 2021-02-09 DIAGNOSIS — G47.30 SLEEP APNEA, UNSPECIFIED TYPE: Primary | ICD-10-CM

## 2021-02-09 DIAGNOSIS — J45.20 MILD INTERMITTENT ASTHMA WITHOUT COMPLICATION: ICD-10-CM

## 2021-02-09 DIAGNOSIS — G47.30 SLEEP APNEA, UNSPECIFIED TYPE: ICD-10-CM

## 2021-02-09 PROCEDURE — 1090F PRES/ABSN URINE INCON ASSESS: CPT | Performed by: INTERNAL MEDICINE

## 2021-02-09 PROCEDURE — G8399 PT W/DXA RESULTS DOCUMENT: HCPCS | Performed by: INTERNAL MEDICINE

## 2021-02-09 PROCEDURE — G8484 FLU IMMUNIZE NO ADMIN: HCPCS | Performed by: INTERNAL MEDICINE

## 2021-02-09 PROCEDURE — G8427 DOCREV CUR MEDS BY ELIG CLIN: HCPCS | Performed by: INTERNAL MEDICINE

## 2021-02-09 PROCEDURE — 4040F PNEUMOC VAC/ADMIN/RCVD: CPT | Performed by: INTERNAL MEDICINE

## 2021-02-09 PROCEDURE — 1036F TOBACCO NON-USER: CPT | Performed by: INTERNAL MEDICINE

## 2021-02-09 PROCEDURE — 3017F COLORECTAL CA SCREEN DOC REV: CPT | Performed by: INTERNAL MEDICINE

## 2021-02-09 PROCEDURE — 99214 OFFICE O/P EST MOD 30 MIN: CPT | Performed by: INTERNAL MEDICINE

## 2021-02-09 PROCEDURE — G8417 CALC BMI ABV UP PARAM F/U: HCPCS | Performed by: INTERNAL MEDICINE

## 2021-02-09 PROCEDURE — 1123F ACP DISCUSS/DSCN MKR DOCD: CPT | Performed by: INTERNAL MEDICINE

## 2021-02-09 NOTE — PROGRESS NOTES
Subjective:     Antonetta Nageotte is a 72 y.o. female who complains today of:     Chief Complaint   Patient presents with    Follow-up     f/u for PFT , sleep study results. HPI  She is on O2 with sleep 3 lit  and flovent HFA. She is not using albuterol inhaler and albuterol HFA prn . She had PFT done show restrictive disease. Decreased DLCO   She has c/o snoring , she feels tired and sleepy during daytime. PSG show mild sleep apnea   C/o shortness of breath  with exertion not all the time . Occasional Wheezing   Occasional Cough with  Sputum  No Hemoptysis  No Chest tightness   No Chest pain with radiation  or pleuritic pain  No Fever or chills. No Rhinorrhea and postnasal drip. Allergies:  Latex, Accupril [quinapril hcl], Bactine [lidocaine-benzalkonium], Other, and Tape [adhesive tape]  Past Medical History:   Diagnosis Date    [facility]     Aplasia of adrenal gland     CAD (coronary artery disease)     Diabetes mellitus ([facility]     DJD (degenerative joint disease)     HLD (hyperlipidemia)     Hypertension     Hypothyroidism     Obesity     CHANA (obstructive sleep apnea)     RA (rheumatoid arthritis) ([facility]     Rotator cuff tendinitis     RIGHT     Past Surgical History:   Procedure Laterality Date    CARPAL TUNNEL RELEASE Left      SECTION      CHOLECYSTECTOMY      CORONARY ANGIOPLASTY WITH STENT PLACEMENT  2019    prior caths as well, total 7 stents    HIP CLOSED REDUCTION Left 2019    HIP CLOSED REDUCTION performed by Faustino Howard DO at [address], LEFT SHOULDER AND HIP AS WELL     No family history on file.   Social History     Socioeconomic History    Marital status:      Spouse name: Hosey Denver Number of children: 2    Years of education: 12    Highest education level: Associate degree: academic program   Occupational History    Occupation: retired  [employer]     Comment:    Social Needs  Financial resource strain: Somewhat hard    Food insecurity     Worry: Never true     Inability: Never true    Transportation needs     Medical: No     Non-medical: No   Tobacco Use    Smoking status: Former Smoker    Smokeless tobacco: Never Used    Tobacco comment: QUIT AT AGE 37   Substance and Sexual Activity    Alcohol use: Not Currently    Drug use: No    Sexual activity: Not Currently   Lifestyle    Physical activity     Days per week: 0 days     Minutes per session: 0 min    Stress: Rather much   Relationships    Social connections     Talks on phone: More than three times a week     Gets together: More than three times a week     Attends Latter-day service: Never     Active member of club or organization: No     Attends meetings of clubs or organizations: Never     Relationship status:     Intimate partner violence     Fear of current or ex partner: No     Emotionally abused: No     Physically abused: No     Forced sexual activity: No   Other Topics Concern    Not on file   Social History Narrative         Lives With: Spouse    Type of Home: 2 story  House    Home Layout: Two level, Able to Live on Main level with bedroom/bathroom    Home Access: Stairs to enter with rails    Entrance Stairs - Number of Steps: 3-4    Bathroom Shower/Tub: Tub/Shower unit    Bathroom Equipment: Shower chair, Grab bars in shower    Home Equipment: 4 wheeled walker, Cane, Electric scooter    ADL Assistance: Independent    Homemaking Assistance: Independent    Homemaking Responsibilities: Yes    Meal Prep Responsibility: Primary    Laundry Responsibility: Primary    Cleaning Responsibility: Primary    Bill Paying/Finance Responsibility: Primary    Shopping Responsibility: Primary    Health Care Management: Primary    Ambulation Assistance: Independent(cane in house; scooter for longer distances)    Transfer Assistance: Independent    Leisure & Hobbies: likes to maria elena and build [email or web address] Review of Systems   Constitutional: Negative for chills, diaphoresis, fatigue and fever. HENT: Negative for congestion, mouth sores, nosebleeds, postnasal drip, rhinorrhea, sinus pressure, sneezing, sore throat, trouble swallowing and voice change. Eyes: Negative for discharge, itching and visual disturbance. Respiratory: Positive for cough, shortness of breath and wheezing. Negative for chest tightness. Cardiovascular: Negative for chest pain, palpitations and leg swelling. Gastrointestinal: Negative for abdominal pain, diarrhea, nausea and vomiting. Genitourinary: Negative for difficulty urinating and hematuria. Musculoskeletal: Negative for arthralgias, joint swelling and myalgias. Skin: Negative for rash. Allergic/Immunologic: Negative for environmental allergies and food allergies. Neurological: Negative for dizziness, tremors, weakness and headaches. Psychiatric/Behavioral: Negative for behavioral problems and sleep disturbance.         :     Vitals:    02/09/21 1056 02/09/21 1115   BP: (!) 108/58 (!) 108/58   Pulse: 64    Resp: 16    Temp: 97.3 °F (36.3 °C)    TempSrc: Temporal    SpO2: 94%    Weight: 228 lb (103.4 kg)    Height: 5' 9\" (1.753 m)      Wt Readings from Last 3 Encounters:   02/09/21 228 lb (103.4 kg)   01/08/21 239 lb (108.4 kg)   02/19/20 248 lb (112.5 kg)         Physical Exam  Constitutional:       General: She is not in acute distress. Appearance: She is well-developed. She is obese. She is not diaphoretic. HENT:      Head: Normocephalic and atraumatic. Nose: Nose normal.   Eyes:      Pupils: Pupils are equal, round, and reactive to light. Neck:      Thyroid: No thyromegaly. Vascular: No JVD. Trachea: No tracheal deviation. Cardiovascular:      Rate and Rhythm: Normal rate and regular rhythm. Heart sounds: No murmur. No friction rub. No gallop. Pulmonary:      Effort: No respiratory distress. Breath sounds: No wheezing or rales. Chest:      Chest wall: No tenderness. Abdominal:      General: There is no distension. Tenderness: There is no abdominal tenderness. There is no rebound. Musculoskeletal: Normal range of motion. Lymphadenopathy:      Cervical: No cervical adenopathy. Skin:     General: Skin is warm and dry. Neurological:      Mental Status: She is alert and oriented to person, place, and time.       Coordination: Coordination normal.         Current Outpatient Medications   Medication Sig Dispense Refill    gabapentin (NEURONTIN) 300 MG capsule TAKE ONE CAPSULE BY MOUTH THREE TIMES DAILY 90 capsule 3    insulin NPH (NOVOLIN N) 100 UNIT/ML injection vial 30 units at bedtime 1 vial 3    insulin regular (HUMULIN R) 100 UNIT/ML injection 10 units plus sliding scale   Less than 150 none   151-200 2 units   201-250 4 units   251-300 6 units   301-400 8 units 1 vial 3    Continuous Blood Gluc Sensor (FREESTYLE ELENA 14 DAY SENSOR) MISC Every 2 weeks 2 each 06    Continuous Blood Gluc  (FREESTYLE ELENA READER) JOSÉ MIGUEL As directed 1 Device 0    buPROPion (WELLBUTRIN XL) 300 MG extended release tablet TAKE 1 TABLET BY MOUTH ONCE DAILY      warfarin (COUMADIN) 2 MG tablet TAKE 1 TABLET BY MOUTH ONCE DAILY OR AS DIRECTED BY [facility]      fluticasone propionate (FLOVENT DISKUS) 100 MCG/BLIST AEPB inhaler Inhale into the lungs daily      levothyroxine (SYNTHROID) 100 MCG tablet Take 1 tablet by mouth Daily 90 tablet 3    predniSONE (DELTASONE) 10 MG tablet 1 1/2 po daily 60 tablet 01    blood glucose test strips (ONETOUCH VERIO) strip USE 1 STRIP TO CHECK GLUCOSE 4 TIMES DAILY 200 each 3    Insulin Syringe-Needle U-100 (AIMSCO INSULIN SYR ULTRA THIN) 31G X 5/16\" 0.3 ML MISC 1 each by Does not apply route 3 times daily 100 each 3    diltiazem (CARDIZEM CD) 300 MG extended release capsule Take 300 mg by mouth daily      metoprolol tartrate (LOPRESSOR) 50 MG tablet Take 100 mg by mouth 2 times daily  lisinopril (PRINIVIL;ZESTRIL) 20 MG tablet Take 20 mg by mouth daily      escitalopram (LEXAPRO) 20 MG tablet Take 20 mg by mouth daily      digoxin (LANOXIN) 125 MCG tablet Take 125 mcg by mouth daily      traZODone (DESYREL) 50 MG tablet Take 50 mg by mouth nightly tAKE 1-2 TAB AT hs      diphenoxylate-atropine (LOMOTIL) 2.5-0.025 MG per tablet Take 1 tablet by mouth daily as needed for Diarrhea. Indications: FOR DIARRHEA      tiZANidine (ZANAFLEX) 4 MG tablet Take 4 mg by mouth every 8 hours as needed (DO NOT EXCEED 3 DOSES IN 24HRS)      sulfaSALAzine (AZULFIDINE) 500 MG tablet Take 500 mg by mouth 2 times daily       potassium chloride (KLOR-CON M) 20 MEQ extended release tablet Take 1 tablet by mouth daily 30 tablet 0    insulin lispro (HUMALOG KWIKPEN) 100 UNIT/ML pen 2 units plus sliding scale   Less than 150 none  151-200 2 units  201-250 4 units   251-300 6 units   301-400 8 units 5 pen 3    atorvastatin (LIPITOR) 40 MG tablet Take 40 mg by mouth daily      Blood Glucose Monitoring Suppl (ONETOUCH VERIO) w/Device KIT Give 1 meter to check bs Dx E11.65 1 kit 0    ONETOUCH DELICA LANCETS 15C MISC Use bid to check bs Dx E11.65 100 each 03    azaTHIOprine (IMURAN) 50 MG tablet Take 100 mg by mouth daily      nitroGLYCERIN (NITROSTAT) 0.4 MG SL tablet Place 0.4 mg under the tongue every 5 minutes as needed for Chest pain up to max of 3 total doses. If no relief after 1 dose, call 911.       Multiple Vitamins-Minerals (PRESERVISION AREDS 2) CAPS Take 1 capsule by mouth 2 times daily      aspirin 81 MG tablet Take 81 mg by mouth daily      cetirizine (ZYRTEC) 10 MG tablet Take 10 mg by mouth daily       Lactobacillus (PROBIOTIC ACIDOPHILUS PO) Take by mouth daily      FOLIC ACID PO Take 190 mg by mouth 2 times daily      B Complex Vitamins (VITAMIN B COMPLEX PO) Take by mouth daily      Cholecalciferol (VITAMIN D3) 5000 UNITS TABS Take 1 tablet by mouth daily Suggested weight control approaches, including dietary changes , exercise, behavioral modification. Return in about 4 weeks (around 3/9/2021) for dane, sleep study.       Amy Johnson MD

## 2021-02-17 ENCOUNTER — HOSPITAL ENCOUNTER (OUTPATIENT)
Dept: SLEEP CENTER | Age: 71
Discharge: HOME | End: 2021-02-19
Payer: MEDICARE

## 2021-02-17 PROCEDURE — 95811 POLYSOM 6/>YRS CPAP 4/> PARM: CPT | Performed by: INTERNAL MEDICINE

## 2021-02-17 PROCEDURE — 95811 POLYSOM 6/>YRS CPAP 4/> PARM: CPT

## 2021-02-21 ASSESSMENT — ENCOUNTER SYMPTOMS
SINUS PRESSURE: 0
COUGH: 1
SHORTNESS OF BREATH: 1
EYE ITCHING: 0
EYE DISCHARGE: 0
TROUBLE SWALLOWING: 0
CHEST TIGHTNESS: 0
ABDOMINAL PAIN: 0
VOICE CHANGE: 0
SORE THROAT: 0
WHEEZING: 1
VOMITING: 0
RHINORRHEA: 0
NAUSEA: 0
DIARRHEA: 0

## 2021-02-24 DIAGNOSIS — G47.30 SLEEP APNEA, UNSPECIFIED TYPE: ICD-10-CM

## 2021-02-25 RX ORDER — PREDNISONE 10 MG/1
TABLET ORAL
Qty: 60 TABLET | Refills: 1 | Status: SHIPPED | OUTPATIENT
Start: 2021-02-25

## 2021-03-17 ENCOUNTER — OFFICE VISIT (OUTPATIENT)
Dept: PULMONOLOGY | Age: 71
End: 2021-03-17
Payer: MEDICARE

## 2021-03-17 VITALS
HEIGHT: 69 IN | OXYGEN SATURATION: 95 % | RESPIRATION RATE: 18 BRPM | BODY MASS INDEX: 32.44 KG/M2 | DIASTOLIC BLOOD PRESSURE: 60 MMHG | SYSTOLIC BLOOD PRESSURE: 112 MMHG | HEART RATE: 93 BPM | TEMPERATURE: 98.5 F | WEIGHT: 219 LBS

## 2021-03-17 DIAGNOSIS — R06.02 SHORTNESS OF BREATH: ICD-10-CM

## 2021-03-17 DIAGNOSIS — G47.30 SLEEP APNEA, UNSPECIFIED TYPE: Primary | ICD-10-CM

## 2021-03-17 PROCEDURE — G8484 FLU IMMUNIZE NO ADMIN: HCPCS | Performed by: INTERNAL MEDICINE

## 2021-03-17 PROCEDURE — G8427 DOCREV CUR MEDS BY ELIG CLIN: HCPCS | Performed by: INTERNAL MEDICINE

## 2021-03-17 PROCEDURE — G8417 CALC BMI ABV UP PARAM F/U: HCPCS | Performed by: INTERNAL MEDICINE

## 2021-03-17 PROCEDURE — 4040F PNEUMOC VAC/ADMIN/RCVD: CPT | Performed by: INTERNAL MEDICINE

## 2021-03-17 PROCEDURE — 3017F COLORECTAL CA SCREEN DOC REV: CPT | Performed by: INTERNAL MEDICINE

## 2021-03-17 PROCEDURE — 1036F TOBACCO NON-USER: CPT | Performed by: INTERNAL MEDICINE

## 2021-03-17 PROCEDURE — 1123F ACP DISCUSS/DSCN MKR DOCD: CPT | Performed by: INTERNAL MEDICINE

## 2021-03-17 PROCEDURE — 1090F PRES/ABSN URINE INCON ASSESS: CPT | Performed by: INTERNAL MEDICINE

## 2021-03-17 PROCEDURE — 99215 OFFICE O/P EST HI 40 MIN: CPT | Performed by: INTERNAL MEDICINE

## 2021-03-17 PROCEDURE — G8399 PT W/DXA RESULTS DOCUMENT: HCPCS | Performed by: INTERNAL MEDICINE

## 2021-03-17 RX ORDER — DOXYCYCLINE HYCLATE 100 MG/1
CAPSULE ORAL
COMMUNITY
Start: 2021-03-10

## 2021-03-17 ASSESSMENT — ENCOUNTER SYMPTOMS
COUGH: 0
SHORTNESS OF BREATH: 1
RHINORRHEA: 0
VOICE CHANGE: 0
DIARRHEA: 0
WHEEZING: 0
CHEST TIGHTNESS: 0
NAUSEA: 0
VOMITING: 0
SINUS PRESSURE: 0
SORE THROAT: 0
EYE ITCHING: 0
ABDOMINAL PAIN: 0
TROUBLE SWALLOWING: 0
EYE DISCHARGE: 0

## 2021-03-17 NOTE — PROGRESS NOTES
Subjective:     Marylene Hammans is a 72 y.o. female who complains today of:     Chief Complaint   Patient presents with    Follow-up     f/u for Sleep Study results. HPI  She is on O2 with sleep 3 lit  and flovent HFA. She is not using any other inhaler. She was in hospital at [facility]  For shortness of breath . According to daughter , she was found having pleural effusion and interstitial  Diease. C/o shortness of breath  with exertion not all the time. She also has occasional wheezing and cough with clear mucus. No HemoptysisNo Chest tightness   No Chest pain with radiation  or pleuritic painNo Fever or chills. No Rhinorrhea and postnasal drip. She is on methotrexate for RA. She had PFT done show restrictive disease. Decreased DLCO . Since I do not have any chest x-ray or CT chest which was done during admission at [facility] at [facility] recommend for record release from her admission [facility].    She has c/o snoring , she feels tired and sleepy during daytime. PSG show mild sleep apnea . She had CPAP study and therapeutic CPAP at 7 cm . I reviewed PSG and CPAP study with patient and daughter. She is willing to use CPAP.       Allergies:  Latex, Accupril [quinapril hcl], Bactine [lidocaine-benzalkonium], Other, and Tape [adhesive tape]  Past Medical History:   Diagnosis Date    [facility]fib [facility] [address]     Aplasia of adrenal gland     CAD (coronary artery disease)     Diabetes mellitus ([facility] [address]     DJD (degenerative joint disease)     HLD (hyperlipidemia)     Hypertension     Hypothyroidism     Obesity     CHANA (obstructive sleep apnea)     RA (rheumatoid arthritis) (HCC)     Rotator cuff tendinitis     RIGHT     Past Surgical History:   Procedure Laterality Date    CARPAL TUNNEL RELEASE Left      SECTION      CHOLECYSTECTOMY      CORONARY ANGIOPLASTY WITH STENT PLACEMENT  2019    prior caths as well, total 7 stents    HIP CLOSED REDUCTION Left 2019    HIP CLOSED REDUCTION performed by Nessa Coleman,  at [id number], LEFT SHOULDER AND HIP AS WELL     No family history on file.   Social History     Socioeconomic History    Marital status:      Spouse name: Malissa Salazar Number of children: 2    Years of education: 12    Highest education level: Associate degree: academic program   Occupational History    Occupation: retired  [employer]     Comment:    Social Needs    Financial resource strain: Somewhat hard   Bhanu-Mj insecurity     Worry: Never true     Inability: Never true    Transportation needs     Medical: No     Non-medical: No   Tobacco Use    Smoking status: Former Smoker    Smokeless tobacco: Never Used    Tobacco comment: QUIT AT AGE 41   Substance and Sexual Activity    Alcohol use: Not Currently    Drug use: No    Sexual activity: Not Currently   Lifestyle    Physical activity     Days per week: 0 days     Minutes per session: 0 min    Stress: Rather much   Relationships    Social connections     Talks on phone: More than three times a week     Gets together: More than three times a week     Attends [employer] service: Never     Active member of club or organization: No     Attends meetings of clubs or organizations: Never     Relationship status:     Intimate partner violence     Fear of current or ex partner: No     Emotionally abused: No     Physically abused: No     Forced sexual activity: No   Other Topics Concern    Not on file   Social History Narrative         Lives With: Spouse    Type of Home: 2 story  House    Home Layout: Two level, Able to Live on Main level with bedroom/bathroom    Home Access: Stairs to enter with rails    Entrance Stairs - Number of Steps: 3-4    Bathroom Shower/Tub: Tub/Shower unit    Bathroom Equipment: Shower chair, Grab bars in shower    Home Equipment: 4 wheeled walker, [employer], Electric scooter    ADL Assistance: Independent    Homemaking Assistance: Independent Homemaking Responsibilities: Yes    Meal Prep Responsibility: Primary    Laundry Responsibility: Primary    Cleaning Responsibility: Primary    Bill Paying/Finance Responsibility: Primary    Shopping Responsibility: Primary    Health Care Management: Primary    Ambulation Assistance: Independent(cane in house; scooter for longer distances)    Transfer Assistance: Katelyn 62: likes to maria elena and build [employer]         Review of Systems   Constitutional: Negative for chills, diaphoresis, fatigue and fever. HENT: Negative for congestion, mouth sores, nosebleeds, postnasal drip, rhinorrhea, sinus pressure, sneezing, sore throat, trouble swallowing and voice change. Eyes: Negative for discharge, itching and visual disturbance. Respiratory: Positive for shortness of breath. Negative for cough, chest tightness and wheezing. Cardiovascular: Negative for chest pain, palpitations and leg swelling. Gastrointestinal: Negative for abdominal pain, diarrhea, nausea and vomiting. Genitourinary: Negative for difficulty urinating and hematuria. Musculoskeletal: Negative for arthralgias, joint swelling and myalgias. Skin: Negative for rash. Allergic/Immunologic: Negative for environmental allergies and food allergies. Neurological: Negative for dizziness, tremors, weakness and headaches. Psychiatric/Behavioral: Positive for sleep disturbance. Negative for behavioral problems. :     Vitals:    03/17/21 1035   BP: 112/60   Pulse: 93   Resp: 18   Temp: 98.5 °F (36.9 °C)   SpO2: 95%   Weight: 219 lb (99.3 kg)   Height: 5' 9\" (1.753 m)     Wt Readings from Last 3 Encounters:   03/17/21 219 lb (99.3 kg)   02/09/21 228 lb (103.4 kg)   01/08/21 239 lb (108.4 kg)         Physical Exam  Constitutional:       General: She is not in acute distress. Appearance: She is well-developed. She is not diaphoretic. HENT:      Head: Normocephalic and atraumatic.       Nose: Nose normal.   Eyes: Pupils: Pupils are equal, round, and reactive to light. Neck:      Thyroid: No thyromegaly. Vascular: No JVD. Trachea: No tracheal deviation. Cardiovascular:      Rate and Rhythm: Normal rate and regular rhythm. Heart sounds: No murmur. No friction rub. No gallop. Pulmonary:      Effort: No respiratory distress. Breath sounds: No wheezing or rales. Chest:      Chest wall: No tenderness. Abdominal:      General: There is no distension. Tenderness: There is no abdominal tenderness. There is no rebound. Musculoskeletal: Normal range of motion. Lymphadenopathy:      Cervical: No cervical adenopathy. Skin:     General: Skin is warm and dry. Neurological:      Mental Status: She is alert and oriented to person, place, and time.       Coordination: Coordination normal.         Current Outpatient Medications   Medication Sig Dispense Refill    doxycycline hyclate (VIBRAMYCIN) 100 MG capsule TAKE 1 CAPSULE BY MOUTH TWICE DAILY      CPAP Machine MISC by Does not apply route New CPAP with 7 cm 1 each 0    predniSONE (DELTASONE) 10 MG tablet 1 1/2 po daily 60 tablet 01    gabapentin (NEURONTIN) 300 MG capsule TAKE ONE CAPSULE BY MOUTH THREE TIMES DAILY 90 capsule 3    insulin NPH (NOVOLIN N) 100 UNIT/ML injection vial 30 units at bedtime 1 vial 3    insulin regular (HUMULIN R) 100 UNIT/ML injection 10 units plus sliding scale   Less than 150 none   151-200 2 units   201-250 4 units   251-300 6 units   301-400 8 units 1 vial 3    Continuous Blood Gluc Sensor (FREESTYLE ELENA 14 DAY SENSOR) MISC Every 2 weeks 2 each 06    Continuous Blood Gluc  (FREESTYLE ELENA READER) JOSÉ MIGUEL As directed 1 Device 0    buPROPion (WELLBUTRIN XL) 300 MG extended release tablet TAKE 1 TABLET BY MOUTH ONCE DAILY      warfarin (COUMADIN) 2 MG tablet TAKE 1 TABLET BY MOUTH ONCE DAILY OR AS DIRECTED BY [facility]      fluticasone propionate (FLOVENT DISKUS) 100 MCG/BLIST AEPB inhaler Inhale into the lungs daily      levothyroxine (SYNTHROID) 100 MCG tablet Take 1 tablet by mouth Daily 90 tablet 3    blood glucose test strips (ONETOUCH VERIO) strip USE 1 STRIP TO CHECK GLUCOSE 4 TIMES DAILY 200 each 3    Insulin Syringe-Needle U-100 (AIMSCO INSULIN SYR ULTRA THIN) 31G X 5/16\" 0.3 ML MISC 1 each by Does not apply route 3 times daily 100 each 3    diltiazem (CARDIZEM CD) 300 MG extended release capsule Take 300 mg by mouth daily      metoprolol tartrate (LOPRESSOR) 50 MG tablet Take 100 mg by mouth 2 times daily       lisinopril (PRINIVIL;ZESTRIL) 20 MG tablet Take 20 mg by mouth daily      escitalopram (LEXAPRO) 20 MG tablet Take 20 mg by mouth daily      digoxin (LANOXIN) 125 MCG tablet Take 125 mcg by mouth daily      traZODone (DESYREL) 50 MG tablet Take 50 mg by mouth nightly tAKE 1-2 TAB AT hs      diphenoxylate-atropine (LOMOTIL) 2.5-0.025 MG per tablet Take 1 tablet by mouth daily as needed for Diarrhea. Indications: FOR DIARRHEA      tiZANidine (ZANAFLEX) 4 MG tablet Take 4 mg by mouth every 8 hours as needed (DO NOT EXCEED 3 DOSES IN 24HRS)      sulfaSALAzine (AZULFIDINE) 500 MG tablet Take 500 mg by mouth 2 times daily       potassium chloride (KLOR-CON M) 20 MEQ extended release tablet Take 1 tablet by mouth daily 30 tablet 0    insulin lispro (HUMALOG KWIKPEN) 100 UNIT/ML pen 2 units plus sliding scale   Less than 150 none  151-200 2 units  201-250 4 units   251-300 6 units   301-400 8 units 5 pen 3    atorvastatin (LIPITOR) 40 MG tablet Take 40 mg by mouth daily      Blood Glucose Monitoring Suppl (ONETOUCH VERIO) w/Device KIT Give 1 meter to check bs Dx E11.65 1 kit 0    ONETOUCH DELICA LANCETS 16E MISC Use bid to check bs Dx E11.65 100 each 03    azaTHIOprine (IMURAN) 50 MG tablet Take 100 mg by mouth daily      nitroGLYCERIN (NITROSTAT) 0.4 MG SL tablet Place 0.4 mg under the tongue every 5 minutes as needed for Chest pain up to max of 3 total doses.  If no relief after 1 machinery if there is a feeling of drowsiness, especially while driving it is preferable to stop driving and take a brief nap. Sleep hygiene:Avoid supine sleep, sleep on  sides. Avoid  sleep deprivation. Explained sleep hygiene. Advice to avoid Alcohol and sedative     2. Shortness of breath  She was in hospital at [facility]  For shortness of breath . According to daughter , she was found having pleural effusion and interstitial  Diease. She was told that it could be due to pneumonia or heart related. At this time c/o shortness of breath  with exertion not all the time. She also has occasional wheezing and cough with clear mucus. She is on methotrexate for RA. She had PFT done show restrictive disease. Decreased DLCO . Since I do not have any chest x-ray or CT chest which was done during admission at [facility]. recommend for record release from her admission [facility].  Meanwhile I will request follow-up chest x-ray here at [facility]. - XR CHEST STANDARD (2 VW); Future    3. Hypoxia  She is on O2 with sleep 3 lit  and flovent HFA. She is not using any other inhaler. Continue O2 to keep saturation 90% above    Time spent over 40 minutes  Return in about 4 weeks (around 4/14/2021) for dane, CXR result, shortness of breath.       Victor M Peters MD

## 2021-03-29 ENCOUNTER — HOSPITAL ENCOUNTER (EMERGENCY)
Age: 71
Discharge: HOME | End: 2021-03-29
Payer: MEDICARE

## 2021-03-29 ENCOUNTER — APPOINTMENT (OUTPATIENT)
Dept: GENERAL RADIOLOGY | Age: 71
End: 2021-03-29
Payer: MEDICARE

## 2021-03-29 VITALS
TEMPERATURE: 97.9 F | HEART RATE: 80 BPM | BODY MASS INDEX: 32.14 KG/M2 | OXYGEN SATURATION: 97 % | DIASTOLIC BLOOD PRESSURE: 66 MMHG | RESPIRATION RATE: 20 BRPM | WEIGHT: 217 LBS | SYSTOLIC BLOOD PRESSURE: 105 MMHG | HEIGHT: 69 IN
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
## 2021-03-29 LAB
GLUCOSE BLD-MCNC: 70 MG/DL (ref 60–115)
PERFORMED ON: NORMAL

## 2021-03-29 PROCEDURE — 90471 IMMUNIZATION ADMIN: CPT | Performed by: PHYSICIAN ASSISTANT

## 2021-03-29 PROCEDURE — 99283 EMERGENCY DEPT VISIT LOW MDM: CPT

## 2021-03-29 PROCEDURE — 6360000002 HC RX W HCPCS: Performed by: PHYSICIAN ASSISTANT

## 2021-03-29 PROCEDURE — 73590 X-RAY EXAM OF LOWER LEG: CPT

## 2021-03-29 PROCEDURE — 6370000000 HC RX 637 (ALT 250 FOR IP): Performed by: PHYSICIAN ASSISTANT

## 2021-03-29 PROCEDURE — 90715 TDAP VACCINE 7 YRS/> IM: CPT | Performed by: PHYSICIAN ASSISTANT

## 2021-03-29 RX ORDER — AMOXICILLIN AND CLAVULANATE POTASSIUM 875; 125 MG/1; MG/1
1 TABLET, FILM COATED ORAL ONCE
Status: COMPLETED | OUTPATIENT
Start: 2021-03-29 | End: 2021-03-29

## 2021-03-29 RX ORDER — TRAMADOL HYDROCHLORIDE 50 MG/1
50 TABLET ORAL EVERY 6 HOURS PRN
Qty: 20 TABLET | Refills: 0 | Status: SHIPPED | OUTPATIENT
Start: 2021-03-29 | End: 2021-04-01

## 2021-03-29 RX ORDER — TRAMADOL HYDROCHLORIDE 50 MG/1
50 TABLET ORAL ONCE
Status: COMPLETED | OUTPATIENT
Start: 2021-03-29 | End: 2021-03-29

## 2021-03-29 RX ORDER — CEPHALEXIN 500 MG/1
500 CAPSULE ORAL 3 TIMES DAILY
COMMUNITY

## 2021-03-29 RX ORDER — BACITRACIN, NEOMYCIN, POLYMYXIN B 400; 3.5; 5 [USP'U]/G; MG/G; [USP'U]/G
OINTMENT TOPICAL ONCE
Status: COMPLETED | OUTPATIENT
Start: 2021-03-29 | End: 2021-03-29

## 2021-03-29 RX ORDER — AMOXICILLIN AND CLAVULANATE POTASSIUM 875; 125 MG/1; MG/1
1 TABLET, FILM COATED ORAL 2 TIMES DAILY
Qty: 14 TABLET | Refills: 0 | Status: SHIPPED | OUTPATIENT
Start: 2021-03-29 | End: 2021-04-05

## 2021-03-29 RX ADMIN — BACITRACIN ZINC, NEOMYCIN, POLYMYXIN B 2 G: 400; 3.5; 5 OINTMENT TOPICAL at 06:48

## 2021-03-29 RX ADMIN — TETANUS TOXOID, REDUCED DIPHTHERIA TOXOID AND ACELLULAR PERTUSSIS VACCINE, ADSORBED 0.5 ML: 5; 2.5; 8; 8; 2.5 SUSPENSION INTRAMUSCULAR at 06:47

## 2021-03-29 RX ADMIN — TRAMADOL HYDROCHLORIDE 50 MG: 50 TABLET, FILM COATED ORAL at 07:55

## 2021-03-29 RX ADMIN — AMOXICILLIN AND CLAVULANATE POTASSIUM 1 TABLET: 875; 125 TABLET, FILM COATED ORAL at 06:47

## 2021-03-29 ASSESSMENT — ENCOUNTER SYMPTOMS
RHINORRHEA: 0
COLOR CHANGE: 0
EYE DISCHARGE: 0
SHORTNESS OF BREATH: 0
SORE THROAT: 0
ABDOMINAL PAIN: 0
CONSTIPATION: 0
ABDOMINAL DISTENTION: 0

## 2021-03-29 ASSESSMENT — PAIN DESCRIPTION - LOCATION: LOCATION: LEG

## 2021-03-29 ASSESSMENT — PAIN SCALES - GENERAL: PAINLEVEL_OUTOF10: 7

## 2021-03-29 ASSESSMENT — PAIN DESCRIPTION - ORIENTATION: ORIENTATION: LEFT;LOWER

## 2021-03-29 ASSESSMENT — PAIN DESCRIPTION - DESCRIPTORS: DESCRIPTORS: BURNING;DISCOMFORT

## 2021-03-29 NOTE — ED TRIAGE NOTES
Patient presents with complaints of injury to her left lower leg, states her cat attacked her and bit her lower leg this morning. Dressing in tact in triage, blood noted on dressing. Bleeding controlled at this time.

## 2021-03-29 NOTE — ED PROVIDER NOTES
[address] [facility] ED  eMERGENCY dEPARTMENT eNCOUnter      Pt Name: Lo Stewart  MRN: [id number]  [place] 1950  Date of evaluation: 3/29/2021  Provider: Rhys Ray PA-C    CHIEF COMPLAINT       Chief Complaint   Patient presents with    Leg Injury     cat bite to left lower leg         HISTORY OF PRESENT ILLNESS   (Location/Symptom, Timing/Onset,Context/Setting, Quality, Duration, Modifying Factors, Severity)  Note limiting factors. Lo Stewart is a 80 y.o. female who presents to the emergency department plaint of a cat bite to left lower extremity which happened this morning. Patient states that she was trying to move the cat it was dark, she said it bit her and scratched her and her left leg. She does not know when her last tetanus booster was. States the cat is up-to-date on vaccinations,this is her personal house cat. HPI    NursingNotes were reviewed. REVIEW OF SYSTEMS    (2-9 systems for level 4, 10 or more for level 5)     Review of Systems   Constitutional: Negative for activity change and appetite change. HENT: Negative for congestion, ear discharge, ear pain, nosebleeds, rhinorrhea and sore throat. Eyes: Negative for discharge. Respiratory: Negative for shortness of breath. Cardiovascular: Negative for chest pain, palpitations and leg swelling. Gastrointestinal: Negative for abdominal distention, abdominal pain and constipation. Genitourinary: Negative for difficulty urinating and dysuria. Musculoskeletal: Negative for arthralgias. Skin: Positive for wound. Negative for color change. Cat bite to left lower extremity   Neurological: Negative for dizziness, syncope, numbness and headaches. Psychiatric/Behavioral: Negative for agitation and confusion. Except as noted above the remainder of the review of systems was reviewed and negative.        PAST MEDICAL HISTORY     Past Medical History:   Diagnosis Date    [facility]-fib [facility])     Aplasia of adrenal gland     CAD (coronary artery disease)     Diabetes mellitus ([facility] [address]     DJD (degenerative joint disease)     HLD (hyperlipidemia)     Hypertension     Hypothyroidism     Obesity     CHANA (obstructive sleep apnea)     RA (rheumatoid arthritis) (HCC)     Rotator cuff tendinitis     RIGHT         SURGICALHISTORY       Past Surgical History:   Procedure Laterality Date    CARPAL TUNNEL RELEASE Left      SECTION      CHOLECYSTECTOMY      CORONARY ANGIOPLASTY WITH STENT PLACEMENT  2019    prior caths as well, total 7 stents    HIP CLOSED REDUCTION Left 2019    HIP CLOSED REDUCTION performed by Ruchi George DO at [address], LEFT SHOULDER AND HIP AS WELL         CURRENT MEDICATIONS       Previous Medications    ALBUTEROL SULFATE HFA (VENTOLIN HFA) 108 (90 BASE) MCG/ACT INHALER    Inhale 2 puffs into the lungs every 6 hours as needed for Wheezing    ASPIRIN 81 MG TABLET    Take 81 mg by mouth daily    ATORVASTATIN (LIPITOR) 40 MG TABLET    Take 40 mg by mouth daily    AZATHIOPRINE (IMURAN) 50 MG TABLET    Take 100 mg by mouth daily    B COMPLEX VITAMINS (VITAMIN B COMPLEX PO)    Take by mouth daily    BLOOD GLUCOSE MONITORING SUPPL (ONETOUCH VERIO) W/DEVICE KIT    Give 1 meter to check bs Dx E11.65    BLOOD GLUCOSE TEST STRIPS (ONETOUCH VERIO) STRIP    USE 1 STRIP TO CHECK GLUCOSE 4 TIMES DAILY    BUPROPION (WELLBUTRIN XL) 300 MG EXTENDED RELEASE TABLET    TAKE 1 TABLET BY MOUTH ONCE DAILY    CEPHALEXIN (KEFLEX) 500 MG CAPSULE    Take 500 mg by mouth 3 times daily    CETIRIZINE (ZYRTEC) 10 MG TABLET    Take 10 mg by mouth daily     CHOLECALCIFEROL (VITAMIN D3) 5000 UNITS TABS    Take 1 tablet by mouth daily    CONTINUOUS BLOOD GLUC  (FREESTYLE ELENA READER) JOSÉ MIGUEL    As directed    CONTINUOUS BLOOD GLUC SENSOR (FREESTYLE ELENA 14 DAY SENSOR) MISC    Every 2 weeks    CPAP MACHINE MISC    by Does not apply route New CPAP with 7 cm    DIGOXIN (LANOXIN) 125 MCG TABLET    Take 125 mcg by mouth daily    DILTIAZEM (CARDIZEM CD) 300 MG EXTENDED RELEASE CAPSULE    Take 300 mg by mouth daily    DIPHENOXYLATE-ATROPINE (LOMOTIL) 2.5-0.025 MG PER TABLET    Take 1 tablet by mouth daily as needed for Diarrhea.  Indications: FOR DIARRHEA    DOXYCYCLINE HYCLATE (VIBRAMYCIN) 100 MG CAPSULE    TAKE 1 CAPSULE BY MOUTH TWICE DAILY    ESCITALOPRAM (LEXAPRO) 20 MG TABLET    Take 20 mg by mouth daily    FLUTICASONE PROPIONATE (FLOVENT DISKUS) 100 MCG/BLIST AEPB INHALER    Inhale into the lungs daily    FOLIC ACID PO    Take 509 mg by mouth 2 times daily    FUROSEMIDE (LASIX) 40 MG TABLET    Take 40 mg by mouth daily as needed     GABAPENTIN (NEURONTIN) 300 MG CAPSULE    TAKE ONE CAPSULE BY MOUTH THREE TIMES DAILY    INSULIN LISPRO (HUMALOG KWIKPEN) 100 UNIT/ML PEN    2 units plus sliding scale   Less than 150 none  151-200 2 units  201-250 4 units   251-300 6 units   301-400 8 units    INSULIN NPH (NOVOLIN N) 100 UNIT/ML INJECTION VIAL    30 units at bedtime    INSULIN REGULAR (HUMULIN R) 100 UNIT/ML INJECTION    10 units plus sliding scale   Less than 150 none   151-200 2 units   201-250 4 units   251-300 6 units   301-400 8 units    INSULIN SYRINGE-NEEDLE U-100 (AIMSCO INSULIN SYR ULTRA THIN) 31G X 5/16\" 0.3 ML MISC    1 each by Does not apply route 3 times daily    LACTOBACILLUS (PROBIOTIC ACIDOPHILUS PO)    Take by mouth daily    LEVOTHYROXINE (SYNTHROID) 100 MCG TABLET    Take 1 tablet by mouth Daily    LISINOPRIL (PRINIVIL;ZESTRIL) 20 MG TABLET    Take 20 mg by mouth daily    METHOTREXATE 2.5 MG TABLET    Take 2.5 mg by mouth once a week TAKE 8 TABS EVERY WEEK FRIDAY    METOPROLOL TARTRATE (LOPRESSOR) 50 MG TABLET    Take 100 mg by mouth 2 times daily     MULTIPLE VITAMINS-MINERALS (PRESERVISION AREDS 2) CAPS    Take 1 capsule by mouth 2 times daily    NITROGLYCERIN (NITROSTAT) 0.4 MG SL TABLET    Place 0.4 mg under the tongue every 5 minutes as needed for Chest pain up to max of 3 total doses. If no relief after 1 dose, call 911. ONETOUCH DELICA JONNYETS 56T MISC    Use bid to check bs Dx E11.65    POTASSIUM CHLORIDE (KLOR-CON M) 20 MEQ EXTENDED RELEASE TABLET    Take 1 tablet by mouth daily    PREDNISONE (DELTASONE) 10 MG TABLET    1 1/2 po daily    SULFASALAZINE (AZULFIDINE) 500 MG TABLET    Take 500 mg by mouth 2 times daily     TIZANIDINE (ZANAFLEX) 4 MG TABLET    Take 4 mg by mouth every 8 hours as needed (DO NOT EXCEED 3 DOSES IN 24HRS)    TRAZODONE (DESYREL) 50 MG TABLET    Take 50 mg by mouth nightly tAKE 1-2 TAB AT hs    WARFARIN (COUMADIN) 2 MG TABLET    TAKE 1 TABLET BY MOUTH ONCE DAILY OR AS DIRECTED BY [facility]       ALLERGIES     Latex, Accupril [quinapril hcl], Bactine [lidocaine-benzalkonium], Other, and Tape [adhesive tape]    FAMILY HISTORY     History reviewed. No pertinent family history.        SOCIAL HISTORY       Social History     Socioeconomic History    Marital status:      Spouse name: Edilia Estrella Number of children: 2    Years of education: 15    Highest education level: Associate degree: academic program   Occupational History    Occupation: retired  [employer]     Comment:    Social Needs    Financial resource strain: Somewhat hard   [employer] insecurity     Worry: Never true     Inability: Never true    Transportation needs     Medical: No     Non-medical: No   Tobacco Use    Smoking status: Former Smoker    Smokeless tobacco: Never Used    Tobacco comment: QUIT AT AGE 41   Substance and Sexual Activity    Alcohol use: Not Currently    Drug use: No    Sexual activity: Not Currently   Lifestyle    Physical activity     Days per week: 0 days     Minutes per session: 0 min    Stress: Rather much   Relationships    Social connections     Talks on phone: More than three times a week     Gets together: More than three times a week     Attends Tenriism service: Never     Active member of club or organization: No Attends meetings of clubs or organizations: Never     Relationship status:     Intimate partner violence     Fear of current or ex partner: No     Emotionally abused: No     Physically abused: No     Forced sexual activity: No   Other Topics Concern    None   Social History Narrative         Lives With: Spouse    Type of Home: 2 story  House    Home Layout: Two level, Able to Live on Main level with bedroom/bathroom    Home Access: Stairs to enter with rails    Entrance Stairs - Number of Steps: 3-4    Bathroom Shower/Tub: Tub/Shower unit    Bathroom Equipment: Shower chair, Grab bars in [place]: 4 wheeled walker, Cane, Electric scooter    ADL Assistance: Independent    Homemaking Assistance: Independent    Homemaking Responsibilities: Yes    Meal Prep Responsibility: Primary    Laundry Responsibility: Primary    Cleaning Responsibility: Primary    Bill Paying/Finance Responsibility: Primary    Shopping Responsibility: Primary    Health Care Management: Primary    Ambulation Assistance: Independent(cane in house; scooter for longer distances)    Transfer Assistance: [address]: likes to natue and build [employer]       SCREENINGS      @FLOW([phone])@      PHYSICAL EXAM    (up to 7 for level 4, 8 or more for level 5)     ED Triage Vitals [03/29/21 0615]   BP Temp Temp Source Pulse Resp SpO2 Height Weight   105/66 97.9 °F (36.6 °C) Oral 80 20 97 % 5' 9\" (1.753 m) 217 lb (98.4 kg)       Physical Exam  Vitals signs and nursing note reviewed. Constitutional:       General: She is not in acute distress. Appearance: She is well-developed. She is not ill-appearing, toxic-appearing or diaphoretic. HENT:      Head: Normocephalic. Nose: No congestion. Mouth/Throat:      Mouth: Mucous membranes are moist.      Pharynx: No oropharyngeal exudate or posterior oropharyngeal erythema. Eyes:      Extraocular Movements: Extraocular movements intact. Conjunctiva/sclera: Conjunctivae normal.      Pupils: Pupils are equal, round, and reactive to light. Neck:      Musculoskeletal: Normal range of motion and neck supple. No neck rigidity. Vascular: No JVD. Trachea: No tracheal deviation. Cardiovascular:      Rate and Rhythm: Normal rate. Pulses: Normal pulses. Heart sounds: Normal heart sounds. No murmur. No friction rub. No gallop. Pulmonary:      Effort: Pulmonary effort is normal. No tachypnea, accessory muscle usage, respiratory distress or retractions. Breath sounds: No stridor. No wheezing, rhonchi or rales. Chest:      Chest wall: No tenderness. Abdominal:      General: Abdomen is flat. Bowel sounds are normal. There is no distension or abdominal bruit. Palpations: There is no shifting dullness, fluid wave, hepatomegaly, splenomegaly, mass or pulsatile mass. Tenderness: There is no abdominal tenderness. There is no right CVA tenderness, left CVA tenderness, guarding or rebound. Negative signs include Rascon's sign, Rovsing's sign and McBurney's sign. Musculoskeletal:         General: No deformity. Skin:     General: Skin is warm and dry. Capillary Refill: Capillary refill takes less than 2 seconds. Coloration: Skin is not jaundiced. Comments: Multiple scratches, and puncture wounds secondary to cat bite and scratches on the left lower extremity. No active bleeding at this time. Capillary refill is within 3 seconds, positive dorsalis and posterior tibialis pulses are present. Neurological:      General: No focal deficit present. Mental Status: She is alert and oriented to person, place, and time. Mental status is at baseline. Cranial Nerves: No cranial nerve deficit. Sensory: No sensory deficit. Motor: No weakness.       Coordination: Coordination normal.   Psychiatric:         Mood and Affect: Mood normal.         DIAGNOSTIC RESULTS     EKG: All EKG's are interpreted by the Emergency Department Physician who either signs or Co-signsthis chart in the absence of a cardiologist.        RADIOLOGY:   Gely Iveth such as CT, Ultrasound and MRI are read by the radiologist. Plain radiographic images are visualized and preliminarily interpreted by the emergency physician with the below findings:    Xray of the left tib-fib shows no acute bony fractures, no retained foreign bodies. Interpretation per the Radiologist below, if available at the time ofthis note:    XR TIBIA FIBULA LEFT (2 VIEWS)    (Results Pending)         ED BEDSIDE ULTRASOUND:   Performed by ED Physician - none    LABS:  Labs Reviewed - No data to display    All other labs were within normal range or not returned as of this dictation. EMERGENCY DEPARTMENT COURSE and DIFFERENTIAL DIAGNOSIS/MDM:   Vitals:    Vitals:    03/29/21 0615   BP: 105/66   Pulse: 80   Resp: 20   Temp: 97.9 °F (36.6 °C)   TempSrc: Oral   SpO2: 97%   Weight: 217 lb (98.4 kg)   Height: 5' 9\" (1.753 m)          MDM  Number of Diagnoses or Management Options  Cat bite, initial encounter  Diagnosis management comments: Presented to the ED with complaint of cat bite to left lower extremity. She states this morning in the dark she was trying to move the cat, she states the cat attacked her biting her and scratching her in her left lower extremity. X-rays of the leg shows no retained foreign bodies. She has multiple puncture wounds across the anterior and posterior surface of the mid tibial region, no signs of bony injury. Patient's area was cleansed, these wounds were not closed as they were fairly small half centimeter or less in size. The patient was given a dose of Augmentin, Ultram for pain control here. She was advised to contact her family physician for close follow-up in the next 2 to 3 days. She was advised if she has worsening or changes symptoms she should return to the ER for reevaluation.       CRITICAL CARE TIME   Total Critical Care time was 0 minutes, excluding separately reportableprocedures. There was a high probability of clinicallysignificant/life threatening deterioration in the patient's condition which required my urgent intervention. CONSULTS:  None    PROCEDURES:  Unless otherwise noted below, none     Procedures    FINAL IMPRESSION      1. Cat bite, initial encounter          DISPOSITION/PLAN   DISPOSITION Decision To Discharge 03/29/2021 07:35:39 AM      PATIENT REFERRED TO:  No follow-up provider specified.     DISCHARGE MEDICATIONS:  New Prescriptions    No medications on file          (Please note that portions of this note were completed with a voice recognition program.  Efforts were made to edit the dictations but occasionally words are mis-transcribed.)    Rosa Hollis PA-C (electronically signed)  Attending Emergency Physician         Rosa Hollis PA-C  03/29/21 726 [facility]  03/29/21 [id number]

## 2021-04-28 ENCOUNTER — HOSPITAL ENCOUNTER (OUTPATIENT)
Dept: GENERAL RADIOLOGY | Age: 71
Discharge: HOME | End: 2021-04-30
Payer: MEDICARE

## 2021-04-28 ENCOUNTER — OFFICE VISIT (OUTPATIENT)
Dept: PULMONOLOGY | Age: 71
End: 2021-04-28
Payer: MEDICARE

## 2021-04-28 VITALS
HEART RATE: 127 BPM | TEMPERATURE: 97.5 F | OXYGEN SATURATION: 92 % | DIASTOLIC BLOOD PRESSURE: 95 MMHG | BODY MASS INDEX: 33.62 KG/M2 | HEIGHT: 69 IN | SYSTOLIC BLOOD PRESSURE: 150 MMHG | WEIGHT: 227 LBS

## 2021-04-28 DIAGNOSIS — R09.02 HYPOXIA: ICD-10-CM

## 2021-04-28 DIAGNOSIS — G47.30 SLEEP APNEA, UNSPECIFIED TYPE: Primary | ICD-10-CM

## 2021-04-28 DIAGNOSIS — J45.20 MILD INTERMITTENT ASTHMA WITHOUT COMPLICATION: ICD-10-CM

## 2021-04-28 DIAGNOSIS — E66.9 OBESITY (BMI 30-39.9): ICD-10-CM

## 2021-04-28 DIAGNOSIS — R06.02 SHORTNESS OF BREATH: ICD-10-CM

## 2021-04-28 PROCEDURE — G8399 PT W/DXA RESULTS DOCUMENT: HCPCS | Performed by: INTERNAL MEDICINE

## 2021-04-28 PROCEDURE — 3017F COLORECTAL CA SCREEN DOC REV: CPT | Performed by: INTERNAL MEDICINE

## 2021-04-28 PROCEDURE — 71046 X-RAY EXAM CHEST 2 VIEWS: CPT

## 2021-04-28 PROCEDURE — G8427 DOCREV CUR MEDS BY ELIG CLIN: HCPCS | Performed by: INTERNAL MEDICINE

## 2021-04-28 PROCEDURE — 1036F TOBACCO NON-USER: CPT | Performed by: INTERNAL MEDICINE

## 2021-04-28 PROCEDURE — G8417 CALC BMI ABV UP PARAM F/U: HCPCS | Performed by: INTERNAL MEDICINE

## 2021-04-28 PROCEDURE — 4040F PNEUMOC VAC/ADMIN/RCVD: CPT | Performed by: INTERNAL MEDICINE

## 2021-04-28 PROCEDURE — 1090F PRES/ABSN URINE INCON ASSESS: CPT | Performed by: INTERNAL MEDICINE

## 2021-04-28 PROCEDURE — 99214 OFFICE O/P EST MOD 30 MIN: CPT | Performed by: INTERNAL MEDICINE

## 2021-04-28 PROCEDURE — 1123F ACP DISCUSS/DSCN MKR DOCD: CPT | Performed by: INTERNAL MEDICINE

## 2021-04-28 ASSESSMENT — ENCOUNTER SYMPTOMS
EYE ITCHING: 0
SHORTNESS OF BREATH: 1
DIARRHEA: 0
VOMITING: 0
TROUBLE SWALLOWING: 0
COUGH: 1
SINUS PRESSURE: 0
CHEST TIGHTNESS: 0
EYE DISCHARGE: 0
SORE THROAT: 0
WHEEZING: 1
ABDOMINAL PAIN: 0
VOICE CHANGE: 0
NAUSEA: 0
RHINORRHEA: 0

## 2021-06-08 RX ORDER — GABAPENTIN 300 MG/1
CAPSULE ORAL
Qty: 90 CAPSULE | Refills: 3 | Status: SHIPPED | OUTPATIENT
Start: 2021-06-08 | End: 2021-09-08

## 2021-07-30 ENCOUNTER — OFFICE VISIT (OUTPATIENT)
Dept: PULMONOLOGY | Age: 71
End: 2021-07-30
Payer: MEDICARE

## 2021-07-30 VITALS
SYSTOLIC BLOOD PRESSURE: 139 MMHG | BODY MASS INDEX: 33.18 KG/M2 | DIASTOLIC BLOOD PRESSURE: 78 MMHG | TEMPERATURE: 97.9 F | OXYGEN SATURATION: 98 % | WEIGHT: 224 LBS | HEART RATE: 87 BPM | HEIGHT: 69 IN

## 2021-07-30 DIAGNOSIS — E66.9 OBESITY (BMI 30-39.9): ICD-10-CM

## 2021-07-30 DIAGNOSIS — G47.33 OSA (OBSTRUCTIVE SLEEP APNEA): Primary | ICD-10-CM

## 2021-07-30 DIAGNOSIS — R09.02 HYPOXIA: ICD-10-CM

## 2021-07-30 DIAGNOSIS — J45.20 MILD INTERMITTENT ASTHMA WITHOUT COMPLICATION: ICD-10-CM

## 2021-07-30 PROCEDURE — 1090F PRES/ABSN URINE INCON ASSESS: CPT | Performed by: INTERNAL MEDICINE

## 2021-07-30 PROCEDURE — 99214 OFFICE O/P EST MOD 30 MIN: CPT | Performed by: INTERNAL MEDICINE

## 2021-07-30 PROCEDURE — G8417 CALC BMI ABV UP PARAM F/U: HCPCS | Performed by: INTERNAL MEDICINE

## 2021-07-30 PROCEDURE — 4040F PNEUMOC VAC/ADMIN/RCVD: CPT | Performed by: INTERNAL MEDICINE

## 2021-07-30 PROCEDURE — G8399 PT W/DXA RESULTS DOCUMENT: HCPCS | Performed by: INTERNAL MEDICINE

## 2021-07-30 PROCEDURE — 3017F COLORECTAL CA SCREEN DOC REV: CPT | Performed by: INTERNAL MEDICINE

## 2021-07-30 PROCEDURE — 1036F TOBACCO NON-USER: CPT | Performed by: INTERNAL MEDICINE

## 2021-07-30 PROCEDURE — 1123F ACP DISCUSS/DSCN MKR DOCD: CPT | Performed by: INTERNAL MEDICINE

## 2021-07-30 PROCEDURE — G8427 DOCREV CUR MEDS BY ELIG CLIN: HCPCS | Performed by: INTERNAL MEDICINE

## 2021-07-30 ASSESSMENT — ENCOUNTER SYMPTOMS
SORE THROAT: 0
EYE DISCHARGE: 0
CHEST TIGHTNESS: 0
WHEEZING: 1
TROUBLE SWALLOWING: 0
EYE ITCHING: 0
SINUS PRESSURE: 0
SHORTNESS OF BREATH: 1
COUGH: 1
ABDOMINAL PAIN: 0
DIARRHEA: 0
VOICE CHANGE: 0
NAUSEA: 0
VOMITING: 0
RHINORRHEA: 0

## 2021-07-30 NOTE — PROGRESS NOTES
Subjective:     Gretta Arreola is a 80 y.o. female who complains today of:     Chief Complaint   Patient presents with    Sleep Apnea     3 month f/u       HPI  She is using CPAP with   7 centimeters of H2O with heated humidity and 3 L oxygen. She is using CPAP for about   4-6  hours every night. She is using CPAP with full face   Mask. She said  sleep is restful with the CPAP use. She is compliant with CPAP therapy and benefiting with CPAP use. No snoring with CPAP use. No complaint of daytime sleepiness or tiredness with CPAP use. She denies taking naps. She denies difficulty falling asleep or staying asleep. She had PFT done show restrictive disease. Decreased DLCO  CXR show no active disease  C/o shortness of breath with exertion. Occasional Wheezing. C/o Cough dry   she is on flovent 110 mcg HFA   No Hemoptysis. No Chest tightness. No Chest pain with radiation  or pleuritic pain. C/o chronic  leg edema. No orthopnea. No Fever or chills. No Rhinorrhea and postnasal drip.               Allergies:  Latex, Accupril [quinapril hcl], Bactine [lidocaine-benzalkonium], Other, and Tape [adhesive tape]  Past Medical History:   Diagnosis Date    [facility]     Aplasia of adrenal gland     CAD (coronary artery disease)     Diabetes mellitus ([facility]     DJD (degenerative joint disease)     HLD (hyperlipidemia)     Hypertension     Hypothyroidism     Obesity     CHANA (obstructive sleep apnea)     RA [facility]rheumatoid arthritis) ([facility]     Rotator cuff tendinitis     RIGHT     Past Surgical History:   Procedure Laterality Date    CARPAL TUNNEL RELEASE Left      SECTION      CHOLECYSTECTOMY      CORONARY ANGIOPLASTY WITH STENT PLACEMENT  2019    prior caths as well, total 7 stents    HIP CLOSED REDUCTION Left 2019    HIP CLOSED REDUCTION performed by Bouchra Whiteside DO at [address], LEFT SHOULDER AND HIP AS WELL     No family history on file.  Social History     Socioeconomic History    Marital status:      Spouse name: Rios Davenport Number of children: 2    Years of education: 12    Highest education level: Associate degree: academic program   Occupational History    Occupation: retired  [employer]     Comment:    Tobacco Use    Smoking status: Former Smoker    Smokeless tobacco: Never Used    Tobacco comment: QUIT AT AGE 41   Vaping Use    Vaping Use: Never used   Substance and Sexual Activity    Alcohol use: Not Currently    Drug use: No    Sexual activity: Not Currently   Other Topics Concern    Not on file   Social History Narrative         Lives With: Spouse    Type of Home: 2 story  House    Home Layout: Two level, Able to Live on Main level with bedroom/bathroom    Home Access: Stairs to enter with rails    Entrance Stairs - Number of Steps: 3-4    Bathroom Shower/Tub: Tub/Shower unit    Bathroom Equipment: Shower chair, Grab bars in [place]: 4 wheeled walker, [employer], Electric scooter    ADL Assistance: Independent    Homemaking Assistance: Independent    Homemaking Responsibilities: Yes    Meal Prep Responsibility: Primary    Laundry Responsibility: Primary    Cleaning Responsibility: Primary    Bill Paying/Finance Responsibility: Primary    Shopping Responsibility: Primary    Health Care Management: Primary    Ambulation Assistance: Independent(cane in house; scooter for longer distances)    Transfer Assistance: Katelyn 62: likes to maria elena and build [employer]     Social Determinants of Health     Financial Resource Strain:     Difficulty of Paying Living Expenses:    Food Insecurity:     Worried About [address] in the Last Year:     [address] in the Last Year:    Transportation Needs:     Lack of Transportation (Medical):      Lack of Transportation (Non-Medical):    Physical Activity:     Days of Exercise per Week:     Minutes of Exercise Nose: Nose normal.   Eyes:      Pupils: Pupils are equal, round, and reactive to light. Neck:      Thyroid: No thyromegaly. Vascular: No JVD. Trachea: No tracheal deviation. Cardiovascular:      Rate and Rhythm: Normal rate and regular rhythm. Heart sounds: No murmur heard. No friction rub. No gallop. Pulmonary:      Effort: No respiratory distress. Breath sounds: No wheezing or rales. Comments: diminished Breath sound bilaterally. Chest:      Chest wall: No tenderness. Abdominal:      General: There is no distension. Tenderness: There is no abdominal tenderness. There is no rebound. Musculoskeletal:         General: Normal range of motion. Right lower leg: Edema (1+) present. Left lower leg: Edema (1+) present. Lymphadenopathy:      Cervical: No cervical adenopathy. Skin:     General: Skin is warm and dry. Neurological:      Mental Status: She is alert and oriented to person, place, and time.       Coordination: Coordination normal.         Current Outpatient Medications   Medication Sig Dispense Refill    gabapentin (NEURONTIN) 300 MG capsule TAKE ONE CAPSULE BY MOUTH THREE TIMES DAILY 90 capsule 3    cephALEXin (KEFLEX) 500 MG capsule Take 500 mg by mouth 3 times daily      doxycycline hyclate (VIBRAMYCIN) 100 MG capsule TAKE 1 CAPSULE BY MOUTH TWICE DAILY      CPAP Machine MISC by Does not apply route New CPAP with 7 cm 1 each 0    predniSONE (DELTASONE) 10 MG tablet 1 1/2 po daily 60 tablet 01    insulin NPH (NOVOLIN N) 100 UNIT/ML injection vial 30 units at bedtime 1 vial 3    insulin regular (HUMULIN R) 100 UNIT/ML injection 10 units plus sliding scale   Less than 150 none   151-200 2 units   201-250 4 units   251-300 6 units   301-400 8 units 1 vial 3    Continuous Blood Gluc Sensor (FREESTYLE ELENA 14 DAY SENSOR) MISC Every 2 weeks 2 each 06    Continuous Blood Gluc  (FREESTYLE ELENA READER) JOSÉ MIGUEL As directed 1 Device 0    buPROPion (WELLBUTRIN XL) 300 MG extended release tablet TAKE 1 TABLET BY MOUTH ONCE DAILY      warfarin (COUMADIN) 2 MG tablet TAKE 1 TABLET BY MOUTH ONCE DAILY OR AS DIRECTED BY [facility]      fluticasone propionate (FLOVENT DISKUS) 100 MCG/BLIST AEPB inhaler Inhale into the lungs daily      levothyroxine (SYNTHROID) 100 MCG tablet Take 1 tablet by mouth Daily 90 tablet 3    blood glucose test strips (ONETOUCH VERIO) strip USE 1 STRIP TO CHECK GLUCOSE 4 TIMES DAILY 200 each 3    Insulin Syringe-Needle U-100 (AIMSCO INSULIN SYR ULTRA THIN) 31G X 5/16\" 0.3 ML MISC 1 each by Does not apply route 3 times daily 100 each 3    diltiazem (CARDIZEM CD) 300 MG extended release capsule Take 300 mg by mouth daily      metoprolol tartrate (LOPRESSOR) 50 MG tablet Take 100 mg by mouth 2 times daily       lisinopril (PRINIVIL;ZESTRIL) 20 MG tablet Take 20 mg by mouth daily      escitalopram (LEXAPRO) 20 MG tablet Take 20 mg by mouth daily      digoxin (LANOXIN) 125 MCG tablet Take 125 mcg by mouth daily      traZODone (DESYREL) 50 MG tablet Take 50 mg by mouth nightly tAKE 1-2 TAB AT hs      diphenoxylate-atropine (LOMOTIL) 2.5-0.025 MG per tablet Take 1 tablet by mouth daily as needed for Diarrhea.  Indications: FOR DIARRHEA      tiZANidine (ZANAFLEX) 4 MG tablet Take 4 mg by mouth every 8 hours as needed (DO NOT EXCEED 3 DOSES IN 24HRS)      sulfaSALAzine (AZULFIDINE) 500 MG tablet Take 500 mg by mouth 2 times daily       potassium chloride (KLOR-CON M) 20 MEQ extended release tablet Take 1 tablet by mouth daily 30 tablet 0    insulin lispro (HUMALOG KWIKPEN) 100 UNIT/ML pen 2 units plus sliding scale   Less than 150 none  151-200 2 units  201-250 4 units   251-300 6 units   301-400 8 units 5 pen 3    atorvastatin (LIPITOR) 40 MG tablet Take 40 mg by mouth daily      Blood Glucose Monitoring Suppl (ONETOUCH VERIO) w/Device KIT Give 1 meter to check bs Dx E11.65 1 kit 0    ONETOUCH DELICA LANCETS 03A MISC Use bid to check bs Dx E11.65 100 each 03    azaTHIOprine (IMURAN) 50 MG tablet Take 100 mg by mouth daily      nitroGLYCERIN (NITROSTAT) 0.4 MG SL tablet Place 0.4 mg under the tongue every 5 minutes as needed for Chest pain up to max of 3 total doses. If no relief after 1 dose, call 911.  Multiple Vitamins-Minerals (PRESERVISION AREDS 2) CAPS Take 1 capsule by mouth 2 times daily      aspirin 81 MG tablet Take 81 mg by mouth daily      cetirizine (ZYRTEC) 10 MG tablet Take 10 mg by mouth daily       Lactobacillus (PROBIOTIC ACIDOPHILUS PO) Take by mouth daily      FOLIC ACID PO Take 979 mg by mouth 2 times daily      B Complex Vitamins (VITAMIN B COMPLEX PO) Take by mouth daily      Cholecalciferol (VITAMIN D3) 5000 UNITS TABS Take 1 tablet by mouth daily      albuterol sulfate HFA (VENTOLIN HFA) 108 (90 BASE) MCG/ACT inhaler Inhale 2 puffs into the lungs every 6 hours as needed for Wheezing      furosemide (LASIX) 40 MG tablet Take 40 mg by mouth daily as needed       methotrexate 2.5 MG tablet Take 2.5 mg by mouth once a week TAKE 8 TABS EVERY WEEK FRIDAY       No current facility-administered medications for this visit. Results for orders placed during the hospital encounter of 04/28/21    XR CHEST (2 VW)    Narrative  EXAMINATION: XR CHEST (2 VW)    CLINICAL HISTORY: SHORTNESS OF BREATH    COMPARISONS: #2, 2019    FINDINGS: Left shoulder arthroplasty. Cardiopericardial silhouette normal. Aorta calcified. Lungs clear. Impression  NO ACUTE CARDIOPULMONARY DISEASE.  ]  Results for orders placed during the hospital encounter of 11/30/19    XR CHEST PORTABLE    Narrative  EXAMINATION: XR CHEST PORTABLE    CLINICAL HISTORY: CONGESTION    COMPARISONS: APRIL 1, 2011    FINDINGS: Left shoulder replacement. Degenerative change right shoulder. Osteopenia. Cardiopericardial silhouette upper limits of normal for technique. Lungs clear. Impression  NO ACUTE CARDIOPULMONARY DISEASE.       Assessment/Plan: 1. CHANA (obstructive sleep apnea)  She is using CPAP with   7 centimeters of H2O with heated humidity and 3 L O2. .She is using CPAP for about   4-6  hours every night. She is using CPAP with full face   Mask. She said  sleep is restful with the CPAP use. She is compliant with CPAP therapy and benefiting with CPAP use. No snoring with CPAP use. Continue bronchodilator therapy as before    Counseling: CPAP/BiPAP uses, She advised to use CPAP at least 5-6 hours every night. Driving: She is advised for extreme caution when driving or operating machinery if there is a feeling of drowsiness, especially while driving it is preferable to stop driving and take a brief nap. Sleep hygiene:Avoid supine sleep, sleep on  sides. Avoid  sleep deprivation. Explained sleep hygiene. Advice to avoid Alcohol and sedative    2. Hypoxia  She is on 3 L O2 with sleep and prn. Continue same keep saturation 90% above    3. Obesity (BMI 30-39. 9)  She is advised try to lose weight. obesity related risk explained to the patient ,  Current weight:  224 lb (101.6 kg) Lbs. BMI:  Body mass index is 33.08 kg/m². Suggested weight control approaches, including dietary changes , exercise, behavioral modification. 4. Mild intermittent asthma without complication  She had PFT done show restrictive disease. Decreased DLCO. CXR show no active disease  C/o shortness of breath with exertion. Occasional Wheezing. C/o Cough dry   she is on flovent 110 mcg HFA 1 puff twice daily. Continue same. Return in about 4 months (around 11/30/2021) for chana, asthma.       Aquilino Young MD

## 2021-10-19 LAB
ABO: NORMAL
ANTIBODY SCREEN: NORMAL
RH TYPE: NORMAL

## 2022-04-15 DIAGNOSIS — E03.9 HYPOTHYROIDISM, UNSPECIFIED TYPE: ICD-10-CM

## 2022-04-15 DIAGNOSIS — E11.641 UNCONTROLLED TYPE 2 DIABETES MELLITUS WITH HYPOGLYCEMIA AND COMA (HCC): Primary | ICD-10-CM

## 2022-04-15 RX ORDER — LEVOTHYROXINE SODIUM 0.1 MG/1
100 TABLET ORAL DAILY
Qty: 30 TABLET | Refills: 0 | Status: SHIPPED | OUTPATIENT
Start: 2022-04-15 | End: 2022-05-03 | Stop reason: SDUPTHER

## 2022-04-15 NOTE — TELEPHONE ENCOUNTER
Patient requesting medication refill.  Please approve or deny this request.    Rx requested:  Requested Prescriptions     Pending Prescriptions Disp Refills    levothyroxine (SYNTHROID) 100 MCG tablet 30 tablet 0     Sig: Take 1 tablet by mouth Daily         Last Office Visit:   1/15/2021      Next Visit Date:  Future Appointments   Date Time Provider Abiodun Mota   5/3/2022  1:30 PM Herb Power MD [facility]

## 2022-04-27 DIAGNOSIS — E03.9 HYPOTHYROIDISM, UNSPECIFIED TYPE: ICD-10-CM

## 2022-04-27 DIAGNOSIS — E11.641 UNCONTROLLED TYPE 2 DIABETES MELLITUS WITH HYPOGLYCEMIA AND COMA (HCC): ICD-10-CM

## 2022-04-27 LAB
ANION GAP SERPL CALCULATED.3IONS-SCNC: 13 MEQ/L (ref 9–15)
BUN BLDV-MCNC: 18 MG/DL (ref 8–23)
CALCIUM SERPL-MCNC: 10.3 MG/DL (ref 8.5–9.9)
CHLORIDE BLD-SCNC: 105 MEQ/L (ref 95–107)
CO2: 25 MEQ/L (ref 20–31)
CREAT SERPL-MCNC: 0.87 MG/DL (ref 0.5–0.9)
GFR AFRICAN AMERICAN: >60
GFR NON-AFRICAN AMERICAN: >60
GLUCOSE BLD-MCNC: 88 MG/DL (ref 70–99)
HBA1C MFR BLD: 7.8 % (ref 4.8–5.9)
POTASSIUM SERPL-SCNC: 3.7 MEQ/L (ref 3.4–4.9)
SODIUM BLD-SCNC: 143 MEQ/L (ref 135–144)
T4 FREE: 1.32 NG/DL (ref 0.84–1.68)
TSH SERPL DL<=0.05 MIU/L-ACNC: 3.79 UIU/ML (ref 0.44–3.86)

## 2022-05-03 ENCOUNTER — OFFICE VISIT (OUTPATIENT)
Dept: ENDOCRINOLOGY | Age: 72
End: 2022-05-03
Payer: MEDICARE

## 2022-05-03 VITALS
WEIGHT: 231 LBS | HEIGHT: 69 IN | SYSTOLIC BLOOD PRESSURE: 136 MMHG | OXYGEN SATURATION: 100 % | BODY MASS INDEX: 34.21 KG/M2 | HEART RATE: 76 BPM | DIASTOLIC BLOOD PRESSURE: 81 MMHG

## 2022-05-03 DIAGNOSIS — E11.641 UNCONTROLLED TYPE 2 DIABETES MELLITUS WITH HYPOGLYCEMIA AND COMA (HCC): ICD-10-CM

## 2022-05-03 DIAGNOSIS — E03.9 HYPOTHYROIDISM, UNSPECIFIED TYPE: Primary | ICD-10-CM

## 2022-05-03 LAB
CHP ED QC CHECK: NORMAL
GLUCOSE BLD-MCNC: 131 MG/DL

## 2022-05-03 PROCEDURE — G8399 PT W/DXA RESULTS DOCUMENT: HCPCS | Performed by: INTERNAL MEDICINE

## 2022-05-03 PROCEDURE — 3017F COLORECTAL CA SCREEN DOC REV: CPT | Performed by: INTERNAL MEDICINE

## 2022-05-03 PROCEDURE — 82962 GLUCOSE BLOOD TEST: CPT | Performed by: INTERNAL MEDICINE

## 2022-05-03 PROCEDURE — 1123F ACP DISCUSS/DSCN MKR DOCD: CPT | Performed by: INTERNAL MEDICINE

## 2022-05-03 PROCEDURE — 3051F HG A1C>EQUAL 7.0%<8.0%: CPT | Performed by: INTERNAL MEDICINE

## 2022-05-03 PROCEDURE — 4040F PNEUMOC VAC/ADMIN/RCVD: CPT | Performed by: INTERNAL MEDICINE

## 2022-05-03 PROCEDURE — 99213 OFFICE O/P EST LOW 20 MIN: CPT | Performed by: INTERNAL MEDICINE

## 2022-05-03 PROCEDURE — 2022F DILAT RTA XM EVC RTNOPTHY: CPT | Performed by: INTERNAL MEDICINE

## 2022-05-03 PROCEDURE — 1090F PRES/ABSN URINE INCON ASSESS: CPT | Performed by: INTERNAL MEDICINE

## 2022-05-03 PROCEDURE — G8427 DOCREV CUR MEDS BY ELIG CLIN: HCPCS | Performed by: INTERNAL MEDICINE

## 2022-05-03 PROCEDURE — 1036F TOBACCO NON-USER: CPT | Performed by: INTERNAL MEDICINE

## 2022-05-03 PROCEDURE — G8417 CALC BMI ABV UP PARAM F/U: HCPCS | Performed by: INTERNAL MEDICINE

## 2022-05-03 RX ORDER — LEVOTHYROXINE SODIUM 0.1 MG/1
100 TABLET ORAL DAILY
Qty: 30 TABLET | Refills: 0 | Status: SHIPPED | OUTPATIENT
Start: 2022-05-03 | End: 2022-06-28

## 2022-05-03 NOTE — PROGRESS NOTES
5/3/2022    Assessment:       Diagnosis Orders   1. Hypothyroidism, unspecified type     2.  Uncontrolled type 2 diabetes mellitus with hypoglycemia and coma ([facility] [address]  POCT Glucose         PLAN:     Orders Placed This Encounter   Medications    levothyroxine (SYNTHROID) 100 MCG tablet     Sig: Take 1 tablet by mouth Daily     Dispense:  30 tablet     Refill:  0     Orders Placed This Encounter   Procedures    T4, Free     Standing Status:   Future     Standing Expiration Date:   5/3/2023    TSH with Reflex     Standing Status:   Future     Standing Expiration Date:   5/3/2023    Microalbumin / Creatinine Urine Ratio     Standing Status:   Future     Standing Expiration Date:   5/3/2023    Hemoglobin A1C     Standing Status:   Future     Standing Expiration Date:   3/9/6618    Basic Metabolic Panel, Fasting     Standing Status:   Future     Standing Expiration Date:   5/3/2023    Lipid, Fasting     Standing Status:   Future     Standing Expiration Date:   5/3/2023    POCT Glucose    HM DIABETES FOOT EXAM     Continue current insulin regimen continue Synthroid A1c goal of 7.5  Continue using freestyle judson to adjust dose of dose of fast acting insulin      Orders Placed This Encounter   Procedures    POCT Glucose       Subjective:     Chief Complaint   Patient presents with    Diabetes    Hypothyroidism     Vitals:    05/03/22 1336   BP: 136/81   Pulse: 76   SpO2: 100%   Weight: 231 lb (104.8 kg)   Height: 5' 9\" (1.753 m)     Wt Readings from Last 3 Encounters:   05/03/22 231 lb (104.8 kg)   07/30/21 224 lb (101.6 kg)   04/28/21 227 lb (103 kg)     BP Readings from Last 3 Encounters:   05/03/22 136/81   07/30/21 139/78   04/28/21 (!) 150/95     Follow-up on type 2 diabetes history of coronary artery disease hypothyroidism patient on NPH 30 units at bedtime regular 10 units plus sliding scale  Hemoglobin A1c was 7.8 overall blood sugars close to 180 denies any hypoglycemia  Hypothyroidism on replacement with levothyroxine by micrograms daily thyroid function test stable    Patient is using freestyle judson to test blood sugars to adjust the dose of her insulin on a regular basis    Diabetes  She presents for her follow-up diabetic visit. She has type 2 diabetes mellitus. There are no hypoglycemic associated symptoms. Associated symptoms include fatigue. Pertinent negatives for diabetes include no polydipsia and no polyuria. Diabetic complications include heart disease. Risk factors for coronary artery disease include obesity. Current diabetic treatment includes insulin injections. She is currently taking insulin pre-breakfast, pre-lunch, pre-dinner and at bedtime. Her overall blood glucose range is 180-200 mg/dl.    Past Medical History:   Diagnosis Date    A-fib ([facility] [address]     Aplasia of adrenal gland     CAD (coronary artery disease)     Diabetes mellitus (HCC)     DJD (degenerative joint disease)     HLD (hyperlipidemia)     Hypertension     Hypothyroidism     Obesity     CHANA (obstructive sleep apnea)     RA (rheumatoid arthritis) ([facility] [address]     Rotator cuff tendinitis     RIGHT     Past Surgical History:   Procedure Laterality Date    CARPAL TUNNEL RELEASE Left      SECTION      CHOLECYSTECTOMY      CORONARY ANGIOPLASTY WITH STENT PLACEMENT  2019    prior caths as well, total 7 stents    HIP CLOSED REDUCTION Left 2019    HIP CLOSED REDUCTION performed by Gretta Tavarez DO at [address], LEFT SHOULDER AND HIP AS WELL     Social History     Socioeconomic History    Marital status:      Spouse name: Emil Moscoso    Number of children: 2    Years of education: 12    Highest education level: Associate degree: academic program   Occupational History    Occupation: retired  [employer]     Comment:    Tobacco Use    Smoking status: Former Smoker    Smokeless tobacco: Never Used    Tobacco comment: QUIT AT AGE 41   Vaping Use    Vaping Use: Never used   Substance and Sexual Activity    Alcohol use: Not Currently    Drug use: No    Sexual activity: Not Currently   Other Topics Concern    Not on file   Social History Narrative         Lives With: Spouse    Type of Home: 2 story  House    Home Layout: Two level, Able to Live on Main level with bedroom/bathroom    Home Access: Stairs to enter with rails    Entrance Stairs - Number of Steps: 3-4    Bathroom Shower/Tub: Tub/Shower unit    Bathroom Equipment: Shower chair, Grab bars in shower    Home Equipment: 4 wheeled walker, Cane, Electric scooter    ADL Assistance: Independent    Homemaking Assistance: Independent    Homemaking Responsibilities: Yes    Meal Prep Responsibility: Primary    Laundry Responsibility: Primary    Cleaning Responsibility: Primary    Bill Paying/Finance Responsibility: Primary    Shopping Responsibility: Primary    Health Care Management: Primary    Ambulation Assistance: Independent(cane in house; scooter for longer distances)    Transfer Assistance: Katelyn 62: likes to [employer] and build [email or web address]     Social Determinants of Health     Financial Resource Strain:     Difficulty of Paying Living Expenses: Not on file   Food Insecurity:     Worried About [address] [employer] in the Last Year: Not on file    [place] of Food in the Last Year: Not on file   Transportation Needs:     Lack of Transportation (Medical): Not on file    Lack of Transportation (Non-Medical):  Not on file   Physical Activity:     Days of Exercise per Week: Not on file    Minutes of Exercise per Session: Not on file   Stress:     Feeling of Stress : Not on file   Social Connections:     Frequency of Communication with Friends and Family: Not on file    Frequency of Social Gatherings with Friends and Family: Not on file    Attends Bahai Services: Not on file    Active Member of Clubs or Organizations: Not on file    Attends Club or Organization Meetings: Not on file    Marital Status: Not on file   Intimate Partner Violence:     Fear of Current or Ex-Partner: Not on file    Emotionally Abused: Not on file    Physically Abused: Not on file    Sexually Abused: Not on file   Housing Stability:     Unable to Pay for Housing in the Last Year: Not on file    Number of Places Lived in the Last Year: Not on file    Unstable Housing in the Last Year: Not on file     No family history on file.   Allergies   Allergen Reactions    Latex     Accupril [Quinapril Hcl]     Bactine [Lidocaine-Benzalkonium]     Other      ACE BANDAGES    Tape [place] Tape]      PLASTIC TAPE AND ACE BANDAGES       Current Outpatient Medications:     levothyroxine (SYNTHROID) 100 MCG tablet, Take 1 tablet by mouth Daily, Disp: 30 tablet, Rfl: 0    CPAP Machine MISC, by Does not apply route New CPAP with 7 cm, Disp: 1 each, Rfl: 0    predniSONE (DELTASONE) 10 MG tablet, 1 1/2 po daily, Disp: 60 tablet, Rfl: 01    insulin NPH (NOVOLIN N) 100 UNIT/ML injection vial, 30 units at bedtime, Disp: 1 vial, Rfl: 3    insulin regular (HUMULIN R) 100 UNIT/ML injection, 10 units plus sliding scale  Less than 150 none  151-200 2 units  201-250 4 units  251-300 6 units  301-400 8 units, Disp: 1 vial, Rfl: 3    Continuous Blood Gluc Sensor (FREESTYLE ELENA 14 DAY SENSOR) MISC, Every 2 weeks, Disp: 2 each, Rfl: 06    Continuous Blood Gluc  (FREESTYLE ELENA READER) JOSÉ MIGUEL, As directed, Disp: 1 Device, Rfl: 0    buPROPion (WELLBUTRIN XL) 300 MG extended release tablet, TAKE 1 TABLET BY MOUTH ONCE DAILY, Disp: , Rfl:     warfarin (COUMADIN) 2 MG tablet, TAKE 1 TABLET BY MOUTH ONCE DAILY OR AS DIRECTED BY [facility], Disp: , Rfl:     fluticasone propionate (FLOVENT DISKUS) 100 MCG/BLIST AEPB inhaler, Inhale into the lungs daily, Disp: , Rfl:     blood glucose test strips (ONETOUCH VERIO) strip, USE 1 STRIP TO CHECK GLUCOSE 4 TIMES DAILY, Disp: 200 each, Rfl: 3    Insulin Syringe-Needle U-100 (AIMSCO INSULIN SYR ULTRA THIN) 31G X 5/16\" 0.3 ML MISC, 1 each by Does not apply route 3 times daily, Disp: 100 each, Rfl: 3    diltiazem (CARDIZEM CD) 300 MG extended release capsule, Take 300 mg by mouth daily, Disp: , Rfl:     metoprolol tartrate (LOPRESSOR) 50 MG tablet, Take 100 mg by mouth 2 times daily , Disp: , Rfl:     lisinopril (PRINIVIL;ZESTRIL) 20 MG tablet, Take 20 mg by mouth daily, Disp: , Rfl:     escitalopram (LEXAPRO) 20 MG tablet, Take 20 mg by mouth daily, Disp: , Rfl:     digoxin (LANOXIN) 125 MCG tablet, Take 125 mcg by mouth daily, Disp: , Rfl:     traZODone (DESYREL) 50 MG tablet, Take 50 mg by mouth nightly tAKE 1-2 TAB AT hs, Disp: , Rfl:     diphenoxylate-atropine (LOMOTIL) 2.5-0.025 MG per tablet, Take 1 tablet by mouth daily as needed for Diarrhea. Indications: FOR DIARRHEA, Disp: , Rfl:     tiZANidine (ZANAFLEX) 4 MG tablet, Take 4 mg by mouth every 8 hours as needed (DO NOT EXCEED 3 DOSES IN 24HRS), Disp: , Rfl:     sulfaSALAzine (AZULFIDINE) 500 MG tablet, Take 500 mg by mouth 2 times daily , Disp: , Rfl:     potassium chloride (KLOR-CON M) 20 MEQ extended release tablet, Take 1 tablet by mouth daily, Disp: 30 tablet, Rfl: 0    insulin lispro (HUMALOG KWIKPEN) 100 UNIT/ML pen, 2 units plus sliding scale  Less than 150 none 151-200 2 units 201-250 4 units  251-300 6 units  301-400 8 units, Disp: 5 pen, Rfl: 3    atorvastatin (LIPITOR) 40 MG tablet, Take 40 mg by mouth daily, Disp: , Rfl:     Blood Glucose Monitoring Suppl (ONETOUCH VERIO) w/Device KIT, Give 1 meter to check bs Dx E11.65, Disp: 1 kit, Rfl: 0    ONETOUCH DELICA LANCETS 63F MISC, Use bid to check bs Dx E11.65, Disp: 100 each, Rfl: 03    azaTHIOprine (IMURAN) 50 MG tablet, Take 100 mg by mouth daily, Disp: , Rfl:     nitroGLYCERIN (NITROSTAT) 0.4 MG SL tablet, Place 0.4 mg under the tongue every 5 minutes as needed for Chest pain up to max of 3 total doses.  If no relief after 1 dose, call 911., Disp: , Rfl:     Multiple Vitamins-Minerals (PRESERVISION AREDS 2) CAPS, Take 1 capsule by mouth 2 times daily, Disp: , Rfl:     cetirizine (ZYRTEC) 10 MG tablet, Take 10 mg by mouth daily , Disp: , Rfl:     Lactobacillus (PROBIOTIC ACIDOPHILUS PO), Take by mouth daily, Disp: , Rfl:     FOLIC ACID PO, Take 899 mg by mouth 2 times daily, Disp: , Rfl:     B Complex Vitamins (VITAMIN B COMPLEX PO), Take by mouth daily, Disp: , Rfl:     Cholecalciferol (VITAMIN D3) 5000 UNITS TABS, Take 1 tablet by mouth daily, Disp: , Rfl:     albuterol sulfate HFA (VENTOLIN HFA) 108 (90 BASE) MCG/ACT inhaler, Inhale 2 puffs into the lungs every 6 hours as needed for Wheezing, Disp: , Rfl:     furosemide (LASIX) 40 MG tablet, Take 40 mg by mouth daily as needed , Disp: , Rfl:     gabapentin (NEURONTIN) 300 MG capsule, TAKE ONE CAPSULE BY MOUTH THREE TIMES DAILY, Disp: 90 capsule, Rfl: 3    cephALEXin (KEFLEX) 500 MG capsule, Take 500 mg by mouth 3 times daily, Disp: , Rfl:     doxycycline hyclate (VIBRAMYCIN) 100 MG capsule, TAKE 1 CAPSULE BY MOUTH TWICE DAILY, Disp: , Rfl:     aspirin 81 MG tablet, Take 81 mg by mouth daily, Disp: , Rfl:     methotrexate 2.5 MG tablet, Take 2.5 mg by mouth once a week TAKE 8 TABS EVERY WEEK FRIDAY, Disp: , Rfl:   Lab Results   Component Value Date     04/27/2022    K 3.7 04/27/2022     04/27/2022    CO2 25 04/27/2022    BUN 18 04/27/2022    CREATININE 0.87 04/27/2022    GLUCOSE 131 05/03/2022    CALCIUM 10.3 (H) 04/27/2022    PROT 5.9 (L) 11/08/2019    LABALBU 3.5 11/08/2019    BILITOT 0.6 11/08/2019    ALKPHOS 53 11/08/2019    AST 33 11/08/2019    ALT 13 11/08/2019    LABGLOM >60.0 04/27/2022    GFRAA >60.0 04/27/2022    GLOB 2.4 11/08/2019     Lab Results   Component Value Date    WBC 6.6 12/02/2019    HGB 9.6 (L) 12/02/2019    HCT 28.2 (L) 12/02/2019    .9 (H) 12/02/2019     12/02/2019     Lab Results   Component Value Date    LABA1C 7.8 (H) 04/27/2022    LABA1C 6.7 (H) 01/19/2021    LABA1C 6.8 (H) 10/12/2020     No results found for: CHOLFAST, TRIGLYCFAST, HDL, LDLCALC, CHOL, TRIG  No results found for: TESTM  Lab Results   Component Value Date    TSH 3.790 04/27/2022    TSH 1.780 01/19/2021    TSH 4.730 (H) 10/12/2020    TSHREFLEX 4.190 (H) 03/12/2019    T4FREE 1.32 04/27/2022    T4FREE 1.20 01/19/2021    T4FREE 1.19 10/12/2020     No results found for: TPOABS    Review of Systems   Constitutional: Positive for fatigue. Eyes: Negative. Cardiovascular: Negative. Endocrine: Negative for polydipsia and polyuria. Musculoskeletal: Positive for arthralgias. All other systems reviewed and are negative. Objective:   Physical Exam  Vitals reviewed. Constitutional:       General: She is not in acute distress. Appearance: Normal appearance. She is obese. HENT:      Head: Normocephalic and atraumatic. Right Ear: External ear normal.      Left Ear: External ear normal.      Nose: Nose normal.   Eyes:      General: No scleral icterus. Right eye: No discharge. Left eye: No discharge. Extraocular Movements: Extraocular movements intact. Conjunctiva/sclera: Conjunctivae normal.   Cardiovascular:      Rate and Rhythm: Normal rate. Pulmonary:      Effort: Pulmonary effort is normal.   Musculoskeletal:         General: Normal range of motion. Cervical back: Normal range of motion and neck supple. Right lower leg: Edema present. Left lower leg: Edema present. Feet:    Neurological:      General: No focal deficit present. Mental Status: She is alert and oriented to person, place, and time.    Psychiatric:         Mood and Affect: Mood normal.         Behavior: Behavior normal.

## 2022-05-12 ASSESSMENT — ENCOUNTER SYMPTOMS: EYES NEGATIVE: 1

## 2022-06-28 DIAGNOSIS — E03.9 HYPOTHYROIDISM, UNSPECIFIED TYPE: ICD-10-CM

## 2022-06-28 RX ORDER — LEVOTHYROXINE SODIUM 100 UG/1
TABLET ORAL
Qty: 30 TABLET | Refills: 3 | Status: SHIPPED | OUTPATIENT
Start: 2022-06-28 | End: 2022-10-10

## 2022-10-08 DIAGNOSIS — E03.9 HYPOTHYROIDISM, UNSPECIFIED TYPE: ICD-10-CM

## 2022-10-10 RX ORDER — LEVOTHYROXINE SODIUM 0.1 MG/1
TABLET ORAL
Qty: 30 TABLET | Refills: 3 | Status: SHIPPED | OUTPATIENT
Start: 2022-10-10

## 2023-04-04 DIAGNOSIS — E03.9 HYPOTHYROIDISM, UNSPECIFIED TYPE: ICD-10-CM

## 2023-04-04 RX ORDER — LEVOTHYROXINE SODIUM 0.1 MG/1
TABLET ORAL
Qty: 30 TABLET | Refills: 1 | Status: SHIPPED | OUTPATIENT
Start: 2023-04-04

## 2023-05-16 DIAGNOSIS — E03.9 HYPOTHYROIDISM, UNSPECIFIED TYPE: ICD-10-CM

## 2023-05-16 RX ORDER — LEVOTHYROXINE SODIUM 0.1 MG/1
TABLET ORAL
Qty: 30 TABLET | Refills: 0 | OUTPATIENT
Start: 2023-05-16

## 2023-06-02 PROBLEM — R07.9 CHEST PAIN: Status: ACTIVE | Noted: 2023-06-02

## 2023-06-05 PROBLEM — I48.91 ATRIAL FIBRILLATION WITH RVR (HCC): Status: ACTIVE | Noted: 2023-06-05

## 2023-06-05 PROBLEM — R50.9 FEVER IN ADULT: Status: ACTIVE | Noted: 2023-06-05

## 2023-06-07 PROBLEM — K57.92 DIVERTICULITIS: Status: ACTIVE | Noted: 2023-06-07

## 2023-08-01 ENCOUNTER — OFFICE VISIT (OUTPATIENT)
Dept: ENDOCRINOLOGY | Age: 73
End: 2023-08-01
Payer: MEDICARE

## 2023-08-01 VITALS
DIASTOLIC BLOOD PRESSURE: 78 MMHG | BODY MASS INDEX: 33.92 KG/M2 | OXYGEN SATURATION: 96 % | HEART RATE: 100 BPM | SYSTOLIC BLOOD PRESSURE: 140 MMHG | WEIGHT: 229 LBS | HEIGHT: 69 IN

## 2023-08-01 DIAGNOSIS — E03.9 HYPOTHYROIDISM, UNSPECIFIED TYPE: Primary | ICD-10-CM

## 2023-08-01 DIAGNOSIS — E11.641 UNCONTROLLED TYPE 2 DIABETES MELLITUS WITH HYPOGLYCEMIA AND COMA (HCC): ICD-10-CM

## 2023-08-01 DIAGNOSIS — B35.1 ONYCHOMYCOSIS: ICD-10-CM

## 2023-08-01 LAB
CHP ED QC CHECK: NORMAL
GLUCOSE BLD-MCNC: 189 MG/DL
INR IN PPP BY COAGULATION ASSAY: 2.1 (ref 0.9–1.1)
PROTHROMBIN TIME (PT) IN PPP BY COAGULATION ASSAY: 23.8 SEC (ref 9.8–12.8)

## 2023-08-01 PROCEDURE — 1036F TOBACCO NON-USER: CPT | Performed by: INTERNAL MEDICINE

## 2023-08-01 PROCEDURE — 3017F COLORECTAL CA SCREEN DOC REV: CPT | Performed by: INTERNAL MEDICINE

## 2023-08-01 PROCEDURE — G8417 CALC BMI ABV UP PARAM F/U: HCPCS | Performed by: INTERNAL MEDICINE

## 2023-08-01 PROCEDURE — 1090F PRES/ABSN URINE INCON ASSESS: CPT | Performed by: INTERNAL MEDICINE

## 2023-08-01 PROCEDURE — 3077F SYST BP >= 140 MM HG: CPT | Performed by: INTERNAL MEDICINE

## 2023-08-01 PROCEDURE — 99213 OFFICE O/P EST LOW 20 MIN: CPT | Performed by: INTERNAL MEDICINE

## 2023-08-01 PROCEDURE — 82962 GLUCOSE BLOOD TEST: CPT | Performed by: INTERNAL MEDICINE

## 2023-08-01 PROCEDURE — 3044F HG A1C LEVEL LT 7.0%: CPT | Performed by: INTERNAL MEDICINE

## 2023-08-01 PROCEDURE — G8427 DOCREV CUR MEDS BY ELIG CLIN: HCPCS | Performed by: INTERNAL MEDICINE

## 2023-08-01 PROCEDURE — 2022F DILAT RTA XM EVC RTNOPTHY: CPT | Performed by: INTERNAL MEDICINE

## 2023-08-01 PROCEDURE — 3078F DIAST BP <80 MM HG: CPT | Performed by: INTERNAL MEDICINE

## 2023-08-01 PROCEDURE — G8399 PT W/DXA RESULTS DOCUMENT: HCPCS | Performed by: INTERNAL MEDICINE

## 2023-08-01 PROCEDURE — 1123F ACP DISCUSS/DSCN MKR DOCD: CPT | Performed by: INTERNAL MEDICINE

## 2023-08-01 RX ORDER — LEVOTHYROXINE SODIUM 0.1 MG/1
100 TABLET ORAL DAILY
Qty: 30 TABLET | Refills: 5 | Status: SHIPPED | OUTPATIENT
Start: 2023-08-01

## 2023-08-01 NOTE — PROGRESS NOTES
Procedure Laterality Date    CARPAL TUNNEL RELEASE Left      SECTION      CHOLECYSTECTOMY      CORONARY ANGIOPLASTY WITH STENT PLACEMENT  2019    prior caths as well, total 7 stents    HIP CLOSED REDUCTION Left 2019    HIP CLOSED REDUCTION performed by Leonid Slater DO at [address], LEFT SHOULDER AND HIP AS WELL     Social History     Socioeconomic History    Marital status:      Spouse name: Aaron Clemons    Number of children: 2    Years of education: 12    Highest education level: Associate degree: academic program   Occupational History    Occupation: retired  [employer]     Comment:    Tobacco Use    Smoking status: Former    Smokeless tobacco: Never    Tobacco comments:     QUIT AT AGE 41   Vaping Use    Vaping Use: Never used   Substance and Sexual Activity    Alcohol use: Not Currently    Drug use: No    Sexual activity: Not Currently   Other Topics Concern    Not on file   Social History Narrative         Lives With: Spouse    Type of Home: 2 story  House    Home Layout: Two level, Able to Live on Main level with bedroom/bathroom    Home Access: Stairs to enter with rails    Entrance Stairs - Number of Steps: 3-4    Bathroom Shower/Tub: Tub/Shower unit    Bathroom Equipment: Shower chair, Grab bars in [facility]: 4 wheeled walker, [address], Electric scooter    ADL Assistance: Independent    Homemaking Assistance: Independent    Homemaking Responsibilities: Yes    Meal Prep Responsibility: Primary    Laundry Responsibility: Primary    Cleaning Responsibility: Primary    Bill Paying/Finance Responsibility: Primary    Shopping Responsibility: Primary    Health Care Management: Primary    Ambulation Assistance: Independent(cane in house; scooter for longer distances)    Transfer Assistance: Independent    Leisure & Hobbies: likes to maria elena and build [employer]     Social Determinants of [address]

## 2023-08-18 LAB
ANION GAP IN SER/PLAS: 12 MMOL/L (ref 10–20)
CALCIUM (MG/DL) IN SER/PLAS: 9.8 MG/DL (ref 8.6–10.3)
CARBON DIOXIDE, TOTAL (MMOL/L) IN SER/PLAS: 27 MMOL/L (ref 21–32)
CHLORIDE (MMOL/L) IN SER/PLAS: 103 MMOL/L (ref 98–107)
CREATININE (MG/DL) IN SER/PLAS: 0.89 MG/DL (ref 0.5–1.05)
ERYTHROCYTE DISTRIBUTION WIDTH (RATIO) BY AUTOMATED COUNT: 14 % (ref 11.5–14.5)
ERYTHROCYTE MEAN CORPUSCULAR HEMOGLOBIN CONCENTRATION (G/DL) BY AUTOMATED: 33.2 G/DL (ref 32–36)
ERYTHROCYTE MEAN CORPUSCULAR VOLUME (FL) BY AUTOMATED COUNT: 102 FL (ref 80–100)
ERYTHROCYTES (10*6/UL) IN BLOOD BY AUTOMATED COUNT: 3.77 X10E12/L (ref 4–5.2)
GFR FEMALE: 69 ML/MIN/1.73M2
GLUCOSE (MG/DL) IN SER/PLAS: 206 MG/DL (ref 74–99)
HEMATOCRIT (%) IN BLOOD BY AUTOMATED COUNT: 38.6 % (ref 36–46)
HEMOGLOBIN (G/DL) IN BLOOD: 12.8 G/DL (ref 12–16)
INR IN PPP BY COAGULATION ASSAY: 2.8 (ref 0.9–1.1)
LEUKOCYTES (10*3/UL) IN BLOOD BY AUTOMATED COUNT: 6.1 X10E9/L (ref 4.4–11.3)
PLATELETS (10*3/UL) IN BLOOD AUTOMATED COUNT: 281 X10E9/L (ref 150–450)
POTASSIUM (MMOL/L) IN SER/PLAS: 3.4 MMOL/L (ref 3.5–5.3)
PROTHROMBIN TIME (PT) IN PPP BY COAGULATION ASSAY: 31.6 SEC (ref 9.8–12.8)
SODIUM (MMOL/L) IN SER/PLAS: 139 MMOL/L (ref 136–145)
UREA NITROGEN (MG/DL) IN SER/PLAS: 15 MG/DL (ref 6–23)

## 2023-08-23 ENCOUNTER — HOSPITAL ENCOUNTER (OUTPATIENT)
Dept: DATA CONVERSION | Facility: HOSPITAL | Age: 73
End: 2023-08-24
Attending: INTERNAL MEDICINE | Admitting: INTERNAL MEDICINE
Payer: COMMERCIAL

## 2023-08-23 DIAGNOSIS — I48.91 UNSPECIFIED ATRIAL FIBRILLATION (MULTI): ICD-10-CM

## 2023-08-23 DIAGNOSIS — I48.11 LONGSTANDING PERSISTENT ATRIAL FIBRILLATION (MULTI): ICD-10-CM

## 2023-08-23 DIAGNOSIS — I48.19 OTHER PERSISTENT ATRIAL FIBRILLATION (MULTI): ICD-10-CM

## 2023-08-23 DIAGNOSIS — I50.22 CHRONIC SYSTOLIC (CONGESTIVE) HEART FAILURE (MULTI): ICD-10-CM

## 2023-08-23 LAB
ANION GAP IN SER/PLAS: 13 MMOL/L (ref 10–20)
CALCIUM (MG/DL) IN SER/PLAS: 9.9 MG/DL (ref 8.6–10.3)
CARBON DIOXIDE, TOTAL (MMOL/L) IN SER/PLAS: 24 MMOL/L (ref 21–32)
CHLORIDE (MMOL/L) IN SER/PLAS: 109 MMOL/L (ref 98–107)
CREATININE (MG/DL) IN SER/PLAS: 0.94 MG/DL (ref 0.5–1.05)
GFR FEMALE: 64 ML/MIN/1.73M2
GLUCOSE (MG/DL) IN SER/PLAS: 137 MG/DL (ref 74–99)
INR IN PPP BY COAGULATION ASSAY: 1.5 (ref 0.9–1.1)
POCT GLUCOSE: 159 MG/DL (ref 74–99)
POTASSIUM (MMOL/L) IN SER/PLAS: 3.7 MMOL/L (ref 3.5–5.3)
PROTHROMBIN TIME (PT) IN PPP BY COAGULATION ASSAY: 17 SEC (ref 9.8–12.8)
SODIUM (MMOL/L) IN SER/PLAS: 142 MMOL/L (ref 136–145)
UREA NITROGEN (MG/DL) IN SER/PLAS: 16 MG/DL (ref 6–23)

## 2023-08-24 LAB
POCT GLUCOSE: 135 MG/DL (ref 74–99)
POCT GLUCOSE: 139 MG/DL (ref 74–99)

## 2023-08-25 LAB
ATRIAL RATE: 90 BPM
ATRIAL RATE: 97 BPM
Q ONSET: 194 MS
Q ONSET: 228 MS
QRS COUNT: 15 BEATS
QRS COUNT: 19 BEATS
QRS DURATION: 154 MS
QRS DURATION: 84 MS
QT INTERVAL: 326 MS
QT INTERVAL: 426 MS
QTC CALCULATION(BAZETT): 449 MS
QTC CALCULATION(BAZETT): 521 MS
QTC FREDERICIA: 403 MS
QTC FREDERICIA: 487 MS
R AXIS: 100 DEGREES
R AXIS: 28 DEGREES
T AXIS: -6 DEGREES
T AXIS: -9 DEGREES
T OFFSET: 391 MS
T OFFSET: 407 MS
VENTRICULAR RATE: 114 BPM
VENTRICULAR RATE: 90 BPM

## 2023-09-29 VITALS — BODY MASS INDEX: 33.73 KG/M2 | WEIGHT: 227.74 LBS | HEIGHT: 69 IN

## 2023-09-30 NOTE — H&P
History of Present Illness:   History Present Illness:  Reason for surgery: AF with rapid ventricular rate   HPI:    She is here for His ablation and dual chamber pacemaker implant.    Past Medical History:        Medical History:   Post PTCA:    Atrial fibrillation with rapid ventricular response:    Peripheral vascular disease:    GERD (gastroesophageal reflux disease):    Morbid obesity:    COPD (chronic obstructive pulmonary disease):    Hypothyroidism:    CAD (coronary artery disease):    Diabetes mellitus, type 2:    Hyperlipidemia:    Atrial tachycardia:    Benign essential hypertension:        Surg History:   Post PTCA:    History of shoulder replacement:    S/P tonsillectomy:    History of carpal tunnel repair:    H/O colonoscopy:    History of hip replacement:    History of knee replacement:    History of cholecystectomy:    History of  section:     Allergies:        Allergies:  ·  Accupril : Unknown, Other  ·  Bextra : Unknown  ·  Latex : Unknown, Blistering Dis.    Home Medication Review:   Home Medications Reviewed: yes     Impression/Procedure:   ·  Impression and Planned Procedure: His ablation, dual chamber pacemaker implant for AF with RVR       ERAS (Enhanced Recovery After Surgery):  ·  ERAS Patient: no     Review of Systems:   Review of Systems:  Respiratory: NEGATIVE: Dry Cough, Productive Cough,  Hemoptysis, Wheezing, Shortness of Breath     Cardiac: POSITIVE: Dyspnea on Exertion, Palpitations ; NEGATIVE: Chest Pain, Orthopnea, Syncope     All Other Systems: All other systems reviewed and  are negative       Physical Exam by System:    Constitutional: Well developed, awake/alert/oriented  x3, no distress, alert and cooperative   Eyes: EOMI, clear sclera   ENMT: mucous membranes moist, no apparent injury,  no lesions seen   Head/Neck: Neck supple, no thyromegaly, No JVD, trachea  midline   Respiratory/Thorax: Patent airways, CTA bilaterally,  no wheezing, normal breath sounds with  good chest expansion, thorax symmetric   Cardiovascular: Normal PMI, irregular, rate and rhythm,  , normal S 1and S 2, no murmurs, 2+ equal pulses radial, brachial, DP pulses   Gastrointestinal: Nondistended, BS +,  no bruits,  soft, non-tender, no guarding, no masses palpable, no organomegaly   Genitourinary: deferred   Musculoskeletal: ROM intact, no joint swelling, normal  strength   Extremities: normal extremities; no cyanosis, edema,  or contusions, no clubbing   Neurological: alert and oriented x3, intact senses,  motor normal strength; normal gait   Breast: deferred   Lymphatic: No cervical lymphadenopathy   Psychological: Appropriate mood and behavior   Skin: Warm and dry, no lesions, no rashes     Airway/Sedation Assessment:  ·  Emotional Status calm   ·  Neurologic alert & oriented x 3   ·  Respiratory clear to auscultation   ·  Cardiovascular irregular   ·  GI/ soft, nontender     · Pulses present: Pedal Left, Pedal Right, Radial Left, Radial Right     ·  Mouth Opening OK yes   ·  Neck Flexibility OK yes   ·  Loose Teeth no   ·  Oropharyngeal Classification Class II   ·  ASA PS Classification ASA III   ·  Sedation Plan moderate sedation     Consent:   COVID-19 Consent:  ·  COVID-19 Risk Consent Surgeon has reviewed key risks related to the risk of carla COVID-19 and if they contract COVID-19 what the risks are.       Electronic Signatures:  Maria L Gracia)  (Signed 23-Aug-2023 09:51)   Authored: History of Present Illness, Past Medical History,  Allergies, Home Medication Review, Impression/Procedure, ERAS, Review of Systems, Physical Exam, Consent, Note Completion      Last Updated: 23-Aug-2023 09:51 by Maria L Gracia)

## 2023-09-30 NOTE — DISCHARGE SUMMARY
Send Summary:   Discharge Summary Providers:  Provider Role Provider Name   · Referring Fiona Reyes   · Attending Maria L Gracia   · Primary Fiona Reyes       Note Recipients: Fiona Reyes MD - [id number]  []  Maria L Gracia MD - [id number] [preferred]       Discharge:    Summary:   Admission Date: .23-Aug-2023 07:54:00   Discharge Date: 24-Aug-2023   Attending Physician at Discharge: Maria L Gracia   Admission Reason: Persistent atrial fibrillation   Final Discharge Diagnoses: A-fib  s/p PPM implant   Procedures: Date: 23-Aug-2023 13:08:00  Procedure Name: Dual chamber pacemaker implant; His bundle ablation, 3D mapping, His reording, Ultrasound guided femoral access, Deep sedation   Condition at Discharge: Satisfactory   Disposition at Discharge: .Home   Vital Signs:        T   P  R  BP   MAP  SpO2   Value  36.6  90  18  139/80   104  94%  Date/Time 8/24 11:34 8/24 11:34 8/24 11:34 8/24 11:34  8/24 11:34 8/24 11:34  Range  (35.9C - 37.3C )  (90 - 92 )  (16 - 18 )  (139 - 169 )/ (75 - 97 )  (104 - 118 )  (92% - 97% )  Highest temp of 37.3 C was recorded at 8/24 0:08    Date:            Weight/Scale Type:  Height:   23-Aug-2023 18:05  103.3  kg         175.2  cm  Hospital Course:    73-year-old female admitted to [facility] with persistent atrial fibrillation for elective dual-chamber permanent pacemaker implant  and AV node ablation.  Patient underwent successful procedure see full operative report per Dr. Gracia.  At this time Left prepectoral site is covered with aquacell dressing D & I, no drainage, or hematoma noted. Right groin site dressing is removed with  no hematoma or bleeding at site. Dual chamber PPM check shows normal lead and device function.  Post procedure potential complication, activity restriction, medications and follow up reviewed with patient. OK to discharge home.  Follow up as below. Warfarin  will be held for 1 more day and resumed tomorrow at previous  dose.    Immunizations:    Immunizations:  23-Jul-2021   SARS-CoV-2 (COVID-19): Immunizations, 24-Feb-2021 23-Jul-2021   SARS-CoV-2 (COVID-19): Immunizations, 24-Mar-2021  29-Sep-2021   .Influenza- Influenza Virus: Immunizations, 29-Sep-2021  29-Sep-2021   Pneumonia- Pneumococcal polysaccharide vaccine: Immunizations,  29-Sep-2021      Discharge Information:    and Continuing Care:   Lab Results - Pending:    None  Radiology Results - Pending: None   Discharge Instructions:    .  Follow Up Appointments:    Follow-Up Appointment 01:           Physician/Dept/Service:   Cardiology Nurse          Reason for Referral:   Incision Check          Scheduled Date/Time:   30-Aug-2023 13:00          Location:   [place] [address]          Phone Number:   [phone]    Follow-Up Appointment 02:           Physician/Dept/Service:   [facility]          Reason for Referral:   Device check/adjustment          Scheduled Date/Time:   25-Sep-2023 11:20          Location:   [facility]          Phone Number:   [phone]    Follow-Up Appointment 03:           Physician/Dept/Service:   [facility]          Reason for Referral:   Device check/adjustment          Scheduled Date/Time:   26-Oct-2023 11:20          Location:   [facility]          Phone Number:   [phone]    Follow-Up Appointment 04:           Physician/Dept/Service:   [facility]          Reason for Referral:   Chest X-ray/Device Check          Scheduled Date/Time:   29-Nov-2023 07:30          Location:   [facility]          Phone Number:   [phone]    Follow-Up Appointment 05:           Physician/Dept/Service:   [facility]          Reason for Referral:   follow-up          Scheduled Date/Time:   29-Nov-2023 09:00    Discharge Medications: Home Medication   cholecalciferol 5000 intl units oral capsule - 1 cap(s) oral once a  day  Vitamin B Complex - 1 tab(s) oral once a day  sulfaSALAzine 500 mg oral tablet - 2 tab(s) orally 2 times a day  levothyroxine 100 mcg (0.1 mg) oral tablet - 1 tab(s) orally once a day  lisinopril 10 mg oral tablet - 1 tab(s) orally once a day  furosemide 40 mg oral tablet - 0.5 tab(s) orally once a day  HumuLIN R 100 units/mL injectable solution - 10 unit(s) subcutaneous 3 times a day (before meals)  HumuLIN N 100 units/mL subcutaneous suspension - 30 unit(s) subcutaneous once a day (at bedtime)  azaTHIOprine 50 mg oral tablet - 1 tab(s) orally 2 times a day  potassium chloride 20 mEq oral tablet, extended release - 1 tab(s) orally once a day  PreserVision AREDS 2 oral capsule - 1 cap(s) orally 2 times a day  aspirin 81 mg oral delayed release tablet - 1 tab(s) orally once a day  dilTIAZem 180 mg/24 hours oral capsule, extended release - 1 cap(s) orally once a day  diclofenac 1% topical gel - Apply topically to affected area once a day  folic acid - 1 tab(s) orally once a day  melatonin 10 mg oral tablet - 1 tab(s) orally once a day (at bedtime)  metoprolol tartrate 50 mg oral tablet - 1 tab(s) orally 2 times a day  warfarin 2 mg oral tablet - 1 tab(s) orally once a day.  Hold today and resume on 8/25/23 at previous dosing     PRN Medication   nitroglycerin 0.4 mg sublingual tablet - 1 tab(s) sublingual every 5 minutes, up to 3 doses, As Needed - for chest pain  traMADol 50 mg oral tablet - 1 tab(s) orally every 6 hours, As needed, Pain if no relief from tylenol then alternate tylenol and tramadol  traZODone 50 mg oral tablet - 1 tab(s) orally once a day (at bedtime), As Needed  Refresh Optive Sin-3 Preservative Free ophthalmic solution - 1 drop(s) in each eye 2 times a day, As Needed  loratadine 10 mg oral tablet - 1 tab(s) orally once a day, As Needed  HumuLIN R 100 units/mL injectable solution - unit(s) subcutaneous 3 times a day (before meals), As Needed per sliding scale  albuterol 90 mcg/inh inhalation  aerosol - 2 puff(s) inhaled every 6 hours, As Needed  diphenoxylate-atropine 2.5 mg-0.025 mg oral tablet - 1 tab(s) orally once a day, As Needed  acetaminophen 500 mg oral tablet - 2 tab(s) orally every 4 hours, As Needed  ondansetron 4 mg oral tablet - 1 tab(s) orally every 8 hours, As Needed - for nausea and vomiting     DNR Status:   ·  Code Status Code Status order at time of discharge: Full Code     Attestation:   Note Completion:  I am a:  Advanced Practice Provider   Attending Only - Shared Visit with Advanced Practice Provider This is a shared visit.  I have reviewed the Advanced Practice Provider?s encounter note, approve the Advanced Practice Provider?s documentation,  and provide the following additional information from my personal encounter.    Comments/ Additional Findings    I have personally reviewed the data.    In addition,  The patient feels  less tired    today.  She is ready to go home    Exam  VS reviewed.  Cor   Regular  Lungs  CTA   Skin  Normal device site; no hematoma      Personally reviewed ECG and tele and device check    Device check  Normal device function    Tele  Appropriate pacing    Impression  AF with RVR, s/p His ablation and pacemaker implant  OK to discharge  Followup EP  Meds per MAR  Greater than 50% visit spent to discuss arrhythmia, ablation, pacemaker, remote monitor, and followup.    41 minutes          Electronic Signatures:  Jacqui Trujillo (APRN-CNP)  (Signed 24-Aug-2023 15:55)   Authored: Send Summary, Summary Content, Immunizations,  Ongoing Care, DNR Status, Note Completion  Maria L Gracia)  (Signed 26-Aug-2023 12:28)   Authored: Note Completion   Co-Signer: Send Summary, Summary Content, Immunizations, Ongoing Care, DNR Status, Note Completion      Last Updated: 26-Aug-2023 12:28 by Maria L Gracia)

## 2023-10-26 ENCOUNTER — APPOINTMENT (OUTPATIENT)
Dept: CARDIOLOGY | Facility: HOSPITAL | Age: 73
End: 2023-10-26
Payer: MEDICARE

## 2023-10-30 ENCOUNTER — HOSPITAL ENCOUNTER (OUTPATIENT)
Dept: CARDIOLOGY | Facility: HOSPITAL | Age: 73
Discharge: HOME | End: 2023-10-30
Payer: MEDICARE

## 2023-10-30 DIAGNOSIS — Z95.0 PRESENCE OF CARDIAC PACEMAKER: ICD-10-CM

## 2023-10-30 DIAGNOSIS — I49.5 SICK SINUS SYNDROME (MULTI): ICD-10-CM

## 2023-10-30 PROCEDURE — 93279 PRGRMG DEV EVAL PM/LDLS PM: CPT | Performed by: INTERNAL MEDICINE

## 2023-10-30 PROCEDURE — 93279 PRGRMG DEV EVAL PM/LDLS PM: CPT

## 2023-11-14 ENCOUNTER — INITIAL CONSULT (OUTPATIENT)
Dept: PODIATRY | Age: 73
End: 2023-11-14
Payer: MEDICARE

## 2023-11-14 VITALS — WEIGHT: 230 LBS | HEIGHT: 69 IN | TEMPERATURE: 97.4 F | BODY MASS INDEX: 34.07 KG/M2

## 2023-11-14 DIAGNOSIS — M20.11 HALLUX ABDUCTO VALGUS, BILATERAL: ICD-10-CM

## 2023-11-14 DIAGNOSIS — M79.672 PAIN IN BOTH FEET: Primary | ICD-10-CM

## 2023-11-14 DIAGNOSIS — M20.12 HALLUX ABDUCTO VALGUS, BILATERAL: ICD-10-CM

## 2023-11-14 DIAGNOSIS — M20.42 HAMMERTOE, BILATERAL: ICD-10-CM

## 2023-11-14 DIAGNOSIS — M20.41 HAMMERTOE, BILATERAL: ICD-10-CM

## 2023-11-14 DIAGNOSIS — M21.622 TAILOR'S BUNIONETTE, BILATERAL: ICD-10-CM

## 2023-11-14 DIAGNOSIS — M79.671 PAIN IN BOTH FEET: Primary | ICD-10-CM

## 2023-11-14 DIAGNOSIS — B35.1 DERMATOPHYTOSIS OF NAIL: ICD-10-CM

## 2023-11-14 DIAGNOSIS — M21.621 TAILOR'S BUNIONETTE, BILATERAL: ICD-10-CM

## 2023-11-14 DIAGNOSIS — E11.42 DIABETIC POLYNEUROPATHY ASSOCIATED WITH TYPE 2 DIABETES MELLITUS (HCC): ICD-10-CM

## 2023-11-14 PROCEDURE — 2022F DILAT RTA XM EVC RTNOPTHY: CPT | Performed by: PODIATRIST

## 2023-11-14 PROCEDURE — G8484 FLU IMMUNIZE NO ADMIN: HCPCS | Performed by: PODIATRIST

## 2023-11-14 PROCEDURE — 1123F ACP DISCUSS/DSCN MKR DOCD: CPT | Performed by: PODIATRIST

## 2023-11-14 PROCEDURE — 1036F TOBACCO NON-USER: CPT | Performed by: PODIATRIST

## 2023-11-14 PROCEDURE — G8399 PT W/DXA RESULTS DOCUMENT: HCPCS | Performed by: PODIATRIST

## 2023-11-14 PROCEDURE — 1090F PRES/ABSN URINE INCON ASSESS: CPT | Performed by: PODIATRIST

## 2023-11-14 PROCEDURE — 3017F COLORECTAL CA SCREEN DOC REV: CPT | Performed by: PODIATRIST

## 2023-11-14 PROCEDURE — G8417 CALC BMI ABV UP PARAM F/U: HCPCS | Performed by: PODIATRIST

## 2023-11-14 PROCEDURE — 99203 OFFICE O/P NEW LOW 30 MIN: CPT | Performed by: PODIATRIST

## 2023-11-14 PROCEDURE — 3044F HG A1C LEVEL LT 7.0%: CPT | Performed by: PODIATRIST

## 2023-11-14 PROCEDURE — 11721 DEBRIDE NAIL 6 OR MORE: CPT | Performed by: PODIATRIST

## 2023-11-14 PROCEDURE — G8427 DOCREV CUR MEDS BY ELIG CLIN: HCPCS | Performed by: PODIATRIST

## 2023-11-14 ASSESSMENT — ENCOUNTER SYMPTOMS
VOMITING: 0
NAUSEA: 0
BACK PAIN: 0
SHORTNESS OF BREATH: 1

## 2023-11-14 NOTE — PROGRESS NOTES
deposition noted to the bilateral leg. Pulmonary:      Effort: Pulmonary effort is normal.   Musculoskeletal:      Right lower le+ Pitting Edema present. Left lower le+ Pitting Edema present. Right foot: Decreased range of motion. Deformity and bunion present. No Charcot foot. Left foot: Decreased range of motion. Deformity and bunion present. No Charcot foot. Feet:      Right foot:      Protective Sensation: 10 sites tested. 0 sites sensed. Skin integrity: Dry skin present. No ulcer or callus. Toenail Condition: Right toenails are abnormally thick, long and ingrown. Fungal disease present. Left foot:      Protective Sensation: 10 sites tested. 0 sites sensed. Skin integrity: Dry skin present. No ulcer or callus. Toenail Condition: Left toenails are abnormally thick, long and ingrown. Fungal disease present. Comments: Rectus foot type noted bilaterally. MMT graded 5/5 for all extrinsic foot muscle groups bilaterally, weakness noted to the intrinsic muscles. Hallux abductovalgus deformity noted bilaterally. Tailor's bunionette deformity with adductovarus rotation deformity of the fifth toe noted bilaterally. Flexion deformity noted to PIPJ 2-4 bilaterally. Cynthia's deformity noted to the bilateral posterior heel. Decreased ankle joint dorsiflexion noted bilaterally, the left improves with knee flexion, the right does not. Lymphadenopathy:      Comments: Popliteal lymph nodes are soft and nontender. Skin:     General: Skin is dry. Capillary Refill: Capillary refill takes 2 to 3 seconds. Comments: No open lesions noted bilaterally. Normal skin turgor noted bilaterally. Dry skin texture noted bilaterally, hyperkeratotic buildup noted to the bilateral plantar heel. Neurological:      Mental Status: She is alert and oriented to person, place, and time. Deep Tendon Reflexes: Babinski sign absent on the right side.  Babinski sign

## 2023-11-17 DIAGNOSIS — I10 HYPERTENSION, UNSPECIFIED TYPE: ICD-10-CM

## 2023-11-17 PROBLEM — E55.9 VITAMIN D DEFICIENCY: Status: ACTIVE | Noted: 2021-09-21

## 2023-11-17 PROBLEM — I48.21 PERMANENT ATRIAL FIBRILLATION (MULTI): Status: ACTIVE | Noted: 2023-11-17

## 2023-11-17 PROBLEM — E27.8 ADRENAL MASS (MULTI): Status: ACTIVE | Noted: 2023-11-17

## 2023-11-17 PROBLEM — Z96.643 STATUS POST TOTAL REPLACEMENT OF BOTH HIPS: Status: ACTIVE | Noted: 2023-11-17

## 2023-11-17 PROBLEM — M54.50 CHRONIC BILATERAL LOW BACK PAIN WITHOUT SCIATICA: Status: ACTIVE | Noted: 2022-04-12

## 2023-11-17 PROBLEM — I25.10 CORONARY ARTERY DISEASE: Status: ACTIVE | Noted: 2017-06-01

## 2023-11-17 PROBLEM — G89.29 CHRONIC HIP PAIN AFTER TOTAL REPLACEMENT OF RIGHT HIP JOINT: Status: ACTIVE | Noted: 2023-11-17

## 2023-11-17 PROBLEM — R60.0 BILATERAL LOWER EXTREMITY EDEMA: Status: ACTIVE | Noted: 2023-11-17

## 2023-11-17 PROBLEM — M79.10 MYALGIA: Status: ACTIVE | Noted: 2023-11-17

## 2023-11-17 PROBLEM — M25.531 BILATERAL WRIST PAIN: Status: ACTIVE | Noted: 2022-04-12

## 2023-11-17 PROBLEM — G89.29 CHRONIC PAIN OF BOTH KNEES: Status: ACTIVE | Noted: 2022-04-12

## 2023-11-17 PROBLEM — Z96.643 S/P BILATERAL REVISION OF TOTAL HIP ARTHROPLASTY: Status: ACTIVE | Noted: 2023-11-17

## 2023-11-17 PROBLEM — I26.99 PULMONARY EMBOLISM ON RIGHT (MULTI): Status: ACTIVE | Noted: 2023-07-16

## 2023-11-17 PROBLEM — I48.91 ATRIAL FIBRILLATION WITH RVR (MULTI): Status: ACTIVE | Noted: 2021-07-22

## 2023-11-17 PROBLEM — Z96.641 CHRONIC HIP PAIN AFTER TOTAL REPLACEMENT OF RIGHT HIP JOINT: Status: ACTIVE | Noted: 2023-11-17

## 2023-11-17 PROBLEM — Z98.61 CAD S/P PERCUTANEOUS CORONARY ANGIOPLASTY: Status: ACTIVE | Noted: 2023-11-17

## 2023-11-17 PROBLEM — F41.9 ANXIETY AND DEPRESSION: Status: ACTIVE | Noted: 2021-07-22

## 2023-11-17 PROBLEM — M47.812 CERVICAL SPONDYLOSIS WITHOUT MYELOPATHY: Status: ACTIVE | Noted: 2021-09-16

## 2023-11-17 PROBLEM — R79.89 ELEVATED LFTS: Status: ACTIVE | Noted: 2021-09-21

## 2023-11-17 PROBLEM — G31.84 MILD COGNITIVE IMPAIRMENT WITH MEMORY LOSS: Status: ACTIVE | Noted: 2021-07-22

## 2023-11-17 PROBLEM — M05.79 RHEUMATOID ARTHRITIS INVOLVING MULTIPLE SITES WITH POSITIVE RHEUMATOID FACTOR (MULTI): Status: ACTIVE | Noted: 2021-09-09

## 2023-11-17 PROBLEM — M79.642 BILATERAL HAND PAIN: Status: ACTIVE | Noted: 2021-09-09

## 2023-11-17 PROBLEM — M79.641 BILATERAL HAND PAIN: Status: ACTIVE | Noted: 2021-09-09

## 2023-11-17 PROBLEM — M25.511 CHRONIC RIGHT SHOULDER PAIN: Status: ACTIVE | Noted: 2021-09-09

## 2023-11-17 PROBLEM — M54.9 UPPER BACK PAIN: Status: ACTIVE | Noted: 2022-04-12

## 2023-11-17 PROBLEM — S72.401A: Status: ACTIVE | Noted: 2023-11-17

## 2023-11-17 PROBLEM — R79.82 ELEVATED C-REACTIVE PROTEIN (CRP): Status: ACTIVE | Noted: 2021-09-09

## 2023-11-17 PROBLEM — E66.9 OBESITY, CLASS I, BMI 30-34.9: Status: ACTIVE | Noted: 2023-06-02

## 2023-11-17 PROBLEM — M19.011 OSTEOARTHRITIS OF RIGHT SHOULDER: Status: ACTIVE | Noted: 2021-09-16

## 2023-11-17 PROBLEM — R70.0 ELEVATED SED RATE: Status: ACTIVE | Noted: 2021-09-09

## 2023-11-17 PROBLEM — F32.A ANXIETY AND DEPRESSION: Status: ACTIVE | Noted: 2021-07-22

## 2023-11-17 PROBLEM — I82.5Y1 CHRONIC DEEP VEIN THROMBOSIS (DVT) OF PROXIMAL VEIN OF RIGHT LOWER EXTREMITY (MULTI): Status: ACTIVE | Noted: 2023-11-17

## 2023-11-17 PROBLEM — E78.2 MIXED HYPERLIPIDEMIA: Status: ACTIVE | Noted: 2022-07-27

## 2023-11-17 PROBLEM — G89.29 CHRONIC RIGHT SHOULDER PAIN: Status: ACTIVE | Noted: 2021-09-09

## 2023-11-17 PROBLEM — G89.29 CHRONIC BILATERAL LOW BACK PAIN WITHOUT SCIATICA: Status: ACTIVE | Noted: 2022-04-12

## 2023-11-17 PROBLEM — R06.02 SOB (SHORTNESS OF BREATH): Status: ACTIVE | Noted: 2023-11-17

## 2023-11-17 PROBLEM — R26.81 GAIT INSTABILITY: Status: ACTIVE | Noted: 2021-09-09

## 2023-11-17 PROBLEM — M25.561 CHRONIC PAIN OF BOTH KNEES: Status: ACTIVE | Noted: 2022-04-12

## 2023-11-17 PROBLEM — M25.532 BILATERAL WRIST PAIN: Status: ACTIVE | Noted: 2022-04-12

## 2023-11-17 PROBLEM — M79.7 FIBROMYALGIA: Status: ACTIVE | Noted: 2021-07-22

## 2023-11-17 PROBLEM — E53.8 VITAMIN B12 DEFICIENCY: Status: ACTIVE | Noted: 2021-09-21

## 2023-11-17 PROBLEM — M15.0 PRIMARY OSTEOARTHRITIS INVOLVING MULTIPLE JOINTS: Status: ACTIVE | Noted: 2021-07-22

## 2023-11-17 PROBLEM — E03.8 OTHER SPECIFIED HYPOTHYROIDISM: Status: ACTIVE | Noted: 2018-06-20

## 2023-11-17 PROBLEM — M75.80 ROTATOR CUFF TENDINITIS: Status: ACTIVE | Noted: 2017-06-01

## 2023-11-17 PROBLEM — E79.0 HYPERURICEMIA: Status: ACTIVE | Noted: 2021-09-21

## 2023-11-17 PROBLEM — R07.9 CHEST PAIN: Status: ACTIVE | Noted: 2023-06-02

## 2023-11-17 PROBLEM — I73.9 PVD (PERIPHERAL VASCULAR DISEASE) (CMS-HCC): Status: ACTIVE | Noted: 2023-11-17

## 2023-11-17 PROBLEM — I25.10 CAD S/P PERCUTANEOUS CORONARY ANGIOPLASTY: Status: ACTIVE | Noted: 2023-11-17
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
## 2023-11-17 PROBLEM — I20.9 ANGINA, CLASS III (CMS-HCC): Status: ACTIVE | Noted: 2023-11-17

## 2023-11-17 PROBLEM — I50.1: Status: ACTIVE | Noted: 2023-11-17

## 2023-11-17 PROBLEM — S73.005A CLOSED DISLOCATION OF LEFT HIP (MULTI): Status: ACTIVE | Noted: 2019-09-24

## 2023-11-17 PROBLEM — M25.562 CHRONIC PAIN OF BOTH KNEES: Status: ACTIVE | Noted: 2022-04-12

## 2023-11-17 PROBLEM — M70.61 TROCHANTERIC BURSITIS OF RIGHT HIP: Status: ACTIVE | Noted: 2023-11-17

## 2023-11-17 PROBLEM — M25.551 CHRONIC HIP PAIN AFTER TOTAL REPLACEMENT OF RIGHT HIP JOINT: Status: ACTIVE | Noted: 2023-11-17

## 2023-11-17 PROBLEM — G47.33 OSA (OBSTRUCTIVE SLEEP APNEA): Status: ACTIVE | Noted: 2023-11-17

## 2023-11-17 PROBLEM — I50.22 CHRONIC SYSTOLIC HEART FAILURE, ACC/AHA STAGE C (MULTI): Status: ACTIVE | Noted: 2023-11-17

## 2023-11-17 PROBLEM — I50.9 CONGESTIVE HEART FAILURE (MULTI): Status: ACTIVE | Noted: 2023-11-17

## 2023-11-17 PROBLEM — E66.811 OBESITY, CLASS I, BMI 30-34.9: Status: ACTIVE | Noted: 2023-06-02

## 2023-11-17 PROBLEM — K57.92 DIVERTICULITIS: Status: ACTIVE | Noted: 2023-06-07

## 2023-11-17 PROBLEM — J44.9 CHRONIC OBSTRUCTIVE PULMONARY DISEASE (MULTI): Status: ACTIVE | Noted: 2022-07-27

## 2023-11-17 PROBLEM — M15.9 PRIMARY OSTEOARTHRITIS INVOLVING MULTIPLE JOINTS: Status: ACTIVE | Noted: 2021-07-22

## 2023-11-17 RX ORDER — DILTIAZEM HYDROCHLORIDE 60 MG/1
1 TABLET, FILM COATED ORAL EVERY 8 HOURS
COMMUNITY
Start: 2023-06-15 | End: 2024-01-04 | Stop reason: SDUPTHER

## 2023-11-17 RX ORDER — ALBUTEROL SULFATE 90 UG/1
2 AEROSOL, METERED RESPIRATORY (INHALATION) EVERY 6 HOURS PRN
COMMUNITY

## 2023-11-17 RX ORDER — WARFARIN 2 MG/1
2 TABLET ORAL DAILY
COMMUNITY
Start: 2020-11-12

## 2023-11-17 RX ORDER — ASPIRIN 81 MG/1
81 TABLET ORAL DAILY
COMMUNITY
Start: 2021-09-29

## 2023-11-17 RX ORDER — METOPROLOL SUCCINATE 50 MG/1
0.5 TABLET, EXTENDED RELEASE ORAL DAILY
COMMUNITY
Start: 2023-06-16 | End: 2024-01-04 | Stop reason: SDUPTHER

## 2023-11-17 RX ORDER — AZATHIOPRINE 50 MG/1
2 TABLET ORAL DAILY
COMMUNITY

## 2023-11-17 RX ORDER — BUPROPION HYDROCHLORIDE 300 MG/1
1 TABLET ORAL DAILY
COMMUNITY
Start: 2021-07-06

## 2023-11-17 RX ORDER — DIPHENOXYLATE HYDROCHLORIDE AND ATROPINE SULFATE 2.5; .025 MG/1; MG/1
1 TABLET ORAL 4 TIMES DAILY PRN
COMMUNITY

## 2023-11-17 RX ORDER — ACETAMINOPHEN 500 MG
1 TABLET ORAL DAILY
COMMUNITY

## 2023-11-17 RX ORDER — NITROGLYCERIN 0.4 MG/1
0.4 TABLET SUBLINGUAL
COMMUNITY
Start: 2015-08-31

## 2023-11-17 RX ORDER — DICLOFENAC SODIUM 10 MG/G
GEL TOPICAL
COMMUNITY
Start: 2021-07-06

## 2023-11-17 RX ORDER — LISINOPRIL 10 MG/1
1 TABLET ORAL DAILY
COMMUNITY
Start: 2023-06-16

## 2023-11-17 RX ORDER — VITAMIN B COMPLEX
1 CAPSULE ORAL DAILY
COMMUNITY

## 2023-11-17 RX ORDER — FUROSEMIDE 20 MG/1
1 TABLET ORAL DAILY
COMMUNITY
Start: 2023-06-04 | End: 2023-11-17 | Stop reason: SDUPTHER

## 2023-11-17 RX ORDER — LEVOTHYROXINE SODIUM 100 UG/1
1 TABLET ORAL DAILY
COMMUNITY
Start: 2020-10-15

## 2023-11-17 RX ORDER — ACETAMINOPHEN 500 MG
5 TABLET ORAL NIGHTLY PRN
COMMUNITY

## 2023-11-17 RX ORDER — ATORVASTATIN CALCIUM 80 MG/1
80 TABLET, FILM COATED ORAL DAILY
COMMUNITY
Start: 2021-09-29

## 2023-11-17 RX ORDER — FOLIC ACID 20 MG
1 CAPSULE ORAL DAILY
COMMUNITY

## 2023-11-17 RX ORDER — SULFASALAZINE 500 MG/1
1000 TABLET ORAL 2 TIMES DAILY
COMMUNITY
Start: 2022-11-16 | End: 2024-01-04 | Stop reason: WASHOUT

## 2023-11-17 RX ORDER — CETIRIZINE HYDROCHLORIDE 10 MG/1
1 TABLET ORAL DAILY
COMMUNITY

## 2023-11-20 RX ORDER — FUROSEMIDE 20 MG/1
20 TABLET ORAL DAILY
Qty: 90 TABLET | Refills: 3 | Status: SHIPPED | OUTPATIENT
Start: 2023-11-20 | End: 2024-01-04 | Stop reason: SDUPTHER

## 2023-11-29 ENCOUNTER — APPOINTMENT (OUTPATIENT)
Dept: CARDIOLOGY | Facility: CLINIC | Age: 73
End: 2023-11-29
Payer: MEDICARE

## 2023-11-29 ENCOUNTER — APPOINTMENT (OUTPATIENT)
Dept: CARDIOLOGY | Facility: HOSPITAL | Age: 73
End: 2023-11-29
Payer: MEDICARE

## 2023-12-22 DIAGNOSIS — I48.91 ATRIAL FIBRILLATION WITH RVR (MULTI): ICD-10-CM

## 2024-01-02 RX ORDER — METOPROLOL TARTRATE 50 MG/1
100 TABLET ORAL 2 TIMES DAILY
COMMUNITY
Start: 2023-11-26 | End: 2024-01-05 | Stop reason: SDUPTHER

## 2024-01-04 ENCOUNTER — HOSPITAL ENCOUNTER (OUTPATIENT)
Dept: CARDIOLOGY | Facility: HOSPITAL | Age: 74
Discharge: HOME | End: 2024-01-04
Payer: MEDICARE

## 2024-01-04 ENCOUNTER — HOSPITAL ENCOUNTER (OUTPATIENT)
Dept: RADIOLOGY | Facility: HOSPITAL | Age: 74
Discharge: HOME | End: 2024-01-04
Payer: MEDICARE

## 2024-01-04 ENCOUNTER — APPOINTMENT (OUTPATIENT)
Dept: CARDIOLOGY | Facility: CLINIC | Age: 74
End: 2024-01-04
Payer: MEDICARE

## 2024-01-04 ENCOUNTER — OFFICE VISIT (OUTPATIENT)
Dept: CARDIOLOGY | Facility: CLINIC | Age: 74
End: 2024-01-04
Payer: MEDICARE

## 2024-01-04 VITALS
DIASTOLIC BLOOD PRESSURE: 84 MMHG | HEART RATE: 70 BPM | WEIGHT: 222 LBS | BODY MASS INDEX: 32.88 KG/M2 | HEIGHT: 69 IN | SYSTOLIC BLOOD PRESSURE: 160 MMHG

## 2024-01-04 DIAGNOSIS — I10 HYPERTENSION, UNSPECIFIED TYPE: ICD-10-CM

## 2024-01-04 DIAGNOSIS — I49.5 SICK SINUS SYNDROME (MULTI): ICD-10-CM

## 2024-01-04 DIAGNOSIS — E11.42 TYPE 2 DIABETES MELLITUS WITH DIABETIC POLYNEUROPATHY, UNSPECIFIED WHETHER LONG TERM INSULIN USE (MULTI): ICD-10-CM

## 2024-01-04 DIAGNOSIS — I44.2 ATRIOVENTRICULAR BLOCK, COMPLETE (MULTI): ICD-10-CM

## 2024-01-04 DIAGNOSIS — E66.9 OBESITY, CLASS I, BMI 30-34.9: ICD-10-CM

## 2024-01-04 DIAGNOSIS — Z95.0 PACEMAKER: ICD-10-CM

## 2024-01-04 DIAGNOSIS — E66.01 MORBID (SEVERE) OBESITY DUE TO EXCESS CALORIES (MULTI): ICD-10-CM

## 2024-01-04 DIAGNOSIS — I48.91 UNSPECIFIED ATRIAL FIBRILLATION (MULTI): ICD-10-CM

## 2024-01-04 DIAGNOSIS — Z95.0 PRESENCE OF CARDIAC PACEMAKER: ICD-10-CM

## 2024-01-04 DIAGNOSIS — I48.11 LONGSTANDING PERSISTENT ATRIAL FIBRILLATION (MULTI): ICD-10-CM

## 2024-01-04 DIAGNOSIS — E27.8 ADRENAL MASS (MULTI): ICD-10-CM

## 2024-01-04 DIAGNOSIS — I50.22 CHRONIC SYSTOLIC (CONGESTIVE) HEART FAILURE (MULTI): ICD-10-CM

## 2024-01-04 DIAGNOSIS — I10 ESSENTIAL HYPERTENSION, BENIGN: ICD-10-CM

## 2024-01-04 DIAGNOSIS — Z98.890 HX OF ATRIOVENTRICULAR NODE ABLATION: ICD-10-CM

## 2024-01-04 DIAGNOSIS — I25.10 CORONARY ARTERY DISEASE INVOLVING NATIVE CORONARY ARTERY OF NATIVE HEART WITHOUT ANGINA PECTORIS: ICD-10-CM

## 2024-01-04 DIAGNOSIS — I73.9 PVD (PERIPHERAL VASCULAR DISEASE) (CMS-HCC): ICD-10-CM

## 2024-01-04 DIAGNOSIS — Z98.61 CAD S/P PERCUTANEOUS CORONARY ANGIOPLASTY: ICD-10-CM

## 2024-01-04 DIAGNOSIS — Z95.0 CARDIAC PACEMAKER IN SITU: ICD-10-CM

## 2024-01-04 DIAGNOSIS — J42 CHRONIC BRONCHITIS, UNSPECIFIED CHRONIC BRONCHITIS TYPE (MULTI): ICD-10-CM

## 2024-01-04 DIAGNOSIS — E78.2 MIXED HYPERLIPIDEMIA: ICD-10-CM

## 2024-01-04 DIAGNOSIS — D68.32 HEMORRHAGIC DISORDER DUE TO EXTRINSIC CIRCULATING ANTICOAGULANTS (MULTI): ICD-10-CM

## 2024-01-04 DIAGNOSIS — I20.9 ANGINA, CLASS III (CMS-HCC): ICD-10-CM

## 2024-01-04 DIAGNOSIS — I48.91 ATRIAL FIBRILLATION WITH RVR (MULTI): Primary | ICD-10-CM

## 2024-01-04 DIAGNOSIS — I50.22 CHRONIC SYSTOLIC HEART FAILURE, ACC/AHA STAGE C (MULTI): ICD-10-CM

## 2024-01-04 DIAGNOSIS — I25.10 CAD S/P PERCUTANEOUS CORONARY ANGIOPLASTY: ICD-10-CM

## 2024-01-04 PROBLEM — I50.9 CONGESTIVE HEART FAILURE (MULTI): Status: RESOLVED | Noted: 2023-11-17 | Resolved: 2024-01-04

## 2024-01-04 PROBLEM — I48.21 PERMANENT ATRIAL FIBRILLATION (MULTI): Status: RESOLVED | Noted: 2023-11-17 | Resolved: 2024-01-04

## 2024-01-04 PROBLEM — I50.1: Status: RESOLVED | Noted: 2023-11-17 | Resolved: 2024-01-04

## 2024-01-04 PROCEDURE — 4010F ACE/ARB THERAPY RXD/TAKEN: CPT | Performed by: NURSE PRACTITIONER

## 2024-01-04 PROCEDURE — 1159F MED LIST DOCD IN RCRD: CPT | Performed by: NURSE PRACTITIONER

## 2024-01-04 PROCEDURE — 3079F DIAST BP 80-89 MM HG: CPT | Performed by: NURSE PRACTITIONER

## 2024-01-04 PROCEDURE — 99215 OFFICE O/P EST HI 40 MIN: CPT | Performed by: NURSE PRACTITIONER

## 2024-01-04 PROCEDURE — 71046 X-RAY EXAM CHEST 2 VIEWS: CPT

## 2024-01-04 PROCEDURE — 93280 PM DEVICE PROGR EVAL DUAL: CPT | Performed by: INTERNAL MEDICINE

## 2024-01-04 PROCEDURE — 93290 INTERROG DEV EVAL ICPMS IP: CPT | Performed by: INTERNAL MEDICINE

## 2024-01-04 PROCEDURE — 71046 X-RAY EXAM CHEST 2 VIEWS: CPT | Performed by: STUDENT IN AN ORGANIZED HEALTH CARE EDUCATION/TRAINING PROGRAM

## 2024-01-04 PROCEDURE — 93280 PM DEVICE PROGR EVAL DUAL: CPT

## 2024-01-04 PROCEDURE — 1036F TOBACCO NON-USER: CPT | Performed by: NURSE PRACTITIONER

## 2024-01-04 PROCEDURE — 3077F SYST BP >= 140 MM HG: CPT | Performed by: NURSE PRACTITIONER

## 2024-01-04 PROCEDURE — 1160F RVW MEDS BY RX/DR IN RCRD: CPT | Performed by: NURSE PRACTITIONER

## 2024-01-04 RX ORDER — FUROSEMIDE 20 MG/1
TABLET ORAL
Qty: 180 TABLET | Refills: 3 | Status: SHIPPED | OUTPATIENT
Start: 2024-01-04

## 2024-01-04 RX ORDER — DILTIAZEM HYDROCHLORIDE 60 MG/1
60 TABLET, FILM COATED ORAL EVERY 8 HOURS
Qty: 270 TABLET | Refills: 3 | Status: SHIPPED | OUTPATIENT
Start: 2024-01-04 | End: 2024-01-05 | Stop reason: SDUPTHER

## 2024-01-04 NOTE — PROGRESS NOTES
"CARDIOLOGY OFFICE VISIT      CHIEF COMPLAINT  Chief Complaint   Patient presents with    Atrial Fibrillation     Chief complaint: \"The staff in the device clinic told me to tell you that I could feel my pacemaker pacing my chest wall.\"    HISTORY OF PRESENT ILLNESS  HPI  The patient is a 73-year-old  female who is followed for permanent atrial fibrillation on rate control strategy and anticoagulated with warfarin.  She underwent an AV node ablation and placement of a dual-chamber pacemaker on August 23, 2023 for ventricular rate control and presents the office today for follow-up evaluation.  She states that she can feel the pacemaker pacing her heart.  She states when she lays on her left side specifically she can feel chest wall stimulation.  She states the staff in the device clinic did make some adjustments to her pacing and she did not experience chest wall pacing after that.  She denies cardiac chest pain, palpitations, dizziness, lightheadedness, or abdominal distention.  She states that she does experience some shortness of breath going up and down the stairs to do her laundry.  She is accompanied by family member for today's office visit.  Past Medical History  Past Medical History:   Diagnosis Date    Acute embolism and thrombosis of right popliteal vein (CMS/[facility]) 04/24/2020    Acute deep vein thrombosis (DVT) of popliteal vein of right lower extremity    Calculus of ureter 04/29/2020    Ureteral stone    Chronic venous hypertension (idiopathic) with inflammation of left lower extremity     Chronic venous hypertension with inflammation, left    Chronic venous hypertension (idiopathic) with inflammation of right lower extremity     Chronic venous hypertension with inflammation, right    Chronic venous hypertension (idiopathic) with ulcer of unspecified lower extremity (CODE) (CMS/[facility])     Chronic venous hypertension with ulcer    Encounter for other preprocedural examination 08/06/2020    " Preoperative clearance    Long term (current) use of anticoagulants 03/26/2020    Bleeding risk due to Coumadin and aspirin    Long term (current) use of anticoagulants 01/20/2022    Anticoagulant long-term use    Other abnormalities of breathing     Difficulty breathing    Other acute postprocedural pain 08/09/2021    Post-op pain    Other bursitis of hip, right hip     Bursitis of hip, right    Other long term (current) drug therapy     Encounter for medication review    Other mechanical complication of other internal joint prosthesis, initial encounter (CMS/[facility]) 05/22/2020    Mechanical instability of hip prosthesis    Other postprocedural complications and disorders of the circulatory system, not elsewhere classified 10/08/2020    Necrosis of surgical wound    Other specified joint disorders, right shoulder     Mass of joint of right shoulder    Other specified soft tissue disorders     Leg swelling    Personal history of diseases of the blood and blood-forming organs and certain disorders involving the immune mechanism     History of immune disorder    Personal history of other diseases of the circulatory system     History of coronary artery disease    Personal history of other diseases of the circulatory system     History of atrial fibrillation    Personal history of other diseases of the musculoskeletal system and connective tissue     History of rheumatoid arthritis    Personal history of other diseases of urinary system 08/30/2020    History of hematuria    Personal history of other endocrine, nutritional and metabolic disease     History of hyperthyroidism    Personal history of other specified conditions 01/04/2022    History of weakness    Personal history of other specified conditions 04/06/2021    History of flank pain    Personal history of other specified conditions     History of chest pain    Personal history of urinary (tract) infections 11/04/2020    History of urinary tract infection     Personal history of urinary calculi 2021    History of renal calculi    Straining to void 2021    Strains to urinate    Toxic effect of unspecified metal, accidental (unintentional), 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    Type 2 diabetes mellitus without complications (CMS/[facility])     Non-insulin dependent type 2 diabetes mellitus    Unspecified fracture of lower end of unspecified femur, initial encounter for closed fracture (CMS/[facility]) 2021    Fracture of distal femur    Unspecified symptoms and signs involving the genitourinary system 2021    Abnormal renal finding       Social History  Social History     Tobacco Use    Smoking status: Former     Packs/day: .5     Types: Cigarettes     Quit date:      Years since quittin.0    Smokeless tobacco: Never   Substance Use Topics    Alcohol use: Yes     Comment: rare    Drug use: Never       Family History     Family History   Problem Relation Name Age of Onset    Other (ptca) Mother      Heart failure Father      Coronary artery disease Father      Breast cancer Sister          Allergies:  Allergies   Allergen Reactions    Adhesive Tape-Silicones Unknown     PLASTIC TAPE AND ACE BANDAGES    Latex Unknown    Valdecoxib Unknown        Outpatient Medications:  Current Outpatient Medications   Medication Instructions    albuterol 90 mcg/actuation inhaler 2 puffs, inhalation, Every 6 hours PRN    aspirin 81 mg, oral, Daily    atorvastatin (LIPITOR) 80 mg, oral, Daily    azaTHIOprine (Imuran) 50 mg tablet 2 tablets, oral, Daily    b complex vitamins (Vitamins B Complex) capsule 1 capsule, oral, Daily    blood sugar diagnostic strip Use bid to check bs Dx e11.65    buPROPion XL (Wellbutrin XL) 300 mg 24 hr tablet 1 tablet, oral, Daily    cetirizine (ZyrTEC) 10 mg tablet 1 tablet, oral, Daily    cholecalciferol (Vitamin D-3) 5,000 Units tablet 1 tablet, oral, Daily    diclofenac sodium (Voltaren) 1 % gel gel APPLY 2 GRAMS TOPICALLY 4 TIMES  DAILY TO AFFECTED AREA    dilTIAZem (Cardizem) 60 mg immediate release tablet 1 tablet, oral, Every 8 hours    diphenoxylate-atropine (Lomotil) 2.5-0.025 mg tablet 1 tablet, oral, 4 times daily PRN    folic acid 20 mg capsule 1 tablet, oral, Daily    furosemide (LASIX) 20 mg, oral, Daily    INSULIN LISPRO SUBQ as directed    levothyroxine (Synthroid, Levoxyl) 100 mcg tablet 1 tablet, oral, Daily    lisinopril 10 mg tablet 1 tablet, oral, Daily    melatonin 5 mg, oral, Nightly PRN    metoprolol succinate XL (Toprol-XL) 50 mg 24 hr tablet 0.5 tablets, oral, Daily    metoprolol tartrate (Lopressor) 50 mg tablet TAKE 1 TABLET BY MOUTH IN THE MORNING AND 1 & 1/2 (ONE & ONE-HALF) IN THE EVENING    nitroglycerin (NITROSTAT) 0.4 mg, sublingual    sulfaSALAzine (AZULFIDINE) 1,000 mg, oral, 2 times daily    vit A/vit C/vit E/zinc/copper (VITAMINS A,C,E-ZINC-COPPER ORAL) oral    warfarin (COUMADIN) 2 mg, oral, Daily, Or as directed per [facility]          REVIEW OF SYSTEMS  Review of Systems   All other systems reviewed and are negative.        VITALS  There were no vitals filed for this visit.    PHYSICAL EXAM  Vitals and nursing note reviewed.   Constitutional:       Appearance: Normal appearance.   HENT:      Head: Normocephalic.   Neck:      Vascular: No JVD.   Cardiovascular:      Rate and Rhythm: Normal rate and regular rhythm.      Pulses: Normal pulses.      Heart sounds: Normal heart sounds.      Right lower extremity swelling noted.  Pulmonary:      Effort: Pulmonary effort is normal.      Breath sounds: Normal breath sounds.   Abdominal:      General: Bowel sounds are normal.      Palpations: Abdomen is soft.   Musculoskeletal:         General: Normal range of motion.      Cervical back: Normal range of motion.   Skin:     General: Skin is warm and dry.  Right subclavian pacemaker pocket is well-healed without redness swelling or drainage.  Neurological:      General: No focal deficit present.      Mental  Status: She is alert and oriented to person, place, and time.      Motor: Motor function is intact.   Psychiatric:         Attention and Perception: Attention and perception normal.         Mood and Affect: Mood and affect normal.         Speech: Speech normal.         Behavior: Behavior normal. Behavior is cooperative.         Thought Content: Thought content normal.         Cognition and Memory: Cognition and memory normal.     Labs and testing: Twelve-lead EKG reveals atrial fibrillation with ventricular pacing at 70 bpm.  QRS durations 168 ms,  ms, QTc 481 ms.  Pacemaker interrogation dated January 4, 2024 reveals ventricular pacing 97%.  The lower rate limit was reduced to 70 bpm.  Good sensing and capture threshold no ventricular high rate events were noted.  Estimated battery longevity is 9 years and 10 months.  PA and lateral chest x-ray reveals good right atrial and right ventricular lead positions with no active lung disease.      ASSESSMENT AND PLAN    Clinical impressions:  1. Permanent atrial fibrillation on rate control strategy and anticoagulated with warfarin.  2. Coronary artery disease status post angioplasty with drug-eluting stent to the proximal circumflex coronary artery on September 2, 2015 and PTCA with drug-eluting stent to the right PLV B on September 19, 2019 with left heart catheterization dated March 5, 2021 revealing 10% left main, 10 to 30% proximal and mid LAD, patent circumflex stent, 10 to 30% mid circumflex and mid and distal RCA stenosis with recommendation for medical management.  2. Hypertension, controlled with a blood pressure today 160/84.  4. Dyslipidemia on statin.  5. Chronic obstructive pulmonary disease.  6. Nephrolithiasis.  7. Diabetes mellitus.  8. Hypothyroidism on replacement therapy.  9. Peripheral neuropathy on gabapentin.  10. Osteoporosis status post bilateral knee replacement surgeries, left total hip replacement surgery, and left shoulder replacement  surgery.  11. Anxiety disorder.  12. Fall resulting in a femur fracture status post open reduction and internal fixation.  13. Noncompliance with obtaining PT/INR's as instructed due to shortness of breath and physical limitations reducing ability to ambulate.  14. Class I obesity with a BMI of 32.80.    Recommendations:  1.  Instructed patient to always carry the device card in their wallet.  No wanding of the device at the airport or courthouse.  Do not carry cell phone in the shirt pocket on the side of the implant.  Utilize the ear on the opposite side of the device.  Refrain from environments with electromagnetic interference (ISA).  2.  Discussed routine device follow up is scheduled every 3-4 months.  Inclinic checks alternate with remote (home) checks.  Inclinic checks will be scheduled prior to the office visit with Electrophysiology.  3.  I discussed with the patient the previous reprogramming of the device that was performed.  The patient stated that they have have reprogrammed her device in the past and the palpitations have recurred.  I reviewed with the patient the programming in the past was to reduce the heart rate from 90 beats to 80 beats and ultimately to 70 beats today.  The reprogramming of the outputs on the pacing wire was only performed at today's office visit.  The patient states that she is not experiencing any chest wall stimulation at this time but is concerned that she will experience recurrence.  She was instructed to contact the device clinic if she does have recurrence.  Patient is also concerned because she experiences shortness of breath with climbing the basement stairs to perform laundry.  She will contact the device clinic if she does experience shortness of breath and rate response will be added.  4.  Obtain a remote device check on April 10, 2024 and in clinic check in 6 months prior to the office visit Dr. Gracia.  5.  Follow-up in office with Dr. Gracia in 6 months or sooner if  needed.  6.  The patient states that she has been out of her diltiazem for a while.  I discussed that is why her blood pressure is most likely elevated.  She was also provided with a prescription for Lasix 20 mg daily.  Patient will resume the diltiazem as prescribed and monitor her blood pressure.  She will contact the office with the blood pressure remains elevated.  We also discussed sodium restrictions within the diet.    Evaluation and note by Ai Hdz, CNP  **Please excuse any errors in grammar or translation related to this dictation.  Voice recognition software was utilized to prepare this document.**

## 2024-01-05 ENCOUNTER — TELEPHONE (OUTPATIENT)
Dept: CARDIOLOGY | Facility: CLINIC | Age: 74
End: 2024-01-05
Payer: MEDICARE

## 2024-01-05 DIAGNOSIS — I10 HYPERTENSION, UNSPECIFIED TYPE: Primary | ICD-10-CM

## 2024-01-05 RX ORDER — METOPROLOL TARTRATE 50 MG/1
100 TABLET ORAL 2 TIMES DAILY
Qty: 120 TABLET | Refills: 11 | Status: SHIPPED | OUTPATIENT
Start: 2024-01-05 | End: 2025-01-04

## 2024-01-05 RX ORDER — DILTIAZEM HYDROCHLORIDE 60 MG/1
60 TABLET, FILM COATED ORAL EVERY 8 HOURS
Qty: 270 TABLET | Refills: 3 | Status: SHIPPED | OUTPATIENT
Start: 2024-01-05 | End: 2025-01-04

## 2024-01-05 NOTE — TELEPHONE ENCOUNTER
Patient called office stating that this morning when she checked her blood pressure, it was 170/108. She states that she has a slight headache.   She reports that she had taken her medication about an hour prior to taking her reading. She reports that she took an extra water pill.  Patient denies any other symptoms. I had her recheck her blood pressure, she reports it to be 193/123.  Patient reports that she is using a wrist blood pressure monitor.  Message forwarded to Ai Hdz CNP for further review and recommendations    Contacted the patient and reviewed her medication list.  Patient presently is taking metoprolol tartrate 50 mg in the morning and 75 mg in the evening.  She was instructed to increase the medication to 100 mg twice a day.  She also stated she was only taking the diltiazem 60 mg 1 tablet twice a day.  She will increase that dosage to 3 times a day as prescribed.  Patient was instructed to continue to monitor her blood pressure over the weekend and to contact me on Monday with her readings.  Patient is in agreement with the plan.  Medication list was corrected to reflect the patient's current medications as stated above.

## 2024-02-06 DIAGNOSIS — E03.9 HYPOTHYROIDISM, UNSPECIFIED TYPE: ICD-10-CM

## 2024-02-06 RX ORDER — LEVOTHYROXINE SODIUM 0.1 MG/1
100 TABLET ORAL DAILY
Qty: 30 TABLET | Refills: 3 | Status: SHIPPED | OUTPATIENT
Start: 2024-02-06

## 2024-03-12 ENCOUNTER — APPOINTMENT (OUTPATIENT)
Dept: CT IMAGING | Age: 74
DRG: 546 | End: 2024-03-12
Payer: MEDICARE

## 2024-03-12 ENCOUNTER — HOSPITAL ENCOUNTER (INPATIENT)
Age: 74
LOS: 13 days | Discharge: HOME | DRG: 546 | End: 2024-03-25
Attending: PHYSICAL MEDICINE & REHABILITATION | Admitting: PHYSICAL MEDICINE & REHABILITATION
Payer: MEDICARE

## 2024-03-12 ENCOUNTER — HOSPITAL ENCOUNTER (EMERGENCY)
Age: 74
Discharge: NURSING, REHAB OR LONG-TERM CARE | DRG: 546 | End: 2024-03-12
Attending: EMERGENCY MEDICINE
Payer: MEDICARE

## 2024-03-12 ENCOUNTER — APPOINTMENT (OUTPATIENT)
Dept: GENERAL RADIOLOGY | Age: 74
DRG: 546 | End: 2024-03-12
Payer: MEDICARE

## 2024-03-12 VITALS
SYSTOLIC BLOOD PRESSURE: 155 MMHG | BODY MASS INDEX: 36.18 KG/M2 | DIASTOLIC BLOOD PRESSURE: 82 MMHG | TEMPERATURE: 98.3 F | HEART RATE: 73 BPM | RESPIRATION RATE: 18 BRPM | WEIGHT: 245 LBS | OXYGEN SATURATION: 98 %

## 2024-03-12 DIAGNOSIS — Z86.79 HISTORY OF CARDIOMYOPATHY: ICD-10-CM

## 2024-03-12 DIAGNOSIS — R42 VERTIGO: ICD-10-CM

## 2024-03-12 DIAGNOSIS — Z74.09 IMPAIRED MOBILITY AND ACTIVITIES OF DAILY LIVING: Primary | ICD-10-CM

## 2024-03-12 DIAGNOSIS — R27.0 ATAXIA: ICD-10-CM

## 2024-03-12 DIAGNOSIS — I34.0 MITRAL VALVE INSUFFICIENCY, UNSPECIFIED ETIOLOGY: ICD-10-CM

## 2024-03-12 DIAGNOSIS — Z78.9 IMPAIRED MOBILITY AND ACTIVITIES OF DAILY LIVING: Primary | ICD-10-CM

## 2024-03-12 DIAGNOSIS — M79.604 PAIN OF RIGHT LOWER EXTREMITY: ICD-10-CM
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
## 2024-03-12 DIAGNOSIS — S09.90XA CLOSED HEAD INJURY, INITIAL ENCOUNTER: ICD-10-CM

## 2024-03-12 DIAGNOSIS — I95.1 ORTHOSTATIC HYPOTENSION: ICD-10-CM

## 2024-03-12 LAB
ABO + RH BLD: NORMAL
ALBUMIN SERPL-MCNC: 4.1 G/DL (ref 3.5–4.6)
ALP SERPL-CCNC: 88 U/L (ref 40–130)
ALT SERPL-CCNC: 21 U/L (ref 0–33)
AMPHET UR QL SCN: ABNORMAL
ANION GAP SERPL CALCULATED.3IONS-SCNC: 12 MEQ/L (ref 9–15)
APTT PPP: 29.5 SEC (ref 24.4–36.8)
AST SERPL-CCNC: 22 U/L (ref 0–35)
BACTERIA URNS QL MICRO: NEGATIVE /HPF
BARBITURATES UR QL SCN: ABNORMAL
BASOPHILS # BLD: 0.1 K/UL (ref 0–0.2)
BASOPHILS NFR BLD: 0.7 %
BENZODIAZ UR QL SCN: ABNORMAL
BILIRUB SERPL-MCNC: <0.2 MG/DL (ref 0.2–0.7)
BILIRUB UR QL STRIP: NEGATIVE
BLD GP AB SCN SERPL QL: NORMAL
BUN SERPL-MCNC: 27 MG/DL (ref 8–23)
CALCIUM SERPL-MCNC: 10.5 MG/DL (ref 8.5–9.9)
CANNABINOIDS UR QL SCN: ABNORMAL
CHLORIDE SERPL-SCNC: 103 MEQ/L (ref 95–107)
CLARITY UR: CLEAR
CO2 SERPL-SCNC: 26 MEQ/L (ref 20–31)
COCAINE UR QL SCN: ABNORMAL
COLOR UR: YELLOW
CREAT SERPL-MCNC: 1.13 MG/DL (ref 0.5–0.9)
CRYSTALS URNS MICRO: ABNORMAL /HPF
DRUG SCREEN COMMENT UR-IMP: ABNORMAL
EOSINOPHIL # BLD: 0.4 K/UL (ref 0–0.7)
EOSINOPHIL NFR BLD: 4.5 %
EPI CELLS #/AREA URNS AUTO: ABNORMAL /HPF (ref 0–5)
ERYTHROCYTE [DISTWIDTH] IN BLOOD BY AUTOMATED COUNT: 13.8 % (ref 11.5–14.5)
ETHANOL PERCENT: NORMAL G/DL
ETHANOLAMINE SERPL-MCNC: <10 MG/DL (ref 0–0.08)
FENTANYL SCREEN, URINE: POSITIVE
GLOBULIN SER CALC-MCNC: 2.9 G/DL (ref 2.3–3.5)
GLUCOSE BLD-MCNC: 136 MG/DL (ref 70–99)
GLUCOSE BLD-MCNC: 95 MG/DL (ref 70–99)
GLUCOSE SERPL-MCNC: 141 MG/DL (ref 70–99)
GLUCOSE UR STRIP-MCNC: NEGATIVE MG/DL
HBA1C MFR BLD: 7.1 % (ref 4.8–5.9)
HCT VFR BLD AUTO: 42.5 % (ref 37–47)
HGB BLD-MCNC: 14.2 G/DL (ref 12–16)
HGB UR QL STRIP: NEGATIVE
HYALINE CASTS #/AREA URNS AUTO: ABNORMAL /HPF (ref 0–5)
INR PPP: 1.6
KETONES UR STRIP-MCNC: NEGATIVE MG/DL
LEUKOCYTE ESTERASE UR QL STRIP: ABNORMAL
LYMPHOCYTES # BLD: 2.8 K/UL (ref 1–4.8)
LYMPHOCYTES NFR BLD: 31.8 %
MCH RBC QN AUTO: 32.3 PG (ref 27–31.3)
MCHC RBC AUTO-ENTMCNC: 33.4 % (ref 33–37)
MCV RBC AUTO: 96.6 FL (ref 79.4–94.8)
METHADONE UR QL SCN: ABNORMAL
MONOCYTES # BLD: 1 K/UL (ref 0.2–0.8)
MONOCYTES NFR BLD: 10.9 %
NEUTROPHILS # BLD: 4.6 K/UL (ref 1.4–6.5)
NEUTS SEG NFR BLD: 51.5 %
NITRITE UR QL STRIP: NEGATIVE
OPIATES UR QL SCN: ABNORMAL
OXYCODONE UR QL SCN: ABNORMAL
PCP UR QL SCN: ABNORMAL
PERFORMED ON: ABNORMAL
PERFORMED ON: NORMAL
PH UR STRIP: 5.5 [PH] (ref 5–9)
PLATELET # BLD AUTO: 223 K/UL (ref 130–400)
POTASSIUM SERPL-SCNC: 4.3 MEQ/L (ref 3.4–4.9)
PROPOXYPH UR QL SCN: ABNORMAL
PROT SERPL-MCNC: 7 G/DL (ref 6.3–8)
PROT UR STRIP-MCNC: NEGATIVE MG/DL
PROTHROMBIN TIME: 19.2 SEC (ref 12.3–14.9)
RBC # BLD AUTO: 4.4 M/UL (ref 4.2–5.4)
REASON FOR REJECTION: NORMAL
REJECTED TEST: NORMAL
SARS-COV-2 RDRP RESP QL NAA+PROBE: NOT DETECTED
SODIUM SERPL-SCNC: 141 MEQ/L (ref 135–144)
SP GR UR STRIP: 1.02 (ref 1–1.03)
URINE REFLEX TO CULTURE: ABNORMAL
UROBILINOGEN UR STRIP-ACNC: 0.2 E.U./DL
WBC # BLD AUTO: 8.9 K/UL (ref 4.8–10.8)
WBC #/AREA URNS AUTO: ABNORMAL /HPF (ref 0–5)

## 2024-03-12 PROCEDURE — 73501 X-RAY EXAM HIP UNI 1 VIEW: CPT

## 2024-03-12 PROCEDURE — 6830039000 HC L3 TRAUMA ALERT

## 2024-03-12 PROCEDURE — 97166 OT EVAL MOD COMPLEX 45 MIN: CPT

## 2024-03-12 PROCEDURE — 80307 DRUG TEST PRSMV CHEM ANLYZR: CPT

## 2024-03-12 PROCEDURE — 73700 CT LOWER EXTREMITY W/O DYE: CPT

## 2024-03-12 PROCEDURE — 6360000002 HC RX W HCPCS: Performed by: EMERGENCY MEDICINE

## 2024-03-12 PROCEDURE — 97162 PT EVAL MOD COMPLEX 30 MIN: CPT

## 2024-03-12 PROCEDURE — 6370000000 HC RX 637 (ALT 250 FOR IP): Performed by: EMERGENCY MEDICINE

## 2024-03-12 PROCEDURE — 1180000000 HC REHAB R&B

## 2024-03-12 PROCEDURE — 36415 COLL VENOUS BLD VENIPUNCTURE: CPT

## 2024-03-12 PROCEDURE — 85730 THROMBOPLASTIN TIME PARTIAL: CPT

## 2024-03-12 PROCEDURE — 85025 COMPLETE CBC W/AUTO DIFF WBC: CPT

## 2024-03-12 PROCEDURE — 99283 EMERGENCY DEPT VISIT LOW MDM: CPT | Performed by: PHYSICAL MEDICINE & REHABILITATION

## 2024-03-12 PROCEDURE — 81001 URINALYSIS AUTO W/SCOPE: CPT

## 2024-03-12 PROCEDURE — 86850 RBC ANTIBODY SCREEN: CPT

## 2024-03-12 PROCEDURE — 70450 CT HEAD/BRAIN W/O DYE: CPT

## 2024-03-12 PROCEDURE — 6370000000 HC RX 637 (ALT 250 FOR IP): Performed by: PHYSICAL MEDICINE & REHABILITATION

## 2024-03-12 PROCEDURE — 82077 ASSAY SPEC XCP UR&BREATH IA: CPT

## 2024-03-12 PROCEDURE — 99285 EMERGENCY DEPT VISIT HI MDM: CPT

## 2024-03-12 PROCEDURE — 85610 PROTHROMBIN TIME: CPT

## 2024-03-12 PROCEDURE — 86901 BLOOD TYPING SEROLOGIC RH(D): CPT

## 2024-03-12 PROCEDURE — 6360000002 HC RX W HCPCS: Performed by: REGISTERED NURSE

## 2024-03-12 PROCEDURE — 73060 X-RAY EXAM OF HUMERUS: CPT

## 2024-03-12 PROCEDURE — 70486 CT MAXILLOFACIAL W/O DYE: CPT

## 2024-03-12 PROCEDURE — 80053 COMPREHEN METABOLIC PANEL: CPT

## 2024-03-12 PROCEDURE — 73552 X-RAY EXAM OF FEMUR 2/>: CPT

## 2024-03-12 PROCEDURE — 96374 THER/PROPH/DIAG INJ IV PUSH: CPT

## 2024-03-12 PROCEDURE — 72125 CT NECK SPINE W/O DYE: CPT

## 2024-03-12 PROCEDURE — 87635 SARS-COV-2 COVID-19 AMP PRB: CPT

## 2024-03-12 PROCEDURE — 6370000000 HC RX 637 (ALT 250 FOR IP): Performed by: REGISTERED NURSE

## 2024-03-12 PROCEDURE — 86900 BLOOD TYPING SEROLOGIC ABO: CPT

## 2024-03-12 PROCEDURE — 83036 HEMOGLOBIN GLYCOSYLATED A1C: CPT

## 2024-03-12 PROCEDURE — 72192 CT PELVIS W/O DYE: CPT

## 2024-03-12 PROCEDURE — 73030 X-RAY EXAM OF SHOULDER: CPT

## 2024-03-12 RX ORDER — LEVOTHYROXINE SODIUM 0.1 MG/1
100 TABLET ORAL DAILY
Status: DISCONTINUED | OUTPATIENT
Start: 2024-03-12 | End: 2024-03-25 | Stop reason: HOSPADM

## 2024-03-12 RX ORDER — DILTIAZEM HYDROCHLORIDE 60 MG/1
60 TABLET, FILM COATED ORAL EVERY 8 HOURS SCHEDULED
Status: DISCONTINUED | OUTPATIENT
Start: 2024-03-12 | End: 2024-03-23

## 2024-03-12 RX ORDER — CETIRIZINE HYDROCHLORIDE 10 MG/1
10 TABLET ORAL DAILY
Status: DISCONTINUED | OUTPATIENT
Start: 2024-03-12 | End: 2024-03-25 | Stop reason: HOSPADM

## 2024-03-12 RX ORDER — FOLIC ACID 1 MG/1
1 TABLET ORAL DAILY
Status: DISCONTINUED | OUTPATIENT
Start: 2024-03-12 | End: 2024-03-25 | Stop reason: HOSPADM

## 2024-03-12 RX ORDER — LISINOPRIL 10 MG/1
10 TABLET ORAL DAILY
Status: DISCONTINUED | OUTPATIENT
Start: 2024-03-13 | End: 2024-03-14

## 2024-03-12 RX ORDER — VITAMIN B COMPLEX
1 CAPSULE ORAL DAILY
Status: DISCONTINUED | OUTPATIENT
Start: 2024-03-12 | End: 2024-03-25 | Stop reason: HOSPADM

## 2024-03-12 RX ORDER — GLUCAGON 1 MG/ML
1 KIT INJECTION PRN
Status: DISCONTINUED | OUTPATIENT
Start: 2024-03-12 | End: 2024-03-25 | Stop reason: HOSPADM

## 2024-03-12 RX ORDER — TRAMADOL HYDROCHLORIDE 50 MG/1
50 TABLET ORAL EVERY 6 HOURS PRN
Status: DISCONTINUED | OUTPATIENT
Start: 2024-03-12 | End: 2024-03-18

## 2024-03-12 RX ORDER — FUROSEMIDE 20 MG/1
20 TABLET ORAL DAILY
Status: DISCONTINUED | OUTPATIENT
Start: 2024-03-12 | End: 2024-03-25 | Stop reason: HOSPADM

## 2024-03-12 RX ORDER — DEXTROSE MONOHYDRATE 100 MG/ML
INJECTION, SOLUTION INTRAVENOUS CONTINUOUS PRN
Status: DISCONTINUED | OUTPATIENT
Start: 2024-03-12 | End: 2024-03-25 | Stop reason: HOSPADM

## 2024-03-12 RX ORDER — METOPROLOL SUCCINATE 25 MG/1
50 TABLET, EXTENDED RELEASE ORAL DAILY
Status: DISCONTINUED | OUTPATIENT
Start: 2024-03-12 | End: 2024-03-14

## 2024-03-12 RX ORDER — VITAMIN B COMPLEX
5000 TABLET ORAL DAILY
Status: DISCONTINUED | OUTPATIENT
Start: 2024-03-12 | End: 2024-03-14

## 2024-03-12 RX ORDER — LACTOBACILLUS RHAMNOSUS GG 10B CELL
1 CAPSULE ORAL DAILY
Status: DISCONTINUED | OUTPATIENT
Start: 2024-03-12 | End: 2024-03-25 | Stop reason: HOSPADM

## 2024-03-12 RX ORDER — SULFASALAZINE 500 MG/1
500 TABLET ORAL 2 TIMES DAILY
Status: DISCONTINUED | OUTPATIENT
Start: 2024-03-12 | End: 2024-03-25 | Stop reason: HOSPADM

## 2024-03-12 RX ORDER — POTASSIUM CHLORIDE 20 MEQ/1
10 TABLET, EXTENDED RELEASE ORAL DAILY
Status: DISCONTINUED | OUTPATIENT
Start: 2024-03-12 | End: 2024-03-25 | Stop reason: HOSPADM

## 2024-03-12 RX ORDER — INSULIN LISPRO 100 [IU]/ML
0-4 INJECTION, SOLUTION INTRAVENOUS; SUBCUTANEOUS NIGHTLY
Status: DISCONTINUED | OUTPATIENT
Start: 2024-03-12 | End: 2024-03-25 | Stop reason: HOSPADM

## 2024-03-12 RX ORDER — INSULIN LISPRO 100 [IU]/ML
0-8 INJECTION, SOLUTION INTRAVENOUS; SUBCUTANEOUS
Status: DISCONTINUED | OUTPATIENT
Start: 2024-03-12 | End: 2024-03-25 | Stop reason: HOSPADM

## 2024-03-12 RX ORDER — OXYCODONE HYDROCHLORIDE AND ACETAMINOPHEN 5; 325 MG/1; MG/1
1 TABLET ORAL ONCE
Status: COMPLETED | OUTPATIENT
Start: 2024-03-12 | End: 2024-03-12

## 2024-03-12 RX ORDER — FENTANYL CITRATE 50 UG/ML
100 INJECTION, SOLUTION INTRAMUSCULAR; INTRAVENOUS ONCE
Status: COMPLETED | OUTPATIENT
Start: 2024-03-12 | End: 2024-03-12

## 2024-03-12 RX ORDER — OXYCODONE HYDROCHLORIDE AND ACETAMINOPHEN 5; 325 MG/1; MG/1
1 TABLET ORAL EVERY 6 HOURS PRN
Status: DISCONTINUED | OUTPATIENT
Start: 2024-03-12 | End: 2024-03-12

## 2024-03-12 RX ORDER — AZATHIOPRINE 50 MG/1
100 TABLET ORAL DAILY
Status: DISCONTINUED | OUTPATIENT
Start: 2024-03-12 | End: 2024-03-25 | Stop reason: HOSPADM

## 2024-03-12 RX ORDER — WARFARIN SODIUM 3 MG/1
6 TABLET ORAL
Status: COMPLETED | OUTPATIENT
Start: 2024-03-12 | End: 2024-03-12

## 2024-03-12 RX ADMIN — METOPROLOL SUCCINATE 50 MG: 25 TABLET, EXTENDED RELEASE ORAL at 17:35

## 2024-03-12 RX ADMIN — POTASSIUM CHLORIDE 10 MEQ: 1500 TABLET, EXTENDED RELEASE ORAL at 17:35

## 2024-03-12 RX ADMIN — CETIRIZINE HYDROCHLORIDE 10 MG: 10 TABLET, FILM COATED ORAL at 17:38

## 2024-03-12 RX ADMIN — TRAMADOL HYDROCHLORIDE 50 MG: 50 TABLET ORAL at 17:35

## 2024-03-12 RX ADMIN — FUROSEMIDE 20 MG: 20 TABLET ORAL at 17:36

## 2024-03-12 RX ADMIN — AZATHIOPRINE 100 MG: 50 TABLET ORAL at 17:36

## 2024-03-12 RX ADMIN — OXYCODONE HYDROCHLORIDE AND ACETAMINOPHEN 1 TABLET: 5; 325 TABLET ORAL at 12:33

## 2024-03-12 RX ADMIN — Medication 1 CAPSULE: at 17:38

## 2024-03-12 RX ADMIN — TRAMADOL HYDROCHLORIDE 50 MG: 50 TABLET ORAL at 23:13

## 2024-03-12 RX ADMIN — FOLIC ACID 1 MG: 1 TABLET ORAL at 17:36

## 2024-03-12 RX ADMIN — DILTIAZEM HYDROCHLORIDE 60 MG: 60 TABLET, FILM COATED ORAL at 21:31

## 2024-03-12 RX ADMIN — SULFASALAZINE 500 MG: 500 TABLET ORAL at 21:31

## 2024-03-12 RX ADMIN — Medication 5000 UNITS: at 17:36

## 2024-03-12 RX ADMIN — WARFARIN SODIUM 6 MG: 3 TABLET ORAL at 18:51

## 2024-03-12 RX ADMIN — FENTANYL CITRATE 100 MCG: 50 INJECTION INTRAMUSCULAR; INTRAVENOUS at 01:32

## 2024-03-12 RX ADMIN — Medication 1 CAPSULE: at 17:35

## 2024-03-12 RX ADMIN — ACETAMINOPHEN, ASPIRIN, CAFFEINE 1 TABLET: 250; 65; 250 TABLET, FILM COATED ORAL at 18:51

## 2024-03-12 ASSESSMENT — LIFESTYLE VARIABLES
HOW MANY STANDARD DRINKS CONTAINING ALCOHOL DO YOU HAVE ON A TYPICAL DAY: PATIENT DOES NOT DRINK
HOW OFTEN DO YOU HAVE A DRINK CONTAINING ALCOHOL: NEVER
HOW OFTEN DO YOU HAVE A DRINK CONTAINING ALCOHOL: MONTHLY OR LESS
HOW MANY STANDARD DRINKS CONTAINING ALCOHOL DO YOU HAVE ON A TYPICAL DAY: PATIENT DOES NOT DRINK

## 2024-03-12 ASSESSMENT — ENCOUNTER SYMPTOMS
SHORTNESS OF BREATH: 0
ABDOMINAL PAIN: 0

## 2024-03-12 ASSESSMENT — PAIN DESCRIPTION - LOCATION: LOCATION: LEG

## 2024-03-12 ASSESSMENT — PAIN DESCRIPTION - PAIN TYPE: TYPE: ACUTE PAIN

## 2024-03-12 ASSESSMENT — PAIN SCALES - GENERAL: PAINLEVEL_OUTOF10: 8

## 2024-03-12 ASSESSMENT — PAIN DESCRIPTION - ORIENTATION: ORIENTATION: LEFT

## 2024-03-12 ASSESSMENT — PAIN DESCRIPTION - FREQUENCY: FREQUENCY: CONTINUOUS

## 2024-03-12 NOTE — PLAN OF CARE
See OT evaluation for all goals and OT POC. Electronically signed by Darby Redd OTALONA/L on 3/12/2024 at 11:51 AM

## 2024-03-12 NOTE — CONSULTS
Consult Note            Date:3/12/2024        Patient Name:Lisseth Harper     YOB: 1950     Age:  Reason for Consult: Chronic disease management    Chief Complaint   Falls      History Obtained From   patient, electronic medical record    History of Present Illness   Lisseth Harper is a 73-year-old female with a past medical history of A-fib, CAD, diabetes, HTN, HLD, hypothyroidism, CHANA; CPAP, obesity and rheumatoid arthritis who admits for mechanical fall.  Patient reports that she was walking at home and and tripped over her sock which was half off her foot.  Patient struck her head resulting in a left periorbital hematoma.  Denies LOC.  CT of the head negative for intracranial hemorrhage or acute findings.  CT of the cervical spine, pelvis and face negative for fractures.  Patient reports body pain also reports headache since receiving Percocet earlier today.  Informed patient we will order something for pain and headache.  Also consult endocrinology for uncontrolled type 2 diabetes; patient sees Dr. Menon for her diabetes.  Hospitalist have been consulted for chronic disease management while receiving inpatient rehab services.    Past Medical History     Past Medical History:   Diagnosis Date    A-fib (HCC)     Aplasia of adrenal gland     CAD (coronary artery disease)     Diabetes mellitus (HCC)     DJD (degenerative joint disease)     HLD (hyperlipidemia)     Hypertension     Hypothyroidism     Obesity     CHANA (obstructive sleep apnea)     RA (rheumatoid arthritis) (HCC)     Rotator cuff tendinitis     RIGHT        Past Surgical History     Past Surgical History:   Procedure Laterality Date    CARPAL TUNNEL RELEASE Left      SECTION      CHOLECYSTECTOMY      CORONARY ANGIOPLASTY WITH STENT PLACEMENT  2019    prior caths as well, total 7 stents    HIP CLOSED REDUCTION Left 2019    HIP CLOSED REDUCTION performed by Herman Galvez DO at [facility] OR    JOINT REPLACEMENT      CHARLES

## 2024-03-12 NOTE — PROGRESS NOTES
Physical Therapy Med Surg Initial Assessment  Facility/Department: [facility] ED  Room:      NAME: Lisseth Harper  : 1950 (73 y.o.)  MRN: [id number]  CODE STATUS: Prior    Date of Service: 3/12/2024    Patient Diagnosis(es): No admission diagnoses are documented for this encounter.   Chief Complaint   Patient presents with    Fall     Fall right leg pain, right arm pain, contusion to face     Patient Active Problem List    Diagnosis Date Noted    Diverticulitis 2023    Atrial fibrillation with RVR ([facility]) 2023    Fever in adult 2023    Chest pain 2023    CHANA (obstructive sleep apnea)     Recurrent dislocation of left hip 2019    Hip dislocation, left, initial encounter ([facility]) 2019    Intracranial injury of other and unspecified nature, without mention of open intracranial wound, unspecified state of consciousness 10/05/2019    Displacement of internal left hip prosthesis ([facility]) 2019    Mobility impaired 2019    Anterior dislocation of left hip ([facility]) 2019    Hip dislocation, left, subsequent encounter 2019    Closed dislocation of left hip ([facility]) 2019    Other specified hypothyroidism 2018    Adrenal insufficiency ([facility]) 2018    Uncontrolled type 2 diabetes mellitus 2017    Rotator cuff tendinitis 2017    RA (rheumatoid arthritis) ([facility]) 2017    Hypertension 2017    CAD (coronary artery disease) 2017    Morbid obesity due to excess calories ([facility]) 2017    Bilateral nonexudative age-related macular degeneration 2015    Nuclear sclerosis 2015        Past Medical History:   Diagnosis Date    A-fib ([facility])     Aplasia of adrenal gland     CAD (coronary artery disease)     Diabetes mellitus ([facility])     DJD (degenerative joint disease)     HLD (hyperlipidemia)     Hypertension     Hypothyroidism     Obesity     CHANA (obstructive sleep apnea)     RA (rheumatoid arthritis) ([facility])     Rotator 
balance and endurance.     Bed Mobility  Bed mobility  Supine to Sit: Stand by assistance  Sit to Supine: Minimal assistance  Bed Mobility Comments: increased time for performance, limited by pain in R thigh    Seated and Standing Balance:  Balance  Sitting: Intact  Standing: Impaired  Standing - Static: Fair  Standing - Dynamic: Fair    Functional Endurance:  Activity Tolerance  Activity Tolerance: Patient limited by pain    D/C Recommendations:  OT D/C RECOMMENDATIONS  REQUIRES OT FOLLOW-UP: Yes    Equipment Recommendations:  OT Equipment Recommendations  Other: Continue to assess    OT Education:   Patient Education  Education Given To: Patient  Education Provided: Role of Therapy;Plan of Care  Education Method: Verbal  Barriers to Learning: None  Education Outcome: Verbalized understanding    OT Follow Up:   OT D/C RECOMMENDATIONS  REQUIRES OT FOLLOW-UP: Yes       Assessment/Discharge Disposition:  Assessment: Pt is a 73 year old woman from home with spouse who presents to [facility] with R thigh pain. Pt. limited d/t weakness and decreased balance, requires increased time for mobility. Pt requires max A for ADL tasks and would benefit from continued OT to maximize independence and safety with ADL tasks.  Performance deficits / Impairments: Decreased ADL status, Decreased strength, Decreased functional mobility , Decreased endurance, Decreased balance, Decreased high-level IADLs  Prognosis: Good  Discharge Recommendations: Continue to assess pending progress  Decision Making: Medium Complexity     History: Pt's medical history is moderately complex  Exam: Pt has 6 performance deficits  Assistance / Modification: Pt requires max A    AMPAC (Six Click) Self care Score   How much help is needed for putting on and taking off regular lower body clothing?: A Lot  How much help is needed for bathing (which includes washing, rinsing, drying)?: A Lot  How much help is needed for toileting (which includes using

## 2024-03-12 NOTE — CARE COORDINATION
PAS reviewed and signed by PM&R physician. Patient can admit to rehab room 259. ER CM aware of bed assignment and needing covid test resulted prior to patient admitting.  Electronically signed by Evette Jaimes on 3/12/2024 at 1:32 PM

## 2024-03-12 NOTE — CARE COORDINATION
Inpatient Rehab referral received. Met with patient and explained [facility] Inpatient Rehab program and requirements, including 3 hours of intense therapy daily, anticipated length of stay and goal of discharge to home. Patient advised she cannot go home in current condition if they are not able to admit her to medical. Patient voiced she is unsure how much she would be able to tolerate with therapy with current pain levels. Patient from home with her  and she provides care for him, for which patient voiced she would need to make arrangements for him as well. Patient agreeable to rehab stay if work up is negative for anything acute. Patient aware PM&R physician will evaluate as well.  Electronically signed by Evette Jaimes on 3/12/2024 at 12:15 PM

## 2024-03-12 NOTE — ED NOTES

## 2024-03-12 NOTE — CONSULTS
emergency medical condition->Emergency Medical Condition (MA) What reading provider will be dictating this exam?->CRC FINDINGS: BONES/ALIGNMENT: There is no acute fracture or traumatic malalignment. DEGENERATIVE CHANGES: There is multilevel degenerative disc disease, resulting in mild-to-moderate spinal canal stenoses. SOFT TISSUES: There is no prevertebral soft tissue swelling.     No acute abnormality of the cervical spine.     CT PELVIS 3/12/2024 There is diffuse decreased bone density.  Status post bilateral hip arthroplasties.  Hardware related artifact limits evaluation adjacent structures. Soft Tissue: No significant soft tissue edema or fluid collections. Visualized intrapelvic contents demonstrate no acute abnormality.  There is diverticulosis.     Status post bilateral hip arthroplasties. No acute osseous abnormality.     CT HEAD  3/12/2024 CT HEAD: BRAIN/VENTRICLES: There is no acute intracranial hemorrhage, mass effect or midline shift. No abnormal extra-axial fluid collection.  The gray-white differentiation is maintained without evidence of an acute infarct.  There is no evidence of hydrocephalus.  There are nonspecific hypoattenuating foci in the subcortical and periventricular white matter that most likely represent chronic microangiopathic ischemic changes in a patient of this age. There are atherosclerotic calcifications of the intracranial vessels. CT FACIAL BONES: FACIAL BONES: The maxilla, pterygoid plates and zygomatic arches are intact. The mandible is intact.  The mandibular condyles are normally situated. Slight irregularity of the nasal bones. ORBITS: The globes appear intact.  The extraocular muscles, optic nerve sheath complexes and lacrimal glands appear unremarkable.  No retrobulbar hematoma or mass is seen.  The orbital walls and rims are intact. SINUSES/MASTOIDS: The paranasal sinuses and mastoid air cells are well aerated.  No acute fracture is seen. SOFT TISSUES: Focal left

## 2024-03-12 NOTE — ED PROVIDER NOTES
Psychiatric:         Mood and Affect: Mood normal.     She has no tenderness to her back.  No tenderness to the right knee or right lower leg.    DIAGNOSTIC RESULTS     EKG: All EKG's are interpreted by the Emergency Department Physician who either signs or Co-signs this chart in the absence of a cardiologist.        RADIOLOGY:   Non-plain film images such as CT, Ultrasound and MRI are read by the radiologist. Plain radiographic images are visualized and preliminarily interpreted by the emergency physician with the below findings:    No acute shoulder dislocation-my interpretation/visualization    Interpretation per the Radiologist below, if available at the time of this note:    CT FEMUR RIGHT WO CONTRAST   Final Result   No acute fracture         XR SHOULDER RIGHT (MIN 2 VIEWS)   Final Result   1. No acute fracture or subluxation.   2. Superior subluxation of the humeral head relative to the glenoid with   narrowing of the acromial humeral interval, suggesting underlying rotator   cuff insufficiency.   3. Moderate acromioclavicular joint osteoarthritis.         XR HUMERUS RIGHT (MIN 2 VIEWS)   Final Result   1. No acute fracture or subluxation.   2. Superior subluxation of the humeral head relative to the glenoid with   narrowing of the acromial humeral interval, suggesting underlying rotator   cuff insufficiency.   3. Moderate acromioclavicular joint osteoarthritis.         XR HIP 1 VW W PELVIS RIGHT   Final Result   1. No acute displaced fracture is identified.   2. Status post bilateral hip arthroplasties and right knee arthroplasty.         XR FEMUR RIGHT (MIN 2 VIEWS)   Final Result   1. No acute displaced fracture is identified.   2. Status post bilateral hip arthroplasties and right knee arthroplasty.         CT HEAD WO CONTRAST   Final Result   1. No acute intracranial abnormality.   2. Focal left periorbital soft tissue contusion/hematoma.   3. Slight irregularity of the nasal bones may be due to

## 2024-03-12 NOTE — CARE COORDINATION
Evette DANIELS from rehab called and she will be down to speak with pt shortly.    Electronically signed by Radha Miranda RN, BSN on 3/12/2024 at 11:22 AM

## 2024-03-12 NOTE — PROGRESS NOTES
Warfarin Dosing - Pharmacy Consult Note  Consulting Provider: Ivory CLIFTON  Indication:  Atrial Fibrillation  Warfarin Dose prior to admission: 3 mg daily   Concurrent anticoagulants/antiplatelets: none  Significant Drug Interactions:  azathioprine, sulfasalazine ,synthroid  Recent Labs     03/12/24  0130 03/12/24  0508   INR  --  1.6   HGB 14.2  --      --    LABALBU 4.1  --      Recent warfarin administrations        No warfarin orders with administrations found.            Orders not given:            warfarin (COUMADIN) tablet 6 mg    warfarin placeholder: dosing by pharmacy                   Date   INR    Dose  03/12     1.6        6 mg     Assessment/Plan  (Goal INR: 2 - 3)  INR is sub-therapeutic at 1.6. Will boost today with 6 mg x 1    Active problem list reviewed.  INR orders are placed.  Chart reviewed for pertinent labs, drug/diet interactions, and past doses.  Documentation of patient's clinical condition was reviewed.    Pharmacy Dosing:  Pharmacy will continue to follow.     Genevieve Gould [facility]  3/12/2024  5:23 PM

## 2024-03-12 NOTE — CARE COORDINATION
Rcvd call from Evette DANIELS admissions for rehab. Dr. Jenkins has accepted pt and going to room 259. Swetha DANIELS informed covid needs done, if should come back positive pt can still be accepted just will have another room assignment.    Pt informed of above and thankful.    Electronically signed by Radha Miranda, RN, BSN on 3/12/2024 at 1:57 PM

## 2024-03-13 PROBLEM — E11.65 HYPERGLYCEMIA DUE TO TYPE 2 DIABETES MELLITUS (HCC): Status: ACTIVE | Noted: 2024-03-13

## 2024-03-13 PROBLEM — E03.9 ACQUIRED HYPOTHYROIDISM: Status: ACTIVE | Noted: 2018-06-20

## 2024-03-13 LAB
GLUCOSE BLD-MCNC: 121 MG/DL (ref 70–99)
GLUCOSE BLD-MCNC: 126 MG/DL (ref 70–99)
GLUCOSE BLD-MCNC: 137 MG/DL (ref 70–99)
GLUCOSE BLD-MCNC: 138 MG/DL (ref 70–99)
GLUCOSE BLD-MCNC: 151 MG/DL (ref 70–99)
HBA1C MFR BLD: 7 % (ref 4.8–5.9)
INR PPP: 1.6
PERFORMED ON: ABNORMAL
PROTHROMBIN TIME: 19.2 SEC (ref 12.3–14.9)
T4 FREE SERPL-MCNC: 1.35 NG/DL (ref 0.84–1.68)
TSH REFLEX: 2.98 UIU/ML (ref 0.44–3.86)

## 2024-03-13 PROCEDURE — 97166 OT EVAL MOD COMPLEX 45 MIN: CPT

## 2024-03-13 PROCEDURE — 6370000000 HC RX 637 (ALT 250 FOR IP): Performed by: REGISTERED NURSE

## 2024-03-13 PROCEDURE — 85610 PROTHROMBIN TIME: CPT

## 2024-03-13 PROCEDURE — 92610 EVALUATE SWALLOWING FUNCTION: CPT

## 2024-03-13 PROCEDURE — 99222 1ST HOSP IP/OBS MODERATE 55: CPT | Performed by: INTERNAL MEDICINE

## 2024-03-13 PROCEDURE — 6370000000 HC RX 637 (ALT 250 FOR IP): Performed by: PHYSICAL MEDICINE & REHABILITATION

## 2024-03-13 PROCEDURE — 97110 THERAPEUTIC EXERCISES: CPT

## 2024-03-13 PROCEDURE — 36415 COLL VENOUS BLD VENIPUNCTURE: CPT

## 2024-03-13 PROCEDURE — 83036 HEMOGLOBIN GLYCOSYLATED A1C: CPT

## 2024-03-13 PROCEDURE — 6360000002 HC RX W HCPCS: Performed by: REGISTERED NURSE

## 2024-03-13 PROCEDURE — 84443 ASSAY THYROID STIM HORMONE: CPT

## 2024-03-13 PROCEDURE — 84439 ASSAY OF FREE THYROXINE: CPT

## 2024-03-13 PROCEDURE — 99222 1ST HOSP IP/OBS MODERATE 55: CPT | Performed by: PHYSICAL MEDICINE & REHABILITATION

## 2024-03-13 PROCEDURE — 97535 SELF CARE MNGMENT TRAINING: CPT

## 2024-03-13 PROCEDURE — 6360000002 HC RX W HCPCS: Performed by: PHYSICAL MEDICINE & REHABILITATION

## 2024-03-13 PROCEDURE — 97162 PT EVAL MOD COMPLEX 30 MIN: CPT

## 2024-03-13 PROCEDURE — 1180000000 HC REHAB R&B

## 2024-03-13 PROCEDURE — 92523 SPEECH SOUND LANG COMPREHEN: CPT

## 2024-03-13 PROCEDURE — 97530 THERAPEUTIC ACTIVITIES: CPT

## 2024-03-13 RX ORDER — METHOCARBAMOL 500 MG/1
500 TABLET, FILM COATED ORAL NIGHTLY PRN
Status: DISCONTINUED | OUTPATIENT
Start: 2024-03-13 | End: 2024-03-24

## 2024-03-13 RX ORDER — LIDOCAINE 4 G/G
3 PATCH TOPICAL DAILY
Status: DISCONTINUED | OUTPATIENT
Start: 2024-03-13 | End: 2024-03-25 | Stop reason: HOSPADM

## 2024-03-13 RX ORDER — UBIDECARENONE 100 MG
100 CAPSULE ORAL DAILY
Status: DISCONTINUED | OUTPATIENT
Start: 2024-03-13 | End: 2024-03-25 | Stop reason: HOSPADM

## 2024-03-13 RX ORDER — WARFARIN SODIUM 3 MG/1
6 TABLET ORAL
Status: COMPLETED | OUTPATIENT
Start: 2024-03-13 | End: 2024-03-13

## 2024-03-13 RX ORDER — CYANOCOBALAMIN 1000 UG/ML
1000 INJECTION, SOLUTION INTRAMUSCULAR; SUBCUTANEOUS WEEKLY
Status: DISCONTINUED | OUTPATIENT
Start: 2024-03-13 | End: 2024-03-25 | Stop reason: HOSPADM

## 2024-03-13 RX ADMIN — POTASSIUM CHLORIDE 10 MEQ: 1500 TABLET, EXTENDED RELEASE ORAL at 09:53

## 2024-03-13 RX ADMIN — FUROSEMIDE 20 MG: 20 TABLET ORAL at 09:53

## 2024-03-13 RX ADMIN — Medication: at 14:14

## 2024-03-13 RX ADMIN — Medication 1 CAPSULE: at 09:53

## 2024-03-13 RX ADMIN — TRAMADOL HYDROCHLORIDE 50 MG: 50 TABLET ORAL at 09:53

## 2024-03-13 RX ADMIN — FOLIC ACID 1 MG: 1 TABLET ORAL at 09:53

## 2024-03-13 RX ADMIN — METOPROLOL SUCCINATE 50 MG: 25 TABLET, EXTENDED RELEASE ORAL at 09:53

## 2024-03-13 RX ADMIN — AZATHIOPRINE 100 MG: 50 TABLET ORAL at 09:53

## 2024-03-13 RX ADMIN — WARFARIN SODIUM 6 MG: 3 TABLET ORAL at 18:45

## 2024-03-13 RX ADMIN — Medication 1 CAPSULE: at 06:43

## 2024-03-13 RX ADMIN — LISINOPRIL 10 MG: 10 TABLET ORAL at 09:54

## 2024-03-13 RX ADMIN — Medication 5000 UNITS: at 09:55

## 2024-03-13 RX ADMIN — LEVOTHYROXINE SODIUM 100 MCG: 100 TABLET ORAL at 10:02

## 2024-03-13 RX ADMIN — DILTIAZEM HYDROCHLORIDE 60 MG: 60 TABLET, FILM COATED ORAL at 21:55

## 2024-03-13 RX ADMIN — Medication: at 21:54

## 2024-03-13 RX ADMIN — SULFASALAZINE 500 MG: 500 TABLET ORAL at 09:53

## 2024-03-13 RX ADMIN — CETIRIZINE HYDROCHLORIDE 10 MG: 10 TABLET, FILM COATED ORAL at 09:53

## 2024-03-13 RX ADMIN — DILTIAZEM HYDROCHLORIDE 60 MG: 60 TABLET, FILM COATED ORAL at 06:43

## 2024-03-13 RX ADMIN — Medication 100 MG: at 09:53

## 2024-03-13 RX ADMIN — CYANOCOBALAMIN 1000 MCG: 1000 INJECTION, SOLUTION INTRAMUSCULAR; SUBCUTANEOUS at 09:58

## 2024-03-13 RX ADMIN — SULFASALAZINE 500 MG: 500 TABLET ORAL at 21:57

## 2024-03-13 ASSESSMENT — PAIN SCALES - GENERAL
PAINLEVEL_OUTOF10: 7
PAINLEVEL_OUTOF10: 3
PAINLEVEL_OUTOF10: 0

## 2024-03-13 NOTE — H&P
[facility]                   [address] [place] [address]                            PREOPERATIVE H&P      PATIENT NAME: BOBBI BECERRA             : 1950  MED REC NO: [id number]                        ROOM: R241  ACCOUNT NO: [id number]                       ADMIT DATE: 2024  PROVIDER: Willy Menon MD      REFERRING PHYSICIAN:  Ivory Paulino    REASON FOR CONSULTATION:  Management of uncontrolled diabetes/hypothyroidism.    CHIEF COMPLAINT AND HISTORY OF PRESENT ILLNESS:  The patient is a 73-year-old female with known history of diabetes with stable to good control, admitted to [facility] because of a fall and left periorbital hematoma without any fracture.  She was seen in the ER yesterday and had a fall at home after tripping over a sock.  The patient's blood sugars are well controlled only on Humalog coverage.  A1c was 7.0.  History of hypothyroidism, on Synthroid 100 mcg daily.  Thyroid function tests were done from a year ago, TSH was 3.003.  The patient wants to get a thyroid level checked.  At home, the patient is taking regular insulin on a sliding scale.  Also has been using FreeStyle Bob.  Was seen at our office a year ago.  Was on NPH 30 units at bedtime, regular 10 with each meals.  Glucose levels here have been stable between 100 and 180 range.  Chemistries from yesterday were reviewed; sodium 141, potassium 4.3, chloride 103, CO2 of 26, BUN 27, creatinine 1.13.    PAST MEDICAL HISTORY:  Significant for type 2 diabetes, history of atrial fibrillation, hypothyroidism, sleep apnea, rheumatoid arthritis.    SURGICAL HISTORY:  Carpal tunnel surgery, C-sections, cholecystectomy, angioplasty with stent placement, hip reduction, joint replacement.    FAMILY HISTORY:  Reviewed, noncontributory.    PERSONAL/SOCIAL HISTORY:  Former smoker.  Denies any substance abuse.    ALLERGIES:  INCLUDE LATEX, ACCUPRIL, ADHESIVE TAPE,

## 2024-03-13 NOTE — CARE COORDINATION
Case Management Initial Assessment        NAME:  Lisseth Harper  ROOM: R241/R241-01  :  1950  DATE: 3/13/2024        Social Functional:  Social/Functional History  Lives With: Spouse  Type of Home: House  Home Layout: Two level;Able to Live on Main level with bedroom/bathroom;Performs ADL's on one level (does not go to second floor--only used for storage and kids will go up there. Full flight w/ one HR to basement for laundry)  Home Access: Stairs to enter with rails  Entrance Stairs - Number of Steps: 1+3  Entrance Stairs - Rails: Both (can reach both at the same time)  Bathroom Shower/Tub: Tub/Shower unit;Curtain  Bathroom Toilet: Standard (raised toilet seat w/o handles)  Bathroom Equipment: Tub transfer bench (grab bar clamped to the tub)  Bathroom Accessibility:  (could not fit ww or w/c, due to it being small but furniture surfs)  Home Equipment: Cane;Walker, rolling;Wheelchair-manual  ADL Assistance: Independent (uses adaptive equipment for buttons d/t RA--called \"button holer\")  Homemaking Assistance: Independent (groceries delivered)  Homemaking Responsibilities: Yes  Meal Prep Responsibility: Primary (does not like premade meals/TV dinners so she only cooks and makes from scratch)  Laundry Responsibility: Primary (full flight to basement w/ 1 HR, pt will throw laundry in a bag down and will go down backwards. When coming up, she places clothes in a basket and goes up one step at a time)  Cleaning Responsibility: Primary (pt stated that she has started looking into hiring some help for cleaning)  Bill Paying/Finance Responsibility: Primary (online/over the phone/writes checks--has had issues with fraud)  Shopping Responsibility: Primary ([employer] delivers groceries--pt shops on smart phone)  Dependent Care Responsibility: Primary (spouse appears to have dementia?/memory deficits but has not been

## 2024-03-13 NOTE — CARE COORDINATION
Walker (03/13/24 1121)  Assistance: Minimal assistance;Stand by assistance (03/13/24 1121)  Quality of Gait: Increased UE support, RLE genu valgus (03/13/24 1121)  Gait Deviations: Slow Karine;Increased SYL;Decreased step length (03/13/24 1121)  Distance: 15ft (03/13/24 1121)  Comments: NT this pM d/t pt feeling clamy and reports of increased blurry vision. (03/13/24 1619)  Stairs:  Stairs/Curb  Stairs?: No (03/13/24 1121)  W/C mobility:  Wheelchair Activities  Propulsion: Yes (03/13/24 1121)  Propulsion 1  Propulsion: Manual (03/13/24 1121)  Level: Level Tile (03/13/24 1121)  Method: RUE;LUE (03/13/24 1121)  Level of Assistance: Independent (03/13/24 1121)  Distance: 150ft+ (03/13/24 1121)  LTG:  Long Term Goal 1: Pt will ambuate >50ft with LRD with stand by assistance  Long Term Goal 2: Pt will perform all transfers (bed, chair and car) independently  Long Term Goal 3: Pt will demonstrate indep and compliance with HEP  Long Term Goal 4: Pt will stand >2min with LRD indep  Long Term Goal 5: Pt will perform bed mobility indep.  PT Treatment Time:  1.5 hrs      OCCUPATIONAL THERAPY    EVALUATION SELF CARE STATUS:  Hand Dominance: Right  Feeding: Unable to assess(comment) (03/13/24 1421)  Grooming: Setup;Increased time to complete (03/13/24 1421)  UE Bathing: Stand by assistance;Increased time to complete (03/13/24 1421)  LE Bathing: Moderate assistance;Increased time to complete (03/13/24 1421)  UE Dressing: Unable to assess(comment) (gown only) (03/13/24 1421)  LE Dressing: Maximum assistance;Increased time to complete (03/13/24 1421)  Toileting: Maximum assistance;Increased time to complete (03/13/24 1421)             CURRENT SELF CARE:           LTG:  Eating:Discharge Goal: Independent  Oral Hygiene:Discharge Goal: Independent  Shower/Bathe self: Discharge Goal: Independent  Upper body dressing:Discharge Goal: Independent  Lower body dressing:Discharge Goal: Independent  Putting on taking off footwear:Discharge

## 2024-03-13 NOTE — PROGRESS NOTES
training;Endurance training;Neuromuscular re-education;Equipment evaluation, education, & procurement;Self-Care / ADL;Patient/Caregiver education & training;Coordination training;Home management training;Safety education & training    Goals:  Patient goals : \"I want to be able to completely care for myself.\"    Time Frame for Long Term Goals : Pt within 2 1/2 weeks will demonsatrate progess to treatment goals as stated on initial evaluation  to address areas of deficit as stated below.  Long Term Goal 1: Pt will increase independence with ADL self care  Long Term Goal 2: Pt will increase independence with ADL transfers  Long Term Goal 3: Pt will increase bilateral UE strength for ADL completion.    - Patient will complete self care as followed using the recommended adaptive equipment and/or adaptive techniques as instructed:  Feeding: Mod I  Grooming: Independent  Bathing: Mod I  UE Dressing: Independent  LE Dressing: Mod I  Footwear: Mod I  Toileting: Mod I  Toilet Transfer: Mod I  Shower/Tub Transfer: Mod I  - Patient will improve static and dynamic standing balance to complete pants management at standing level  - Patient will improve functional endurance to tolerate/complete 60 minutes of ADLs.  - Patient will improve bilateral UE strength and endurance to Good- in order to participate in self-care activities as projected.  - Patient will perform kitchen mobility at device level without episodes of LOB and good safety awareness   - Patient will perform basic room mobility at least restrictive device level.  - Patient and/or caregiver will demonstrate understanding of recommended HEP for bilateral UE's.   -Patient will participate in [facility] cognitive assessment     Therapy Time:   Individual   Time In 0930   Time Out 1030   Minutes 60            Eval: 60 minutes     Electronically signed by:    SUZANNE Guerra/L,   3/13/2024, 2:45 PM Electronically signed by SUZANNE Guerra/L on 3/13/24 at 2:49 PM EDT

## 2024-03-13 NOTE — PROGRESS NOTES
Physical Therapy Rehab Treatment Note  Facility/Department: [facility]  [facility]       NAME: Lisseth Harper  : 1950 (73 y.o.)  MRN: [id number]  CODE STATUS: Prior    Date of Service: 3/13/2024     Restrictions:  Restrictions/Precautions: Fall Risk     SUBJECTIVE:   Subjective: Pt states she has been having trouble with getting cramps in her calfs for the past month.  Pt reports ice has helped for pain in the past.    Pain  Pain: 10 R hip  Pt reports already medicated.    OBJECTIVE:         Bed mobility  Rolling to Right: Independent  Supine to Sit: Stand by assistance  Bed Mobility Comments: Completed on flat mat without rails.  Good log roll technique.  Increased time and effort to complete.    Transfers  Sit to Stand: Minimal Assistance  Stand to Sit: Minimal Assistance  Bed to Chair: Minimal assistance  Comment: VCs for safety and technique.              PT Exercises  Exercise Treatment: supine AP, QS, GS x20 ea; hooklying add/abd isometrics x10 ea, SAQ x20            ASSESSMENT/PROGRESS TOWARDS GOALS: Initiated LE isometric ex.  Pt limited by pain.       Goals:See PT eval for details  Long Term Goals  Long Term Goal 1: Pt will ambuate >50ft with LRD with stand by assistance  Long Term Goal 2: Pt will perform all transfers (bed, chair and car) independently  Long Term Goal 3: Pt will demonstrate indep and compliance with HEP  Long Term Goal 4: Pt will stand >2min with LRD indep  PLAN OF CARE/Safety:  Continue per POC.       Therapy Time:   Individual   Time In 1100   Time Out 1130   Minutes 30      Minutes:  Transfer/Bed mobility trainin  Gait trainin  Neuro re education:0  Therapeutic ex:25    Carmen Matute PTA, 24 at 11:34 AM

## 2024-03-13 NOTE — PROGRESS NOTES
OCCUPATIONAL THERAPY  INPATIENT REHAB TREATMENT NOTE  [facility]      NAME: Lisseth Harper  : 1950 (73 y.o.)  MRN: [id number]  CODE STATUS: Prior  Room: R241/R241-01    Date of Service: 3/13/2024    Referring Physician: Dr Goyal  Rehab Diagnosis: Impaired mobility and ADL's due to OA flare up S/P fall    Restrictions  Restrictions/Precautions  Restrictions/Precautions: Fall Risk     Patient's date of birth confirmed: Yes    SAFETY:  Safety Devices  Safety Devices in place: Yes  Type of devices: All fall risk precautions in place    SUBJECTIVE: \"I had a crushed femur.\"    Pain at start of treatment: Yes: 5/10    Pain at end of treatment: Yes: 6/10    Location: R LE, R shoulder  Description ache  Nursing notified: Declined  Intervention: None    COGNITION:  Orientation  Overall Orientation Status: Within Functional Limits  Cognition  Overall Cognitive Status: WFL  Cognition Comment: comp I, expression I, social I, problem I, memory I    OBJECTIVE:    AE Education: Patient educated in AE for lower body dressing.  Patient able to doff B socks with dressing stick with 0 difficulty and 1 verbal cue.   Patient able to thread B LE's into hospital underwear with 0 difficulty utilizing reacher and 1 verbal cue.   Patient able to doff hospital underwear with dressing stick with 0 difficulty requiring 0 verbal cues.   Patient able to  B socks from floor with reacher with 0 difficulty.   Patient able to thread B socks onto sock aid with MIN difficulty requiring demonstration and 2 verbal cues.   Patient able to don B socks with sock aid with 0 difficulty and 0 verbal cues.     UE Strengthening:  Patient engaged in B UE ROM and strengthening following UE strengthening HEP to increase I with ADL's and transfers.   Patient utilized 1 # hand weight 1 X 10 repetitions in various planes with RB's as needed.   Patient required MIN verbal cues for proper technique.  Patient required MIN verbal cues for

## 2024-03-13 NOTE — PROGRESS NOTES
Mild  Working Memory: Mild  Problem Solving  Problem Solving: Within Functional Limits  Simple Functional Tasks: WFL  Verbal Reasoning Skills: WFL  Initiation: WFL  Sequencing: WFL  Executive Function Skills:  (Unable to assess, will assess with magnifying glass.)  Abstract Reasoning  Abstract Reasoning: Within Functional Limits  Safety/Judgment  Safety/Judgment: Within Functional Limits    Additional Assessments   Informal Acute Rehab Cognitive-Linguistic Evaluation    Results of Cognitive-linguistic Evaluation Total Score for each section Percentage Score Severity Rating for each section   Auditory Comprehension 9 90 WNL   Verbal Reasoning 15 100 WNL   Memory 16 80 Mild   Problem Solving/Sequencing 10 100 WNL   Executive Function DNT       Overall Cognitive-Linguistic Severity Rating TBD         Recommendations  Requires SLP Intervention: Yes  D/C Recommendations: Ongoing speech therapy is recommended during this hospitalization  Duration of Treatment: 2-3 weeks  Frequency of Treatment: 2-4x/week for LOS or untl all goals are met    Prognosis  Speech Therapy Prognosis  Prognosis: Good  Prognosis Considerations: Age;Potential    Education  Patient Education: Pt was educated on SLE results  Patient Education Response: Verbalizes understanding    Treatment/Goals  Short Term Goals  Time Frame for Short Term Goals: 1-2 weeks  Goal 1: To increase safety awareness and judgment for safe completion of ADLs secondary to patient's cognitive deficits, patient will complete abstract reasoning tasks (i.e. word deduction, convergent and divergent naming, similarities/differences) with 90% accuracy  Goal 2: To decrease patient's cognitive deficits through the use of compensatory strategies, the patient will be educated on 3 different memory strategies and verbalize how he/she might use them at home in 3 ways with Supervised cues.  Goal 3: To address patient's cognitive deficits and promote recall of personal and medical

## 2024-03-13 NOTE — PROGRESS NOTES
status;Decreased ROM;Decreased strength;Decreased endurance;Decreased balance;Increased pain;Decreased posture  Decision Making: Medium Complexity  History: med  Exam: high  Clinical Presentation: med    Therapy Prognosis: Good           CLINICAL IMPRESSION: Pt is a 74y/o female with h/o RA, admitted to rehab dt a fall in her home. Pt presents with R thigh pain, decreased overall strength and balance, and an inability to ambulate indep. Pt will benefit from physical therapy to improve her strength and balance and increase her ability to ambulate and perform transfers indep. Recommend pt continue with therapy to improve her ability to return to PLOF and ambulate and perform transfers to the highest level of indep possible.    PLAN OF CARE:  Frequency: 1-2 treatment sessions per day, 5-7 days per week  Additional Comments: Modalities as needed for R thigh pain  Current Treatment Recommendations: Strengthening, ROM, Balance training, Functional mobility training, Transfer training, Endurance training, Gait training, Stair training, Neuromuscular re-education, Manual, Pain management, Home exercise program, Patient/Caregiver education & training, Equipment evaluation, education, & procurement, Modalities, Positioning  Requires PT Follow-Up: Yes    Patient's Goal:  decrease her pain and be able to return to PLOF    GOALS:  Patient Goals : decrease her pain and be able to return to PLOF  Long Term Goals  Long Term Goal 1: Pt will ambuate >50ft with LRD with stand by assistance  Long Term Goal 2: Pt will perform all transfers (bed, chair and car) independently  Long Term Goal 3: Pt will demonstrate indep and compliance with HEP  Long Term Goal 4: Pt will stand >2min with LRD indep  Long Term Goal 5: Pt will perform bed mobility indep.  Long term goal 6: Pt will negotiate 4 stairs with appropriate rails with SBA    ELOS:   Therapy Duration: 10 Days    Therapy Time:    Individual   Time In 1030   Time Out 1100   Minutes 30

## 2024-03-13 NOTE — PROGRESS NOTES
Physical Therapy Rehab Treatment Note  Facility/Department: [facility]  Room: [place] [address]       NAME: Lisseth Harper  : 1950 (73 y.o.)  MRN: [id number]  CODE STATUS: Prior    Date of Service: 3/13/2024     Restrictions:  Restrictions/Precautions: Fall Risk    SUBJECTIVE:   Subjective: Pt states she is tired and hot.  States her room is really hot.  Pt states she hasn't been sleeping well.  Pt states she has been sitting in the chair since this morning.  States the towel roll between the knees helped.    Pain  Pain: R hip and shld 6/10.  Pt declines intervention.    OBJECTIVE:         Bed mobility  Rolling to Right: Independent  Supine to Sit: Stand by assistance  Bed Mobility Comments: Completed on flat mat without rails.  Good log roll technique.  Increased time and effort to complete.    Transfers  Sit to Stand: Minimal Assistance  Stand to Sit: Minimal Assistance  Bed to Chair: Minimal assistance  Comment: VCs for safety and technique.    Ambulation  Comments: NT this pM d/t pt feeling clamy and reports of increased blurry vision.           PT Exercises  A/AROM Exercises: seated AP, LAQ, march, hip add with ball, hip abd x10 ea  Static Standing Balance Exercises: Stood at WW SBA 60sec         Education  Education Given To: Patient  Education Provided Comments: Educated pt on goals.    ASSESSMENT/PROGRESS TOWARDS GOALS:   Assessment: Upon assisting pt with sit to stand noted pt feeling clamy.  Pt reports feeling hot and blurry vision.  Pt returned to room with RN present to assess pt and blood sugar.  Charge nurse notified of pt's c/o room tempature.    Goals:  Long Term Goals  Long Term Goal 1: Pt will ambuate >50ft with LRD with stand by assistance  Long Term Goal 2: Pt will perform all transfers (bed, chair and car) independently  Long Term Goal 3: Pt will demonstrate indep and compliance with HEP  Long Term Goal 4: Pt will stand >2min with LRD indep  Long Term Goal 5: Pt will perform bed mobility

## 2024-03-13 NOTE — H&P
HISTORY & PHYSICAL       DATE OF ADMISSION:  3/12/2024    DATE OF SERVICE:  3/13/24    Subjective:    Lisseth Harper, 73 y.o. female presents today with:     CHIEF COMPLAINT:  Patient was seen in the ER on 3/12/2024 after falling at home for after tripping on a sock.  She has a left periorbital hematoma but no fracture.     She has a history of previous rehab stays in 2019-after revision of the hip replacement after falling.  She states that her body aches are quite severe especially in her low back and right flank and hip.  She states that muscle relaxers seem to help best though she is taking Ultram at lowest effective dose.     Fatigue  This is a recurrent problem. The problem has been gradually improving. Associated symptoms include anorexia, arthralgias, fatigue, headaches, joint swelling and numbness. Pertinent negatives include no chest pain, chills, fever, nausea or vomiting. She has tried lying down, acetaminophen, relaxation and position changes for the symptoms. The treatment provided mild relief.   Leg Pain   The incident occurred more than 1 week ago. The injury mechanism was a fall. The pain is present in the right hip, right thigh and right knee. The quality of the pain is described as burning, cramping and aching. The pain is at a severity of 7/10. The pain is severe. The pain has been Fluctuating since onset. Associated symptoms include an inability to bear weight and numbness. Possible foreign bodies include metal. The symptoms are aggravated by movement, palpation and weight bearing. She has tried elevation, acetaminophen, ice, immobilization, heat, non-weight bearing and NSAIDs for the symptoms. The treatment provided moderate relief.       I reviewed recent nursing note, \" 0 Distress, ambulatory, To rehab \".        The patient has stabilized medically and is able to participate at acute level rehab but is too medically complex for SNF due to need for therapy at the acute level with at least 15

## 2024-03-13 NOTE — PROGRESS NOTES
[facility]   [facility]  Speech Language Pathology  Clinical Bedside Swallow Evaluation    NAME:Lisseth Harper  : 1950 (73 y.o.)   [x]   confirmed    MRN: [id number]  ROOM: [place] [address]  ADMISSION DATE: 3/12/2024  PATIENT DIAGNOSIS(ES): Impaired mobility and ADLs [Z74.09, Z78.9]  No chief complaint on file.    Patient Active Problem List    Diagnosis Date Noted    Hyperglycemia due to type 2 diabetes mellitus ([facility]) 2024    Diverticulitis 2023    Atrial fibrillation with RVR ([facility]) 2023    Fever in adult 2023    Chest pain 2023    CHANA (obstructive sleep apnea)     Recurrent dislocation of left hip 2019    Hip dislocation, left, initial encounter ([facility]) 2019    Intracranial injury of other and unspecified nature, without mention of open intracranial wound, unspecified state of consciousness 10/05/2019    Displacement of internal left hip prosthesis ([facility]) 2019    Impaired mobility and activities of daily living dt flare of RA 2019    Anterior dislocation of left hip ([facility]) 2019    Hip dislocation, left, subsequent encounter 2019    Closed dislocation of left hip ([facility]) 2019    Other specified hypothyroidism 2018    Adrenal insufficiency ([facility]) 2018    Rotator cuff tendinitis 2017    RA (rheumatoid arthritis) ([facility]) 2017    Hypertension 2017    CAD (coronary artery disease) 2017    Morbid obesity due to excess calories ([facility]) 2017    Bilateral nonexudative age-related macular degeneration 2015    Nuclear sclerosis 2015     Past Medical History:   Diagnosis Date    A-fib ([facility])     Aplasia of adrenal gland     CAD (coronary artery disease)     Diabetes mellitus ([facility])     DJD (degenerative joint disease)     HLD (hyperlipidemia)     Hypertension     Hypothyroidism     Obesity     CHANA (obstructive sleep apnea)     RA (rheumatoid arthritis) ([facility])     Rotator cuff tendinitis

## 2024-03-13 NOTE — PROGRESS NOTES
0730  Patient report received, patient new to the unit from the ED R/T fall at home alone   Patient is receiving therapy for strength ing   Patient is taking Lantus at home, and will need to be assessed by Endo to prescribe medication.  0900  Patient assessment complete, patient is A/O x 4   Patient report pain after therapy  Patient medicated as ordered  Patient I concerned about the right leg being larger than the left leg R/T Hx of DVT  Will report to provider  1530  Patient has returned from therapy, patient report feeling hot and therapist noted patient feeling clammy BG checked, 138  Maintenance request put in to turn temp. Down in the room   Electronically signed by Delilah Smyth RN on 3/13/2024 at 1:57 PM

## 2024-03-13 NOTE — PROGRESS NOTES
Warfarin Dosing - Pharmacy Consult Note  Consulting Provider: Ivory CLIFTON   Indication:  Atrial Fibrillation  Warfarin Dose prior to admission: 3 mg daily   Concurrent anticoagulants/antiplatelets: none  Significant Drug Interactions:  azathioprine, sulfasalazine  Recent Labs     03/12/24  0130 03/12/24  0508 03/13/24  0437   INR  --  1.6 1.6   HGB 14.2  --   --      --   --    LABALBU 4.1  --   --      Recent warfarin administrations                     warfarin (COUMADIN) tablet 6 mg (mg) 6 mg Given 03/12/24 1851                   Date   INR    Dose  03/12     1.6        6 mg   03/13     1.6        6 mg    Assessment/Plan  (Goal INR: 2 - 3)  INR is sub-therapeutic at 1.6. Will boost today with 6 mg x 1     Active problem list reviewed.  INR orders are placed.  Chart reviewed for pertinent labs, drug/diet interactions, and past doses.  Documentation of patient's clinical condition was reviewed.    Pharmacy Dosing:  Pharmacy will continue to follow.     RITU HurtadoPh.  3/13/2024  7:46 AM

## 2024-03-14 LAB
GLUCOSE BLD-MCNC: 112 MG/DL (ref 70–99)
GLUCOSE BLD-MCNC: 125 MG/DL (ref 70–99)
GLUCOSE BLD-MCNC: 149 MG/DL (ref 70–99)
INR PPP: 2
PERFORMED ON: ABNORMAL
PROTHROMBIN TIME: 22.8 SEC (ref 12.3–14.9)

## 2024-03-14 PROCEDURE — 85610 PROTHROMBIN TIME: CPT

## 2024-03-14 PROCEDURE — 97530 THERAPEUTIC ACTIVITIES: CPT

## 2024-03-14 PROCEDURE — 6360000002 HC RX W HCPCS: Performed by: REGISTERED NURSE

## 2024-03-14 PROCEDURE — 6370000000 HC RX 637 (ALT 250 FOR IP): Performed by: PHYSICAL MEDICINE & REHABILITATION

## 2024-03-14 PROCEDURE — 6370000000 HC RX 637 (ALT 250 FOR IP): Performed by: REGISTERED NURSE

## 2024-03-14 PROCEDURE — 99232 SBSQ HOSP IP/OBS MODERATE 35: CPT | Performed by: PHYSICIAN ASSISTANT

## 2024-03-14 PROCEDURE — 97535 SELF CARE MNGMENT TRAINING: CPT

## 2024-03-14 PROCEDURE — 97129 THER IVNTJ 1ST 15 MIN: CPT

## 2024-03-14 PROCEDURE — 97130 THER IVNTJ EA ADDL 15 MIN: CPT

## 2024-03-14 PROCEDURE — 1180000000 HC REHAB R&B

## 2024-03-14 PROCEDURE — 99233 SBSQ HOSP IP/OBS HIGH 50: CPT | Performed by: PHYSICAL MEDICINE & REHABILITATION

## 2024-03-14 PROCEDURE — 97116 GAIT TRAINING THERAPY: CPT

## 2024-03-14 PROCEDURE — 97112 NEUROMUSCULAR REEDUCATION: CPT

## 2024-03-14 PROCEDURE — 36415 COLL VENOUS BLD VENIPUNCTURE: CPT

## 2024-03-14 RX ORDER — LISINOPRIL 10 MG/1
10 TABLET ORAL DAILY
Status: DISCONTINUED | OUTPATIENT
Start: 2024-03-15 | End: 2024-03-21

## 2024-03-14 RX ORDER — VITAMIN B COMPLEX
2000 TABLET ORAL DAILY
Status: DISCONTINUED | OUTPATIENT
Start: 2024-03-14 | End: 2024-03-25 | Stop reason: HOSPADM

## 2024-03-14 RX ORDER — METOPROLOL SUCCINATE 25 MG/1
50 TABLET, EXTENDED RELEASE ORAL DAILY
Status: DISCONTINUED | OUTPATIENT
Start: 2024-03-15 | End: 2024-03-23

## 2024-03-14 RX ORDER — WARFARIN SODIUM 4 MG/1
4 TABLET ORAL
Status: COMPLETED | OUTPATIENT
Start: 2024-03-14 | End: 2024-03-14

## 2024-03-14 RX ADMIN — DILTIAZEM HYDROCHLORIDE 60 MG: 60 TABLET, FILM COATED ORAL at 13:34

## 2024-03-14 RX ADMIN — LEVOTHYROXINE SODIUM 100 MCG: 100 TABLET ORAL at 07:45

## 2024-03-14 RX ADMIN — FUROSEMIDE 20 MG: 20 TABLET ORAL at 07:44

## 2024-03-14 RX ADMIN — FOLIC ACID 1 MG: 1 TABLET ORAL at 07:45

## 2024-03-14 RX ADMIN — TRAMADOL HYDROCHLORIDE 50 MG: 50 TABLET ORAL at 21:26

## 2024-03-14 RX ADMIN — TRAMADOL HYDROCHLORIDE 50 MG: 50 TABLET ORAL at 01:57

## 2024-03-14 RX ADMIN — SULFASALAZINE 500 MG: 500 TABLET ORAL at 21:25

## 2024-03-14 RX ADMIN — Medication 2000 UNITS: at 17:36

## 2024-03-14 RX ADMIN — POTASSIUM CHLORIDE 10 MEQ: 1500 TABLET, EXTENDED RELEASE ORAL at 07:45

## 2024-03-14 RX ADMIN — Medication 100 MG: at 07:45

## 2024-03-14 RX ADMIN — Medication: at 21:40

## 2024-03-14 RX ADMIN — WARFARIN 4 MG: 4 TABLET ORAL at 17:36

## 2024-03-14 RX ADMIN — Medication: at 13:35

## 2024-03-14 RX ADMIN — AZATHIOPRINE 100 MG: 50 TABLET ORAL at 07:44

## 2024-03-14 RX ADMIN — CETIRIZINE HYDROCHLORIDE 10 MG: 10 TABLET, FILM COATED ORAL at 07:44

## 2024-03-14 RX ADMIN — TRAMADOL HYDROCHLORIDE 50 MG: 50 TABLET ORAL at 09:34

## 2024-03-14 RX ADMIN — DILTIAZEM HYDROCHLORIDE 60 MG: 60 TABLET, FILM COATED ORAL at 06:20

## 2024-03-14 RX ADMIN — Medication 1 CAPSULE: at 07:45

## 2024-03-14 RX ADMIN — DILTIAZEM HYDROCHLORIDE 60 MG: 60 TABLET, FILM COATED ORAL at 21:25

## 2024-03-14 RX ADMIN — Medication 1 CAPSULE: at 06:21

## 2024-03-14 RX ADMIN — SULFASALAZINE 500 MG: 500 TABLET ORAL at 08:03

## 2024-03-14 ASSESSMENT — PAIN DESCRIPTION - ORIENTATION
ORIENTATION: RIGHT
ORIENTATION: RIGHT;POSTERIOR
ORIENTATION: RIGHT

## 2024-03-14 ASSESSMENT — PAIN SCALES - GENERAL
PAINLEVEL_OUTOF10: 7
PAINLEVEL_OUTOF10: 6
PAINLEVEL_OUTOF10: 7
PAINLEVEL_OUTOF10: 0
PAINLEVEL_OUTOF10: 0

## 2024-03-14 ASSESSMENT — PAIN DESCRIPTION - LOCATION
LOCATION: OTHER (COMMENT)
LOCATION: RIB CAGE
LOCATION: LEG;OTHER (COMMENT)

## 2024-03-14 ASSESSMENT — PAIN DESCRIPTION - DESCRIPTORS
DESCRIPTORS: THROBBING;ACHING
DESCRIPTORS: ACHING;DISCOMFORT
DESCRIPTORS: SHARP;STABBING

## 2024-03-14 ASSESSMENT — PAIN - FUNCTIONAL ASSESSMENT: PAIN_FUNCTIONAL_ASSESSMENT: ACTIVITIES ARE NOT PREVENTED

## 2024-03-14 NOTE — PROGRESS NOTES
Physical Therapy Rehab Treatment Note  Facility/Department: [facility]  [facility]       NAME: Lisseth Harper  : 1950 (73 y.o.)  MRN: [id number]  CODE STATUS: Prior    Date of Service: 3/14/2024  Chart Reviewed: Yes  Patient assessed for rehabilitation services?: Yes  Family / Caregiver Present: No  Diagnosis: Impaired mobility and activities of daily living dt flare of RA    Restrictions:  Restrictions/Precautions: Fall Risk       SUBJECTIVE:   Response To Previous Treatment: Patient with no complaints from previous session.    Pain  5/10 low back/ RLE. Patient denies need for intervention.       OBJECTIVE:     Transfers  Sit to Stand: Minimal Assistance;Stand by assistance  Stand to Sit: Minimal Assistance;Stand by assistance  Comment: VCs for safety and technique. Patient completes STS from w/c x6. Requires intermittent steadying min A.     Ambulation  Surface: Level tile  Device: Rolling Walker  Quality of Gait: flexed posture, R genu valgus, absent heel strike on right, decreased right foot clearance, increased time for turns, mildly unsteady once fatigued  Distance: 50 feet x2- 1 seated RB    PT Exercises  A/AROM Exercises: seated AP, LAQ, march, hip add with ball, hip abd x10 ea  Static Standing Balance Exercises: 2 minutes with intermittent UE support  Dynamic Standing Balance Exercises: single steps forward/lateral  at ww x10 B         ASSESSMENT/PROGRESS TOWARDS GOALS:   Body Structures, Functions, Activity Limitations Requiring Skilled Therapeutic Intervention: Decreased functional mobility ;Decreased ADL status;Decreased ROM;Decreased strength;Decreased endurance;Decreased balance;Increased pain;Decreased posture  Assessment: Patient demonstrates progress toward transfer and gait goals this AM. She continues to require cues to improve quality and safety of gait. Patient tolerates approx 2 minutes of static standing with intermittent UE support. No LOB observed during session. Patient

## 2024-03-14 NOTE — PROGRESS NOTES
Physical Therapy Rehab Treatment Note  [place] [facility]  [facility]       NAME: Lisseth Harper  : 1950 (73 y.o.)  MRN: [id number]  CODE STATUS: Prior    Date of Service: 3/14/2024  Chart Reviewed: Yes  Patient assessed for rehabilitation services?: Yes  Family / Caregiver Present: No  Diagnosis: Impaired mobility and activities of daily living dt flare of RA    Restrictions:  Restrictions/Precautions: Fall Risk       SUBJECTIVE:   Response To Previous Treatment: Patient with no complaints from previous session.    Pain  Patient denies pain pre/post session       OBJECTIVE:     Transfers  Sit to Stand: Minimal Assistance;Stand by assistance  Stand to Sit: Minimal Assistance;Stand by assistance  Comment: VCs for safety and technique.    Ambulation  Surface: Level tile  Device: Rolling Walker  Quality of Gait: flexed posture, R genu valgus, absent heel strike on right, decreased right foot clearance, increased time for turns, mildly unsteady once fatigued  Distance: 75 feet      PT Exercises  Exercise Treatment: Gait drills: lateral, march  Dynamic Standing Balance Exercises: single steps forward/lateral  at ww x10 B     ASSESSMENT/PROGRESS TOWARDS GOALS:   Body Structures, Functions, Activity Limitations Requiring Skilled Therapeutic Intervention: Decreased functional mobility ;Decreased ADL status;Decreased ROM;Decreased strength;Decreased endurance;Decreased balance;Increased pain;Decreased posture  Assessment: Session focused on gait quality and endurance. Patient continues to progress distance ambulated without change in deviations. Quick fatigue noted during gait drills in // bars.   Goals:  Long Term Goals  Long Term Goal 1: Pt will ambuate >50ft with LRD with stand by assistance  Long Term Goal 2: Pt will perform all transfers (bed, chair and car) independently  Long Term Goal 3: Pt will demonstrate indep and compliance with HEP  Long Term Goal 4: Pt will stand >2min with LRD

## 2024-03-14 NOTE — PROGRESS NOTES
[facility] Occupational Therapy      Date: 3/14/2024  Patient Name: Lisseth Harper        MRN: [id number]  Account: [id number]   : 1950  (73 y.o.)  Room: [place] [address]    Chart reviewed, attempted OT at 7:40 for OT Get Up and Go program. Patient not seen 2° to:    Pt. declined, stating: \"I'm okay.\"    Will attempt again when able.    Electronically signed by CHARLES Meredith on 3/14/2024 at 9:29 AM

## 2024-03-14 NOTE — CARE COORDINATION
LSW met with pt and discussed projected discharge date as well as goals. Pt in agreement and is aware that she is not currently goaled to be completely independent. Pt confirmed that she can receive assistance with laundry completion upon discharge. Electronically signed by UMER Beltrán, FELI on 3/14/2024 at 5:17 PM

## 2024-03-14 NOTE — PROGRESS NOTES
Warfarin Dosing - Pharmacy Consult Note  Consulting Provider: Ivory CLIFTON   Indication:  Atrial Fibrillation  Warfarin Dose prior to admission: 3 mg daily   Concurrent anticoagulants/antiplatelets: none  Significant Drug Interactions:  azathioprine, sulfasalazine  Recent Labs     03/12/24  0130 03/12/24  0508 03/13/24  0437 03/14/24  0436   INR  --  1.6 1.6 2.0   HGB 14.2  --   --   --      --   --   --    LABALBU 4.1  --   --   --      Recent warfarin administrations                     warfarin (COUMADIN) tablet 6 mg (mg) 6 mg Given 03/13/24 1845    warfarin (COUMADIN) tablet 6 mg (mg) 6 mg Given 03/12/24 1851                   Date   INR    Dose  03/12     1.6        6 mg   03/13     1.6        6 mg  03/14     2.0        4 mg     Assessment/Plan  (Goal INR: 2 - 3)  INR is therapeutic for goal. Will give warfarin 4mg x 1.     Active problem list reviewed.  INR orders are placed.  Chart reviewed for pertinent labs, drug/diet interactions, and past doses.  Documentation of patient's clinical condition was reviewed.    Pharmacy Dosing:  Pharmacy will continue to follow.     Mraal Covarrubias [facility] PharmD

## 2024-03-14 NOTE — PROGRESS NOTES
OCCUPATIONAL THERAPY  INPATIENT REHAB TREATMENT NOTE  [facility]      NAME: Lisseth Harper  : 1950 (73 y.o.)  MRN: [id number]  CODE STATUS: Prior  Room: R241/R241-01    Date of Service: 3/15/2024    Referring Physician: Dr Goyal  Rehab Diagnosis: Impaired mobility and ADL's due to OA flare up S/P fall    Restrictions  Restrictions/Precautions  Restrictions/Precautions: Fall Risk     Patient's date of birth confirmed: Yes    SAFETY:       SUBJECTIVE: \"I had a hollis retriever.\"    Pain at start of treatment: No    Pain at end of treatment: No    COGNITION:       OBJECTIVE:    Small Peg Activity:  Patient engaged in B FM coordination/strengthening and cognition to increase I with ADL's, IADL's and transfers.   Patient completed small peg design replications with example picture placed at midline.  Patient able to reach in lateral planes with 0 difficulty.  Patient able to reach in forward planes with 0 difficulty.   Patient able to  small pegs from table top with MIN difficulty.   Patient able to turn pegs into correct position for placement with MIN difficulty with in hand manipulation.   Patient able to place pegs into small pegboard with MIN difficulty.   Patient able to follow design with MIN difficulty.     Patient completed a simple 30 piece zig zag design with original design placed at midline.   Patient placed a total of 28 pegs with 27 being correct on first attempt.   Patient placed a total of 30 pegs with 30 being correct on second attempt.   Patient completed an intermediate 58 piece straight line design with original design placed at midline.   Patient placed a total of 57 pegs with 55 being correct on first attempt.   Patient placed a total of 58 pegs with 58 being correct on second attempt.   Patient able to remove pegs with 0 difficulty.   Patient with MIN errors with correct color choices of pegs.    ASSESSMENT: Patient pleasant and chatty while working at a steady

## 2024-03-14 NOTE — PROGRESS NOTES
Subjective:  The patient complains of severe acute on chronic progressive fatigue and right rib pain right upper abdomen pain partially relieved by rest, medications, PT,  OT,   SLP and rest and exacerbated by recent fall.      Patient was seen in the ER on 3/12/2024 after falling at home for after tripping on a sock.  She has a left periorbital hematoma but no fracture.     She has a history of previous rehab stays in 2019-after revision of the hip replacement after falling.  She states that her body aches are quite severe especially in her low back and right flank and hip.  She states that muscle relaxers seem to help best though she is taking Ultram at lowest effective dose.    I am concerned about patient’s medical complexities and barriers to advancing in rehab goals including  pain controll.        I reviewed current care and plans for further care with other rehab providers including nursing and case management.  According to recent nursing note, \" resting quietly in bed, denies pain/sob, assist x1 CGA to bathroom with walker. Continent of bowel and bladder. Bed alarm on, call light in reach, will monitor.  \".    ROS x10:  The patient also complains of severely impaired mobility and activities of daily living.  Otherwise no new problems with vision, hearing, nose, mouth, throat, dermal, cardiovascular, GI, , pulmonary, musculoskeletal, psychiatric or neurological. See also Acute Rehab PM&R H&P.       Vital signs:  /64   Pulse 70   Temp 98.4 °F (36.9 °C) (Oral)   Resp 17   Wt 102.2 kg (225 lb 6.4 oz)   SpO2 99%   BMI 33.29 kg/m²   I/O:   PO/Intake:  fair PO intake,   reg diet    Bowel:   continent   Bladder: continent  no morfin  General:  Patient is well developed,   adequately nourished, and    well kempt.     HEENT:    Pupils equal, hearing intact to loud voice, external inspection of ear and nose benign.  Inspection of lips, tongue and gums benign    Musculoskeletal: No significant change in

## 2024-03-14 NOTE — PROGRESS NOTES
a history of hypothyroidism, repeat labs were all within normal limits.  Continue levothyroxine and low-dose sliding scale Humalog coverage.    Review of systems: denies polyuria, polydipsia, ABD pain, flank pain, N/V/D, or diaphoresis  Medications:   Scheduled Meds:   [START ON 3/15/2024] lisinopril  10 mg Oral Daily    [START ON 3/15/2024] metoprolol succinate  50 mg Oral Daily    Vitamin D  2,000 Units Oral Daily    warfarin  4 mg Oral Once    cyanocobalamin  1,000 mcg IntraMUSCular Weekly    coenzyme Q10  100 mg Oral Daily    lidocaine  3 patch TransDERmal Daily    camphor-menthol-methyl salicylate   Topical TID    insulin lispro  0-8 Units SubCUTAneous TID WC    insulin lispro  0-4 Units SubCUTAneous Nightly    azaTHIOprine  100 mg Oral Daily    b complex vitamins  1 capsule Oral Daily    cetirizine  10 mg Oral Daily    dilTIAZem  60 mg Oral 3 times per day    folic acid  1 mg Oral Daily    furosemide  20 mg Oral Daily    lactobacillus  1 capsule Oral Daily    levothyroxine  100 mcg Oral Daily    potassium chloride  10 mEq Oral Daily    sulfaSALAzine  500 mg Oral BID    warfarin placeholder: dosing by pharmacy   Other RX Placeholder     Continuous Infusions:   dextrose         Objective:   Vitals: /67   Pulse 70   Temp 97.9 °F (36.6 °C) (Oral)   Resp 18   Wt 102.2 kg (225 lb 6.4 oz)   SpO2 96%   BMI 33.29 kg/m²    Wt Readings from Last 3 Encounters:   03/14/24 102.2 kg (225 lb 6.4 oz)   03/12/24 111.1 kg (245 lb)   11/14/23 104.3 kg (230 lb)        General appearance: alert, appears stated age, cooperative, and no distress  Skin: Skin color, texture, turgor normal. No rashes or lesions.  Neck: thyroid normal size, non-tender,  without nodularity  Lungs: clear to auscultation bilaterally  Heart: regular rate and rhythm, S1, S2 normal, no murmur, click, rub or gallop  Abdomen: soft, non-tender. Bowel sounds normal. No masses,  no organomegaly.  Extremities: extremities normal, atraumatic, no cyanosis

## 2024-03-14 NOTE — PROGRESS NOTES
[facility]  Facility/Department: [facility]  Speech Language Pathology   Treatment Note          Lisseth Harper  1950  R241/R241-01  [x]   confirmed    Date: 3/14/2024      Restrictions/Precautions: Fall Risk     ADULT DIET; Regular; 4 carb choices (60 gm/meal)     Respiratory Status:   RA  No active isolations    Rehab Diagnosis: Impaired mobility and activities of daily living dt flare of RA     Subjective:  Alert and Cooperative        Interventions used this date:  Cognitive Skill Development    Objective/Assessment:  Patient progressing towards goals:  Short Term Goals  Time Frame for Short Term Goals: 1-2 weeks  Goal 1: To increase safety awareness and judgment for safe completion of ADLs secondary to patient's cognitive deficits, patient will complete abstract reasoning tasks (i.e. word deduction, convergent and divergent naming, similarities/differences) with 90% accuracy. Patient completed word deduction task I with 100% accuracy.    Patient completed convergent abstract naming task I with 80% accuracy, with min to mod cueing 100% accuracy.    Goal 2: To decrease patient's cognitive deficits through the use of compensatory strategies, the patient will be educated on 3 different memory strategies and verbalize how he/she might use them at home in 3 ways with Supervised cues. Patient and ST discussed memory strategies notebook, calendar, sticky notes, repetition out loud and asking caregivers to repeat.     Goal 3: To address patient's cognitive deficits and promote recall of personal and medical information, the patient will answer questions addressing remote, recent, delayed recall with 90% accuracy and supervised cues.  Patient completed 3-4 word immediate recall task with category inclusion question I with the following accuracy  3 word recall:I with 100% accuracy  4 word recall: I with 87% accuracy, with repetition 100% accuracy  Category inclusion 3 word; I with 100% accuracy  Category

## 2024-03-14 NOTE — FLOWSHEET NOTE
Patient resting quietly in bed, denies pain/sob, assist x1 CGA to bathroom with walker.  Continent of bowel and bladder.  Bed alarm on, call light in reach, will monitor.  Electronically signed by Josseline Wolfe RN on 3/13/2024 at 11:18 PM

## 2024-03-14 NOTE — PROGRESS NOTES
OCCUPATIONAL THERAPY  INPATIENT REHAB TREATMENT NOTE  [facility]      NAME: Lisseth Harper  : 1950 (73 y.o.)  MRN: [id number]  CODE STATUS: Prior  Room: R241/R241-01    Date of Service: 3/14/2024    Referring Physician: Dr Goyal  Rehab Diagnosis: Impaired mobility and ADL's due to OA flare up S/P fall      Restrictions  Restrictions/Precautions  Restrictions/Precautions: Fall Risk       Patient's date of birth confirmed: Yes    SAFETY:  Safety Devices  Safety Devices in place: Yes  Type of devices: All fall risk precautions in place    SUBJECTIVE:    Pain  Pain at start of treatment: Yes: 7/10    Pain at end of treatment: Yes: 7/10    Location: Right \"Side\"   Nursing notified: Yes  RN: sEperanza  Intervention: None  Pt unsure if she had pain medication. RN to pass pain medication if available.     COGNITION:  Overall Cognitive Status: WFL      Pt's current cognitive status is:  Comprehension: Independent  Expression: Mod I  Social Interaction: Independent  Problem Solving: Independent  Memory: Independent    OBJECTIVE: ADL shower session completed as follows.       Grooming/Oral Hygiene  Assistance Level: Independent  Skilled Clinical Factors: Pt completed oral care, hair care, and washed face while seated at sink  Upper Extremity Bathing  Assistance Level: Independent  Lower Extremity Bathing  Assistance Level: Supervision  Skilled Clinical Factors: Pt washed viri areas only while seated on toilet  Upper Extremity Dressing  Assistance Level: Set-up  Skilled Clinical Factors: Pt donned long sleeved shirt with increased time and effort  Lower Extremity Dressing  Assistance Level: Stand by assist  Skilled Clinical Factors: Pt donned shorts using reacher. SBA for safety in standing to don pants past hips  Putting On/Taking Off Footwear  Assistance Level: Supervision;Set-up  Skilled Clinical Factors: Pt doffed socks using dressing stick. Pt donned socks using sock aid without education. Pt familiar

## 2024-03-14 NOTE — PROGRESS NOTES
INDIVIDUALIZED OVERALL REHAB PLAN OF CARE  ADDENDUM TO REHAB PROGRESS NOTE-for audit purposes must also refer to this day's clinical note and combine the information      Date: 3/14/2024  Patient Name: Lisseth Harper   Room: R241/R241-01    MRN: [id number]    : 1950  (73 y.o.)  Gender: female       Today 3/14/2024 during weekly team meeting, I reviewed the patient Lisseth Harper in detail with the therapists and nurses involved in patient's care gathering complex physiatric data regarding current medical issues, progress in therapies, factors limiting progress, social issues, psychological issues, ongoing therapeutic plans and discharge planning.    Legend:  I= independent Im =Modified independent  S=Supervised SB=stand by NGUYEN=set up CG=contact tianna Min= minimal Mod=Moderate Max=maximal Max of 2 =maximal assist of 2 people      CURRENT FUNCTIONAL STATUS:    Patient was seen in the ER on 3/12/2024 after falling at home for after tripping on a sock.  She has a left periorbital hematoma but no fracture.     She has a history of previous rehab stays in 2019-after revision of the hip replacement after falling.  She states that her body aches are quite severe especially in her low back and right flank and hip.  She states that muscle relaxers seem to help best though she is taking Ultram at lowest effective dose.    NURSING ISSUES:    resting quietly in bed, denies pain/sob, assist x1 CGA to bathroom with walker. Continent of bowel and bladder. Bed alarm on, call light in reach, will monitor.      Nursing will continue to focus on bowel and bladder continence transitioning toward independence by time of discharge.  Monitoring post void residuals monitoring for severe constipation and bowel obstruction.      Barriers to progress and discharge complex medical conditions, severe pain, and complex social situations      Bowel function- constipation  Plans to address- laxative     Bladder function-  [employer]    Plans

## 2024-03-15 ENCOUNTER — APPOINTMENT (OUTPATIENT)
Dept: ULTRASOUND IMAGING | Age: 74
DRG: 546 | End: 2024-03-15
Attending: PHYSICAL MEDICINE & REHABILITATION
Payer: MEDICARE

## 2024-03-15 PROBLEM — Z78.9 IMPAIRED MOBILITY AND ADLS: Status: ACTIVE | Noted: 2024-03-15

## 2024-03-15 PROBLEM — Z74.09 IMPAIRED MOBILITY AND ADLS: Status: ACTIVE | Noted: 2024-03-15

## 2024-03-15 LAB
GLUCOSE BLD-MCNC: 103 MG/DL (ref 70–99)
GLUCOSE BLD-MCNC: 114 MG/DL (ref 70–99)
GLUCOSE BLD-MCNC: 124 MG/DL (ref 70–99)
GLUCOSE BLD-MCNC: 134 MG/DL (ref 70–99)
INR PPP: 2.3
PERFORMED ON: ABNORMAL
PROTHROMBIN TIME: 25.3 SEC (ref 12.3–14.9)

## 2024-03-15 PROCEDURE — 97130 THER IVNTJ EA ADDL 15 MIN: CPT

## 2024-03-15 PROCEDURE — 97535 SELF CARE MNGMENT TRAINING: CPT

## 2024-03-15 PROCEDURE — 99232 SBSQ HOSP IP/OBS MODERATE 35: CPT | Performed by: PHYSICAL MEDICINE & REHABILITATION

## 2024-03-15 PROCEDURE — 1180000000 HC REHAB R&B

## 2024-03-15 PROCEDURE — 93971 EXTREMITY STUDY: CPT

## 2024-03-15 PROCEDURE — 6360000002 HC RX W HCPCS: Performed by: REGISTERED NURSE

## 2024-03-15 PROCEDURE — 6370000000 HC RX 637 (ALT 250 FOR IP): Performed by: REGISTERED NURSE

## 2024-03-15 PROCEDURE — 97110 THERAPEUTIC EXERCISES: CPT

## 2024-03-15 PROCEDURE — 97129 THER IVNTJ 1ST 15 MIN: CPT

## 2024-03-15 PROCEDURE — 85610 PROTHROMBIN TIME: CPT

## 2024-03-15 PROCEDURE — 36415 COLL VENOUS BLD VENIPUNCTURE: CPT

## 2024-03-15 PROCEDURE — 97116 GAIT TRAINING THERAPY: CPT

## 2024-03-15 PROCEDURE — 6370000000 HC RX 637 (ALT 250 FOR IP): Performed by: PHYSICAL MEDICINE & REHABILITATION

## 2024-03-15 PROCEDURE — 97530 THERAPEUTIC ACTIVITIES: CPT

## 2024-03-15 RX ORDER — BISACODYL 10 MG
10 SUPPOSITORY, RECTAL RECTAL DAILY PRN
Status: DISCONTINUED | OUTPATIENT
Start: 2024-03-15 | End: 2024-03-25 | Stop reason: HOSPADM

## 2024-03-15 RX ORDER — WARFARIN SODIUM 3 MG/1
3 TABLET ORAL
Status: COMPLETED | OUTPATIENT
Start: 2024-03-15 | End: 2024-03-15

## 2024-03-15 RX ORDER — ENEMA 19; 7 G/133ML; G/133ML
1 ENEMA RECTAL DAILY PRN
Status: DISCONTINUED | OUTPATIENT
Start: 2024-03-15 | End: 2024-03-25 | Stop reason: HOSPADM

## 2024-03-15 RX ORDER — ONDANSETRON 4 MG/1
4 TABLET, ORALLY DISINTEGRATING ORAL EVERY 8 HOURS PRN
Status: DISCONTINUED | OUTPATIENT
Start: 2024-03-15 | End: 2024-03-25 | Stop reason: HOSPADM

## 2024-03-15 RX ORDER — ACETAMINOPHEN 325 MG/1
650 TABLET ORAL EVERY 4 HOURS PRN
Status: DISCONTINUED | OUTPATIENT
Start: 2024-03-15 | End: 2024-03-18

## 2024-03-15 RX ADMIN — Medication 100 MG: at 09:33

## 2024-03-15 RX ADMIN — TRAMADOL HYDROCHLORIDE 50 MG: 50 TABLET ORAL at 17:57

## 2024-03-15 RX ADMIN — Medication: at 21:01

## 2024-03-15 RX ADMIN — FOLIC ACID 1 MG: 1 TABLET ORAL at 09:33

## 2024-03-15 RX ADMIN — POTASSIUM CHLORIDE 10 MEQ: 1500 TABLET, EXTENDED RELEASE ORAL at 09:33

## 2024-03-15 RX ADMIN — WARFARIN SODIUM 3 MG: 3 TABLET ORAL at 17:57

## 2024-03-15 RX ADMIN — DILTIAZEM HYDROCHLORIDE 60 MG: 60 TABLET, FILM COATED ORAL at 05:55

## 2024-03-15 RX ADMIN — LISINOPRIL 10 MG: 10 TABLET ORAL at 09:32

## 2024-03-15 RX ADMIN — AZATHIOPRINE 100 MG: 50 TABLET ORAL at 09:32

## 2024-03-15 RX ADMIN — Medication 1 CAPSULE: at 09:32

## 2024-03-15 RX ADMIN — LEVOTHYROXINE SODIUM 100 MCG: 100 TABLET ORAL at 07:49

## 2024-03-15 RX ADMIN — SULFASALAZINE 500 MG: 500 TABLET ORAL at 21:13

## 2024-03-15 RX ADMIN — TRAMADOL HYDROCHLORIDE 50 MG: 50 TABLET ORAL at 10:27

## 2024-03-15 RX ADMIN — SULFASALAZINE 500 MG: 500 TABLET ORAL at 09:33

## 2024-03-15 RX ADMIN — ONDANSETRON 4 MG: 4 TABLET, ORALLY DISINTEGRATING ORAL at 10:27

## 2024-03-15 RX ADMIN — Medication 2000 UNITS: at 09:33

## 2024-03-15 RX ADMIN — DILTIAZEM HYDROCHLORIDE 60 MG: 60 TABLET, FILM COATED ORAL at 14:13

## 2024-03-15 RX ADMIN — FUROSEMIDE 20 MG: 20 TABLET ORAL at 09:33

## 2024-03-15 RX ADMIN — Medication 1 CAPSULE: at 05:54

## 2024-03-15 RX ADMIN — CETIRIZINE HYDROCHLORIDE 10 MG: 10 TABLET, FILM COATED ORAL at 09:33

## 2024-03-15 ASSESSMENT — PAIN SCALES - GENERAL
PAINLEVEL_OUTOF10: 5
PAINLEVEL_OUTOF10: 7

## 2024-03-15 ASSESSMENT — PAIN DESCRIPTION - LOCATION: LOCATION: LEG;FOOT

## 2024-03-15 ASSESSMENT — PAIN DESCRIPTION - DESCRIPTORS: DESCRIPTORS: ACHING;DISCOMFORT

## 2024-03-15 ASSESSMENT — PAIN - FUNCTIONAL ASSESSMENT: PAIN_FUNCTIONAL_ASSESSMENT: PREVENTS OR INTERFERES SOME ACTIVE ACTIVITIES AND ADLS

## 2024-03-15 NOTE — PROGRESS NOTES
Physical Therapy Rehab Treatment Note  Facility/Department: [facility]  [facility]       NAME: Lisseth Harper  : 1950 (73 y.o.)  MRN: [id number]  CODE STATUS: Full Code    Date of Service: 3/15/2024       Restrictions:  Restrictions/Precautions: Fall Risk       SUBJECTIVE:   Subjective: I have a history of blood clots.    Pain  Pain: R hip and shoulder; 6/10: declined intervention: nursing aware.pre and post session.      OBJECTIVE:                                           PT Exercises  A/AROM Exercises: Seated: AP/March/LAQ/ADD with Ball; ABD with YTB: x 20; HS curls with YTB x 10                       ASSESSMENT/PROGRESS TOWARDS GOALS:   Assessment  Assessment: Pt seen for make up time only.  Pt scheduled for doppler to R/O DVT at 1600, after therapy, but is already on blood thinners per nursing.    Goals:  Long Term Goals  Long Term Goal 1: Pt will ambuate >50ft with LRD with stand by assistance  Long Term Goal 2: Pt will perform all transfers (bed, chair and car) independently  Long Term Goal 3: Pt will demonstrate indep and compliance with HEP  Long Term Goal 4: Pt will stand >2min with LRD indep  Long Term Goal 5: Pt will perform bed mobility indep.  Long term goal 6: Pt will negotiate 4 stairs with appropriate rails with SBA  Patient Goals   Patient Goals : decrease her pain and be able to return to PLOF    PLAN OF CARE/Safety:   Safety Devices  Type of Devices: All fall risk precautions in place;Call light within reach;Left in chair      Therapy Time:   Individual   Time In 1410   Time Out 1430   Minutes 20      Minutes:20    Therapeutic ex: 20      Jessica Yost PTA, 03/15/24 at 2:34 PM

## 2024-03-15 NOTE — FLOWSHEET NOTE
Patient resting quietly in bed, denies pain/sob, assist x1 CGA to bathroom with walker. Continent of bowel and bladder. Bed alarm on, call light in reach, will monitor.  Electronically signed by Josseline Wolfe RN on 3/14/2024 at 9:30 PM

## 2024-03-15 NOTE — PROGRESS NOTES
Nutrition Assessment    Type and Reason for Visit:  Initial, Consult (New rehab admit; eval and treat)    Nutrition Recommendations/Plan:   Continue Carb Control 4 diet as tolerated     Malnutrition Assessment:  Malnutrition Status:  No malnutrition (03/15/24 1607)      Nutrition Assessment:      New admission to rehab floor. Nutritional status appears adequate at this time, based on available information. Pt denies any GI &/or Nutrition related concerns at this time. Counseling provided on the importance of consuming adequate calories and protein to aid in recovery with emphasis on nutrient dense food choices.  Discussed the role of proper nutrition for building & maintaining strength, to maximize participation in therapy for achieving ADL goals. .  Current diet is appropriate and tolerated,  no discharge needs identified at this time. RDN to follow up in 7-10 days per protocol.      Nutrition Related Findings:    Came to ED 3/12, s/p fall, Hx includes : CAD, diabetes, hypertension, hyperlipidemia, hypothyroidism, obstructive sleep apnea, rheumatoid arthritis,. Tx to rehab 3/15 \"Impaired mobility and ADLs due to mechanical fall resulting in left periorbital contusion/hematoma\",  glucose controlled, meds noted to include : wafarin, synthroid, lactobaccilus, B- vitamins, insulin, No nutrition or GI related complaints Wound Type: None       Current Nutrition Intake & Therapies:    Average Meal Intake: %  Average Supplements Intake: None Ordered  ADULT DIET; Regular; 4 carb choices (60 gm/meal)    Anthropometric Measures:  Height: 175.3 cm (5' 9\")  Ideal Body Weight (IBW): 145 lbs (66 kg)    Admission Body Weight: 102.1 kg (225 lb)  Current Body Weight: 96.2 kg (212 lb), 146.2 % IBW. Weight Source: Bed Scale  Current BMI (kg/m2): 31.3  Usual Body Weight: 98.4 kg (217 lb) (( 6/2023), 227# -2021)  % Weight Change (Calculated): -2.3                    BMI Categories: Obese Class 1 (BMI 30.0-34.9)      Nutrition

## 2024-03-15 NOTE — PROGRESS NOTES
Physical Therapy Rehab Treatment Note  Facility/Department: [facility]  [facility]       NAME: Lisseth Harper  : 1950 (73 y.o.)  MRN: [id number]  CODE STATUS: Full Code    Date of Service: 3/15/2024     Restrictions:  Restrictions/Precautions: Fall Risk     SUBJECTIVE:   Subjective: Pt states she is feeling better.  States she is still a little lightheaded.  States it her vision.  States she has macular degeneration and some days are worse than others.    Pain  Pain: R hip and shoulder; 6/10: declined intervention    OBJECTIVE:         Bed mobility  Supine to Sit: Stand by assistance  Sit to Supine: Stand by assistance    Transfers  Sit to Stand: Stand by assistance  Stand to Sit: Stand by assistance  Bed to Chair: Stand by assistance    Ambulation  Surface: Level tile  Device: Rolling Walker  Assistance: Stand by assistance  Quality of Gait: Valgus R knee, R foot rosa with absent R heel strike  Gait Deviations: Slow Karine  Distance: 30ft  Comments: Pt reports felling dizzy with change of direction.  VCs for gaze stabilation techniques.           PT Exercises  A/AROM Exercises: seated/supine AP x10; hip add/abd isometrics x20   Static Standing Balance Exercises: Stood at WW with B UE support 50sec         ASSESSMENT/PROGRESS TOWARDS GOALS:   Assessment: Pt with increased fatigue this pM limiting gait distance and standing time.    Goals:  Long Term Goals  Long Term Goal 1: Pt will ambuate >50ft with LRD with stand by assistance  Long Term Goal 2: Pt will perform all transfers (bed, chair and car) independently  Long Term Goal 3: Pt will demonstrate indep and compliance with HEP  Long Term Goal 4: Pt will stand >2min with LRD indep  Long Term Goal 5: Pt will perform bed mobility indep.  Long term goal 6: Pt will negotiate 4 stairs with appropriate rails with SBA  Patient Goals   Patient Goals : decrease her pain and be able to return to PLOF    PLAN OF CARE/Safety: Cont per POC.       Therapy Time:

## 2024-03-15 NOTE — PROGRESS NOTES
Physical Therapy Rehab Treatment Note  Facility/Department: [facility] [place] [facility]  Room: [place] [address]       NAME: Lisseth Harper  : 1950 (73 y.o.)  MRN: [id number]  CODE STATUS: Prior    Date of Service: 3/15/2024     Restrictions:  Restrictions/Precautions: Fall Risk     SUBJECTIVE:   Subjective: Pt states she has been nauseated.  States this is not new.  States Zophran helps and she just had it.  Pain  Pain: R hip and shld /10.  Pt reports just medicated prior to session.    OBJECTIVE:         Bed mobility  Supine to Sit: Stand by assistance  Sit to Supine: Stand by assistance    Transfers  Sit to Stand: Stand by assistance  Stand to Sit: Stand by assistance  Bed to Chair: Stand by assistance    Ambulation  Surface: Level tile  Device: Rolling Walker  Assistance: Stand by assistance  Quality of Gait: Valgus R knee, R foot rosa with absent R heel strike, VCs to keep hands on WW while ambulating  Distance: 60ft  Comments: after sinning pt reports feeling lightheaded                  Vitals  Pulse: 69  BP: (!) 159/68 supine  MAP (Calculated): 98  SpO2: 98 %    Seated 105/71 HR 69     ASSESSMENT/PROGRESS TOWARDS GOALS:   Assessment: Pt reports feeling lightheaded after ambulating.  Vitals assessed.  + orthostatic BP from supine to sit.  Lightheadedness worsened with sup to sit.   RN notified and pt returned to room to lay down.    Goals:  Long Term Goals  Long Term Goal 1: Pt will ambuate >50ft with LRD with stand by assistance  Long Term Goal 2: Pt will perform all transfers (bed, chair and car) independently  Long Term Goal 3: Pt will demonstrate indep and compliance with HEP  Long Term Goal 4: Pt will stand >2min with LRD indep  Long Term Goal 5: Pt will perform bed mobility indep.  Long term goal 6: Pt will negotiate 4 stairs with appropriate rails with SBA  Patient Goals   Patient Goals : decrease her pain and be able to return to PLOF    PLAN OF CARE/Safety: Cont per POC.       Therapy Time:   Individual   Time In

## 2024-03-15 NOTE — PROGRESS NOTES
[facility]  Facility/Department: [facility]  Speech Language Pathology   Treatment Note          Lisseth Harper  1950  R241/R241-01  [x]   confirmed    Date: 3/15/2024      Restrictions/Precautions: Fall Risk     ADULT DIET; Regular; 4 carb choices (60 gm/meal)     Respiratory Status:   RA  No active isolations    Rehab Diagnosis: Impaired mobility and activities of daily living dt flare of RA     Subjective:  Alert, Cooperative, and Pleasant        Interventions used this date:  Cognitive Skill Development    Objective/Assessment:  Patient progressing towards goals:  Short Term Goals  Time Frame for Short Term Goals: 1-2 weeks  Goal 1: To increase safety awareness and judgment for safe completion of ADLs secondary to patient's cognitive deficits, patient will complete abstract reasoning tasks (i.e. word deduction, convergent and divergent naming, similarities/differences) with 90% accuracy. Patient named abstract categories with 3 given items I with 100% accuracy.    Patient named 4 items in an abstract category I with 100% accuracy.    Goal 2: To decrease patient's cognitive deficits through the use of compensatory strategies, the patient will be educated on 3 different memory strategies and verbalize how he/she might use them at home in 3 ways with Supervised cues. Not addressed    Goal 3: To address patient's cognitive deficits and promote recall of personal and medical information, the patient will answer questions addressing remote, recent, delayed recall with 90% accuracy and supervised cues.  Patient repeated 3 items in reverse order I with 100% accuracy.    Patient repeated 3 items in alphabetic order I with 80% accuracy, with repetition 100% accuracy.    Goal 4: Within 1-5 days of implementing the ST POC, the patient's functional reading and writing skills will be assessed in relation to executive functioning (e.g. bill paying, reading rx labels, completing personal information), with

## 2024-03-15 NOTE — PROGRESS NOTES
calf and right thigh pain.  Right thigh swollen.  Larger than left.  She denies chest pain, shortness of breath, palpitations, headache, dizziness will obtain ultrasound rule out DVT.  Currently on warfarin for history of A-fib        Review of Systems   12 point review of systems reviewed with patient; negative other than as mentioned    Medications   Scheduled Meds:    warfarin  3 mg Oral Once    lisinopril  10 mg Oral Daily    metoprolol succinate  50 mg Oral Daily    Vitamin D  2,000 Units Oral Daily    cyanocobalamin  1,000 mcg IntraMUSCular Weekly    coenzyme Q10  100 mg Oral Daily    lidocaine  3 patch TransDERmal Daily    camphor-menthol-methyl salicylate   Topical TID    insulin lispro  0-8 Units SubCUTAneous TID WC    insulin lispro  0-4 Units SubCUTAneous Nightly    azaTHIOprine  100 mg Oral Daily    b complex vitamins  1 capsule Oral Daily    cetirizine  10 mg Oral Daily    dilTIAZem  60 mg Oral 3 times per day    folic acid  1 mg Oral Daily    furosemide  20 mg Oral Daily    lactobacillus  1 capsule Oral Daily    levothyroxine  100 mcg Oral Daily    potassium chloride  10 mEq Oral Daily    sulfaSALAzine  500 mg Oral BID    warfarin placeholder: dosing by pharmacy   Other RX Placeholder     Continuous Infusions:    dextrose       PRN Meds: ondansetron, methocarbamol, glucose, dextrose bolus **OR** dextrose bolus, glucagon (rDNA), dextrose, traMADol, aspirin-acetaminophen-caffeine    Past History    Past Medical History:   has a past medical history of A-fib (HCC), Aplasia of adrenal gland, CAD (coronary artery disease), Diabetes mellitus (HCC), DJD (degenerative joint disease), HLD (hyperlipidemia), Hypertension, Hypothyroidism, Obesity, CHANA (obstructive sleep apnea), RA (rheumatoid arthritis) (HCC), and Rotator cuff tendinitis.    Social History:   reports that she has quit smoking. She has never used smokeless tobacco. She reports that she does not currently use alcohol. She reports that she does not

## 2024-03-15 NOTE — PROGRESS NOTES
Pt complained of feeling lighted headed during PT. Pt. Was returned to her room. Evaluated positive for othro per PT.   Pt placed on her bed. Foot of bed elevated.   Pt. States this happens frequently at home and she usually takes time before standing to prevent falling.   Will continue to monitor for pt. Needs.   US of lower extremity d/t c/o pain,negative for DVT.

## 2024-03-15 NOTE — PROGRESS NOTES
OCCUPATIONAL THERAPY  INPATIENT REHAB TREATMENT NOTE  [facility]      NAME: Lisseth Harper  : 1950 (73 y.o.)  MRN: [id number]  CODE STATUS: Prior  Room: R241/R241-01    Date of Service: 3/15/2024    Referring Physician: Dr Goyal  Rehab Diagnosis: Impaired mobility and ADL's due to OA flare up S/P fall      Restrictions  Restrictions/Precautions  Restrictions/Precautions: Fall Risk     Patient's date of birth confirmed: Yes    SAFETY:  Safety Devices  Safety Devices in place: Yes  Type of devices: All fall risk precautions in place    SUBJECTIVE:    Pain  Pain at start of treatment: Yes: 6/10    Pain at end of treatment: Yes: 6/10    Location: Right \"Side\"  Nursing notified: No  Intervention: None  Pt reports chronic pain on her right side. Pt reorts that she had pain medication. Pt reporting nausea. Notified RN Esperanza that pt requests something for nausea.     COGNITION:  Cognition  Overall Cognitive Status: [facility]      Pt's current cognitive status is:  Comprehension: Independent  Expression: Independent  Social Interaction: Independent  Problem Solving: Mod I  Memory: Independent    OBJECTIVE: Pt in bed resting upon arrival. Pt declined full ADL shower session. Pt agreeable to washing up while seated at sink. Pt completed as follows.       Grooming/Oral Hygiene  Assistance Level: Independent  Skilled Clinical Factors: Pt completed  hair care and washed face while seated at sink  Upper Extremity Bathing  Assistance Level: Independent  Lower Extremity Bathing  Assistance Level: Supervision  Skilled Clinical Factors: Pt washed viri areas only  Upper Extremity Dressing  Assistance Level: Set-up  Skilled Clinical Factors: Pt donned long sleeved shirt with increased time and effort  Lower Extremity Dressing  Assistance Level: Supervision  Skilled Clinical Factors: Pt donned shorts by bending at the waist with increased effort. Supervision for safety in standing to don pants past hips  Putting On/Taking

## 2024-03-15 NOTE — PROGRESS NOTES
OCCUPATIONAL THERAPY  INPATIENT REHAB TREATMENT NOTE  [facility]      NAME: Lisseth Harper  : 1950 (73 y.o.)  MRN: [id number]  CODE STATUS: Full Code  Room: R241/R241-01    Date of Service: 3/15/2024    Referring Physician: Dr Goyal  Rehab Diagnosis: Impaired mobility and ADL's due to OA flare up S/P fall    Restrictions  Restrictions/Precautions  Restrictions/Precautions: Fall Risk     Patient's date of birth confirmed: Yes    SAFETY:  Safety Devices  Safety Devices in place: Yes  Type of devices: All fall risk precautions in place    SUBJECTIVE: \"My daughter paid for her wedding because she figured if she paid for it then she can have it her way.\"    Pain at start of treatment: Yes: 6/10    Pain at end of treatment: Yes: 6/10    Location: R shoulder, R LE  Description ache, sharp, shooting  Nursing notified: Declined  Intervention: None    COGNITION:  Orientation  Overall Orientation Status: Within Functional Limits  Cognition  Overall Cognitive Status: WFL    OBJECTIVE:    FM Coordination:  Patient engaged in B FM coordination and strengthening to increase I with fasteners and small objects for ADL's and IADL's.   Patient able to  pennies from table top with MOD R FM difficulty.   Patient able to stack pennies in 10's with MAX R FM difficulty.   Patient able to  stacked pennies with MOD R FM difficulty.   Patient able to place pennies 1 at a time in slotted container with MIN difficulty with in hand manipulation.    ASSESSMENT: Patient pleasant and chatty while working at a steady pace.    Activity Tolerance: Patient tolerated treatment well      PLAN OF CARE:  Strengthening, Balance training, Functional mobility training, Endurance training, Neuromuscular re-education, Equipment evaluation, education, & procurement, Self-Care / ADL, Patient/Caregiver education & training, Coordination training, Home management training, Safety education & training    Continue per OT POC for

## 2024-03-15 NOTE — PROGRESS NOTES
Subjective:  The patient complains of severe acute on chronic progressive fatigue and right rib pain right upper abdomen pain partially relieved by rest, medications, PT,  OT,   SLP and rest and exacerbated by recent fall.      Patient was seen in the ER on 3/12/2024 after falling at home for after tripping on a sock.  She has a left periorbital hematoma but no fracture.     She has a history of previous rehab stays in 2019-after revision of the hip replacement after falling.  She states that her body aches are quite severe especially in her low back and right flank and hip.  She states that muscle relaxers seem to help best though she is taking Ultram at lowest effective dose.    I am concerned about patient’s medical complexities and barriers to advancing in rehab goals including  pain controll.        I reviewed current care and plans for further care with other rehab providers including nursing and case management.  According to recent nursing note, \" resting quietly in bed, denies pain/sob, assist x1 CGA to bathroom with walker. Continent of bowel and bladder. Bed alarm on, call light in reach, will monitor.  \".    She complains of active chronic nausea in the morning and I have written sublingual Zofran as needed.    ROS x10:  The patient also complains of severely impaired mobility and activities of daily living.  Otherwise no new problems with vision, hearing, nose, mouth, throat, dermal, cardiovascular, GI, , pulmonary, musculoskeletal, psychiatric or neurological. See also Acute Rehab PM&R H&P.       Vital signs:  /83   Pulse 71   Temp 98 °F (36.7 °C) (Oral)   Resp 18   Wt 96.2 kg (212 lb)   SpO2 93%   BMI 31.31 kg/m²   I/O:   PO/Intake:  fair PO intake,   reg diet    Bowel:   continent   Bladder: continent  no morfin  General:  Patient is well developed,   adequately nourished, and    well kempt.     HEENT:    Pupils equal, hearing intact to loud voice, external inspection of ear and nose

## 2024-03-15 NOTE — PROGRESS NOTES
Warfarin Dosing - Pharmacy Consult Note  Consulting Provider: Ivory CLIFTON     Indication:  Atrial Fibrillation  Warfarin Dose prior to admission: 3 mg daily   Concurrent anticoagulants/antiplatelets: none  Significant Drug Interactions:  azathioprine, sulfasalazine  Recent Labs     03/13/24  0437 03/14/24  0436 03/15/24  0430   INR 1.6 2.0 2.3     Recent warfarin administrations                     warfarin (COUMADIN) tablet 4 mg (mg) 4 mg Given 03/14/24 1736    warfarin (COUMADIN) tablet 6 mg (mg) 6 mg Given 03/13/24 1845    warfarin (COUMADIN) tablet 6 mg (mg) 6 mg Given 03/12/24 1851                   Date   INR    Dose  03/12     1.6        6 mg   03/13     1.6        6 mg  03/14     2.0        4 mg   03/15     2.3        3 mg    Assessment/Plan  (Goal INR: 2 - 3)  INR is therapeutic for goal  Will order usual home dose of warfarin 3 mg for today     Active problem list reviewed.  INR orders are placed.  Chart reviewed for pertinent labs, drug/diet interactions, and past doses.  Documentation of patient's clinical condition was reviewed.    Pharmacy Dosing:  Pharmacy will continue to follow.     Lavinia Maza, PharmD  3/15/2024 8:18 AM

## 2024-03-16 LAB
GLUCOSE BLD-MCNC: 105 MG/DL (ref 70–99)
GLUCOSE BLD-MCNC: 107 MG/DL (ref 70–99)
GLUCOSE BLD-MCNC: 117 MG/DL (ref 70–99)
GLUCOSE BLD-MCNC: 118 MG/DL (ref 70–99)
INR PPP: 2.4
PERFORMED ON: ABNORMAL
PROTHROMBIN TIME: 26.4 SEC (ref 12.3–14.9)

## 2024-03-16 PROCEDURE — 1180000000 HC REHAB R&B

## 2024-03-16 PROCEDURE — 6360000002 HC RX W HCPCS: Performed by: REGISTERED NURSE

## 2024-03-16 PROCEDURE — 85610 PROTHROMBIN TIME: CPT

## 2024-03-16 PROCEDURE — 99232 SBSQ HOSP IP/OBS MODERATE 35: CPT | Performed by: PHYSICAL MEDICINE & REHABILITATION

## 2024-03-16 PROCEDURE — 6370000000 HC RX 637 (ALT 250 FOR IP): Performed by: PHYSICAL MEDICINE & REHABILITATION

## 2024-03-16 PROCEDURE — 97530 THERAPEUTIC ACTIVITIES: CPT

## 2024-03-16 PROCEDURE — 97116 GAIT TRAINING THERAPY: CPT

## 2024-03-16 PROCEDURE — 97110 THERAPEUTIC EXERCISES: CPT

## 2024-03-16 PROCEDURE — 6370000000 HC RX 637 (ALT 250 FOR IP): Performed by: REGISTERED NURSE

## 2024-03-16 PROCEDURE — 36415 COLL VENOUS BLD VENIPUNCTURE: CPT

## 2024-03-16 RX ORDER — WARFARIN SODIUM 3 MG/1
3 TABLET ORAL
Status: COMPLETED | OUTPATIENT
Start: 2024-03-16 | End: 2024-03-16

## 2024-03-16 RX ADMIN — DILTIAZEM HYDROCHLORIDE 60 MG: 60 TABLET, FILM COATED ORAL at 05:43

## 2024-03-16 RX ADMIN — SULFASALAZINE 500 MG: 500 TABLET ORAL at 09:12

## 2024-03-16 RX ADMIN — POTASSIUM CHLORIDE 10 MEQ: 1500 TABLET, EXTENDED RELEASE ORAL at 09:11

## 2024-03-16 RX ADMIN — LISINOPRIL 10 MG: 10 TABLET ORAL at 09:12

## 2024-03-16 RX ADMIN — FOLIC ACID 1 MG: 1 TABLET ORAL at 09:12

## 2024-03-16 RX ADMIN — TRAMADOL HYDROCHLORIDE 50 MG: 50 TABLET ORAL at 15:37

## 2024-03-16 RX ADMIN — Medication 100 MG: at 09:12

## 2024-03-16 RX ADMIN — ACETAMINOPHEN, ASPIRIN, CAFFEINE 1 TABLET: 250; 65; 250 TABLET, FILM COATED ORAL at 12:08

## 2024-03-16 RX ADMIN — FUROSEMIDE 20 MG: 20 TABLET ORAL at 09:12

## 2024-03-16 RX ADMIN — METOPROLOL SUCCINATE 50 MG: 25 TABLET, EXTENDED RELEASE ORAL at 09:12

## 2024-03-16 RX ADMIN — TRAMADOL HYDROCHLORIDE 50 MG: 50 TABLET ORAL at 09:12

## 2024-03-16 RX ADMIN — DILTIAZEM HYDROCHLORIDE 60 MG: 60 TABLET, FILM COATED ORAL at 14:19

## 2024-03-16 RX ADMIN — SULFASALAZINE 500 MG: 500 TABLET ORAL at 20:58

## 2024-03-16 RX ADMIN — Medication: at 09:15

## 2024-03-16 RX ADMIN — WARFARIN SODIUM 3 MG: 3 TABLET ORAL at 17:24

## 2024-03-16 RX ADMIN — Medication 2000 UNITS: at 09:12

## 2024-03-16 RX ADMIN — Medication 1 CAPSULE: at 09:12

## 2024-03-16 RX ADMIN — AZATHIOPRINE 100 MG: 50 TABLET ORAL at 09:12

## 2024-03-16 RX ADMIN — LEVOTHYROXINE SODIUM 100 MCG: 100 TABLET ORAL at 05:44

## 2024-03-16 RX ADMIN — Medication 1 CAPSULE: at 05:44

## 2024-03-16 RX ADMIN — CETIRIZINE HYDROCHLORIDE 10 MG: 10 TABLET, FILM COATED ORAL at 09:12

## 2024-03-16 ASSESSMENT — PAIN DESCRIPTION - ORIENTATION
ORIENTATION: RIGHT;LEFT
ORIENTATION: LEFT;RIGHT

## 2024-03-16 ASSESSMENT — PAIN DESCRIPTION - LOCATION
LOCATION: FOOT;HEAD;LEG
LOCATION: SHOULDER;HIP

## 2024-03-16 ASSESSMENT — PAIN SCALES - GENERAL: PAINLEVEL_OUTOF10: 5

## 2024-03-16 NOTE — PROGRESS NOTES
mobility trainin  Gait training:10  Neuro re education:0  Therapeutic ex:45      Driss Muñiz PTA, 24 at 3:00 PM

## 2024-03-16 NOTE — PROGRESS NOTES
OCCUPATIONAL THERAPY  INPATIENT REHAB TREATMENT NOTE  [facility]      NAME: Lisseth Harper  : 1950 (73 y.o.)  MRN: [id number]  CODE STATUS: Full Code  Room: R241/R241-01    Date of Service: 3/16/2024    Referring Physician: Dr Goyal  Rehab Diagnosis: Impaired mobility and ADL's due to OA flare up S/P fall    Restrictions  Restrictions/Precautions  Restrictions/Precautions: Fall Risk     Patient's date of birth confirmed: Yes    SAFETY:  Safety Devices  Safety Devices in place: Yes  Type of devices: All fall risk precautions in place    SUBJECTIVE:    Pain  Pain at start of treatment: Yes: 10    Pain at end of treatment: Yes: 3/10 , 5/10   Pt reports that hip pain has decreased and shoulder pain has increased.     Location: Right Side   Description: Sore  Nursing notified: Yes  RN: Elena  Intervention: Cold applied, RN provided pain medication      OBJECTIVE:    UE Function   Pt wore 1# wrist weights and used alternating hands to don/doff rubber rings onto their corresponding vertical graded hermilo. Pt had Min difficulty with activity and worked at a slow and steady pace with rest breaks prn. Pt noted to make compensatory shoulder hiking movement when using her RUE. Repetitive reaching at and above shoulder level to increase BUE strength and endurance for improved transfers. Problem solving incorporated into activity.      Fine Motor Coordination   Small Pegs: Pt used alternating hands to insert small pegs into a small board. Pt was able to insert 80 pegs with Min/Mod difficulty and increased time. Pt noted to have increased difficulty manipulating pegs with her right hand. Pt did not drop any pegs during activity. Pt worked at a slow and steady pace with occasional rest breaks. Pt completed activity to improve hand fine motor coordination in order to manage clothing fasteners in a timely manner.         Equipment recommendations:  Continue to assess pending progress.

## 2024-03-16 NOTE — PROGRESS NOTES
Warfarin Dosing - Pharmacy Consult Note  Consulting Provider: Ivory Paulino APRN - CNP   Indication:  Atrial Fibrillation  Warfarin Dose prior to admission: 3 mg daily    Concurrent anticoagulants/antiplatelets: n/a  Significant Drug Interactions:  azathioprine, sulfasalazine  Recent Labs     03/14/24  0436 03/15/24  0430 03/16/24  0532   INR 2.0 2.3 2.4     Recent warfarin administrations                     warfarin (COUMADIN) tablet 3 mg (mg) 3 mg Given 03/15/24 1757    warfarin (COUMADIN) tablet 4 mg (mg) 4 mg Given 03/14/24 1736    warfarin (COUMADIN) tablet 6 mg (mg) 6 mg Given 03/13/24 1845                   Date   INR    Dose  03/12     1.6        6 mg   03/13     1.6        6 mg  03/14     2.0        4 mg   03/15     2.3        3 mg  03/16     2.4        3 mg    Assessment/Plan  (Goal INR: 2 - 3)  INR therapeutic at 2.4. Will order home dose of warfarin 3 mg for today.    Active problem list reviewed.  INR orders are placed.  Chart reviewed for pertinent labs, drug/diet interactions, and past doses.  Documentation of patient's clinical condition was reviewed.    Pharmacy Dosing:  Pharmacy will continue to follow.

## 2024-03-16 NOTE — FLOWSHEET NOTE
Patient alert, oriented and cooperative.  Complains of forehead/headache pain and right leg pain, pain medications effective. Denies any further needs or complaints at this time. Call light within reach.

## 2024-03-16 NOTE — PROGRESS NOTES
Physical Therapy Rehab Treatment Note  [place] [facility]  Room: [place] [address]       NAME: Lisseth Harper  : 1950 (73 y.o.)  MRN: [id number]  CODE STATUS: Full Code    Date of Service: 3/16/2024       Restrictions:  Restrictions/Precautions: Fall Risk       SUBJECTIVE:   Subjective: Pt stated she has a headache. Stated the nurse is getting her excedrine.    Pain  Pain: 7/10 in back of head      OBJECTIVE:   PT Exercises  A/AROM Exercises: Seated: AP/March/LAQ/ADD with Ball; ABD with YTB: x 20; HS curls with YTB x 10     ASSESSMENT/PROGRESS TOWARDS GOALS:   Assessment: Tx primarily focused on LE strengthening this date to incr abillity of ttransfer and gait. Good tolerance to tx this date.    Goals:  Long Term Goals  Long Term Goal 1: Pt will ambuate >50ft with LRD with stand by assistance  Long Term Goal 2: Pt will perform all transfers (bed, chair and car) independently  Long Term Goal 3: Pt will demonstrate indep and compliance with HEP  Long Term Goal 4: Pt will stand >2min with LRD indep  Long Term Goal 5: Pt will perform bed mobility indep.  Long term goal 6: Pt will negotiate 4 stairs with appropriate rails with SBA  Patient Goals   Patient Goals : decrease her pain and be able to return to PLOF    PLAN OF CARE/Safety:   Safety Devices  Type of Devices: All fall risk precautions in place;Call light within reach;Left in chair;Chair alarm in place      Therapy Time:   Individual   Time In 1100   Time Out 1130   Minutes 30      Minutes:30  Transfer/Bed mobility trainin  Gait trainin  Neuro re education:0  Therapeutic ex:30      Driss Muñiz PTA, 24 at 11:30 AM

## 2024-03-16 NOTE — PROGRESS NOTES
OCCUPATIONAL THERAPY  INPATIENT REHAB TREATMENT NOTE  [facility]      NAME: Lisseth aHrper  : 1950 (73 y.o.)  MRN: [id number]  CODE STATUS: Full Code  Room: R241/R241-01    Date of Service: 3/16/2024    Referring Physician: Dr Goyal  Rehab Diagnosis: Impaired mobility and ADL's due to OA flare up S/P fall      Restrictions  Restrictions/Precautions  Restrictions/Precautions: Fall Risk      Patient's date of birth confirmed: Yes    SAFETY:  Safety Devices  Safety Devices in place: Yes  Type of devices: All fall risk precautions in place    SUBJECTIVE:    Pain  Pain at start of treatment: Yes: 7/10    Pain at end of treatment: Yes: 4/10    Location: Right Shoulder and Hip  Nursing notified: Declined  Intervention: None      OBJECTIVE: Pt requests to complete activities while seated at tabletop secondary to increase right hip pain post Physical Therapy session.     Blocks and Bolts: Pt used bilateral hands to assemble a pattern using colored blocks, threaded rods, and wing nuts. Pt was able to replicate a hard complexity pattern with Min difficulty and increased time to manipulate wing nuts. Pt had Min difficulty with cognitive challenges of activity, however, did not make any errors. Pt completed activity to improve hand fine motor coordination in order to manage clothing fasteners in a timely manner.       ASSESSMENT:  Activity Tolerance: Patient tolerated treatment well      PLAN OF CARE:  Strengthening, Balance training, Functional mobility training, Endurance training, Neuromuscular re-education, Equipment evaluation, education, & procurement, Self-Care / ADL, Patient/Caregiver education & training, Coordination training, Home management training, Safety education & training  Continue per OT POC for planned discharge on 3-23-24.    Patient goals : \"I want to be able to completely care for myself.\"  Time Frame for Long Term Goals : Pt within 2 1/2 weeks will demonsatrate progess to treatment

## 2024-03-16 NOTE — PROGRESS NOTES
Patient alert and oriented pleasant and cooperative denies any pain or discomfort at this time. Continent of bowl and bladder

## 2024-03-16 NOTE — PROGRESS NOTES
bleeding  Active chronic nausea-as needed Zofran     Focus on balancing physical with emotional rehabilitation.  Involve rehabilitation psychology and spiritual care if patient is agreeable.        Electronically signed by Grecia Goyal DO on 3/14/24 at 7:37 AM EDT       Grecia Goyal D.O., PM&R     Attending    [phone]   [facility]

## 2024-03-17 LAB
GLUCOSE BLD-MCNC: 111 MG/DL (ref 70–99)
GLUCOSE BLD-MCNC: 121 MG/DL (ref 70–99)
GLUCOSE BLD-MCNC: 122 MG/DL (ref 70–99)
GLUCOSE BLD-MCNC: 136 MG/DL (ref 70–99)
INR PPP: 2.6
PERFORMED ON: ABNORMAL
PROTHROMBIN TIME: 27.3 SEC (ref 12.3–14.9)

## 2024-03-17 PROCEDURE — 85610 PROTHROMBIN TIME: CPT

## 2024-03-17 PROCEDURE — 36415 COLL VENOUS BLD VENIPUNCTURE: CPT

## 2024-03-17 PROCEDURE — 6370000000 HC RX 637 (ALT 250 FOR IP): Performed by: REGISTERED NURSE

## 2024-03-17 PROCEDURE — 6370000000 HC RX 637 (ALT 250 FOR IP): Performed by: PHYSICAL MEDICINE & REHABILITATION

## 2024-03-17 PROCEDURE — 1180000000 HC REHAB R&B

## 2024-03-17 PROCEDURE — 6360000002 HC RX W HCPCS: Performed by: REGISTERED NURSE

## 2024-03-17 RX ORDER — WARFARIN SODIUM 3 MG/1
3 TABLET ORAL
Status: COMPLETED | OUTPATIENT
Start: 2024-03-17 | End: 2024-03-17

## 2024-03-17 RX ADMIN — Medication 100 MG: at 08:36

## 2024-03-17 RX ADMIN — ACETAMINOPHEN 650 MG: 325 TABLET ORAL at 20:56

## 2024-03-17 RX ADMIN — Medication: at 15:07

## 2024-03-17 RX ADMIN — SULFASALAZINE 500 MG: 500 TABLET ORAL at 08:36

## 2024-03-17 RX ADMIN — POTASSIUM CHLORIDE 10 MEQ: 1500 TABLET, EXTENDED RELEASE ORAL at 08:35

## 2024-03-17 RX ADMIN — DILTIAZEM HYDROCHLORIDE 60 MG: 60 TABLET, FILM COATED ORAL at 05:14

## 2024-03-17 RX ADMIN — FOLIC ACID 1 MG: 1 TABLET ORAL at 08:36

## 2024-03-17 RX ADMIN — AZATHIOPRINE 100 MG: 50 TABLET ORAL at 08:36

## 2024-03-17 RX ADMIN — Medication 1 CAPSULE: at 08:36

## 2024-03-17 RX ADMIN — FUROSEMIDE 20 MG: 20 TABLET ORAL at 08:36

## 2024-03-17 RX ADMIN — Medication: at 08:41

## 2024-03-17 RX ADMIN — METOPROLOL SUCCINATE 50 MG: 25 TABLET, EXTENDED RELEASE ORAL at 08:36

## 2024-03-17 RX ADMIN — WARFARIN SODIUM 3 MG: 3 TABLET ORAL at 17:29

## 2024-03-17 RX ADMIN — SULFASALAZINE 500 MG: 500 TABLET ORAL at 20:53

## 2024-03-17 RX ADMIN — LISINOPRIL 10 MG: 10 TABLET ORAL at 08:36

## 2024-03-17 RX ADMIN — DILTIAZEM HYDROCHLORIDE 60 MG: 60 TABLET, FILM COATED ORAL at 15:06

## 2024-03-17 RX ADMIN — CETIRIZINE HYDROCHLORIDE 10 MG: 10 TABLET, FILM COATED ORAL at 08:36

## 2024-03-17 RX ADMIN — Medication 2000 UNITS: at 08:36

## 2024-03-17 RX ADMIN — Medication 1 CAPSULE: at 05:14

## 2024-03-17 RX ADMIN — DILTIAZEM HYDROCHLORIDE 60 MG: 60 TABLET, FILM COATED ORAL at 20:53

## 2024-03-17 RX ADMIN — LEVOTHYROXINE SODIUM 100 MCG: 100 TABLET ORAL at 05:14

## 2024-03-17 ASSESSMENT — PAIN SCALES - GENERAL: PAINLEVEL_OUTOF10: 7

## 2024-03-17 ASSESSMENT — PAIN DESCRIPTION - DESCRIPTORS: DESCRIPTORS: ACHING

## 2024-03-17 ASSESSMENT — PAIN DESCRIPTION - LOCATION: LOCATION: HAND;FOOT

## 2024-03-17 ASSESSMENT — PAIN DESCRIPTION - ORIENTATION: ORIENTATION: RIGHT;LEFT

## 2024-03-17 NOTE — PROGRESS NOTES
Subjective:  The patient complains of severe acute on chronic progressive fatigue and right rib pain right upper abdomen pain partially relieved by rest, medications, PT,  OT,   SLP and rest and exacerbated by recent fall.      Patient was seen in the ER on 3/12/2024 after falling at home for after tripping on a sock.  She has a left periorbital hematoma but no fracture.     She has a history of previous rehab stays in 2019-after revision of the hip replacement after falling.  She states that her body aches are quite severe especially in her low back and right flank and hip.  She states that muscle relaxers seem to help best though she is taking Ultram at lowest effective dose.    I am concerned about patient’s medical complexities and barriers to advancing in rehab goals including  pain controll.        I reviewed current care and plans for further care with other rehab providers including nursing and case management.  According to recent nursing note, \" resting quietly in bed, denies pain/sob, assist x1 CGA to bathroom with walker. Continent of bowel and bladder. Bed alarm on, call light in reach, will monitor.  \".    Her nausea is better.  She continues to complain of achiness in the muscles of her right leg the even did an ultrasound yesterday which was unremarkable.  Shasta Barr RN  Registered Nurse     Plan of Care     Signed     Date of Service: 3/16/2024  9:34 PM     Signed            Problem: Discharge Planning  Goal: Discharge to home or other facility with appropriate resources  3/16/2024 2133 by Shasta Barr RN  Outcome: Progressing  3/16/2024 1104 by Elena Stallworth, RN  Outcome: Progressing     Problem: Safety - Adult  Goal: Free from fall injury  3/16/2024 2133 by Shasta Barr, RN  Outcome: Progressing  3/16/2024 1104 by Elena Stallworth, RN  Outcome: Progressing     Problem: ABCDS Injury Assessment  Goal: Absence of physical injury  3/16/2024 2133 by Momo  program including PT/OT to improve balance, ambulation, ADL’s, and to improve the P/AROM.  Therapeutic modifications regarding activities in therapies, place, amount of time per day and intensity of therapy made daily.  In bed therapies or bedside therapies prn.   Bowel and Bladder dysfunction, Neurogenic bowel and bladder:  frequent toileting, ambulate to bathroom with assistance, check post void residuals.  Check for C.difficile x1 if >2 loose stools in 24 hours, continue bowel & bladder program.  Monitor bowel and bladder function.  Lactinex 2 PO every AC.  MOM prn, Brown Bomb prn, Glycerin suppository prn, enema prn.  Encourage therapy and nursing to co-treat and problem solve re continence.    Severe back pain, right rib pain, RA (rheumatoid arthritis)  as well as generalized OA pain: reassess pain every shift and prior to and after each therapy session, give prn Tylenol and consider scheduled Tylenol, modalities prn in therapy, masage, Lidoderm, K-pad prn. Consider scheduled AM pain meds.  Skin healing  right eye and forehead and breakdown risk:  continue pressure relief program.  Daily skin exams and reports from nursing.  Fatigue due to nutritional and hydration deficiency: Add and titrate vitamin B12 vitamin D and CoQ10 continue to monitor I&O’s, calorie counts prn, dietary consult prn.  Add healthy snack at night.  Acute episodic insomnia with situational adjustment disorder:  prn Ambien, monitor for day time sedation.  Falls risk elevated:  patient to use call light to get nursing assistance to get up, bed and chair alarm.  Elevated DVT risk: progressive activities in PT, continue prophylaxis ANABELA hose, elevation and meds-see MAR Coumadin.  Ultrasound right lower extremity is negative as 3/15/2024 patient is already on Coumadin  Complex discharge planning: Discharge is planned for March 23, 2024 home with her  and home health care.  Until then continue weekly team meeting every Thursday to re-assess

## 2024-03-17 NOTE — PROGRESS NOTES
Warfarin Dosing - Pharmacy Consult Note  Consulting Provider: ELODIA Mello-CNP  Indication:  Atrial Fibrillation  Warfarin Dose prior to admission: 3 mg PO daily   Concurrent anticoagulants/antiplatelets: None  Significant Drug Interactions:  Axathioprine, sulfasalazine  Recent Labs     03/15/24  0430 03/16/24  0532 03/17/24  0505   INR 2.3 2.4 2.6     Recent warfarin administrations                     warfarin (COUMADIN) tablet 3 mg (mg) 3 mg Given 03/16/24 1724    warfarin (COUMADIN) tablet 3 mg (mg) 3 mg Given 03/15/24 1757    warfarin (COUMADIN) tablet 4 mg (mg) 4 mg Given 03/14/24 1736                   Date   INR    Dose  03/12     1.6        6 mg   03/13     1.6        6 mg  03/14     2.0        4 mg   03/15     2.3        3 mg  03/16     2.4        3 mg  03/17     2.6        3 mg    Assessment/Plan  (Goal INR: 2 - 3)  INR is therapeutic at 2.6. Will order home dose of 3 mg for tonight.    Active problem list reviewed.  INR orders are placed.  Chart reviewed for pertinent labs, drug/diet interactions, and past doses.  Documentation of patient's clinical condition was reviewed.    Pharmacy Dosing:  Pharmacy will continue to follow.     Rosy Chavarria, [place]  PGY1 Pharmacy Resident  3/17/2024 11:42 AM

## 2024-03-18 PROBLEM — Z86.79 HISTORY OF CARDIOMYOPATHY: Status: ACTIVE | Noted: 2024-03-18

## 2024-03-18 PROBLEM — I48.21 PERMANENT ATRIAL FIBRILLATION (HCC): Status: ACTIVE | Noted: 2024-03-18

## 2024-03-18 PROBLEM — Z95.0 PACEMAKER: Status: ACTIVE | Noted: 2024-03-18

## 2024-03-18 LAB
GLUCOSE BLD-MCNC: 104 MG/DL (ref 70–99)
GLUCOSE BLD-MCNC: 119 MG/DL (ref 70–99)
GLUCOSE BLD-MCNC: 121 MG/DL (ref 70–99)
GLUCOSE BLD-MCNC: 138 MG/DL (ref 70–99)
INR PPP: 2.5
PERFORMED ON: ABNORMAL
PROTHROMBIN TIME: 27 SEC (ref 12.3–14.9)

## 2024-03-18 PROCEDURE — 97530 THERAPEUTIC ACTIVITIES: CPT

## 2024-03-18 PROCEDURE — 99221 1ST HOSP IP/OBS SF/LOW 40: CPT | Performed by: INTERNAL MEDICINE

## 2024-03-18 PROCEDURE — 85610 PROTHROMBIN TIME: CPT

## 2024-03-18 PROCEDURE — 6370000000 HC RX 637 (ALT 250 FOR IP): Performed by: PHYSICAL MEDICINE & REHABILITATION

## 2024-03-18 PROCEDURE — 6370000000 HC RX 637 (ALT 250 FOR IP): Performed by: REGISTERED NURSE

## 2024-03-18 PROCEDURE — 1180000000 HC REHAB R&B

## 2024-03-18 PROCEDURE — 97116 GAIT TRAINING THERAPY: CPT

## 2024-03-18 PROCEDURE — 99232 SBSQ HOSP IP/OBS MODERATE 35: CPT | Performed by: PHYSICAL MEDICINE & REHABILITATION

## 2024-03-18 PROCEDURE — 6360000002 HC RX W HCPCS: Performed by: REGISTERED NURSE

## 2024-03-18 PROCEDURE — 97110 THERAPEUTIC EXERCISES: CPT

## 2024-03-18 PROCEDURE — 97535 SELF CARE MNGMENT TRAINING: CPT

## 2024-03-18 PROCEDURE — 36415 COLL VENOUS BLD VENIPUNCTURE: CPT

## 2024-03-18 PROCEDURE — APPSS45 APP SPLIT SHARED TIME 31-45 MINUTES: Performed by: PHYSICIAN ASSISTANT

## 2024-03-18 RX ORDER — TRAMADOL HYDROCHLORIDE 50 MG/1
50 TABLET ORAL EVERY 6 HOURS PRN
Status: DISCONTINUED | OUTPATIENT
Start: 2024-03-18 | End: 2024-03-25 | Stop reason: HOSPADM

## 2024-03-18 RX ORDER — WARFARIN SODIUM 3 MG/1
3 TABLET ORAL
Status: COMPLETED | OUTPATIENT
Start: 2024-03-18 | End: 2024-03-18

## 2024-03-18 RX ORDER — ACETAMINOPHEN 325 MG/1
650 TABLET ORAL EVERY 4 HOURS PRN
Status: DISCONTINUED | OUTPATIENT
Start: 2024-03-18 | End: 2024-03-25 | Stop reason: HOSPADM

## 2024-03-18 RX ORDER — OXYCODONE HYDROCHLORIDE 5 MG/1
5 TABLET ORAL EVERY 4 HOURS PRN
Status: DISCONTINUED | OUTPATIENT
Start: 2024-03-18 | End: 2024-03-25 | Stop reason: HOSPADM

## 2024-03-18 RX ADMIN — FOLIC ACID 1 MG: 1 TABLET ORAL at 09:31

## 2024-03-18 RX ADMIN — Medication: at 13:19

## 2024-03-18 RX ADMIN — DILTIAZEM HYDROCHLORIDE 60 MG: 60 TABLET, FILM COATED ORAL at 19:58

## 2024-03-18 RX ADMIN — LISINOPRIL 10 MG: 10 TABLET ORAL at 09:32

## 2024-03-18 RX ADMIN — METOPROLOL SUCCINATE 50 MG: 25 TABLET, EXTENDED RELEASE ORAL at 09:32

## 2024-03-18 RX ADMIN — WARFARIN SODIUM 3 MG: 3 TABLET ORAL at 18:47

## 2024-03-18 RX ADMIN — SULFASALAZINE 500 MG: 500 TABLET ORAL at 19:57

## 2024-03-18 RX ADMIN — AZATHIOPRINE 100 MG: 50 TABLET ORAL at 09:31

## 2024-03-18 RX ADMIN — TRAMADOL HYDROCHLORIDE 50 MG: 50 TABLET ORAL at 18:47

## 2024-03-18 RX ADMIN — Medication 2000 UNITS: at 09:32

## 2024-03-18 RX ADMIN — Medication 100 MG: at 09:31

## 2024-03-18 RX ADMIN — Medication 1 CAPSULE: at 05:42

## 2024-03-18 RX ADMIN — LEVOTHYROXINE SODIUM 100 MCG: 100 TABLET ORAL at 05:42

## 2024-03-18 RX ADMIN — DILTIAZEM HYDROCHLORIDE 60 MG: 60 TABLET, FILM COATED ORAL at 05:42

## 2024-03-18 RX ADMIN — FUROSEMIDE 20 MG: 20 TABLET ORAL at 09:32

## 2024-03-18 RX ADMIN — POTASSIUM CHLORIDE 10 MEQ: 1500 TABLET, EXTENDED RELEASE ORAL at 09:31

## 2024-03-18 RX ADMIN — DILTIAZEM HYDROCHLORIDE 60 MG: 60 TABLET, FILM COATED ORAL at 13:11

## 2024-03-18 RX ADMIN — Medication 1 CAPSULE: at 09:31

## 2024-03-18 RX ADMIN — CETIRIZINE HYDROCHLORIDE 10 MG: 10 TABLET, FILM COATED ORAL at 09:32

## 2024-03-18 RX ADMIN — SULFASALAZINE 500 MG: 500 TABLET ORAL at 09:31

## 2024-03-18 ASSESSMENT — PAIN SCALES - GENERAL: PAINLEVEL_OUTOF10: 6

## 2024-03-18 ASSESSMENT — PAIN DESCRIPTION - LOCATION: LOCATION: HEAD

## 2024-03-18 ASSESSMENT — PAIN DESCRIPTION - DESCRIPTORS: DESCRIPTORS: ACHING

## 2024-03-18 NOTE — PROGRESS NOTES
OCCUPATIONAL THERAPY  INPATIENT REHAB TREATMENT NOTE  [facility]      NAME: Lisseth Harper  : 1950 (73 y.o.)  MRN: [id number]  CODE STATUS: Full Code  Room: 41/R241-01    Date of Service: 3/18/2024    Referring Physician: Dr Goyal  Rehab Diagnosis: Impaired mobility and ADL's due to OA flare up S/P fall    Restrictions  Restrictions/Precautions  Restrictions/Precautions: Fall Risk     Patient's date of birth confirmed: Yes    SAFETY:  Safety Devices  Safety Devices in place: Yes  Type of devices: All fall risk precautions in place    SUBJECTIVE:  \"My fingers don't work good\"    Pain at start of treatment: No    Pain at end of treatment: No    COGNITION:  Orientation  Overall Orientation Status: Within Functional Limits  Orientation Level: Oriented X4  Cognition  Overall Cognitive Status: WFL  Cognition Comment: comp I, expression I, social I, problem I, memory I      OBJECTIVE:    Instrumental ADL's  Instrumental ADLs: Yes  Health Management  Health Management Level of Assistance: Supervision  Health Management:   Medication Management Simulation Activity:  Patient completed simulation activity utilizing 7 provided medication bottles with written instruction to place a total of 74 beads into the provided weekly medication organizer.   Patient with MAX difficulty opening medication bottles requiring assist to open 6/7 bottles.   Patient with MIN difficulty opening organizer boxes.   Patient placed a total of 76 beads into organizer with MIN verbal cues and a total of 74 being correct.   Patient with MIN difficulty manipulating beads.   Patient able to sort beads back by color with MIN errors.     Plastic Puzzle Pieces FM Activity  Patient engaged in BUE FM graded puzzle activity, improving coordination and strength to increase I with small objects for ADL's and IADL's.  Patient followed corresponding color pattern of yellow, red, green, blue in sequential order with 1 VC for redirection to

## 2024-03-18 NOTE — PROGRESS NOTES
Subjective:  The patient complains of severe acute on chronic progressive fatigue and right rib pain right upper abdomen pain partially relieved by rest, medications, PT,  OT,   SLP and rest and exacerbated by recent fall.      Patient was seen in the ER on 3/12/2024 after falling at home for after tripping on a sock.  She has a left periorbital hematoma but no fracture.     She has a history of previous rehab stays in 2019-after revision of the hip replacement after falling.  She states that her body aches are quite severe especially in her low back and right flank and hip.  She states that muscle relaxers seem to help best though she is taking Ultram at lowest effective dose.    I am concerned about patient’s medical complexities and barriers to advancing in rehab goals including  pain controll.        I reviewed current care and plans for further care with other rehab providers including nursing and case management.  According to recent nursing note, \" alert and oriented pleasant and cooperative Up to bathroom one assist pivot transfer safety reinforced. Continent of bowl and bladder. Appetite good taking po fluids well. Slept well\".     Her ADLs are going well her rib pain is subsiding.      ROS x10:  The patient also complains of severely impaired mobility and activities of daily living.  Otherwise no new problems with vision, hearing, nose, mouth, throat, dermal, cardiovascular, GI, , pulmonary, musculoskeletal, psychiatric or neurological. See also Acute Rehab PM&R H&P.       Vital signs:  /70   Pulse 70   Temp 98.1 °F (36.7 °C)   Resp 18   Ht 1.753 m (5' 9\")   Wt 95.8 kg (211 lb 1.6 oz)   SpO2 98%   BMI 31.17 kg/m²   I/O:   PO/Intake:  fair PO intake,   reg diet    Bowel:   continent   Bladder: continent  no morfin  General:  Patient is well developed,   adequately nourished, and    well kempt.     HEENT:    Pupils equal, hearing intact to loud voice, external inspection of ear and nose benign.

## 2024-03-18 NOTE — PROGRESS NOTES
Physical Therapy Rehab Treatment Note  Facility/Department: [facility]  Room: [place] [address]       NAME: Lisseth Harper  : 1950 (73 y.o.)  MRN: [id number]  CODE STATUS: Full Code    Date of Service: 3/18/2024     Restrictions:  Restrictions/Precautions: Fall Risk     SUBJECTIVE:      Pain  Pain: R shld 6/10 and R hip  head ache 4/10  Pt declined intervention    OBJECTIVE:         Transfers  Sit to Stand: Supervision  Stand to Sit: Supervision    Ambulation  Surface: Level tile  Device: Rolling Walker  Assistance: Stand by assistance  Quality of Gait: Valgus R knee; decreased stance on R LE  Gait Deviations: Slow Karine  Distance: 50ft; 75ft    Stairs/Curb  Stairs?: Yes  Stairs  # Steps : 4  Stairs Height: 6\"  Rails: Bilateral  Assistance: Contact guard assistance  Comment: VCs for sequencing to decrease R LE pain.              ASSESSMENT/PROGRESS TOWARDS GOALS:   Assessment: Tolerated ambulating this PM with minimal c/o lightheadedness.  Initiated stair training.    Goals:  Long Term Goals  Long Term Goal 1: Pt will ambuate >50ft with LRD with stand by assistance  Long Term Goal 2: Pt will perform all transfers (bed, chair and car) independently  Long Term Goal 3: Pt will demonstrate indep and compliance with HEP  Long Term Goal 4: Pt will stand >2min with LRD indep  Long Term Goal 5: Pt will perform bed mobility indep.  Long term goal 6: Pt will negotiate 4 stairs with appropriate rails with SBA  Patient Goals   Patient Goals : decrease her pain and be able to return to PLOF    PLAN OF CARE/Safety: Cont per POC.       Therapy Time:   Individual   Time In 1530   Time Out 1600   Minutes 30      Minutes:  Transfer/Bed mobility trainin  Gait trainin  Neuro re education:0  Therapeutic ex:0    Carmen Matute PTA, 24 at 4:14 PM

## 2024-03-18 NOTE — PROGRESS NOTES
OCCUPATIONAL THERAPY  INPATIENT REHAB TREATMENT NOTE  [facility]      NAME: Lisseth Harper  : 1950 (73 y.o.)  MRN: [id number]  CODE STATUS: Full Code  Room: R241/R241-01    Date of Service: 3/18/2024    Referring Physician: Dr Goyal  Rehab Diagnosis: Impaired mobility and ADL's due to OA flare up S/P fall    Hospital course:        Restrictions  Restrictions/Precautions  Restrictions/Precautions: Fall Risk     Patient's date of birth confirmed: Yes    SAFETY:  Safety Devices  Safety Devices in place: Yes  Type of devices: All fall risk precautions in place    SUBJECTIVE:  Pain at start of treatment: Yes: 8/10    Pain at end of treatment: Yes: 8/10    Location: R shoulder and R hip  Nursing notified: Declined  Intervention: Other: Stated to have already had pain medication. Declined any other intervention.    COGNITION:  Orientation  Overall Orientation Status: Within Functional Limits  Cognition  Overall Cognitive Status: WFL      OBJECTIVE:    FM Coordination and Strengthening:  Patient engaged in a FM coordination and strengthening to increase I with fasteners and small objects for ADL's and IADL's.   Patient able to remove pink resistive theraputty from container with min difficulty.   Patient able to roll theraputty into log shape with good ability.  Patient able to place 10 small beads 1 at a time in theraputty with min difficulty.   Patient able to roll theraputty into ball shape with increased time.  Patient able to flatten theraputty with increased time.  Patient able to pinch theraputty with min difficulty to locate beads.   Patient able to remove beads with good ability.   Patient noted to have min difficulty manipulating beads.     Pt provided with a container of pennies and was tasked to pick them up 1 at a time and place them back into the container through a thin slot on the lid. Pt demonstrated min difficulty picking up the pennies with her R hand. Pt completed the activity with

## 2024-03-18 NOTE — PROGRESS NOTES
Physical Therapy Rehab Treatment Note  Facility/Department: [facility]  Room: [place] [address]       NAME: Lisseth Harper  : 1950 (73 y.o.)  MRN: [id number]  CODE STATUS: Full Code    Date of Service: 3/18/2024     Restrictions:  Restrictions/Precautions: Fall Risk     SUBJECTIVE:      Pain  Pain: R shld and R hip  head ache5/10  Pt declined intervention    OBJECTIVE:         Bed mobility  Supine to Sit: Supervision  Sit to Supine: Supervision    Transfers  Sit to Stand: Supervision  Stand to Sit: Supervision  Bed to Chair: Supervision (WW)    Ambulation  Surface: Level tile  Device: Rolling Walker  Assistance: Stand by assistance  Quality of Gait: Valgus R knee; decreasedstance on R LE  Gait Deviations: Slow Karine  Distance: 30ft  Comments: Pt reports feeling lightheaded after 15ft.  Able to turn around and return to chair.           PT Exercises  A/AROM Exercises: supine AP, QS, GS, heel slides, hip abd, SAQ x10 ea  Static Standing Balance Exercises: Stood at WW with B UE support 60sec        Vitals  Supine  Pulse: 69  BP: (!) 142/70  BP Location: Right upper arm    Seated   129/73  HR 69    Standing  139/67  HR68     ASSESSMENT/PROGRESS TOWARDS GOALS:   Assessment: Pt limited by reports of lightheadeness with positional changes and standing.  Pt negitive for orthostatic BPs.  Pt tolerated AROM exercises with decreasedd pain.    Goals:  Long Term Goals  Long Term Goal 1: Pt will ambuate >50ft with LRD with stand by assistance  Long Term Goal 2: Pt will perform all transfers (bed, chair and car) independently  Long Term Goal 3: Pt will demonstrate indep and compliance with HEP  Long Term Goal 4: Pt will stand >2min with LRD indep  Long Term Goal 5: Pt will perform bed mobility indep.  Long term goal 6: Pt will negotiate 4 stairs with appropriate rails with SBA  Patient Goals   Patient Goals : decrease her pain and be able to return to PLOF    PLAN OF CARE/Safety: Cont per POC.       Therapy Time:   Individual

## 2024-03-18 NOTE — CONSULTS
Consult Note  Patient: Lisseth Harper  Unit/Bed: R241/R241-01  YOB: 1950  MRN: [id number]  Acct: [id number]   Admitting Diagnosis: Impaired mobility and ADLs [Z74.09, Z78.9]  Date:  3/12/2024  Hospital Day: 6      Reason for consult:  Orthostatic hypotension    History of Present Illness:    This is a 73-year-old  female with past medical history significant for permanent atrial fibrillation on rate control strategy, history of coronary artery disease status post PCI, history of cardiomyopathy with EF of 40 to 45% per echocardiogram 7/23/2022, history of moderate mitral valve regurgitation, hypertension, dyslipidemia, diabetes, hypothyroidism, CHANA on CPAP and history of RA who was admitted to acute inpatient rehab on 3/13/2024 after presenting to ER on 3/12/2024 after sustaining a mechanical fall at home.  Cardiology consult was placed on 3/15/2024 for orthostatic hypotension.  Unfortunately do not see any orthostatic vitals charted on 3/15/2024.    At time my evaluation today, patient recently returned from therapy.  Orthostatic vitals completed this morning showed blood pressure 142/70 while lying followed by blood pressure 129/73 upon sitting and 139/67 upon standing.    Patient is resting comfortably sitting in wheelchair and in no acute distress at this time.  She reports shortness of breath with exertion in addition to occasional shortness of breath at rest which is a chronic issue and unchanged.  She also reports occasional dizziness and lightheadedness with an episode happening this morning when sitting up from lying down.  She denies chest pain, palpitations, orthopnea, PND, lower extremity edema, syncope, fever or chills.  She follows with an  cardiology, Dr. Rojas and Dr. Ray.      Allergies   Allergen Reactions    Latex     Accupril [Quinapril Hcl]     Adhesive Tape Other (See Comments)     PLASTIC TAPE AND ACE BANDAGES    Bactine [Lidocaine-Benzalkonium]     Other

## 2024-03-18 NOTE — PROGRESS NOTES
Patient alert and oriented pleasant and cooperative Up to bathroom one assist pivot transfer safety reinforced. Continent of bowl  and bladder. Appetite good taking po fluids well. Slept well.

## 2024-03-18 NOTE — PROGRESS NOTES
Warfarin Dosing - Pharmacy Consult Note  Consulting Provider: ELODIA Mello-CNP  Indication:  Atrial Fibrillation  Warfarin Dose prior to admission: 3 mg PO daily   Concurrent anticoagulants/antiplatelets: None  Significant Drug Interactions:  Axathioprine, sulfasalazine  Recent Labs     03/16/24  0532 03/17/24  0505 03/18/24  0509   INR 2.4 2.6 2.5     Recent warfarin administrations                     warfarin (COUMADIN) tablet 3 mg (mg) 3 mg Given 03/17/24 1729    warfarin (COUMADIN) tablet 3 mg (mg) 3 mg Given 03/16/24 1724    warfarin (COUMADIN) tablet 3 mg (mg) 3 mg Given 03/15/24 1757                   Date   INR    Dose  03/12     1.6        6 mg   03/13     1.6        6 mg  03/14     2.0        4 mg   03/15     2.3        3 mg  03/16     2.4        3 mg  03/17     2.6        3 mg  03/18     2.5        3 mg     Assessment/Plan  (Goal INR: 2 - 3)  INR is therapeutic at 2.5   Will order home dose of 3 mg for tonight.    Active problem list reviewed.  INR orders are placed.  Chart reviewed for pertinent labs, drug/diet interactions, and past doses.  Documentation of patient's clinical condition was reviewed.    Pharmacy Dosing:  Pharmacy will continue to follow.     Lavinia Maza, PharmD  3/18/2024 9:39 AM

## 2024-03-19 PROBLEM — I95.1 ORTHOSTATIC HYPOTENSION: Status: ACTIVE | Noted: 2024-03-19

## 2024-03-19 LAB
ANION GAP SERPL CALCULATED.3IONS-SCNC: 11 MEQ/L (ref 9–15)
BUN SERPL-MCNC: 27 MG/DL (ref 8–23)
CALCIUM SERPL-MCNC: 10.8 MG/DL (ref 8.5–9.9)
CHLORIDE SERPL-SCNC: 99 MEQ/L (ref 95–107)
CO2 SERPL-SCNC: 22 MEQ/L (ref 20–31)
CREAT SERPL-MCNC: 1.31 MG/DL (ref 0.5–0.9)
GLUCOSE BLD-MCNC: 110 MG/DL (ref 70–99)
GLUCOSE BLD-MCNC: 116 MG/DL (ref 70–99)
GLUCOSE BLD-MCNC: 143 MG/DL (ref 70–99)
GLUCOSE BLD-MCNC: 97 MG/DL (ref 70–99)
GLUCOSE SERPL-MCNC: 107 MG/DL (ref 70–99)
INR PPP: 2.6
PERFORMED ON: ABNORMAL
PERFORMED ON: NORMAL
POTASSIUM SERPL-SCNC: 4.1 MEQ/L (ref 3.4–4.9)
PROTHROMBIN TIME: 27.7 SEC (ref 12.3–14.9)
SODIUM SERPL-SCNC: 132 MEQ/L (ref 135–144)

## 2024-03-19 PROCEDURE — 99232 SBSQ HOSP IP/OBS MODERATE 35: CPT | Performed by: INTERNAL MEDICINE

## 2024-03-19 PROCEDURE — APPSS30 APP SPLIT SHARED TIME 16-30 MINUTES: Performed by: PHYSICIAN ASSISTANT

## 2024-03-19 PROCEDURE — 6360000002 HC RX W HCPCS: Performed by: REGISTERED NURSE

## 2024-03-19 PROCEDURE — 6370000000 HC RX 637 (ALT 250 FOR IP): Performed by: REGISTERED NURSE

## 2024-03-19 PROCEDURE — 85610 PROTHROMBIN TIME: CPT

## 2024-03-19 PROCEDURE — 97535 SELF CARE MNGMENT TRAINING: CPT

## 2024-03-19 PROCEDURE — 97110 THERAPEUTIC EXERCISES: CPT

## 2024-03-19 PROCEDURE — 99232 SBSQ HOSP IP/OBS MODERATE 35: CPT | Performed by: PHYSICAL MEDICINE & REHABILITATION

## 2024-03-19 PROCEDURE — 97129 THER IVNTJ 1ST 15 MIN: CPT

## 2024-03-19 PROCEDURE — 6370000000 HC RX 637 (ALT 250 FOR IP): Performed by: PHYSICAL MEDICINE & REHABILITATION

## 2024-03-19 PROCEDURE — 80048 BASIC METABOLIC PNL TOTAL CA: CPT

## 2024-03-19 PROCEDURE — 97530 THERAPEUTIC ACTIVITIES: CPT

## 2024-03-19 PROCEDURE — 97130 THER IVNTJ EA ADDL 15 MIN: CPT

## 2024-03-19 PROCEDURE — 97116 GAIT TRAINING THERAPY: CPT

## 2024-03-19 PROCEDURE — 36415 COLL VENOUS BLD VENIPUNCTURE: CPT

## 2024-03-19 PROCEDURE — 1180000000 HC REHAB R&B

## 2024-03-19 RX ORDER — WARFARIN SODIUM 3 MG/1
3 TABLET ORAL
Status: COMPLETED | OUTPATIENT
Start: 2024-03-19 | End: 2024-03-19

## 2024-03-19 RX ADMIN — Medication 1 CAPSULE: at 05:14

## 2024-03-19 RX ADMIN — Medication 100 MG: at 07:56

## 2024-03-19 RX ADMIN — AZATHIOPRINE 100 MG: 50 TABLET ORAL at 07:56

## 2024-03-19 RX ADMIN — Medication 2000 UNITS: at 07:56

## 2024-03-19 RX ADMIN — LEVOTHYROXINE SODIUM 100 MCG: 100 TABLET ORAL at 05:14

## 2024-03-19 RX ADMIN — FUROSEMIDE 20 MG: 20 TABLET ORAL at 07:56

## 2024-03-19 RX ADMIN — DILTIAZEM HYDROCHLORIDE 60 MG: 60 TABLET, FILM COATED ORAL at 05:14

## 2024-03-19 RX ADMIN — WARFARIN SODIUM 3 MG: 3 TABLET ORAL at 17:39

## 2024-03-19 RX ADMIN — POTASSIUM CHLORIDE 10 MEQ: 1500 TABLET, EXTENDED RELEASE ORAL at 07:56

## 2024-03-19 RX ADMIN — FOLIC ACID 1 MG: 1 TABLET ORAL at 07:56

## 2024-03-19 RX ADMIN — METOPROLOL SUCCINATE 50 MG: 25 TABLET, EXTENDED RELEASE ORAL at 07:56

## 2024-03-19 RX ADMIN — TRAMADOL HYDROCHLORIDE 50 MG: 50 TABLET ORAL at 21:34

## 2024-03-19 RX ADMIN — LISINOPRIL 10 MG: 10 TABLET ORAL at 07:56

## 2024-03-19 RX ADMIN — DILTIAZEM HYDROCHLORIDE 60 MG: 60 TABLET, FILM COATED ORAL at 21:32

## 2024-03-19 RX ADMIN — SULFASALAZINE 500 MG: 500 TABLET ORAL at 07:56

## 2024-03-19 RX ADMIN — Medication: at 12:00

## 2024-03-19 RX ADMIN — Medication 1 CAPSULE: at 07:56

## 2024-03-19 RX ADMIN — CETIRIZINE HYDROCHLORIDE 10 MG: 10 TABLET, FILM COATED ORAL at 07:56

## 2024-03-19 RX ADMIN — SULFASALAZINE 500 MG: 500 TABLET ORAL at 21:32

## 2024-03-19 RX ADMIN — TRAMADOL HYDROCHLORIDE 50 MG: 50 TABLET ORAL at 10:38

## 2024-03-19 ASSESSMENT — PAIN DESCRIPTION - ORIENTATION
ORIENTATION: RIGHT
ORIENTATION: RIGHT

## 2024-03-19 ASSESSMENT — PAIN DESCRIPTION - DESCRIPTORS
DESCRIPTORS: DISCOMFORT
DESCRIPTORS: ACHING

## 2024-03-19 ASSESSMENT — PAIN SCALES - GENERAL
PAINLEVEL_OUTOF10: 8
PAINLEVEL_OUTOF10: 7

## 2024-03-19 ASSESSMENT — PAIN DESCRIPTION - LOCATION
LOCATION: SHOULDER
LOCATION: HIP;SHOULDER

## 2024-03-19 NOTE — PROGRESS NOTES
Physical Therapy Rehab Treatment Note  Facility/Department[facility]  [facility]       NAME: Lisseth Harper  : 1950 (73 y.o.)  MRN: [id number]  CODE STATUS: Full Code    Date of Service: 3/19/2024     Restrictions:  Restrictions/Precautions: Fall Risk    SUBJECTIVE:   Subjective: Pt she is \"a little\" lightheaded.    Pain  Pain: Pt denies pain.  States she was medicated and has pain patches on her shld.    OBJECTIVE:            Transfers  Sit to Stand: Supervision  Stand to Sit: Supervision    Ambulation  Surface: Level tile;Uneven;Carpet  Device: Rolling Walker  Assistance: Stand by assistance  Quality of Gait: Valgus R knee; decreased stance on R LE, R foot drop  Gait Deviations: Slow Karine  Distance: 60ft  Comments: Gait NT this session d/t feeling increased lightheadeness upon standing.    Stairs/Curb  Stairs?: Yes  Stairs  # Steps : 4  Stairs Height: 6\"  Rails: Bilateral  Assistance: Contact guard assistance  Comment: B rails ascending.  B grasp on 1 rail descending.  VCs for sequencing.        PT Exercises  Exercise Treatment: seated AP, LAQ, march, hip add with ball, hip abd YTB, ham curl YTB x20           ASSESSMENT/PROGRESS TOWARDS GOALS:   Assessment: Pt without c/o lightheadedness throughout session.  Tolerated gait and stair training this visit.  Fatigues after requiring seated rest.    Goals:  Long Term Goals  Long Term Goal 1: Pt will ambuate >50ft with LRD with stand by assistance  Long Term Goal 2: Pt will perform all transfers (bed, chair and car) independently  Long Term Goal 3: Pt will demonstrate indep and compliance with HEP  Long Term Goal 4: Pt will stand >2min with LRD indep  Long Term Goal 5: Pt will perform bed mobility indep.  Long term goal 6: Pt will negotiate 4 stairs with appropriate rails with SBA  Patient Goals   Patient Goals : decrease her pain and be able to return to PLOF    PLAN OF CARE/Safety: Cont per POC.       Therapy Time:   Individual   Time In 1500    Time Out 1600   Minutes 60      Minutes:  Transfer/Bed mobility trainin  Gait trainin  Neuro re education:0  Therapeutic ex:30    Carmen Matute PTA, 24 at 4:01 PM

## 2024-03-19 NOTE — PROGRESS NOTES
Subjective:  The patient complains of severe acute on chronic progressive fatigue and right rib pain right upper abdomen pain partially relieved by rest, medications, PT,  OT,   SLP and rest and exacerbated by recent fall.      Patient was seen in the ER on 3/12/2024 after falling at home for after tripping on a sock.  She has a left periorbital hematoma but no fracture.     She has a history of previous rehab stays in 2019-after revision of the hip replacement after falling.  She states that her body aches are quite severe especially in her low back and right flank and hip.  She states that muscle relaxers seem to help best though she is taking Ultram at lowest effective dose.    I am concerned about patient’s medical complexities and barriers to advancing in rehab goals including  pain controll.        I reviewed current care and plans for further care with other rehab providers including nursing and case management.  According to recent PA note, \" Repeat orthostatic vitals in a.m.  Consider repeat echocardiogram to reevaluate LV function as well as valvular heart disease\".     Though she tells me that her rib pain is better therapy tells me that her pain is limiting her function.  I will offer her a stronger pain medication-which has helped.  She also complains of active chronic nausea relieved with Zofran.  She is a poor medical historian      ROS x10:  The patient also complains of severely impaired mobility and activities of daily living.  Otherwise no new problems with vision, hearing, nose, mouth, throat, dermal, cardiovascular, GI, , pulmonary, musculoskeletal, psychiatric or neurological. See also Acute Rehab PM&R H&P.       Vital signs:  /75   Pulse 70   Temp 98.5 °F (36.9 °C) (Oral)   Resp 16   Ht 1.753 m (5' 9\")   Wt 96.8 kg (213 lb 4.8 oz)   SpO2 97%   BMI 31.50 kg/m²   I/O:   PO/Intake:  fair PO intake,   reg diet    Bowel:   continent   Bladder: continent  no morfin  General:  Patient  having progressing in therapy.  Patient and family education is in progress.  The patient is to follow-up with their family physician after discharge.        Complex Active General Medical Issues that complicate care Assess & Plan:          Atrial fibrillation with RVR,Hypertension,  CAD (coronary artery disease)-Acute rehab to monitor heart rate and rhythm with the option of telemetry and the effects of chronotropic medication with respect to increasing physical activity and exercise in PT, OT, ADLs with medication titration to lowest effective dosing.  Continue blood signs every shift focusing on heart rate, rhythm and blood pressure checks with orthostatic checks-monitoring the effect of exercise, therapy and posture.  Consult hospitalist for backup medical and adjust/add medications.  Monitor heart rate and blood pressure as well as medications effects on vital signs before during and after therapy with especial focus on preventing orthostasis and falls risk.    Morbid obesity due to excess calories -low-carb diet    Recurrent dislocation of left hip-with surgical procedure to keep it in place    CHANA (obstructive sleep apnea)-Acute rehab for endurance traing with Pulse Ox to monitoring oxygen saturation and heart rate with O2 titration to lowest effective dose. Pulse oximeter checks to shift and at HS to dose and titrate oxygen and aerosol treatments monitor for nocturnal hypoxemia, monitor vital signs, oxygen prn.  Focus on energy conservation.    Hyperglycemia due to type 2 diabetes mellitus-Continue blood sugar checks every shift, diet, add diabetic add dietary education, restrict carbohydrates to lowest effective and safe carb count per meal advising 4 carbs per meal, add at bedtime snack to prevent a.m. hypoglycemia, adjust/add medications.  Chronic Coumadin therapy for [id number]-monitor for bleeding  Active chronic nausea-as needed Zofran-scatter medications monitor for GI bleeding     Focus

## 2024-03-19 NOTE — PROGRESS NOTES
[facility]   [facility]  Speech Language Pathology  Discharge Report        Patient: Lisseth Harper  : 1950    Date: 3/19/2024    Initial Status:  Diet:   Reg/thin  Dysphagia Outcome Severity Scale:  Ratin    Speech Therapy Level of Assistance Scale:  Auditory Comprehension:  Rating: Independent  Verbal Expression:  Rating:Modified Independent  Motor Speech:  Rating: Independent  Problem Solving:  Rating: Independent-Modified Independent  Memory:  Rating: Supervised Assistance-Minimal Assistance      Long Term Goals:  Long Term Goals  Time Frame for Long Term Goals: 2-3 weeks  Goal 1: Patient will demonstrate functional cognitive-linguistic abilities in all opportunities with modified independence in order to safely complete ADLs.  Long-term Goals  Timeframe for Long-term Goals: N/A        Patient's Response to Therapy:  Pt participated in speech therapy with a focus on cognitive skills in order to increase pt's ability to safely complete ADLS. Pt has been able to meet all goals including abstract reasoning tasks, memory strategies, and remote/recent/delayed recall. Speech therapy is no longer recommended at this time.       Discharge Status:  Diet:   ADULT DIET; Regular; 4 carb choices (60 gm/meal)  Compensatory Swallowing Strategies : Small bites/sips, Upright as possible for all oral intake  Dysphagia Outcome Severity Scale:  Ratin    Speech Therapy Level of Assistance Scale:  Auditory Comprehension:  Rating: Independent  Verbal Expression:  Rating:Modified Independent  Motor Speech:  Rating: Independent  Problem Solving:  Rating: Independent-Modified Independent  Memory:  Rating: Modified Independent        Functional Status at time of Discharge:    Cognition: Patient demonstrates no cognitive deficits.    Language: Patient demonstrates no language deficits.    Motor Speech: Patient demonstrates no motor speech deficits.    Swallow: Patient demonstrates no dysphagia.                                  Patient is discharged to Home               [] Recommend continued speech therapy   [x] Speech Therapy is no longer warranted      Signature:  Electronically signed by MIAN Sharp on 3/19/2024 at 3:06 PM

## 2024-03-19 NOTE — CARE COORDINATION
MELANIW met with pt and dtr-in-law Latisha and discussed progress and upcoming discharge. Pt and Latisha confirmed that she will receive assist with homemaking tasks upon discharge and that she has transport arranged for 3/23. Pt discussed recent lightheadedness she experienced today, RN is aware. Electronically signed by UMER Beltrán LSW on 3/19/2024 at 4:36 PM

## 2024-03-19 NOTE — PROGRESS NOTES
Warfarin Dosing - Pharmacy Consult Note  Consulting Provider: ELODIA Mello-GIULIA  Indication:  Atrial Fibrillation  Warfarin Dose prior to admission: 3 mg PO daily   Concurrent anticoagulants/antiplatelets: None  Significant Drug Interactions:  Axathioprine, sulfasalazine  Recent Labs     03/17/24  0505 03/18/24  0509 03/19/24  0514   INR 2.6 2.5 2.6     Recent warfarin administrations                     warfarin (COUMADIN) tablet 3 mg (mg) 3 mg Given 03/18/24 1847    warfarin (COUMADIN) tablet 3 mg (mg) 3 mg Given 03/17/24 1729    warfarin (COUMADIN) tablet 3 mg (mg) 3 mg Given 03/16/24 1724                   Date   INR    Dose  03/12     1.6        6 mg   03/13     1.6        6 mg  03/14     2.0        4 mg   03/15     2.3        3 mg  03/16     2.4        3 mg  03/17     2.6        3 mg  03/18     2.5        3 mg   03/19     2.6        3 mg      Assessment/Plan  (Goal INR: 2 - 3)  INR is therapeutic at 2.6  Will order home dose of 3 mg again for tonight.    Active problem list reviewed.  INR orders are placed.  Chart reviewed for pertinent labs, drug/diet interactions, and past doses.  Documentation of patient's clinical condition was reviewed.    Pharmacy Dosing:  Pharmacy will continue to follow.

## 2024-03-19 NOTE — PROGRESS NOTES
[facility]  Facility/Department: [facility]  Speech Language Pathology   Treatment Note          Lisseth Harper  1950  R241/R241-01  [x]   confirmed    Date: 3/19/2024      Restrictions/Precautions: Fall Risk     ADULT DIET; Regular; 4 carb choices (60 gm/meal)     Respiratory Status:   Room air  No active isolations    Rehab Diagnosis: Impaired mobility and activities of daily living dt flare of RA     Subjective:  Alert, Cooperative, and Pleasant        Interventions used this date:  Cognitive Skill Development    Objective/Assessment:  Patient progressing towards goals:  Short Term Goals  Time Frame for Short Term Goals: 1-2 weeks  Goal 1: To increase safety awareness and judgment for safe completion of ADLs secondary to patient's cognitive deficits, patient will complete abstract reasoning tasks (i.e. word deduction, convergent and divergent naming, similarities/differences) with 90% accuracy--Completed divergent naming with 93% accuracy I and increased to 100% accuracy with min verbal cues.    Goal 2: To decrease patient's cognitive deficits through the use of compensatory strategies, the patient will be educated on 3 different memory strategies and verbalize how he/she might use them at home in 3 ways with Supervised cues.-Pt recalled memory strategies in 3/3 trials with supervision cues given.      Goal 3: To address patient's cognitive deficits and promote recall of personal and medical information, the patient will answer questions addressing remote, recent, delayed recall with 90% accuracy and supervised cues.--Delayed recall: recalled 3 word after 5 min with the following results:  Trial one: 100% accuracy.  Trial 2: 66% accuracy I and increased to 100% accuracy with min verbal cues.    Goal 4: Within 1-5 days of implementing the ST POC, the patient's functional reading and writing skills will be assessed in relation to executive functioning (e.g. bill paying, reading rx labels, completing

## 2024-03-19 NOTE — PROGRESS NOTES
OCCUPATIONAL THERAPY  INPATIENT REHAB TREATMENT NOTE  [facility]      NAME: Lisseth Harper  : 1950 (73 y.o.)  MRN: [id number]  CODE STATUS: Full Code  Room: [facility]/R241-01    Date of Service: 3/19/2024    Referring Physician: Dr Goayl  Rehab Diagnosis: Impaired mobility and ADL's due to OA flare up S/P fall    Restrictions  Restrictions/Precautions  Restrictions/Precautions: Fall Risk     Patient's date of birth confirmed: Yes    SAFETY:  Safety Devices  Safety Devices in place: Yes  Type of devices: All fall risk precautions in place    SUBJECTIVE:  \"I had a bad morning, I felt dizzy\"    Pain at start of treatment: No    Pain at end of treatment: No      COGNITION:  Orientation  Overall Orientation Status: Within Functional Limits  Orientation Level: Oriented X4  Cognition  Overall Cognitive Status: WFL  Cognition Comment: comp I, expression I, social I, problem I, memory I    OBJECTIVE:    FM Coordination  Patient engaged in B UE ROM/strengthening, B FM coordination and cognition to increase I with ADL's, IADL's and transfers.   Patient able to reach in lateral planes with 0 difficulty.  Patient able to reach in forward planes with 0 difficulty.   Patient able to place golf tees 5 at a time with 0 difficulty with in hand manipulation.   Patient able to place marbles 5 at a time on top of golf sergey with MIN difficulty with in hand manipulation.  Patient able to place 50 marbles/golf tees on pegboard with MIN difficulty.  Patient able to remove marbles 1 at a time with MIN difficulty.  Patient able to remove golf tees 1 at a time with 0 difficulty.   Patient noted to require 1 verbal cue to follow verbal instruction of alternating UE's with sergey placement after half way through.  Patient used BUE's appropriately throughout session.  Rest breaks as needed.     ASSESSMENT:  Pt was pleasant and motivated during therapy session. Pt verbalizes that she is right handed but prefers to use left hand due to  nerve damage.  Activity Tolerance: Patient tolerated treatment well      PLAN OF CARE:  Strengthening, Balance training, Functional mobility training, Endurance training, Neuromuscular re-education, Equipment evaluation, education, & procurement, Self-Care / ADL, Patient/Caregiver education & training, Coordination training, Home management training, Safety education & training    Continue per OT POC for planned discharge on 3-23-24.    Patient goals : \"I want to be able to completely care for myself.\"  Time Frame for Long Term Goals : Pt within 2 1/2 weeks will demonsatrate progess to treatment goals as stated on initial evaluation  to address areas of deficit as stated below.  Long Term Goal 1: Pt will increase independence with ADL self care  Long Term Goal 2: Pt will increase independence with ADL transfers  Long Term Goal 3: Pt will increase bilateral UE strength for ADL completion.        Therapy Time:   Individual Group Co-Treat   Time In 1430       Time Out 1500         Minutes 30                   Therapeutic activities: 30 minutes     Electronically signed by:    CHARLES Meredith,   3/19/2024, 3:14 PM

## 2024-03-19 NOTE — PROGRESS NOTES
Progress Note  Patient: Lisseth Harper  Unit/Bed: R241/R241-01  YOB: 1950  MRN: [id number]  Acct: [id number]   Admitting Diagnosis: Impaired mobility and ADLs [Z74.09, Z78.9]  Date:  3/12/2024  Hospital Day: 7    Chief Complaint:  Dizziness    Subjective      3/19/24: Patient sitting in wheelchair and in no acute distress.  Repeat orthostatic vitals this morning positive from lying to sitting with a 21 mm drop in systolic BP with BP dropping from 138/59 to 117/62 from lying to sitting.  Patient does report dizziness upon sitting up.  She has chronic shortness of breath with activity/exertion which is unchanged.  Denies chest pain.  No lower extremity edema.  Will check BMP today to rule out dehydration/CHRISTOPHER as possible culprit to orthostatic hypotension.  Patient is currently on Lasix 20 mg daily.   Echocardiogram ordered to reevaluate LV function and valvular heart disease as last echocardiogram from 7/23/2022 at  showed EF of 40 to 45% and moderate MR, mild TR.  On oral anticoagulation with Coumadin and pharmacy dosing while inpatient.  INR today therapeutic at 2.6.    3/18/24: This is a 73-year-old  female with past medical history significant for permanent atrial fibrillation on rate control strategy, history of coronary artery disease status post PCI, history of cardiomyopathy with EF of 40 to 45% per echocardiogram 7/23/2022, history of moderate mitral valve regurgitation, hypertension, dyslipidemia, diabetes, hypothyroidism, CHANA on CPAP and history of RA who was admitted to acute inpatient rehab on 3/13/2024 after presenting to ER on 3/12/2024 after sustaining a mechanical fall at home.  Cardiology consult was placed on 3/15/2024 for orthostatic hypotension.  Unfortunately do not see any orthostatic vitals charted on 3/15/2024.     At time my evaluation today, patient recently returned from therapy.  Orthostatic vitals completed this morning showed blood pressure 142/70 while lying  Moderate mitral valve regurgitation.    6. There is moderate aortic valve cusp calcification.    7. There is global hypokinesis of the left ventricle with minor regional variations.      Assessment:    Active Hospital Problems    Diagnosis Date Noted    Orthostatic hypotension [I95.1] 03/19/2024    Permanent atrial fibrillation (HCC) [I48.21] 03/18/2024    Pacemaker [Z95.0] 03/18/2024    History of cardiomyopathy [Z86.79] 03/18/2024    Impaired mobility and ADLs [Z74.09, Z78.9] 03/15/2024    Inadequately controlled diabetes mellitus (HCC) [E11.65] 03/13/2024    Atrial fibrillation with RVR (HCC) [I48.91] 06/05/2023    CHANA (obstructive sleep apnea) [G47.33]     Recurrent dislocation of left hip [M24.452] 12/01/2019    Impaired mobility and activities of daily living dt flare of RA [Z74.09, Z78.9] 09/28/2019    Adrenal insufficiency (HCC) [E27.40] 06/20/2018    Acquired hypothyroidism [E03.9] 06/20/2018    CAD (coronary artery disease) [I25.10] 06/01/2017    Hypertension [I10] 06/01/2017    Morbid obesity due to excess calories ([facility]) [E66.01] 06/01/2017    RA (rheumatoid arthritis) ([facility]) [M06.9] 06/01/2017     Orthostatic hypotension  Permanent atrial fib--on rate control strategy per NO cardio  Hx CAD s/p PCI--[facility] 3/5/21 with patent RCA and circ stents and otherwise mild CAD  Hx cardiomyopathy with EF 40-45% per echo 7/23/22  Hx moderate MR per echo 7/23/22  Hx dual chamber PPM implant in 8/2023  Hx AV node ablation  HTN  Dyslipidemia  DM  COPD  Hypothyroidism  CHANA on CPAP  Obesity      Plan:  Continue current medications-Cardizem 60 mg p.o. every 8 hours, Toprol-XL 50 mg p.o. daily, lisinopril 10 mg p.o. daily, oral anticoagulation with Coumadin--pharmacy to dose while inpatient, sulfasalazine 500 mg p.o. twice daily, Imuran 100 mg p.o. daily, Synthroid 100 mcg p.o. daily, insulin as directed  Hold Lasix for now due to orthostatic hypotension from lying to sitting.  Will hold potassium chloride as well while Lasix

## 2024-03-19 NOTE — PROGRESS NOTES
OCCUPATIONAL THERAPY  INPATIENT REHAB TREATMENT NOTE  [facility]      NAME: Lisseth Harper  : 1950 (73 y.o.)  MRN: [id number]  CODE STATUS: Full Code  Room: 41/R241-01    Date of Service: 3/19/2024    Referring Physician: Dr Goyal  Rehab Diagnosis: Impaired mobility and ADL's due to OA flare up S/P fall    Hospital course:          Restrictions  Restrictions/Precautions  Restrictions/Precautions: Fall Risk                 Patient's date of birth confirmed: Yes    SAFETY:  Safety Devices  Safety Devices in place: Yes  Type of devices: All fall risk precautions in place    SUBJECTIVE:  Subjective  Subjective: \"Oh I wish someone would've told me, I wouldn't have gotten myself dressed!\" pt agreeable to ADL    Pain at start of treatment: Yes: 4/10    Pain at end of treatment: Yes: 4/10    Location: R shoulder and hands as well as headache  Description ache  Nursing notified: Declined  Intervention: Repositioned    COGNITION:  Orientation  Overall Orientation Status: Within Functional Limits  Cognition  Overall Cognitive Status: WFL          OBJECTIVE:    Pt engaged in full ADL at shower level. See below for performance details       Grooming/Oral Hygiene  Assistance Level: Increased time to complete;Modified independent  Skilled Clinical Factors: Pt completed  hair care and oral care while seated at sink  Upper Extremity Bathing  Assistance Level: Modified independent;Increased time to complete  Skilled Clinical Factors: seated in shower  Lower Extremity Bathing  Assistance Level: Supervision;Increased time to complete  Skilled Clinical Factors: pt completes primarily while seated in shower. stands for short period only with SUP - mod I while seated  Upper Extremity Dressing  Assistance Level: Supervision  Skilled Clinical Factors: pt doffs and dons long sleeve shirt while seated with SUP  Lower Extremity Dressing  Assistance Level: Supervision  Skilled Clinical Factors: SUP while standing for

## 2024-03-20 ENCOUNTER — HOSPITAL ENCOUNTER (INPATIENT)
Age: 74
Discharge: HOME | End: 2024-03-22
Payer: MEDICARE

## 2024-03-20 VITALS
WEIGHT: 213.41 LBS | SYSTOLIC BLOOD PRESSURE: 126 MMHG | BODY MASS INDEX: 31.61 KG/M2 | HEIGHT: 69 IN | DIASTOLIC BLOOD PRESSURE: 77 MMHG

## 2024-03-20 LAB
ECHO AO ROOT DIAM: 3.2 CM
ECHO AO ROOT INDEX: 1.51 CM/M2
ECHO AV AREA PEAK VELOCITY: 3.7 CM2
ECHO AV AREA VTI: 3.7 CM2
ECHO AV AREA/BSA PEAK VELOCITY: 1.7 CM2/M2
ECHO AV AREA/BSA VTI: 1.7 CM2/M2
ECHO AV MEAN GRADIENT: 3 MMHG
ECHO AV MEAN VELOCITY: 0.8 M/S
ECHO AV PEAK GRADIENT: 6 MMHG
ECHO AV PEAK VELOCITY: 1.2 M/S
ECHO AV VELOCITY RATIO: 0.83
ECHO AV VTI: 23.9 CM
ECHO BSA: 2.17 M2
ECHO LA DIAMETER INDEX: 1.65 CM/M2
ECHO LA DIAMETER: 3.5 CM
ECHO LA TO AORTIC ROOT RATIO: 1.09
ECHO LA VOL A-L A2C: 50 ML (ref 22–52)
ECHO LA VOL A-L A4C: 84 ML (ref 22–52)
ECHO LA VOL MOD A2C: 49 ML (ref 22–52)
ECHO LA VOL MOD A4C: 77 ML (ref 22–52)
ECHO LA VOLUME AREA LENGTH: 66 ML
ECHO LA VOLUME INDEX A-L A2C: 24 ML/M2 (ref 16–34)
ECHO LA VOLUME INDEX A-L A4C: 40 ML/M2 (ref 16–34)
ECHO LA VOLUME INDEX AREA LENGTH: 31 ML/M2 (ref 16–34)
ECHO LA VOLUME INDEX MOD A2C: 23 ML/M2 (ref 16–34)
ECHO LA VOLUME INDEX MOD A4C: 36 ML/M2 (ref 16–34)
ECHO LV E' LATERAL VELOCITY: 11 CM/S
ECHO LV E' SEPTAL VELOCITY: 6 CM/S
ECHO LV EDV A2C: 79 ML
ECHO LV EDV A4C: 81 ML
ECHO LV EDV BP: 82 ML (ref 56–104)
ECHO LV EDV INDEX A4C: 38 ML/M2
ECHO LV EDV INDEX BP: 39 ML/M2
ECHO LV EDV NDEX A2C: 37 ML/M2
ECHO LV EJECTION FRACTION A2C: 69 %
ECHO LV EJECTION FRACTION A4C: 47 %
ECHO LV EJECTION FRACTION BIPLANE: 59 % (ref 55–100)
ECHO LV ESV A2C: 25 ML
ECHO LV ESV A4C: 43 ML
ECHO LV ESV BP: 33 ML (ref 19–49)
ECHO LV ESV INDEX A2C: 12 ML/M2
ECHO LV ESV INDEX A4C: 20 ML/M2
ECHO LV ESV INDEX BP: 16 ML/M2
ECHO LV FRACTIONAL SHORTENING: 21 % (ref 28–44)
ECHO LV INTERNAL DIMENSION DIASTOLE INDEX: 2.26 CM/M2
ECHO LV INTERNAL DIMENSION DIASTOLIC: 4.8 CM (ref 3.9–5.3)
ECHO LV INTERNAL DIMENSION SYSTOLIC INDEX: 1.79 CM/M2
ECHO LV INTERNAL DIMENSION SYSTOLIC: 3.8 CM
ECHO LV IVSD: 1.4 CM (ref 0.6–0.9)
ECHO LV IVSS: 1.3 CM
ECHO LV MASS 2D: 288.4 G (ref 67–162)
ECHO LV MASS INDEX 2D: 136 G/M2 (ref 43–95)
ECHO LV POSTERIOR WALL DIASTOLIC: 1.5 CM (ref 0.6–0.9)
ECHO LV POSTERIOR WALL SYSTOLIC: 1.5 CM
ECHO LV RELATIVE WALL THICKNESS RATIO: 0.63
ECHO LVOT AREA: 4.5 CM2
ECHO LVOT AV VTI INDEX: 0.84
ECHO LVOT DIAM: 2.4 CM
ECHO LVOT MEAN GRADIENT: 2 MMHG
ECHO LVOT PEAK GRADIENT: 4 MMHG
ECHO LVOT PEAK VELOCITY: 1 M/S
ECHO LVOT STROKE VOLUME INDEX: 42.9 ML/M2
ECHO LVOT SV: 90.9 ML
ECHO LVOT VTI: 20.1 CM
ECHO MV A VELOCITY: 0.04 M/S
ECHO MV AREA PHT: 2.4 CM2
ECHO MV AREA VTI: 3.2 CM2
ECHO MV E DECELERATION TIME (DT): 217.2 MS
ECHO MV E VELOCITY: 1.08 M/S
ECHO MV E/A RATIO: 27
ECHO MV E/E' LATERAL: 9.82
ECHO MV E/E' RATIO (AVERAGED): 13.91
ECHO MV LVOT VTI INDEX: 1.39
ECHO MV MAX VELOCITY: 1.2 M/S
ECHO MV MEAN GRADIENT: 2 MMHG
ECHO MV MEAN VELOCITY: 0.7 M/S
ECHO MV PEAK GRADIENT: 6 MMHG
ECHO MV PRESSURE HALF TIME (PHT): 93.6 MS
ECHO MV REGURGITANT PEAK GRADIENT: 27 MMHG
ECHO MV REGURGITANT PEAK VELOCITY: 2.6 M/S
ECHO MV VTI: 28 CM
ECHO PULMONARY ARTERY END DIASTOLIC PRESSURE: 3 MMHG
ECHO PV MAX VELOCITY: 0.7 M/S
ECHO PV PEAK GRADIENT: 2 MMHG
ECHO PV REGURGITANT MAX VELOCITY: 0.9 M/S
ECHO RV INTERNAL DIMENSION: 2.9 CM
ECHO RV TAPSE: 1.5 CM (ref 1.7–?)
ECHO TV REGURGITANT MAX VELOCITY: 2.79 M/S
ECHO TV REGURGITANT PEAK GRADIENT: 31 MMHG
GLUCOSE BLD-MCNC: 101 MG/DL (ref 70–99)
GLUCOSE BLD-MCNC: 115 MG/DL (ref 70–99)
GLUCOSE BLD-MCNC: 191 MG/DL (ref 70–99)
GLUCOSE BLD-MCNC: 97 MG/DL (ref 70–99)
INR PPP: 2.5
PERFORMED ON: ABNORMAL
PERFORMED ON: NORMAL
PROTHROMBIN TIME: 26.5 SEC (ref 12.3–14.9)

## 2024-03-20 PROCEDURE — 93306 TTE W/DOPPLER COMPLETE: CPT | Performed by: INTERNAL MEDICINE

## 2024-03-20 PROCEDURE — 97535 SELF CARE MNGMENT TRAINING: CPT

## 2024-03-20 PROCEDURE — 97110 THERAPEUTIC EXERCISES: CPT

## 2024-03-20 PROCEDURE — 93306 TTE W/DOPPLER COMPLETE: CPT

## 2024-03-20 PROCEDURE — 85610 PROTHROMBIN TIME: CPT

## 2024-03-20 PROCEDURE — 1180000000 HC REHAB R&B

## 2024-03-20 PROCEDURE — 6370000000 HC RX 637 (ALT 250 FOR IP): Performed by: PHYSICAL MEDICINE & REHABILITATION

## 2024-03-20 PROCEDURE — 99232 SBSQ HOSP IP/OBS MODERATE 35: CPT | Performed by: INTERNAL MEDICINE

## 2024-03-20 PROCEDURE — 6360000002 HC RX W HCPCS: Performed by: REGISTERED NURSE

## 2024-03-20 PROCEDURE — 97116 GAIT TRAINING THERAPY: CPT

## 2024-03-20 PROCEDURE — 36415 COLL VENOUS BLD VENIPUNCTURE: CPT

## 2024-03-20 PROCEDURE — 97530 THERAPEUTIC ACTIVITIES: CPT

## 2024-03-20 PROCEDURE — 6360000002 HC RX W HCPCS: Performed by: PHYSICAL MEDICINE & REHABILITATION

## 2024-03-20 PROCEDURE — 6370000000 HC RX 637 (ALT 250 FOR IP): Performed by: REGISTERED NURSE

## 2024-03-20 RX ORDER — WARFARIN SODIUM 3 MG/1
3 TABLET ORAL
Status: COMPLETED | OUTPATIENT
Start: 2024-03-20 | End: 2024-03-20

## 2024-03-20 RX ORDER — METOPROLOL SUCCINATE 25 MG/1
25 TABLET, EXTENDED RELEASE ORAL DAILY
Status: CANCELLED | OUTPATIENT
Start: 2024-03-21

## 2024-03-20 RX ADMIN — Medication 100 MG: at 08:46

## 2024-03-20 RX ADMIN — FOLIC ACID 1 MG: 1 TABLET ORAL at 08:45

## 2024-03-20 RX ADMIN — SULFASALAZINE 500 MG: 500 TABLET ORAL at 08:46

## 2024-03-20 RX ADMIN — Medication 1 CAPSULE: at 08:47

## 2024-03-20 RX ADMIN — TRAMADOL HYDROCHLORIDE 50 MG: 50 TABLET ORAL at 21:36

## 2024-03-20 RX ADMIN — DILTIAZEM HYDROCHLORIDE 60 MG: 60 TABLET, FILM COATED ORAL at 15:19

## 2024-03-20 RX ADMIN — CETIRIZINE HYDROCHLORIDE 10 MG: 10 TABLET, FILM COATED ORAL at 08:45

## 2024-03-20 RX ADMIN — WARFARIN SODIUM 3 MG: 3 TABLET ORAL at 18:06

## 2024-03-20 RX ADMIN — CYANOCOBALAMIN 1000 MCG: 1000 INJECTION, SOLUTION INTRAMUSCULAR; SUBCUTANEOUS at 08:45

## 2024-03-20 RX ADMIN — Medication 2000 UNITS: at 08:45

## 2024-03-20 RX ADMIN — LEVOTHYROXINE SODIUM 100 MCG: 100 TABLET ORAL at 06:21

## 2024-03-20 RX ADMIN — DILTIAZEM HYDROCHLORIDE 60 MG: 60 TABLET, FILM COATED ORAL at 21:34

## 2024-03-20 RX ADMIN — DILTIAZEM HYDROCHLORIDE 60 MG: 60 TABLET, FILM COATED ORAL at 06:21

## 2024-03-20 RX ADMIN — AZATHIOPRINE 100 MG: 50 TABLET ORAL at 08:46

## 2024-03-20 RX ADMIN — Medication 1 CAPSULE: at 06:22

## 2024-03-20 RX ADMIN — SULFASALAZINE 500 MG: 500 TABLET ORAL at 21:34

## 2024-03-20 RX ADMIN — OXYCODONE 5 MG: 5 TABLET ORAL at 15:19

## 2024-03-20 ASSESSMENT — PAIN DESCRIPTION - ORIENTATION: ORIENTATION: RIGHT

## 2024-03-20 ASSESSMENT — PAIN SCALES - GENERAL
PAINLEVEL_OUTOF10: 8
PAINLEVEL_OUTOF10: 6

## 2024-03-20 ASSESSMENT — PAIN DESCRIPTION - LOCATION: LOCATION: LEG

## 2024-03-20 ASSESSMENT — PAIN DESCRIPTION - DESCRIPTORS: DESCRIPTORS: ACHING;SORE

## 2024-03-20 NOTE — PROGRESS NOTES
Progress Note  Patient: Lisseth Harper  Unit/Bed: R241/R241-01  YOB: 1950  MRN: [id number]  Acct: [id number]   Admitting Diagnosis: Impaired mobility and ADLs [Z74.09, Z78.9]  Date:  3/12/2024  Hospital Day: 8    Chief Complaint:  Dizziness    Subjective      3/19/24: Patient sitting in wheelchair and in no acute distress.  Repeat orthostatic vitals this morning positive from lying to sitting with a 21 mm drop in systolic BP with BP dropping from 138/59 to 117/62 from lying to sitting.  Patient does report dizziness upon sitting up.  She has chronic shortness of breath with activity/exertion which is unchanged.  Denies chest pain.  No lower extremity edema.  Will check BMP today to rule out dehydration/CHRISTOPHER as possible culprit to orthostatic hypotension.  Patient is currently on Lasix 20 mg daily.   Echocardiogram ordered to reevaluate LV function and valvular heart disease as last echocardiogram from 7/23/2022 at  showed EF of 40 to 45% and moderate MR, mild TR.  On oral anticoagulation with Coumadin and pharmacy dosing while inpatient.  INR today therapeutic at 2.6.    3/18/24: This is a 73-year-old  female with past medical history significant for permanent atrial fibrillation on rate control strategy, history of coronary artery disease status post PCI, history of cardiomyopathy with EF of 40 to 45% per echocardiogram 7/23/2022, history of moderate mitral valve regurgitation, hypertension, dyslipidemia, diabetes, hypothyroidism, CHANA on CPAP and history of RA who was admitted to acute inpatient rehab on 3/13/2024 after presenting to ER on 3/12/2024 after sustaining a mechanical fall at home.  Cardiology consult was placed on 3/15/2024 for orthostatic hypotension.  Unfortunately do not see any orthostatic vitals charted on 3/15/2024.     At time my evaluation today, patient recently returned from therapy.  Orthostatic vitals completed this morning showed blood pressure 142/70 while lying

## 2024-03-20 NOTE — PROGRESS NOTES
Warfarin Dosing - Pharmacy Consult Note  Consulting Provider: ELODIA Mello-GIULIA  Indication:  Atrial Fibrillation  Warfarin Dose prior to admission: 3 mg PO daily   Concurrent anticoagulants/antiplatelets: None  Significant Drug Interactions:  Azathioprine, sulfasalazine , Excedrin, APAP, tramadol, synthroid  Recent Labs     03/18/24  0509 03/19/24  0514 03/20/24  0332   INR 2.5 2.6 2.5     Recent warfarin administrations                     warfarin (COUMADIN) tablet 3 mg (mg) 3 mg Given 03/19/24 1739    warfarin (COUMADIN) tablet 3 mg (mg) 3 mg Given 03/18/24 1847    warfarin (COUMADIN) tablet 3 mg (mg) 3 mg Given 03/17/24 1729                   Date   INR    Dose  03/12     1.6        6 mg   03/13     1.6        6 mg  03/14     2.0        4 mg   03/15     2.3        3 mg  03/16     2.4        3 mg  03/17     2.6        3 mg  03/18     2.5        3 mg   03/19     2.6        3 mg  03/20     2.5        3 mg      Assessment/Plan  (Goal INR: 2 - 3)  INR is therapeutic at 2.5. Will order home dose of 3 mg again for tonight.    Active problem list reviewed.  INR orders are placed.  Chart reviewed for pertinent labs, drug/diet interactions, and past doses.  Documentation of patient's clinical condition was reviewed.    Pharmacy Dosing:  Pharmacy will continue to follow.     Maral Covarrubias RPH PharmD

## 2024-03-20 NOTE — PROGRESS NOTES
Physical Therapy Rehab Treatment Note  Facility/Department: [facility]  [facility]       NAME: Lisseth Harper  : 1950 (74 y.o.)  MRN: [id number]  CODE STATUS: Full Code    Date of Service: 3/20/2024     Restrictions:  Restrictions/Precautions: Fall Risk    SUBJECTIVE:   Subjective: Pt states she twisted her R leg earlier and it is sore.  States she feels a little lightheaded today but muck better than yesterday.  Pt states she enjoys gardening but can't get down to do it anymore.    Pain  Pain: 7/10 R thigh Pt presented to PT with CP on R hip/thigh  Pt declined notified nsg for meds.  Reports she will ask if needed after therapy.    OBJECTIVE:            Transfers  Sit to Stand: Modified independent  Stand to Sit: Modified independent    Ambulation  Surface: Level tile;Uneven;Carpet  Device: Rolling Walker  Assistance: Stand by assistance  Quality of Gait: decreased stance on R LE, R foot drop, pt compensates to clear  Gait Deviations: Slow Karine  Distance: 100ft  Comments: Pt reports fatigue after    Stairs/Curb  Stairs?: Yes  Stairs  # Steps : 4  Stairs Height: 6\"  Rails: Bilateral  Assistance: Stand by assistance  Comment: B rails ascending.  B grasp on 1 rail descending.        PT Exercises  Static Standing Balance Exercises: Stood at WW with B UE support 4min; without UE support 25sec.            ASSESSMENT/PROGRESS TOWARDS GOALS:   Assessment: Pt tolerated increased gait distance.  Demo'd increased ease completing steps requiring decreased assist.  Pt reports mild lightheaedness however did not limit mobility.  Goals:  Long Term Goals  Long Term Goal 1: Pt will ambuate >50ft with LRD with stand by assistance  Long Term Goal 2: Pt will perform all transfers (bed, chair and car) independently  Long Term Goal 3: Pt will demonstrate indep and compliance with HEP  Long Term Goal 4: Pt will stand >2min with LRD indep  Long Term Goal 5: Pt will perform bed mobility indep.  Long term goal 6: Pt will

## 2024-03-20 NOTE — CARE COORDINATION
(03/20/24 1014)  Assistance: Stand by assistance (03/20/24 1014)  Quality of Gait: decreased stance on R LE, R foot drop, pt compensates to clear (03/20/24 1014)  Gait Deviations: Slow Karine (03/20/24 1014)  Distance: 100ft (03/20/24 1014)  Comments: Pt reports fatigue after (03/20/24 1014)  Stairs:  Stairs/Curb  Stairs?: Yes (03/20/24 1027)  Stairs  # Steps : 4 (03/20/24 1027)  Stairs Height: 6\" (03/20/24 1027)  Rails: Bilateral (03/20/24 1027)  Assistance: Stand by assistance (03/20/24 1027)  Comment: B rails ascending.  B grasp on 1 rail descending. (03/20/24 1027)  W/C mobility:  Wheelchair Activities  Propulsion: Yes (03/13/24 1121)  Propulsion 1  Propulsion: Manual (03/13/24 1121)  Level: Level Tile (03/13/24 1121)  Method: RUE;LUE (03/13/24 1121)  Level of Assistance: Independent (03/13/24 1121)  Distance: 150ft+ (03/13/24 1121)  LTG:  Long Term Goal 1: Pt will ambuate >50ft with LRD with stand by assistance  Long Term Goal 2: Pt will perform all transfers (bed, chair and car) independently  Long Term Goal 3: Pt will demonstrate indep and compliance with HEP  Long Term Goal 4: Pt will stand >2min with LRD indep  Long Term Goal 5: Pt will perform bed mobility indep.  PT Treatment Time:  1.5 hrs      OCCUPATIONAL THERAPY    EVALUATION SELF CARE STATUS:  Hand Dominance: Right  Feeding: Unable to assess(comment) (03/13/24 1421)  Grooming: Setup;Increased time to complete (03/13/24 1421)  UE Bathing: Stand by assistance;Increased time to complete (03/13/24 1421)  LE Bathing: Moderate assistance;Increased time to complete (03/13/24 1421)  UE Dressing: Unable to assess(comment) (gown only) (03/13/24 1421)  LE Dressing: Maximum assistance;Increased time to complete (03/13/24 1421)  Toileting: Maximum assistance;Increased time to complete (03/13/24 1421)  Skin Care: Soap and water (03/18/24 7047)             CURRENT SELF CARE:  Grooming/Oral Hygiene  Assistance Level: Increased time to complete;Modified independent  using leg . denies leg  for transferring out of tub. reports mild strain to R thigh after completing, states she often strains same region. Ice provided for increased comfort. (03/20/24 1253)        LTG:  Eating:Discharge Goal: Independent  Oral Hygiene:Discharge Goal: Independent  Shower/Bathe self: Discharge Goal: Independent  Upper body dressing:Discharge Goal: Independent  Lower body dressing:Discharge Goal: Independent  Putting on taking off footwear:Discharge Goal: Independent   Toileting: Discharge Goal: Independent  Toilet transfer:Discharge Goal: Independent  OT Treatment Time: 1.5 hrs      SPEECH THERAPY       Comprehension: Within Functional Limits  Verbal Expression: Within functional limits (Verbal expression is WFL. Pt did report some word finding difficulty)      Diet/Swallow:        Dysphagia Outcome Severity Scale: Level 6: Within functional limits/Modified independence    Compensatory Swallowing Strategies : Small bites/sips, Upright as possible for all oral intake         LTG:  Long Term Goals  Time Frame for Long Term Goals: 2-3 weeks  Goal 1: Patient will demonstrate functional cognitive-linguistic abilities in all opportunities with modified independence in order to safely complete ADLs.  Long-term Goals  Timeframe for Long-term Goals: N/A          COGNITION  OT: Cognition  Overall Cognitive Status: WFL (03/20/24 1515)  Cognition Comment: comp I, expression I, social I, problem I, memory I (03/20/24 9801)          Orientation  Overall Orientation Status: Within Functional Limits (03/20/24 1515)  Orientation Level: Oriented X4 (03/20/24 1515)  SP:Memory: Exceptions to WFL (Mild STM and working memory deficits. Recommend repetition)        Problem Solving: Within Functional Limits    RECREATIONAL THERAPY  Attendance to recreational therapy programs:    []  Pet Therapy  [] Music Therapy  [] Art Therapy    [] Recreation Therapy Group [] Support Group           Patient social interaction

## 2024-03-20 NOTE — PROGRESS NOTES
OCCUPATIONAL THERAPY  INPATIENT REHAB TREATMENT NOTE  [facility]      NAME: Lisseth Harper  : 1950 (74 y.o.)  MRN: [id number]  CODE STATUS: Full Code  Room: R241/R241-01    Date of Service: 3/20/2024    Referring Physician: Dr Goyal  Rehab Diagnosis: Impaired mobility and ADL's due to OA flare up S/P fall    Restrictions  Restrictions/Precautions  Restrictions/Precautions: Fall Risk             Patient's date of birth confirmed: Yes    SAFETY:  Safety Devices  Safety Devices in place: Yes  Type of devices: All fall risk precautions in place    SUBJECTIVE:  \"I'm going to paint in the spring\"    Pain at start of treatment: Yes: 6/10    Pain at end of treatment: Yes: 5/10    Location: L rib  Description sharp  Nursing notified: No    COGNITION:  Orientation  Overall Orientation Status: Within Functional Limits  Orientation Level: Oriented X4  Cognition  Overall Cognitive Status: WFL    OBJECTIVE:  FM String Beads   Pt engaged in B UE FM string bead activity to promote FM coordination and strengthening for IADL/ADL's.   Patient strung 30 beads 1 at a time from beginning to end of thread to increase B UE FM.  Patient had B UE MIN difficulty initiating stringing beads through thread.  Patient had R UE MIN difficulty completing all 30 beads through thread with increased time.  Patient had R UE MIN difficulty removing all 30 beads 1 at a time to increase FM.  Required no rest breaks.    Patient engaged in 24 pc. puzzle activity to improve cognitive function and FM skills.  Patient required 3 VC's for correct orientation to visual model.  Patient correctly placed 16 pieces of the puzzle with MIN difficulty.  Patient misplaced 5 pieces of the puzzle, MOD difficulty with orientation of pieces.  Patient was redirected to look at all pieces after the puzzle was completed to look for errors. 3 pieces were positioned wrong and fixed accordingly.    Patient engaged in B UE FM Putty to increase strength and

## 2024-03-20 NOTE — PROGRESS NOTES
OCCUPATIONAL THERAPY  INPATIENT REHAB TREATMENT NOTE  [facility]      NAME: Lisseth Harper  : 1950 (74 y.o.)  MRN: [id number]  CODE STATUS: Full Code  Room: R241/R241-01    Date of Service: 3/20/2024    Referring Physician: Dr Goyal  Rehab Diagnosis: Impaired mobility and ADL's due to OA flare up S/P fall    Hospital course:          Restrictions  Restrictions/Precautions  Restrictions/Precautions: Fall Risk           Patient's date of birth confirmed: Yes    SAFETY:  Safety Devices  Safety Devices in place: Yes  Type of devices: All fall risk precautions in place    SUBJECTIVE:  Subjective  Subjective: \"today [birth year] my birthday\"    Pain at start of treatment: Yes: 7/10    Pain at end of treatment: Yes: 5/10    Location: shoulder, R thigh  Description sore  Nursing notified: Declined  Intervention: Cold applied to R thigh    COGNITION:  Orientation  Overall Orientation Status: Within Functional Limits  Orientation Level: Oriented X4  Cognition  Overall Cognitive Status: WFL  Cognition Comment: comp I, expression I, social I, problem I, memory I        OBJECTIVE:    Tub transfer:  Pt walks from wc in department to bathroom in therapy gym with WW and SUP. Pt walks with slow pace walking to bathroom. uses tub bench to simulate home setup. After completing, pt states difficulty at baseline achieving RLE over tub and using leg . denies leg  for transferring out of tub. reports mild strain to R thigh after completing, states she often strains same region. Ice provided for increased comfort.          Patient engaged in  B FM coordination therapeutic activity with cognition incorporated to increase I with ADL's and IADL's.  Therapist spread a variety of buttons across tabletop.  Patient verbally instructed to sort various sized buttons by shape.  Patient with no difficulty with lateral reaching.   Patient with no difficulty with forward reaching.  Patient with min-mod FM difficulty picking up

## 2024-03-20 NOTE — PROGRESS NOTES
Pt's INR is 2.5, warfarin 3 mg given, pt verbalizes understanding of labs and dosing. Echocardiogram completed at bedside this AM. ENT consult ordered, Dr. Damon called and will see pt in the AM. Electronically signed by Bhavya Beltran RN on 3/20/2024 at 7:01 PM

## 2024-03-20 NOTE — PROGRESS NOTES
Pt assessment completed. Patient alert, oriented and cooperative. Mediations given per MAR. Denies any further needs or complaints at this time. Call light within reach, bed locked, and in lowest position.

## 2024-03-20 NOTE — PROGRESS NOTES
Physical Therapy Rehab Treatment Note  Facility/Department: [facility] [place] [facility]  [facility]       NAME: Lisseth Harper  : 1950 (74 y.o.)  MRN: [id number]  CODE STATUS: Full Code    Date of Service: 3/20/2024     Restrictions:  Restrictions/Precautions: Fall Risk     SUBJECTIVE:   Subjective: Pt states her R leg is feeling better.  States her shld pain is nothing new.    Pain  Pain: 3/10 R hip/thigh; R shld 6/10  7/10 R LE post.  RN notified and pt positioned for comfort in bed with pillows.      OBJECTIVE:         Bed mobility  Rolling to Left: Supervision  Rolling to Right: Supervision  Supine to Sit: Supervision  Sit to Supine: Supervision    Transfers  Sit to Stand: Modified independent  Stand to Sit: Modified independent  Car Transfer: Stand by assistance (Instructed pt on safest technique.  VCs for handplacement.  Increased difficulty standing from low seat.  Pt states she will be picked up in a van.)    Ambulation  Surface: Level tile;Uneven;Carpet  Device: Rolling Walker  Assistance: Stand by assistance  Quality of Gait: decreased stance on R LE, R foot drop, pt compensates to clear  Gait Deviations: Slow Karine  Distance: 100ft  Comments: Pt reports fatigue after        PT Exercises  Exercise Treatment: seated AP, LAQ, march, hip add with ball, hip abd YTB, ham curl YTB x20  Functional Mobility Circuit Training: STS x5        ASSESSMENT/PROGRESS TOWARDS GOALS:   Assessment: Pt tolerated mobility without c/o increased lightheadedness.  Initiated car transfer training with difficulty standing from low seat.    Goals:  Long Term Goals  Long Term Goal 1: Pt will ambuate >50ft with LRD with stand by assistance  Long Term Goal 2: Pt will perform all transfers (bed, chair and car) independently  Long Term Goal 3: Pt will demonstrate indep and compliance with HEP  Long Term Goal 4: Pt will stand >2min with LRD indep  Long Term Goal 5: Pt will perform bed mobility indep.  Long term goal 6: Pt will negotiate 4

## 2024-03-21 LAB
ANION GAP SERPL CALCULATED.3IONS-SCNC: 10 MEQ/L (ref 9–15)
BUN SERPL-MCNC: 22 MG/DL (ref 8–23)
CALCIUM SERPL-MCNC: 10.5 MG/DL (ref 8.5–9.9)
CHLORIDE SERPL-SCNC: 105 MEQ/L (ref 95–107)
CO2 SERPL-SCNC: 24 MEQ/L (ref 20–31)
CREAT SERPL-MCNC: 0.93 MG/DL (ref 0.5–0.9)
GLUCOSE BLD-MCNC: 112 MG/DL (ref 70–99)
GLUCOSE BLD-MCNC: 149 MG/DL (ref 70–99)
GLUCOSE BLD-MCNC: 158 MG/DL (ref 70–99)
GLUCOSE BLD-MCNC: 98 MG/DL (ref 70–99)
GLUCOSE SERPL-MCNC: 113 MG/DL (ref 70–99)
INR PPP: 2.3
PERFORMED ON: ABNORMAL
PERFORMED ON: NORMAL
POTASSIUM SERPL-SCNC: 4.1 MEQ/L (ref 3.4–4.9)
PROTHROMBIN TIME: 25.1 SEC (ref 12.3–14.9)
SODIUM SERPL-SCNC: 139 MEQ/L (ref 135–144)

## 2024-03-21 PROCEDURE — 85610 PROTHROMBIN TIME: CPT

## 2024-03-21 PROCEDURE — 97112 NEUROMUSCULAR REEDUCATION: CPT

## 2024-03-21 PROCEDURE — 97110 THERAPEUTIC EXERCISES: CPT

## 2024-03-21 PROCEDURE — 6360000002 HC RX W HCPCS: Performed by: REGISTERED NURSE

## 2024-03-21 PROCEDURE — 1180000000 HC REHAB R&B

## 2024-03-21 PROCEDURE — 6370000000 HC RX 637 (ALT 250 FOR IP): Performed by: PHYSICAL MEDICINE & REHABILITATION

## 2024-03-21 PROCEDURE — 97535 SELF CARE MNGMENT TRAINING: CPT

## 2024-03-21 PROCEDURE — 36415 COLL VENOUS BLD VENIPUNCTURE: CPT

## 2024-03-21 PROCEDURE — 6370000000 HC RX 637 (ALT 250 FOR IP): Performed by: REGISTERED NURSE

## 2024-03-21 PROCEDURE — 97116 GAIT TRAINING THERAPY: CPT

## 2024-03-21 PROCEDURE — 99232 SBSQ HOSP IP/OBS MODERATE 35: CPT | Performed by: INTERNAL MEDICINE

## 2024-03-21 PROCEDURE — 80048 BASIC METABOLIC PNL TOTAL CA: CPT

## 2024-03-21 PROCEDURE — 97530 THERAPEUTIC ACTIVITIES: CPT

## 2024-03-21 RX ORDER — WARFARIN SODIUM 3 MG/1
3 TABLET ORAL
Status: COMPLETED | OUTPATIENT
Start: 2024-03-21 | End: 2024-03-21

## 2024-03-21 RX ADMIN — Medication 2000 UNITS: at 11:09

## 2024-03-21 RX ADMIN — Medication: at 11:12

## 2024-03-21 RX ADMIN — FOLIC ACID 1 MG: 1 TABLET ORAL at 11:09

## 2024-03-21 RX ADMIN — Medication 1 CAPSULE: at 05:54

## 2024-03-21 RX ADMIN — Medication: at 20:37

## 2024-03-21 RX ADMIN — TRAMADOL HYDROCHLORIDE 50 MG: 50 TABLET ORAL at 18:52

## 2024-03-21 RX ADMIN — METOPROLOL SUCCINATE 50 MG: 25 TABLET, EXTENDED RELEASE ORAL at 11:10

## 2024-03-21 RX ADMIN — SULFASALAZINE 500 MG: 500 TABLET ORAL at 11:10

## 2024-03-21 RX ADMIN — WARFARIN SODIUM 3 MG: 3 TABLET ORAL at 17:54

## 2024-03-21 RX ADMIN — Medication 100 MG: at 11:10

## 2024-03-21 RX ADMIN — AZATHIOPRINE 100 MG: 50 TABLET ORAL at 11:10

## 2024-03-21 RX ADMIN — Medication 1 CAPSULE: at 11:09

## 2024-03-21 RX ADMIN — SULFASALAZINE 500 MG: 500 TABLET ORAL at 20:35

## 2024-03-21 RX ADMIN — LISINOPRIL 10 MG: 10 TABLET ORAL at 11:09

## 2024-03-21 RX ADMIN — CETIRIZINE HYDROCHLORIDE 10 MG: 10 TABLET, FILM COATED ORAL at 11:09

## 2024-03-21 RX ADMIN — DILTIAZEM HYDROCHLORIDE 60 MG: 60 TABLET, FILM COATED ORAL at 05:54

## 2024-03-21 RX ADMIN — LEVOTHYROXINE SODIUM 100 MCG: 100 TABLET ORAL at 05:54

## 2024-03-21 RX ADMIN — OXYCODONE 5 MG: 5 TABLET ORAL at 05:54

## 2024-03-21 RX ADMIN — DILTIAZEM HYDROCHLORIDE 60 MG: 60 TABLET, FILM COATED ORAL at 20:35

## 2024-03-21 ASSESSMENT — PAIN DESCRIPTION - LOCATION: LOCATION: FOOT

## 2024-03-21 ASSESSMENT — PAIN DESCRIPTION - ORIENTATION: ORIENTATION: RIGHT;LEFT

## 2024-03-21 ASSESSMENT — PAIN DESCRIPTION - DESCRIPTORS: DESCRIPTORS: ACHING

## 2024-03-21 ASSESSMENT — PAIN SCALES - GENERAL
PAINLEVEL_OUTOF10: 7
PAINLEVEL_OUTOF10: 5

## 2024-03-21 NOTE — PROGRESS NOTES
OCCUPATIONAL THERAPY  INPATIENT REHAB TREATMENT NOTE  [facility]      NAME: Lisseth Harper  : 1950 (74 y.o.)  MRN: [id number]  CODE STATUS: Full Code  Room: R241/R241-01    Date of Service: 3/21/2024    Referring Physician: Dr Goyal  Rehab Diagnosis: Impaired mobility and ADL's due to OA flare up S/P fall    Hospital course:          Restrictions  Restrictions/Precautions  Restrictions/Precautions: Fall Risk                 Patient's date of birth confirmed: Yes    SAFETY:  Safety Devices  Safety Devices in place: Yes  Type of devices: All fall risk precautions in place    SUBJECTIVE: \"I am a little bit more dizzier than normal.\"       Pain at start of treatment: Yes: 2/10    Pain at end of treatment: Yes: 2/10    Location: R  hand  Description \"I have RA, so it's just really sore this morning.\"   Nursing notified: Declined    COGNITION:  Orientation  Overall Orientation Status: Within Functional Limits  Orientation Level: Oriented X4  Cognition  Overall Cognitive Status: WFL    OBJECTIVE: Pt requested to work on laundry tasks.    Pt participated in a seated GM table top activity to focus on coordination and energy conservation necessary for improved participation in ADLs/IADLs.  Pt provided with an assortment of wash cloths, bath towels, pillow cases and socks. Pt instructed on energy conservations techniques while participation in IADLs; taking breaks PRN to prevent hand exhaust, alternating with item to fold and using a table as stabilization instead of folding at shoulder height.   After folding item, pt cross midline to place in basket.  Pt folded 5 bath towels, 8 wash cloths, 5 pillow cases and 15 pairs of socks with good pacing.     Pt participated in a seated FM table top activity to focus on coordination and dexterity necessary for improved participation in ADLs/IADLs.  Pt use L tip pinch to push pop bead.  Pt use R hand to stabilize pop bead.   Pt report minimal difficulty d/t

## 2024-03-21 NOTE — PROGRESS NOTES
Subjective:  The patient complains of severe acute on chronic progressive fatigue and right rib pain right upper abdomen pain partially relieved by rest, medications, PT,  OT,   SLP and rest and exacerbated by recent fall.      Patient was seen in the ER on 3/12/2024 after falling at home for after tripping on a sock.  She has a left periorbital hematoma but no fracture.     She has a history of previous rehab stays in 2019-after revision of the hip replacement after falling.  She states that her body aches are quite severe especially in her low back and right flank and hip.  She states that muscle relaxers seem to help best though she is taking Ultram at lowest effective dose.    I am concerned about patient’s medical complexities and barriers to advancing in rehab goals including  pain controll.        I reviewed current care and plans for further care with other rehab providers including nursing and case management.  According to recent PA note, \" Repeat orthostatic vitals in a.m.  Consider repeat echocardiogram to reevaluate LV function as well as valvular heart disease\".     Though she tells me that her rib pain is better therapy tells me that her pain is limiting her function.  I will offer her a stronger pain medication-which has helped.  She also complains of active chronic nausea relieved with Zofran.  She is a poor medical historian  Jeferson Arora LPN  Licensed Nurse  Nursing     Progress Notes     Signed     Date of Service: 3/19/2024 10:19 PM     Signed         Pt assessment completed. Patient alert, oriented and cooperative. Mediations given per MAR. Denies any further needs or complaints at this time. Call light within reach, bed locked, and in lowest position.                    ROS x10:  The patient also complains of severely impaired mobility and activities of daily living.  Otherwise no new problems with vision, hearing, nose, mouth, throat, dermal, cardiovascular, GI, , pulmonary,  blood sugar checks every shift, diet, add diabetic add dietary education, restrict carbohydrates to lowest effective and safe carb count per meal advising 4 carbs per meal, add at bedtime snack to prevent a.m. hypoglycemia, adjust/add medications.  Chronic Coumadin therapy for [id number] pharmacy-monitor for bleeding  Active chronic nausea-as needed Zofran-scatter medications monitor for GI bleeding     Focus on coordinating dc plans with patient family and care givers and order necessary equipment.        SANDHYA Goyal D.O., PM&R     Attending    [phone]   [facility]

## 2024-03-21 NOTE — CARE COORDINATION
LSW met with pt while Latisha (dtr-in-law) was present. Pt declined both HHC and OP. No new DME needed. Pt is aware of continued planned discharge of 3/23, but noted some concern with dizziness. Electronically signed by UMER Beltrán, FELI on 3/21/2024 at 5:31 PM

## 2024-03-21 NOTE — PROGRESS NOTES
Physical Therapy Rehab Treatment Note  Fac[place]/Department[place] [facility]  Room: R2/R241-01       NAME: Lisseth Harper  : 1950 (74 y.o.)  MRN: [id number]  CODE STATUS: Full Code    Date of Service: 3/21/2024       Restrictions:fall risk.           SUBJECTIVE:   Subjective: \"I'm headling every day, but my face is so sore\". I'm worried about this dizziness . it is most of the time\"    Pain  Pain: 4/10 right shoulder. just medicated      OBJECTIVE:   Orientation  Overall Orientation Status: Within Functional Limits  Cognition  Overall Cognitive Status: WFL              Transfer Training  Transfer Training: Yes  Overall Level of Assistance: Stand-by assistance  Interventions: Safety awareness training;Manual cues  Sit to Stand: Stand-by assistance  Stand to Sit: Stand-by assistance    Gait Training: Yes  Overall Level of Assistance: Stand-by assistance  Distance (ft): 120 Feet  Assistive Device: Walker, rolling  Interventions: Safety awareness training;Verbal cues;Manual cues  Base of Support: Widened  Speed/Karine: Fluctuations  Step Length: Right shortened  Gait Abnormalities: Decreased step clearance  Right Side Weight Bearing: As tolerated  Left Side Weight Bearing: As tolerated              Neuromuscular Education  Neuromuscular Comments: around bolsters with ww. pt with increased dizziness with quick changes in direction. pt able to take standing rest breaks as needed. min assist for safety. 5 x sit to stands. sba. 4 inchs step taps while holding onto ww. sba to min assist.  Static standing at ww. 3 minutes.      Therex: longseated position on swiss ball. Lateral flexion and knee flexion. Increased hip pain with lateral flexion. Laq x 20. Ankle pumps x 20. Pt unable to tolerate hip flexion due to increased pain.                      ASSESSMENT/PROGRESS TOWARDS GOALS: progressing daily but continues to struggle with dizziness in all directions. Pt fearful of having this at home.        Goals:  Long Term  Goals  Long Term Goal 1: Pt will ambuate >50ft with LRD with stand by assistance  Long Term Goal 2: Pt will perform all transfers (bed, chair and car) independently  Long Term Goal 3: Pt will demonstrate indep and compliance with HEP  Long Term Goal 4: Pt will stand >2min with LRD indep  Long Term Goal 5: Pt will perform bed mobility indep.  Long term goal 6: Pt will negotiate 4 stairs with appropriate rails with SBA  Patient Goals   Patient Goals : decrease her pain and be able to return to PLOF    PLAN OF CARE/Safety: ongoing          Therapy Time:   Individual   Time In 1000   Time Out 1100   Minutes 60      Minutes:60  Transfer/Bed mobility training:15  Gait training:10  Neuro re education:25  Therapeutic ex:10      Tiarra Jones PTA, 03/21/24 at 10:41 AM

## 2024-03-21 NOTE — PROGRESS NOTES
Physical Therapy Rehab Treatment Note  Facility/Department: [facility] [place] [facility]  [facility]       NAME: Lisseth Harper  : 1950 (74 y.o.)  MRN: [id number]  CODE STATUS: Full Code    Date of Service: 3/21/2024       Restrictions:   Fall risk.        SUBJECTIVE:   Subjective: \"I'm just tired of being dizzy all the time, it's like wavy all the time in front of me\"    Pain  Pain: 5/10 right hip and shoulder  Pt not sure if she had meds for pain again. Notified RN.     OBJECTIVE:   Orientation  Overall Orientation Status: Within Functional Limits  Cognition  Overall Cognitive Status: WFL         Bed Mobility Training  Bed Mobility Training: Yes  Overall Level of Assistance: Supervision  Rolling: Supervision  Supine to Sit: Supervision  Sit to Supine: Supervision  Scooting: Supervision    Transfer Training  Transfer Training: Yes  Overall Level of Assistance: Stand-by assistance;Minimum assistance  Interventions: Safety awareness training;Manual cues  Sit to Stand: Stand-by assistance;Minimum assistance  Stand to Sit: Stand-by assistance;Minimum assistance    Gait Training: Yes  Overall Level of Assistance: Minimum assistance  Distance (ft): 120 Feet  Assistive Device: Walker, rolling  Interventions: Safety awareness training;Verbal cues;Manual cues  Base of Support: Widened  Speed/Karine: Fluctuations  Step Length: Right shortened  Gait Abnormalities: Decreased step clearance  Rail Use: Right  Right Side Weight Bearing: As tolerated  Left Side Weight Bearing: As tolerated    Stairs - Level of Assistance: Stand-by assistance;Minimum assistance  Number of Stairs Trained: 4 (descends sideways while holding right rail. ascends with both hr.)         Neuromuscular Education  Standing static with ww. Weight shifting in all directions.                         ASSESSMENT/PROGRESS TOWARDS GOALS: pt continues to struggle with \"wavy\" feeling in all positions and is worried about managing at home.        Goals:  Long Term

## 2024-03-21 NOTE — PROGRESS NOTES
Comprehensive Nutrition Assessment    Type and Reason for Visit:  Reassess    Nutrition Recommendations/Plan:   Modify Current Diet     Malnutrition Assessment:  Malnutrition Status:  No malnutrition (03/15/24 1607)      Nutrition Assessment:    Pt remains stable from a nutritional standpoint, with fair to good appetite/intake at meals. To continue to follow.    Nutrition Related Findings:    PMH: CAD, DB, hypertension, hyperlipidemia, hypothyroidism, obstructive sleep apnea, rheumatoid arthritis; admitted s/p fall on 3/12, transferred to rehab 3/15 for \"Impaired mobility and ADLs due to mechanical fall resulting in left periorbital contusion/hematoma\". Labs/meds reviewed. Gluc- x last 24hrs. Pert meds-insulin, FA, MVI, synthroid, No edema noted. Wound Type: None       Current Nutrition Intake & Therapies:    Average Meal Intake: 51-75%, %  Average Supplements Intake: None Ordered  ADULT DIET; Regular; 4 carb choices (60 gm/meal)    Anthropometric Measures:  Height: 175.3 cm (5' 9\")  Ideal Body Weight (IBW): 145 lbs (66 kg)    Admission Body Weight: 102.1 kg (225 lb) ((3/14); 212lb (3/15)-weight discrepency)  Current Body Weight: 95.8 kg (211 lb 3.2 oz) (3/21), 146.2 % IBW. Weight Source: Bed Scale  Current BMI (kg/m2): 31.2  Usual Body Weight: 98.4 kg (217 lb) (( 6/2023), 227# -2021)  % Weight Change (Calculated): -2.3                    BMI Categories: Obese Class 1 (BMI 30.0-34.9)    Estimated Daily Nutrient Needs:  Energy Requirements Based On: Kcal/kg  Weight Used for Energy Requirements: Current  Energy (kcal/day): 9934-9930 (kg x 15-17)    Nutrition Diagnosis:   Altered nutrition-related lab values related to endocrine dysfuntion as evidenced by lab values    Nutrition Interventions:   Food and/or Nutrient Delivery: Modify Current Diet  Nutrition Education/Counseling: No recommendation at this time  Coordination of Nutrition Care: Continue to monitor while inpatient       Goals:     Goals: PO  intake 75% or greater (Gluc<160)       Nutrition Monitoring and Evaluation:   Behavioral-Environmental Outcomes: None Identified  Food/Nutrient Intake Outcomes: Food and Nutrient Intake  Physical Signs/Symptoms Outcomes: Biochemical Data, Weight    Discharge Planning:     No discharge needs at this time    Sola Villeda RD, LD

## 2024-03-21 NOTE — PROGRESS NOTES
Progress Note  Patient: Lisseth Harper  Unit/Bed: R241/R241-01  YOB: 1950  MRN: [id number]  Acct: [id number]   Admitting Diagnosis: Impaired mobility and ADLs [Z74.09, Z78.9]  Date:  3/12/2024  Hospital Day: 9    Chief Complaint:  Dizziness    Subjective    3/21/2024  Patient sitting in a chair  Denies chest pain or shortness of breath  Still endorsing dizziness, unchanged since admission  ENT consult still pending  TTE yesterday 3/20/2024 EF 40 to 45%    3/19/24: Patient sitting in wheelchair and in no acute distress.  Repeat orthostatic vitals this morning positive from lying to sitting with a 21 mm drop in systolic BP with BP dropping from 138/59 to 117/62 from lying to sitting.  Patient does report dizziness upon sitting up.  She has chronic shortness of breath with activity/exertion which is unchanged.  Denies chest pain.  No lower extremity edema.  Will check BMP today to rule out dehydration/CHRISTOPHER as possible culprit to orthostatic hypotension.  Patient is currently on Lasix 20 mg daily.   Echocardiogram ordered to reevaluate LV function and valvular heart disease as last echocardiogram from 7/23/2022 at  showed EF of 40 to 45% and moderate MR, mild TR.  On oral anticoagulation with Coumadin and pharmacy dosing while inpatient.  INR today therapeutic at 2.6.    3/18/24: This is a 73-year-old  female with past medical history significant for permanent atrial fibrillation on rate control strategy, history of coronary artery disease status post PCI, history of cardiomyopathy with EF of 40 to 45% per echocardiogram 7/23/2022, history of moderate mitral valve regurgitation, hypertension, dyslipidemia, diabetes, hypothyroidism, CHANA on CPAP and history of RA who was admitted to acute inpatient rehab on 3/13/2024 after presenting to ER on 3/12/2024 after sustaining a mechanical fall at home.  Cardiology consult was placed on 3/15/2024 for orthostatic hypotension.  Unfortunately do not see

## 2024-03-21 NOTE — PROGRESS NOTES
Warfarin Dosing - Pharmacy Consult Note  Consulting Provider: ELODIA Mello-CNP  Indication:  Atrial Fibrillation  Warfarin Dose prior to admission: 3 mg PO daily   Concurrent anticoagulants/antiplatelets: None  Significant Drug Interactions:  Azathioprine, sulfasalazine , Excedrin, APAP, tramadol, synthroid  Recent Labs     03/19/24  0514 03/20/24  0332 03/21/24  0548   INR 2.6 2.5 2.3     Recent warfarin administrations                     warfarin (COUMADIN) tablet 3 mg (mg) 3 mg Given 03/20/24 1806    warfarin (COUMADIN) tablet 3 mg (mg) 3 mg Given 03/19/24 1739    warfarin (COUMADIN) tablet 3 mg (mg) 3 mg Given 03/18/24 1847                   Date   INR    Dose  03/12     1.6        6 mg   03/13     1.6        6 mg  03/14     2.0        4 mg   03/15     2.3        3 mg  03/16     2.4        3 mg  03/17     2.6        3 mg  03/18     2.5        3 mg   03/19     2.6        3 mg  03/20     2.5        3 mg  03/21     2.3        3 mg      Assessment/Plan  (Goal INR: 2 - 3)  INR is therapeutic at 2.3. Will order home dose of 3 mg again for tonight.    Active problem list reviewed.  INR orders are placed.  Chart reviewed for pertinent labs, drug/diet interactions, and past doses.  Documentation of patient's clinical condition was reviewed.    Pharmacy Dosing:  Pharmacy will continue to follow.     Maral Covarrubias [facility] PharmD

## 2024-03-21 NOTE — PROGRESS NOTES
[facility]   Acute  Rehabilitation  MUSIC THERAPY      Date:  3/21/2024        Patient Name: Lisseth Harper       MRN: [id number]        YOB: 1950 (74 y.o.)       Gender: female  Diagnosis: Impaired mobility and ADL's due to OA flare up S/P fall  Referring Practitioner: Dr Goyal    RESTRICTIONS/PRECAUTIONS:  Restrictions/Precautions: Fall Risk  Vision: Impaired  Hearing: Within functional limits  Hearing Exceptions: Hard of hearing/hearing concerns, No hearing aid      TIME OF SESSION: 11:30am - 12:10pm     SUBJECTIVE:  \"Sure! I think a doctor is supposed to come in soon.\"     OBJECTIVE:        [x] To Improve Mood     [x] To Increase Social Well-Being  [] To Increase Focus   [x] To Increase Emotional Well-Being  [] To Increase Eye Contact    [] To Increase Spiritual Well-Being   [] To Decrease Anxiety   [x] To Increase Relaxation   [x] To Decrease Pain    [] To Increase Communication  [] To Increase Movement to Music     MUSIC INTERVENTION PROVIDED:     [x] Live Music on Voice  [x] Recorded Music   [x] Live Music on Guitar  [x] Discussion Related to Music   [] Live Music on Q-chord  [x] Discussion Related to Pt Experience   [] Live Music on Percussion      PARTICIPATION LEVEL OF PATIENT:     [x] Active with discussion   [] Passive with discussion   [x] Active with singing    [] Passive with singing   [] Active with instrument playing  [] Passive with instrument playing   [x] Actively listening to music   [] Passively listening to music.     OUTCOMES OBSERVED:      [x] Improved Mood   [x] Increased Social Well-Being  [] Increased Focus   [x] Increased Emotional Well-Being  [] Increased Eye Contact    [] Increased Spiritual Well-Being   [] Decreased Anxiety   [x] Increased Relaxation   [x] Decreased Pain    [] Increased Communication   [] Increased Movement to Music     PAIN ASSESSMENT    Before MT:      [] No     [x] Yes   Location: right shoulder & hand     Rating:

## 2024-03-21 NOTE — PROGRESS NOTES
OCCUPATIONAL THERAPY  INPATIENT REHAB TREATMENT NOTE  [facility]      NAME: Lisseth Harper  : 1950 (74 y.o.)  MRN: [id number]  CODE STATUS: Full Code  Room: R241/R241-01    Date of Service: 3/21/2024    Referring Physician: Dr Goyal  Rehab Diagnosis: Impaired mobility and ADL's due to OA flare up S/P fall      Restrictions  Restrictions/Precautions  Restrictions/Precautions: Fall Risk             Patient's date of birth confirmed: Yes    SAFETY:  Safety Devices  Safety Devices in place: Yes  Type of devices: All fall risk precautions in place    SUBJECTIVE:   \"I'm careful how I pick things up d/t my arthritis in R shoulder\"    Pain at start of treatment: No    Pain at end of treatment: No    COGNITION:  Orientation  Overall Orientation Status: Within Functional Limits  Orientation Level: Oriented X4  Cognition  Overall Cognitive Status: WFL      OBJECTIVE:  FM Coordination Ring Activity  Patient engaged in BUE FM therapeutic activity to increase cognitive function and FM tolerance/strengthening.  Patient used BUE's to place various sized rings on 6 different sized dowels with MIN difficulty matching ring to dowel size accordingly.  Patient placed all 50+ rings through correct dowels with MIN difficulty.  Patient removed all 50+ ring with BUE's from correct dowels into slotted compartment with MIN difficulty.  Patient organized ring size accordingly into appropriate slot with 0 difficulty  Patient required increased time with activity and no rest breaks needed.    HEP Program  Patient was educated on UE HEP to promote strength for IADL/ADL's when returning home.  Patient engaged in various exercises, horizontal abduction, palm supination/pronation, lateral/frontal raise, elbow bent abduction, shoulder flexion/extension, biceps curls, wrist supination/pronation, and wrist flexion/extension with MOD difficulty in R shoulder d/t arthritis.  Patient demonstrated all exercise movements with LUE x10

## 2024-03-21 NOTE — PROGRESS NOTES
1445)  Stairs:  Stairs/Curb  Stairs?: Yes (03/20/24 1027)  Stairs  # Steps : 4 (03/20/24 1027)  Stairs Height: 6\" (03/20/24 1027)  Rails: Bilateral (03/20/24 1027)  Assistance: Stand by assistance (03/20/24 1027)  Comment: B rails ascending.  B grasp on 1 rail descending. (03/20/24 1027)  Stairs - Level of Assistance: Stand-by assistance;Minimum assistance (03/21/24 1445)  Number of Stairs Trained: 4 (descends sideways while holding right rail. ascends with both hr.) (03/21/24 1445)  W/C mobility:  Wheelchair Activities  Propulsion: Yes (03/13/24 1121)  Propulsion 1  Propulsion: Manual (03/13/24 1121)  Level: Level Tile (03/13/24 1121)  Method: RUE;LUE (03/13/24 1121)  Level of Assistance: Independent (03/13/24 1121)  Distance: 150ft+ (03/13/24 1121)    Occupational Therapy:   Hand Dominance: Right  ADL  Feeding: Unable to assess(comment) (03/13/24 1421)  Grooming: Setup;Increased time to complete (03/13/24 1421)  UE Bathing: Stand by assistance;Increased time to complete (03/13/24 1421)  LE Bathing: Moderate assistance;Increased time to complete (03/13/24 1421)  UE Dressing: Unable to assess(comment) (gown only) (03/13/24 1421)  LE Dressing: Maximum assistance;Increased time to complete (03/13/24 1421)  Toileting: Maximum assistance;Increased time to complete (03/13/24 1421)  Toilet Transfers  Equipment Used: Grab bars (03/13/24 1428)  Toilet Transfer: Moderate assistance (03/13/24 1428)  Tub Transfers  Tub Transfers: Not tested (03/13/24 1428)  Shower Transfers  Shower - Transfer Type: To and From (03/13/24 1428)  Shower Transfers: Moderate assistance (03/13/24 1428)    Speech Therapy:      Comprehension: Within Functional Limits  Verbal Expression: Within functional limits (Verbal expression is WFL. Pt did report some word finding difficulty)  Diet/Swallow:        Dysphagia Outcome Severity Scale: Level 6: Within functional limits/Modified independence  Compensatory Swallowing Strategies : Small bites/sips,  Focus on energy conservation.    Hyperglycemia due to type 2 diabetes mellitus-Continue blood sugar checks every shift, diet, add diabetic add dietary education, restrict carbohydrates to lowest effective and safe carb count per meal advising 4 carbs per meal, add at bedtime snack to prevent a.m. hypoglycemia, adjust/add medications.  Chronic Coumadin therapy for [facility] pharmacy-monitor for bleeding  Active chronic nausea-as needed Zofran-scatter medications monitor for GI bleeding     Focus on coordinating dc plans with patient family and care givers and order necessary equipment.    BG excellent 149, 98  GFR above 60  INR 2.3    M Ana Laura Goyal D.O., PM&R     Attending    [phone]   [facility]

## 2024-03-22 LAB
ANION GAP SERPL CALCULATED.3IONS-SCNC: 10 MEQ/L (ref 9–15)
BACTERIA URNS QL MICRO: ABNORMAL /HPF
BASOPHILS # BLD: 0.1 K/UL (ref 0–0.2)
BASOPHILS NFR BLD: 0.6 %
BILIRUB UR QL STRIP: NEGATIVE
BUN SERPL-MCNC: 22 MG/DL (ref 8–23)
CALCIUM SERPL-MCNC: 10.9 MG/DL (ref 8.5–9.9)
CHLORIDE SERPL-SCNC: 102 MEQ/L (ref 95–107)
CLARITY UR: CLEAR
CO2 SERPL-SCNC: 26 MEQ/L (ref 20–31)
COLOR UR: YELLOW
CREAT SERPL-MCNC: 0.99 MG/DL (ref 0.5–0.9)
EOSINOPHIL # BLD: 0.2 K/UL (ref 0–0.7)
EOSINOPHIL NFR BLD: 1.9 %
EPI CELLS #/AREA URNS AUTO: ABNORMAL /HPF (ref 0–5)
ERYTHROCYTE [DISTWIDTH] IN BLOOD BY AUTOMATED COUNT: 13.2 % (ref 11.5–14.5)
GLUCOSE BLD-MCNC: 103 MG/DL (ref 70–99)
GLUCOSE BLD-MCNC: 122 MG/DL (ref 70–99)
GLUCOSE BLD-MCNC: 96 MG/DL (ref 70–99)
GLUCOSE SERPL-MCNC: 163 MG/DL (ref 70–99)
GLUCOSE UR STRIP-MCNC: NEGATIVE MG/DL
HCT VFR BLD AUTO: 38 % (ref 37–47)
HGB BLD-MCNC: 12.8 G/DL (ref 12–16)
HGB UR QL STRIP: NEGATIVE
HYALINE CASTS #/AREA URNS AUTO: ABNORMAL /HPF (ref 0–5)
INR PPP: 2.2
KETONES UR STRIP-MCNC: NEGATIVE MG/DL
LEUKOCYTE ESTERASE UR QL STRIP: ABNORMAL
LYMPHOCYTES # BLD: 2.4 K/UL (ref 1–4.8)
LYMPHOCYTES NFR BLD: 28.4 %
MCH RBC QN AUTO: 32.9 PG (ref 27–31.3)
MCHC RBC AUTO-ENTMCNC: 33.7 % (ref 33–37)
MCV RBC AUTO: 97.7 FL (ref 79.4–94.8)
MONOCYTES # BLD: 0.8 K/UL (ref 0.2–0.8)
MONOCYTES NFR BLD: 9.3 %
NEUTROPHILS # BLD: 5.1 K/UL (ref 1.4–6.5)
NEUTS SEG NFR BLD: 59.3 %
NITRITE UR QL STRIP: NEGATIVE
PERFORMED ON: ABNORMAL
PERFORMED ON: ABNORMAL
PERFORMED ON: NORMAL
PH UR STRIP: 5.5 [PH] (ref 5–9)
PLATELET # BLD AUTO: 266 K/UL (ref 130–400)
POTASSIUM SERPL-SCNC: 4.3 MEQ/L (ref 3.4–4.9)
PROT UR STRIP-MCNC: NEGATIVE MG/DL
PROTHROMBIN TIME: 24.6 SEC (ref 12.3–14.9)
RBC # BLD AUTO: 3.89 M/UL (ref 4.2–5.4)
RBC #/AREA URNS HPF: ABNORMAL /HPF (ref 0–2)
SODIUM SERPL-SCNC: 138 MEQ/L (ref 135–144)
SP GR UR STRIP: 1.01 (ref 1–1.03)
UROBILINOGEN UR STRIP-ACNC: 0.2 E.U./DL
WBC # BLD AUTO: 8.6 K/UL (ref 4.8–10.8)
WBC #/AREA URNS AUTO: ABNORMAL /HPF (ref 0–5)

## 2024-03-22 PROCEDURE — 80048 BASIC METABOLIC PNL TOTAL CA: CPT

## 2024-03-22 PROCEDURE — 1180000000 HC REHAB R&B

## 2024-03-22 PROCEDURE — 97535 SELF CARE MNGMENT TRAINING: CPT

## 2024-03-22 PROCEDURE — 87186 SC STD MICRODIL/AGAR DIL: CPT

## 2024-03-22 PROCEDURE — 99232 SBSQ HOSP IP/OBS MODERATE 35: CPT | Performed by: INTERNAL MEDICINE

## 2024-03-22 PROCEDURE — 6370000000 HC RX 637 (ALT 250 FOR IP): Performed by: PHYSICAL MEDICINE & REHABILITATION

## 2024-03-22 PROCEDURE — 87077 CULTURE AEROBIC IDENTIFY: CPT

## 2024-03-22 PROCEDURE — 85025 COMPLETE CBC W/AUTO DIFF WBC: CPT

## 2024-03-22 PROCEDURE — 6370000000 HC RX 637 (ALT 250 FOR IP): Performed by: NURSE PRACTITIONER

## 2024-03-22 PROCEDURE — 81001 URINALYSIS AUTO W/SCOPE: CPT

## 2024-03-22 PROCEDURE — 85610 PROTHROMBIN TIME: CPT

## 2024-03-22 PROCEDURE — 6370000000 HC RX 637 (ALT 250 FOR IP): Performed by: REGISTERED NURSE

## 2024-03-22 PROCEDURE — 87086 URINE CULTURE/COLONY COUNT: CPT

## 2024-03-22 PROCEDURE — 97116 GAIT TRAINING THERAPY: CPT

## 2024-03-22 PROCEDURE — 36415 COLL VENOUS BLD VENIPUNCTURE: CPT

## 2024-03-22 PROCEDURE — 99222 1ST HOSP IP/OBS MODERATE 55: CPT | Performed by: PSYCHIATRY & NEUROLOGY

## 2024-03-22 PROCEDURE — 97110 THERAPEUTIC EXERCISES: CPT

## 2024-03-22 PROCEDURE — 97530 THERAPEUTIC ACTIVITIES: CPT

## 2024-03-22 PROCEDURE — 6360000002 HC RX W HCPCS: Performed by: REGISTERED NURSE

## 2024-03-22 RX ORDER — WARFARIN SODIUM 3 MG/1
3 TABLET ORAL
Status: COMPLETED | OUTPATIENT
Start: 2024-03-22 | End: 2024-03-22

## 2024-03-22 RX ORDER — DILTIAZEM HYDROCHLORIDE 180 MG/1
180 CAPSULE, COATED, EXTENDED RELEASE ORAL DAILY
Qty: 90 CAPSULE | Refills: 5 | Status: SHIPPED | OUTPATIENT
Start: 2024-03-22

## 2024-03-22 RX ORDER — METOPROLOL SUCCINATE 50 MG/1
50 TABLET, EXTENDED RELEASE ORAL DAILY
Qty: 30 TABLET | Refills: 3 | Status: SHIPPED | OUTPATIENT
Start: 2024-03-23 | End: 2024-03-23 | Stop reason: HOSPADM

## 2024-03-22 RX ORDER — MECLIZINE HYDROCHLORIDE 25 MG/1
25 TABLET ORAL 3 TIMES DAILY
Status: DISCONTINUED | OUTPATIENT
Start: 2024-03-22 | End: 2024-03-25 | Stop reason: HOSPADM

## 2024-03-22 RX ADMIN — ACETAMINOPHEN 325MG 650 MG: 325 TABLET ORAL at 09:33

## 2024-03-22 RX ADMIN — TRAMADOL HYDROCHLORIDE 50 MG: 50 TABLET ORAL at 21:01

## 2024-03-22 RX ADMIN — SULFASALAZINE 500 MG: 500 TABLET ORAL at 09:06

## 2024-03-22 RX ADMIN — Medication 100 MG: at 09:07

## 2024-03-22 RX ADMIN — AZATHIOPRINE 100 MG: 50 TABLET ORAL at 09:06

## 2024-03-22 RX ADMIN — Medication: at 09:08

## 2024-03-22 RX ADMIN — MECLIZINE HYDROCHLORIDE 25 MG: 25 TABLET ORAL at 16:26

## 2024-03-22 RX ADMIN — Medication 1 CAPSULE: at 05:56

## 2024-03-22 RX ADMIN — LEVOTHYROXINE SODIUM 100 MCG: 100 TABLET ORAL at 05:56

## 2024-03-22 RX ADMIN — SULFASALAZINE 500 MG: 500 TABLET ORAL at 21:01

## 2024-03-22 RX ADMIN — DILTIAZEM HYDROCHLORIDE 60 MG: 60 TABLET, FILM COATED ORAL at 21:01

## 2024-03-22 RX ADMIN — METOPROLOL SUCCINATE 50 MG: 25 TABLET, EXTENDED RELEASE ORAL at 09:07

## 2024-03-22 RX ADMIN — CETIRIZINE HYDROCHLORIDE 10 MG: 10 TABLET, FILM COATED ORAL at 09:06

## 2024-03-22 RX ADMIN — DILTIAZEM HYDROCHLORIDE 60 MG: 60 TABLET, FILM COATED ORAL at 16:26

## 2024-03-22 RX ADMIN — MECLIZINE HYDROCHLORIDE 25 MG: 25 TABLET ORAL at 21:01

## 2024-03-22 RX ADMIN — FOLIC ACID 1 MG: 1 TABLET ORAL at 09:05

## 2024-03-22 RX ADMIN — Medication 2000 UNITS: at 09:05

## 2024-03-22 RX ADMIN — DILTIAZEM HYDROCHLORIDE 60 MG: 60 TABLET, FILM COATED ORAL at 05:56

## 2024-03-22 RX ADMIN — Medication 1 CAPSULE: at 09:06

## 2024-03-22 RX ADMIN — FUROSEMIDE 20 MG: 20 TABLET ORAL at 09:05

## 2024-03-22 RX ADMIN — WARFARIN SODIUM 3 MG: 3 TABLET ORAL at 17:37

## 2024-03-22 RX ADMIN — TRAMADOL HYDROCHLORIDE 50 MG: 50 TABLET ORAL at 05:56

## 2024-03-22 ASSESSMENT — PAIN DESCRIPTION - LOCATION
LOCATION: LEG
LOCATION: LEG;SHOULDER
LOCATION: FOOT;SHOULDER

## 2024-03-22 ASSESSMENT — PAIN DESCRIPTION - ORIENTATION
ORIENTATION: RIGHT

## 2024-03-22 ASSESSMENT — PAIN SCALES - GENERAL
PAINLEVEL_OUTOF10: 6
PAINLEVEL_OUTOF10: 5
PAINLEVEL_OUTOF10: 1
PAINLEVEL_OUTOF10: 6

## 2024-03-22 ASSESSMENT — PAIN DESCRIPTION - DESCRIPTORS
DESCRIPTORS: ACHING

## 2024-03-22 ASSESSMENT — PAIN DESCRIPTION - FREQUENCY: FREQUENCY: CONTINUOUS

## 2024-03-22 ASSESSMENT — PAIN DESCRIPTION - PAIN TYPE: TYPE: CHRONIC PAIN

## 2024-03-22 NOTE — PROGRESS NOTES
Physical Therapy Rehab Treatment Note  [place] [facility]       NAME: Lisseth Harper  : 1950 (74 y.o.)  MRN: [id number]  CODE STATUS: Full Code    Date of Service: 3/22/2024     Restrictions:  Restrictions/Precautions: Fall Risk     SUBJECTIVE:   Subjective: Pt reports lightheadedness/dizziness on and off.  States its better than it was.  States it comes on suddenly and that concerns her. States she had her ears cleaned out and some BP meds adjusted.    Pain  Pain: 6/10 R shld  Pt declined intervention.  \"My hip did hurt but I iced it.\"      OBJECTIVE:            Transfers  Sit to Stand: Modified independent  Stand to Sit: Modified independent  Car Transfer: Modified independent (Good follow through of technique.)    Ambulation  Surface: Level tile;Uneven;Carpet  Device: Rolling Walker  Assistance: Supervision  Quality of Gait: decreased stance on R LE, R foot drop, pt compensates to clear  Gait Deviations: Slow Karine  Distance: 150ft  Comments: Pt reports fatigue after ambulating increased distance.  Supervision d/t intermittent feeling of lightheadedness.    Stairs/Curb  Stairs?: Yes  Stairs  # Steps : 4  Stairs Height: 6\"  Rails: Bilateral (B ascending; B grasp on R rail descending laterally.)  Assistance: Supervision        PT Exercises  Exercise Treatment: seated AP, LAQ, march, hip add with ball, hip abd  x20ea  Static Standing Balance Exercises: Stood at WW with B UE support >2min; without UE support 25sec with increased sway  Dynamic Standing Balance Exercises: Utilized reacher to  objects standing with WW.         Education Provided: Home Exercise Program;Safety;Fall Prevention Strategies  Education  Education Given To: Patient  Education Provided: Home Exercise Program;Safety;Fall Prevention Strategies  Education Provided Comments: Reviewed and issued seated therex for HEP.  Reviewed safety and strategies for mobility when feeling lightheaded.

## 2024-03-22 NOTE — PROGRESS NOTES
Progress Note  Patient: Lisseth Harper  Unit/Bed: R241/R241-01  YOB: 1950  MRN: [id number]  Acct: [id number]   Admitting Diagnosis: Impaired mobility and ADLs [Z74.09, Z78.9]  Date:  3/12/2024  Hospital Day: 10    Chief Complaint:  Dizziness    Subjective  3/22/2024  Patient sitting in a chair looks comfortable  Denies chest pain or shortness of breath  Still endorsing dizziness  Orthostatics negative  From cardiology standpoint patient can be discharged home  Follow-up in clinic with [facility] in 1 week    3/21/2024  Patient sitting in a chair  Denies chest pain or shortness of breath  Still endorsing dizziness, unchanged since admission  ENT consult still pending  TTE yesterday 3/20/2024 EF 40 to 45%    3/19/24: Patient sitting in wheelchair and in no acute distress.  Repeat orthostatic vitals this morning positive from lying to sitting with a 21 mm drop in systolic BP with BP dropping from 138/59 to 117/62 from lying to sitting.  Patient does report dizziness upon sitting up.  She has chronic shortness of breath with activity/exertion which is unchanged.  Denies chest pain.  No lower extremity edema.  Will check BMP today to rule out dehydration/CHRISTOPHER as possible culprit to orthostatic hypotension.  Patient is currently on Lasix 20 mg daily.   Echocardiogram ordered to reevaluate LV function and valvular heart disease as last echocardiogram from 7/23/2022 at  showed EF of 40 to 45% and moderate MR, mild TR.  On oral anticoagulation with Coumadin and pharmacy dosing while inpatient.  INR today therapeutic at 2.6.    3/18/24: This is a 73-year-old  female with past medical history significant for permanent atrial fibrillation on rate control strategy, history of coronary artery disease status post PCI, history of cardiomyopathy with EF of 40 to 45% per echocardiogram 7/23/2022, history of moderate mitral valve regurgitation, hypertension, dyslipidemia, diabetes, hypothyroidism, CHANA on CPAP and  results found.     [facility] 3/5/21:  Coronary Angiography:   The coronary circulation is right dominant.     Left Main Coronary Artery:   There is 10% stenosis in the entire left main coronary artery.     Left Anterior Descending Coronary Artery Distribution:   There is 10-30% stenosis in the proximal and mid left anterior descending artery.     Circumflex Coronary Artery Distribution:   Contains patent previously placed stents. There is 10-30% stenosis in the the mid Circumflex artery.     Right Coronary Artery Distribution:     Contains patent previously placed stents. There is 10-30% stenosis in the the mid and the distal Right Coronary Artery.       Left Ventriculography:   The estimated left ventricular ejection fraction is 50%.         Echocardiogram 7/23/22:  CONCLUSIONS:    1. The left ventricular systolic function is moderately decreased with a 40-45% estimated ejection fraction.    2. Spectral Doppler shows a restrictive pattern of left ventricular diastolic filling.    3. The left atrium is moderately dilated.    4. The mitral valve is moderately thickened.    5. Moderate mitral valve regurgitation.    6. There is moderate aortic valve cusp calcification.    7. There is global hypokinesis of the left ventricle with minor regional variations.      Assessment:    Active Hospital Problems    Diagnosis Date Noted    Orthostatic hypotension [I95.1] 03/19/2024     Priority: Low    Permanent atrial fibrillation (HCC) [I48.21] 03/18/2024     Priority: Low    Pacemaker [Z95.0] 03/18/2024     Priority: Low    History of cardiomyopathy [Z86.79] 03/18/2024     Priority: Low    Impaired mobility and ADLs [Z74.09, Z78.9] 03/15/2024     Priority: Low    Inadequately controlled diabetes mellitus (HCC) [E11.65] 03/13/2024     Priority: Low    Atrial fibrillation with RVR (HCC) [I48.91] 06/05/2023     Priority: Low    CHANA (obstructive sleep apnea) [G47.33]      Priority: Low    Recurrent dislocation of left hip [M24.452]

## 2024-03-22 NOTE — PROGRESS NOTES
Subjective:  The patient complains of severe acute on chronic progressive fatigue and right rib pain right upper abdomen pain partially relieved by rest, medications, PT,  OT,   SLP and rest and exacerbated by recent fall.      Patient was seen in the ER on 3/12/2024 after falling at home for after tripping on a sock.  She has a left periorbital hematoma but no fracture.     She has a history of previous rehab stays in 2019-after revision of the hip replacement after falling.  She states that her body aches are quite severe especially in her low back and right flank and hip.  She states that muscle relaxers seem to help best though she is taking Ultram at lowest effective dose.    I am concerned about patient’s medical complexities and barriers to advancing in rehab goals including  pain controll.        I reviewed current care and plans for further care with other rehab providers including nursing and case management.  According to recent PA note, \" Repeat orthostatic vitals in a.m.  Consider repeat echocardiogram to reevaluate LV function as well as valvular heart disease\".     Though she tells me that her rib pain is better therapy tells me that her pain is limiting her function.  I will offer her a stronger pain medication-which has helped.  She also complains of active chronic nausea relieved with Zofran.  She is a poor medical historian    Virginia Ramirez, RN  Registered Nurse  Nursing     Flowsheet Note     Signed     Date of Service: 3/22/2024  1:56 PM     Signed         Patient assessment is complete. Tylenol given earlier before therapy. RN got erika hose that were ordered.                       ROS x10:  The patient also complains of severely impaired mobility and activities of daily living.  Otherwise no new problems with vision, hearing, nose, mouth, throat, dermal, cardiovascular, GI, , pulmonary, musculoskeletal, psychiatric or neurological. See also Acute Rehab PM&R H&P.       Vital signs:  BP

## 2024-03-22 NOTE — PROGRESS NOTES
TOLU, Shila Shaffer patient pivots onto the toilet independently with my supervision. Does very well on her own in the wheelchair, for some reason prefers to use her wheelchair instead of her walker.

## 2024-03-22 NOTE — PROGRESS NOTES
70% of time spent focused exclusively on this patient, reviewing chart, reconciling medications, and answering questions and discussing treatment plan.    Subjective   Interval History Status: improved.     Patient claims that she is feeling and doing good today.  No complaints made.  She denies fever, chills, dizziness, shortness of breath, chest pain, and N/V/D.    Review of Systems   12 point review of systems reviewed with patient with pertinent positive listed in HPI, otherwise, negative.    Medications   Scheduled Meds:    warfarin  3 mg Oral Once    meclizine  25 mg Oral TID    metoprolol succinate  50 mg Oral Daily    Vitamin D  2,000 Units Oral Daily    cyanocobalamin  1,000 mcg IntraMUSCular Weekly    coenzyme Q10  100 mg Oral Daily    lidocaine  3 patch TransDERmal Daily    camphor-menthol-methyl salicylate   Topical TID    insulin lispro  0-8 Units SubCUTAneous TID WC    insulin lispro  0-4 Units SubCUTAneous Nightly    azaTHIOprine  100 mg Oral Daily    b complex vitamins  1 capsule Oral Daily    cetirizine  10 mg Oral Daily    dilTIAZem  60 mg Oral 3 times per day    folic acid  1 mg Oral Daily    furosemide  20 mg Oral Daily    lactobacillus  1 capsule Oral Daily    levothyroxine  100 mcg Oral Daily    [Held by provider] potassium chloride  10 mEq Oral Daily    sulfaSALAzine  500 mg Oral BID    warfarin placeholder: dosing by pharmacy   Other RX Placeholder     Continuous Infusions:    dextrose       PRN Meds: oxyCODONE, acetaminophen, traMADol, ondansetron, bisacodyl, sodium phosphate, methocarbamol, glucose, dextrose bolus **OR** dextrose bolus, glucagon (rDNA), dextrose, aspirin-acetaminophen-caffeine    Past History    Past Medical History:   has a past medical history of A-fib (HCC), Aplasia of adrenal gland, CAD (coronary artery disease), Diabetes mellitus (HCC), DJD (degenerative joint disease), HLD (hyperlipidemia), Hypertension, Hypothyroidism, Obesity, CHANA (obstructive sleep apnea), RA  (rheumatoid arthritis) (HCC), and Rotator cuff tendinitis.    Social History:   reports that she has quit smoking. She has never used smokeless tobacco. She reports that she does not currently use alcohol. She reports that she does not use drugs.     Family History: History reviewed. No pertinent family history.    Physical Examination      Vitals:  /64   Pulse 70   Temp 98.1 °F (36.7 °C) (Oral)   Resp 18   Ht 1.753 m (5' 9\")   Wt 96.8 kg (213 lb 8 oz)   SpO2 96%   BMI 31.51 kg/m²   Temp (24hrs), Av.9 °F (36.6 °C), Min:97.7 °F (36.5 °C), Max:98.1 °F (36.7 °C)      I/O (24Hr):  No intake or output data in the 24 hours ending 24 1738    CONSTITUTIONAL:  awake, alert, cooperative, no apparent distress, and appears stated age  EYES:  sclera clear  ENT:  normocepalic, without obvious abnormality  NECK:  supple, symmetrical, trachea midline  BACK:  symmetric  LUNGS:  No increased work of breathing, good air exchange, clear to auscultation bilaterally, no crackles or wheezing  CARDIOVASCULAR: Irregular rhythm and no murmur noted  ABDOMEN:  normal bowel sounds, non-distended, and non-tender  MUSCULOSKELETAL:  there is no redness, warmth, or swelling of the joints  full range of motion noted  NEUROLOGIC:  Awake, alert, oriented to name, place and time.   SKIN:  normal skin color, texture, turgor and no redness, warmth, or swelling    Labs/Imaging/Diagnostics   Labs:  CBC:  Recent Labs     24  1426   WBC 8.6   RBC 3.89*   HGB 12.8   HCT 38.0   MCV 97.7*   RDW 13.2        CHEMISTRIES:  Recent Labs     24  0548 24  1426    138   K 4.1 4.3    102   CO2 24 26   BUN 22 22   CREATININE 0.93* 0.99*   GLUCOSE 113* 163*     PT/INR:  Recent Labs     24  0332 24  0548 24  0445   PROTIME 26.5* 25.1* 24.6*   INR 2.5 2.3 2.2     APTT:No results for input(s): \"APTT\" in the last 72 hours.  LIVER PROFILE:No results for input(s): \"AST\", \"ALT\", \"BILIDIR\", \"BILITOT\",

## 2024-03-22 NOTE — FLOWSHEET NOTE
Patient resting quietly in bed, denies pain/sob, assist x1 CGA to bathroom with walker. Continent of bowel and bladder. Bed alarm on, call light in reach, will monitor.  Electronically signed by Josseline Wolfe RN on 3/21/2024 at 8:39 PM

## 2024-03-22 NOTE — CARE COORDINATION
LSW met with pt and confirmed discharge for tomorrow. Pt declined both HHC and OP. No new DME needed. Patient satisfaction survey completed. Electronically signed by UMER Beltrán, FELI on 3/22/2024 at 4:50 PM

## 2024-03-22 NOTE — PROGRESS NOTES
Physical Therapy Rehab Treatment Note  [facility]  [facility]       NAME: Lisseth Harper  : 1950 (74 y.o.)  MRN: [id number]  CODE STATUS: Full Code    Date of Service: 3/22/2024       Restrictions:  Restrictions/Precautions: Fall Risk       SUBJECTIVE:   Subjective: Pt states she is going home tomorrow.  States she feels ok about that.  Pt states she is going to have an MRI.    Pain  Pain: Pt denies pain.  States she just took a Tylonol and rested.      OBJECTIVE:         Bed mobility  Rolling to Left: Modified independent  Rolling to Right: Modified independent  Supine to Sit: Modified independent  Sit to Supine: Modified independent    Transfers  Sit to Stand: Modified independent  Stand to Sit: Modified independent  Bed to Chair: Modified independent  Car Transfer: Modified independent (Good follow through of technique.)    Ambulation  Surface: Level tile;Uneven;Carpet  Device: Rolling Walker  Assistance: Supervision  Quality of Gait: decreased stance on R LE, R foot drop, pt compensates to clear  Gait Deviations: Slow Karine  Distance: 50ft x2  distance not the focus  Comments: Pt reports fatigue after ambulating increased distance.  Supervision d/t intermittent feeling of lightheadedness.        PT Exercises  Standing Open/Closed Kinetic Chain Exercises: heel raises and march x10 ea      Education Given To: Patient  Education Provided: Home Exercise Program;Safety;Fall Prevention Strategies  Education Provided Comments: Pt ordered to wear ANABELA hose.  RN notified.      ASSESSMENT/PROGRESS TOWARDS GOALS:      Goals:  Long Term Goals  Long Term Goal 1: Pt will ambuate >50ft with LRD with stand by assistance-met  Long Term Goal 2: Pt will perform all transfers (bed, chair and car) independently-met  Long Term Goal 3: Pt will demonstrate indep and compliance with HEP-met  Long Term Goal 4: Pt will stand >2min with LRD indep-met  Long Term Goal 5: Pt will perform bed mobility  indep.-met  Long term goal 6: Pt will negotiate 4 stairs with appropriate rails with SBA-met  Patient Goals   Patient Goals : decrease her pain and be able to return to PLOF    PLAN OF CARE/Safety:Pt to D/C home tomorrow 3/23.        Therapy Time:   Individual   Time In 1325   Time Out 1355   Minutes 30      Minutes:  Transfer/Bed mobility training:10  Gait training:15  Neuro re education:0  Therapeutic ex:5    Carmen Matute PTA, 03/22/24 at 1:53 PM

## 2024-03-22 NOTE — CONSULTS
[facility] Neurology Daily Progress Note  Name: Lisseth Harper  Age: 74 y.o.  Gender: female  CodeStatus: Full Code  Allergies: Latex  Accupril [Quinapril Hcl]  Adhesive Tape  Bactine [Lidocaine-Benzalkonium]  Other    Chief Complaint:No chief complaint on file.    Primary Care Provider: Cathy Sims MD  InpatientTreatment Team: Treatment Team: Attending Provider: Grecia Goyal DO; Consulting Physician: Willy Menon MD; Consulting Physician: Elizabeth Damon MD; Registered Nurse: Virginia Ramirez RN; Occupational Therapist Assistant: Julio Cesar Cuello OTA; : Marya Andrade MSW, LSW; Occupational Therapist: Ida Hardy OTR/L; Consulting Physician: Sergio Guo MD; Patient Care Tech: Audelia Romo  Admission Date: 3/12/2024      HPI   Consulting provider: Dr Gonzalo Park for dizziness after sustaining a fall, negative orthostatic blood pressures.  Pt seen and examined on rehab for neurology consult.  Patient is a 74-year-old  female with past medical history of rheumatoid arthritis, obstructive sleep apnea, obesity, hypothyroidism, hypertension, hyperlipidemia, diabetes mellitus, CAD status post coronary stent, atrial fibrillation on Coumadin, pacemaker placement who was admitted to [facility] rehab unit on 3/12/2024 after presenting to the emergency room that day for which she reported as a mechanical fall due to tripping over her sock.  Patient did fall forward and struck her face.  No loss of consciousness or amnesia.  No preceding lightheadedness or dizziness.  No associated chest pain pressure or palpitations.  On Coumadin at the time of fall.  INR was subtherapeutic on presentation at 1.6.  CT of the head was obtained and negative for any acute intracranial findings or hemorrhage.  There is focal left periorbital soft tissue contusion/hematoma.  Orthostatic blood pressures were initially positive but now are negative.  Patient has had ongoing vertigo since fall with

## 2024-03-22 NOTE — PROGRESS NOTES
OCCUPATIONAL THERAPY  INPATIENT REHAB TREATMENT NOTE  [facility]      NAME: Lisseth Harper  : 1950 (74 y.o.)  MRN: [id number]  CODE STATUS: Full Code  Room: R241/R241-01    Date of Service: 3/22/2024    Referring Physician: Dr Goyal  Rehab Diagnosis: Impaired mobility and ADL's due to OA flare up S/P fall    Restrictions  Restrictions/Precautions  Restrictions/Precautions: Fall Risk               Patient's date of birth confirmed: Yes    SAFETY:  Safety Devices  Safety Devices in place: Yes  Type of devices: All fall risk precautions in place    SUBJECTIVE:   \"I hope they give me something for the dizziness?    Pain at start of treatment: Yes: 6/10    Pain at end of treatment: Yes: 6/10    Location: R hip  Description sharp pain  Nursing notified: Yes  RN: Stacy  Intervention: RN provided pain medication    COGNITION:  Orientation  Overall Orientation Status: Within Functional Limits  Orientation Level: Oriented X4  Cognition  Overall Cognitive Status: WFL    OBJECTIVE:     Pt engaged in morning discharge ADL with good motivation to return home.     Grooming/Oral Hygiene  Assistance Level: Independent  Skilled Clinical Factors: Brushed hair and teeth  Upper Extremity Bathing  Assistance Level: Mod I  Lower Extremity Bathing  Assistance Level: Stand by assist  Skilled Clinical Factors: Pt washed behind using grab bars.  Upper Extremity Dressing  Assistance Level: Independent  Lower Extremity Dressing  Assistance Level: Stand by assist;Increased time to complete  Skilled Clinical Factors: SBA to pull pants up with lock w/c, Minimal LOB while standing up from w/c.  Putting On/Taking Off Footwear  Equipment Provided: Sock aid  Assistance Level: Modified independent  Skilled Clinical Factors: Donned socks with AE, first pair of socks were too tight requiring a second attempt with different socks.  Toileting  Assistance Level: Supervision  Toilet Transfers  Technique: Stand step  Equipment: Grab

## 2024-03-22 NOTE — PROGRESS NOTES
Warfarin Dosing - Pharmacy Consult Note  Consulting Provider: ELODIA Mello-CNP  Indication:  Atrial Fibrillation  Warfarin Dose prior to admission: 3 mg PO daily   Concurrent anticoagulants/antiplatelets: None  Significant Drug Interactions:  Azathioprine, sulfasalazine , Excedrin, APAP, tramadol, synthroid  Recent Labs     03/20/24  0332 03/21/24  0548 03/22/24  0445   INR 2.5 2.3 2.2     Recent warfarin administrations                     warfarin (COUMADIN) tablet 3 mg (mg) 3 mg Given 03/21/24 1754    warfarin (COUMADIN) tablet 3 mg (mg) 3 mg Given 03/20/24 1806    warfarin (COUMADIN) tablet 3 mg (mg) 3 mg Given 03/19/24 1739                   Date   INR    Dose  03/12     1.6        6 mg   03/13     1.6        6 mg  03/14     2.0        4 mg   03/15     2.3        3 mg  03/16     2.4        3 mg  03/17     2.6        3 mg  03/18     2.5        3 mg   03/19     2.6        3 mg  03/20     2.5        3 mg  03/21     2.3        3 mg  03/22     2.2        3 mg      Assessment/Plan  (Goal INR: 2 - 3)  INR is therapeutic at 2.3  Will order home dose of 3 mg again for tonight    Active problem list reviewed.  INR orders are placed.  Chart reviewed for pertinent labs, drug/diet interactions, and past doses.  Documentation of patient's clinical condition was reviewed.    Pharmacy Dosing:  Pharmacy will continue to follow.     Lavinia Maza, PharmD  3/22/2024 2:10 PM

## 2024-03-22 NOTE — FLOWSHEET NOTE
Patient assessment is complete. Tylenol given earlier before therapy. RN got erika hose that were ordered.

## 2024-03-22 NOTE — PROGRESS NOTES
Pacemaker check at bedside per Dr. Park.  Patient has an [employer] device, and follows with the [facility]. Copy placed on paper chart.  Electronically signed by Carol Garcia RN on 3/22/2024 at 8:54 AM

## 2024-03-22 NOTE — PROGRESS NOTES
OCCUPATIONAL THERAPY  INPATIENT REHAB TREATMENT NOTE  [facility]      NAME: Lisseth Harper  : 1950 (74 y.o.)  MRN: [id number]  CODE STATUS: Full Code  Room: R241/R241-01    Date of Service: 3/22/2024    Referring Physician: Dr Goyal  Rehab Diagnosis: Impaired mobility and ADL's due to OA flare up S/P fall      Restrictions  Restrictions/Precautions  Restrictions/Precautions: Fall Risk       Patient's date of birth confirmed: Yes    SAFETY:  Safety Devices  Safety Devices in place: Yes  Type of devices: All fall risk precautions in place    SUBJECTIVE:    Pain  Pain at start of treatment: Yes: 3/10    Pain at end of treatment: Yes: 3/10    Location: Right Hip, Right Shoulder  Nursing notified: Declined  Intervention: Cold applied      OBJECTIVE:    Fine Motor Coordination     Socks: Pt used bilateral hands to retrieve socks that were spread out across the tabletop. Pt matched/folded socks and placed them in a basket placed on the floor to the left of her. Pt had Min difficulty with activity and worked at a steady pace without rest  breaks. Pt had No difficulty with cognitive challenges of activity. Pt completed activity to improve functional endurance for ADL/IADL completion.      Shower Rings: Pt used bilateral hands to thread looped metal shower rings into a shower liner (top strip with holes only, not entire curtain). Pt had Mod difficulty and was able to thread 5 rings. Pt took increased time and effort to manipulate rings. Pt completed activity to increase hand strength in order to manage clothing fasteners/ADL containers in a timely manner.     ASSESSMENT:  Activity Tolerance: Patient tolerated treatment well      PLAN OF CARE:  Strengthening, Balance training, Functional mobility training, Endurance training, Neuromuscular re-education, Equipment evaluation, education, & procurement, Self-Care / ADL, Patient/Caregiver education & training, Coordination training, Home management training,  Safety education & training  Continue per OT POC for planned discharge on 3-23-24.    Patient goals : \"I want to be able to completely care for myself.\"  Time Frame for Long Term Goals : Pt within 2 1/2 weeks will demonsatrate progess to treatment goals as stated on initial evaluation  to address areas of deficit as stated below.  Long Term Goal 1: Pt will increase independence with ADL self care  Long Term Goal 2: Pt will increase independence with ADL transfers  Long Term Goal 3: Pt will increase bilateral UE strength for ADL completion.        Therapy Time:   Individual Group Co-Treat   Time In 1500       Time Out 1530         Minutes 30           Therapeutic activities: 30 minutes     Electronically signed by:    CHARLES Duval,   3/22/2024, 3:49 PM

## 2024-03-22 NOTE — PROGRESS NOTES
CLINICAL PHARMACY NOTE: MEDS TO BEDS    Total # of Prescriptions Filled: 1   The following medications were delivered to the patient:  Diltiazem  mg cap    Additional Documentation:  Patient declined metoprolol because she has the same medication at home.

## 2024-03-23 ENCOUNTER — APPOINTMENT (OUTPATIENT)
Dept: CT IMAGING | Age: 74
DRG: 546 | End: 2024-03-23
Attending: PHYSICAL MEDICINE & REHABILITATION
Payer: MEDICARE

## 2024-03-23 LAB
GLUCOSE BLD-MCNC: 115 MG/DL (ref 70–99)
GLUCOSE BLD-MCNC: 118 MG/DL (ref 70–99)
GLUCOSE BLD-MCNC: 124 MG/DL (ref 70–99)
GLUCOSE BLD-MCNC: 95 MG/DL (ref 70–99)
INR PPP: 2.1
PERFORMED ON: ABNORMAL
PERFORMED ON: NORMAL
PROTHROMBIN TIME: 23.8 SEC (ref 12.3–14.9)

## 2024-03-23 PROCEDURE — 97116 GAIT TRAINING THERAPY: CPT

## 2024-03-23 PROCEDURE — 6370000000 HC RX 637 (ALT 250 FOR IP): Performed by: PHYSICAL MEDICINE & REHABILITATION

## 2024-03-23 PROCEDURE — 70450 CT HEAD/BRAIN W/O DYE: CPT

## 2024-03-23 PROCEDURE — 6360000002 HC RX W HCPCS: Performed by: REGISTERED NURSE

## 2024-03-23 PROCEDURE — 1180000000 HC REHAB R&B

## 2024-03-23 PROCEDURE — 97112 NEUROMUSCULAR REEDUCATION: CPT

## 2024-03-23 PROCEDURE — 6370000000 HC RX 637 (ALT 250 FOR IP): Performed by: REGISTERED NURSE

## 2024-03-23 PROCEDURE — 85610 PROTHROMBIN TIME: CPT

## 2024-03-23 PROCEDURE — 36415 COLL VENOUS BLD VENIPUNCTURE: CPT

## 2024-03-23 PROCEDURE — 97530 THERAPEUTIC ACTIVITIES: CPT

## 2024-03-23 PROCEDURE — 6370000000 HC RX 637 (ALT 250 FOR IP): Performed by: NURSE PRACTITIONER

## 2024-03-23 RX ORDER — MECLIZINE HYDROCHLORIDE 25 MG/1
25 TABLET ORAL 3 TIMES DAILY
Qty: 30 TABLET | Refills: 0 | Status: SHIPPED | OUTPATIENT
Start: 2024-03-23 | End: 2024-04-02

## 2024-03-23 RX ORDER — TRAMADOL HYDROCHLORIDE 50 MG/1
50 TABLET ORAL EVERY 6 HOURS PRN
Qty: 28 TABLET | Refills: 0 | Status: SHIPPED | OUTPATIENT
Start: 2024-03-23 | End: 2024-03-30

## 2024-03-23 RX ORDER — NITROFURANTOIN 25; 75 MG/1; MG/1
100 CAPSULE ORAL EVERY 12 HOURS SCHEDULED
Qty: 6 CAPSULE | Refills: 0 | Status: SHIPPED | OUTPATIENT
Start: 2024-03-23 | End: 2024-03-25 | Stop reason: HOSPADM

## 2024-03-23 RX ORDER — METOPROLOL TARTRATE 50 MG/1
50 TABLET, FILM COATED ORAL 2 TIMES DAILY
Status: DISCONTINUED | OUTPATIENT
Start: 2024-03-24 | End: 2024-03-25 | Stop reason: HOSPADM

## 2024-03-23 RX ORDER — WARFARIN SODIUM 3 MG/1
3 TABLET ORAL
Status: COMPLETED | OUTPATIENT
Start: 2024-03-23 | End: 2024-03-23

## 2024-03-23 RX ORDER — METOPROLOL TARTRATE 50 MG/1
50 TABLET, FILM COATED ORAL 2 TIMES DAILY
Qty: 60 TABLET | Refills: 3 | Status: SHIPPED | OUTPATIENT
Start: 2024-03-24

## 2024-03-23 RX ORDER — UBIDECARENONE 100 MG
100 CAPSULE ORAL DAILY
Qty: 60 CAPSULE | Refills: 1 | Status: SHIPPED | OUTPATIENT
Start: 2024-03-24

## 2024-03-23 RX ORDER — NITROFURANTOIN 25; 75 MG/1; MG/1
100 CAPSULE ORAL EVERY 12 HOURS SCHEDULED
Status: DISCONTINUED | OUTPATIENT
Start: 2024-03-23 | End: 2024-03-24

## 2024-03-23 RX ADMIN — Medication 1 CAPSULE: at 06:36

## 2024-03-23 RX ADMIN — FOLIC ACID 1 MG: 1 TABLET ORAL at 10:19

## 2024-03-23 RX ADMIN — MECLIZINE HYDROCHLORIDE 25 MG: 25 TABLET ORAL at 10:19

## 2024-03-23 RX ADMIN — SULFASALAZINE 500 MG: 500 TABLET ORAL at 20:45

## 2024-03-23 RX ADMIN — Medication: at 10:25

## 2024-03-23 RX ADMIN — Medication 2000 UNITS: at 10:19

## 2024-03-23 RX ADMIN — Medication 100 MG: at 10:19

## 2024-03-23 RX ADMIN — DILTIAZEM HYDROCHLORIDE 30 MG: 30 TABLET, FILM COATED ORAL at 20:45

## 2024-03-23 RX ADMIN — AZATHIOPRINE 100 MG: 50 TABLET ORAL at 10:19

## 2024-03-23 RX ADMIN — LEVOTHYROXINE SODIUM 100 MCG: 100 TABLET ORAL at 06:36

## 2024-03-23 RX ADMIN — TRAMADOL HYDROCHLORIDE 50 MG: 50 TABLET ORAL at 20:48

## 2024-03-23 RX ADMIN — SULFASALAZINE 500 MG: 500 TABLET ORAL at 10:19

## 2024-03-23 RX ADMIN — FUROSEMIDE 20 MG: 20 TABLET ORAL at 10:19

## 2024-03-23 RX ADMIN — DILTIAZEM HYDROCHLORIDE 60 MG: 60 TABLET, FILM COATED ORAL at 06:36

## 2024-03-23 RX ADMIN — MECLIZINE HYDROCHLORIDE 25 MG: 25 TABLET ORAL at 14:39

## 2024-03-23 RX ADMIN — NITROFURANTOIN MONOHYDRATE/MACROCRYSTALS 100 MG: 75; 25 CAPSULE ORAL at 18:01

## 2024-03-23 RX ADMIN — ACETAMINOPHEN 325MG 650 MG: 325 TABLET ORAL at 13:37

## 2024-03-23 RX ADMIN — CETIRIZINE HYDROCHLORIDE 10 MG: 10 TABLET, FILM COATED ORAL at 10:19

## 2024-03-23 RX ADMIN — WARFARIN SODIUM 3 MG: 3 TABLET ORAL at 18:01

## 2024-03-23 RX ADMIN — DILTIAZEM HYDROCHLORIDE 30 MG: 30 TABLET, FILM COATED ORAL at 14:39

## 2024-03-23 RX ADMIN — OXYCODONE 5 MG: 5 TABLET ORAL at 04:13

## 2024-03-23 RX ADMIN — MECLIZINE HYDROCHLORIDE 25 MG: 25 TABLET ORAL at 20:45

## 2024-03-23 RX ADMIN — Medication 1 CAPSULE: at 10:19

## 2024-03-23 RX ADMIN — METOPROLOL SUCCINATE 50 MG: 25 TABLET, EXTENDED RELEASE ORAL at 10:19

## 2024-03-23 ASSESSMENT — PAIN DESCRIPTION - PAIN TYPE: TYPE: ACUTE PAIN

## 2024-03-23 ASSESSMENT — PAIN DESCRIPTION - ORIENTATION
ORIENTATION: RIGHT
ORIENTATION: RIGHT

## 2024-03-23 ASSESSMENT — PAIN DESCRIPTION - FREQUENCY: FREQUENCY: CONTINUOUS

## 2024-03-23 ASSESSMENT — PAIN SCALES - GENERAL
PAINLEVEL_OUTOF10: 7
PAINLEVEL_OUTOF10: 0
PAINLEVEL_OUTOF10: 7

## 2024-03-23 ASSESSMENT — PAIN DESCRIPTION - LOCATION
LOCATION: SHOULDER
LOCATION: SHOULDER

## 2024-03-23 ASSESSMENT — PAIN DESCRIPTION - DESCRIPTORS: DESCRIPTORS: SHARP

## 2024-03-23 NOTE — PROGRESS NOTES
Department of Otolaryngology  Elizabeth Damon MD  Progress Note      SUBJECTIVE: I am not having much ear pain.  I am having some dizziness and some vision problem.    OBJECTIVE patient is in her room sitting in the bed.    Physical    /69   Pulse 70   Temp 98.2 °F (36.8 °C) (Oral)   Resp 17   Ht 1.753 m (5' 9\")   Wt 99.1 kg (218 lb 6.4 oz)   SpO2 98%   BMI 32.24 kg/m²     Intake/Output Summary (Last 24 hours) at 3/23/2024 0951  Last data filed at 3/23/2024 0609  Gross per 24 hour   Intake 360 ml   Output --   Net 360 ml         Physical Exam   Patient is awake alert and not in any distress vital signs stable    Data      Current Inpatient Medications  Current Facility-Administered Medications: warfarin (COUMADIN) tablet 3 mg, 3 mg, Oral, Once  meclizine (ANTIVERT) tablet 25 mg, 25 mg, Oral, TID  oxyCODONE (ROXICODONE) immediate release tablet 5 mg, 5 mg, Oral, Q4H PRN  acetaminophen (TYLENOL) tablet 650 mg, 650 mg, Oral, Q4H PRN  traMADol (ULTRAM) tablet 50 mg, 50 mg, Oral, Q6H PRN  ondansetron (ZOFRAN-ODT) disintegrating tablet 4 mg, 4 mg, Oral, Q8H PRN  bisacodyl (DULCOLAX) suppository 10 mg, 10 mg, Rectal, Daily PRN  sodium phosphate (FLEET) rectal enema 1 enema, 1 enema, Rectal, Daily PRN  metoprolol succinate (TOPROL XL) extended release tablet 50 mg, 50 mg, Oral, Daily  Vitamin D (CHOLECALCIFEROL) tablet 2,000 Units, 2,000 Units, Oral, Daily  cyanocobalamin injection 1,000 mcg, 1,000 mcg, IntraMUSCular, Weekly  coenzyme Q10 capsule 100 mg, 100 mg, Oral, Daily  lidocaine 4 % external patch 3 patch, 3 patch, TransDERmal, Daily  camphor-menthol-methyl salicylate (BENGAY ULTRA STRENGTH) 4-10-30 % cream, , Topical, TID  methocarbamol (ROBAXIN) tablet 500 mg, 500 mg, Oral, Nightly PRN  glucose chewable tablet 16 g, 4 tablet, Oral, PRN  dextrose bolus 10% 125 mL, 125 mL, IntraVENous, PRN **OR** dextrose bolus 10% 250 mL, 250 mL, IntraVENous, PRN  glucagon injection 1 mg, 1 mg, SubCUTAneous,

## 2024-03-23 NOTE — PROGRESS NOTES
Dr. Damon in to see pt. OK to d/c home from his perspective. Electronically signed by Bhavya Beltran RN on 3/23/2024 at 9:05 AM

## 2024-03-23 NOTE — CONSULTS
Department of Otolaryngology  Attending Consult Note      Reason for Consult: Right ear pain  Requesting Physician: Grecia Goyal    CHIEF COMPLAINT: Right ear pain    History Obtained From:  patient    HISTORY OF PRESENT ILLNESS:                The patient is a 74 y.o. female with significant past medical history of mechanical fall who presents with right ear pain.    Patient has been admitted in the hospital with multiple medical problems and came to the emergency room after a mechanical fall due to tripping over.  Patient struck her head without loss of consciousness or amnesia.  Patient has been on Coumadin patient has coronary artery disease s/p stent atrial fibrillation on Coumadin pacemaker recently.    Past Medical History:        Diagnosis Date    A-fib (HCC)     Aplasia of adrenal gland     CAD (coronary artery disease)     Diabetes mellitus (HCC)     DJD (degenerative joint disease)     HLD (hyperlipidemia)     Hypertension     Hypothyroidism     Obesity     CHANA (obstructive sleep apnea)     RA (rheumatoid arthritis) (HCC)     Rotator cuff tendinitis     RIGHT     Past Surgical History:        Procedure Laterality Date    CARPAL TUNNEL RELEASE Left      SECTION      CHOLECYSTECTOMY      CORONARY ANGIOPLASTY WITH STENT PLACEMENT  2019    prior caths as well, total 7 stents    HIP CLOSED REDUCTION Left 2019    H[place] CLOSED RED[place] [facility] [place]INT REPLACEMENT      BILAT KNEE, LEFT SHOULDER AND HIP AS WELL    PACEMAKER PLACEMENT       Current Medications:   Current Facility-Administered Medications: warfarin (COUMADIN) tablet 3 mg, 3 mg, Oral, Once  meclizine (ANTIVERT) tablet 25 mg, 25 mg, Oral, TID  oxyCODONE (ROXICODONE) immediate release tablet 5 mg, 5 mg, Oral, Q4H PRN  acetaminophen (TYLENOL) tablet 650 mg, 650 mg, Oral, Q4H PRN  traMADol (ULTRAM) tablet 50 mg, 50 mg, Oral, Q6H PRN  ondansetron (ZOFRAN-ODT) disintegrating tablet 4 mg, 4 mg,

## 2024-03-23 NOTE — PROGRESS NOTES
Physical Therapy Rehab Treatment Note  Facility/Department: [facility] [place] [facility]  [facility]       NAME: Lisseth Harper  : 1950 (74 y.o.)  MRN: [id number]  CODE STATUS: Full Code    Date of Service: 3/23/2024  Chart Reviewed: Yes  Patient assessed for rehabilitation services?: Yes  Family / Caregiver Present: No  Diagnosis: Impaired mobility and activities of daily living dt flare of RA    Restrictions:  Restrictions/Precautions: Fall Risk       SUBJECTIVE:   Subjective: \"I got really dizzy this morning. IT was worse than usual.\"  Response To Previous Treatment: Patient with no complaints from previous session.    Pain  Patient denies pain pre/post session     OBJECTIVE:   Bed mobility  Bridging: Independent  Rolling to Left: Independent  Rolling to Right: Independent  Supine to Sit: Independent  Sit to Supine: Independent  Scooting: Independent    Transfers  Sit to Stand: Modified independent  Stand to Sit: Modified independent  Bed to Chair: Modified independent    Ambulation  Surface: Level tile;Uneven;Carpet  Device: Rolling Walker  Assistance: Supervision  Quality of Gait: decreased stance on R LE, R foot drop, pt compensates to clear  Distance: 50 feet x2- distance limited by dizziness    Exercises:   STS x5  Static standing balance: WBOS, NBOS        ASSESSMENT/PROGRESS TOWARDS GOALS:   Body Structures, Functions, Activity Limitations Requiring Skilled Therapeutic Intervention: Decreased functional mobility ;Decreased ADL status;Decreased ROM;Decreased strength;Decreased endurance;Decreased balance;Increased pain;Decreased posture  Assessment: Patient continues to require supervision for gait training due to dizziness.    Goals:  Long Term Goals  Long Term Goal 1: Pt will ambuate >50ft with LRD with stand by assistance-met  Long Term Goal 2: Pt will perform all transfers (bed, chair and car) independently-met  Long Term Goal 3: Pt will demonstrate indep and compliance with HEP-met  Long Term Goal 4: Pt  will stand >2min with LRD indep-met  Long Term Goal 5: Pt will perform bed mobility indep.-met  Long term goal 6: Pt will negotiate 4 stairs with appropriate rails with SBA-met  Patient Goals   Patient Goals : decrease her pain and be able to return to PLOF    PLAN OF CARE/Safety: all safety precautions in place.          Therapy Time:   Individual   Time In 0830   Time Out 0900   Minutes 30   Timed Code Treatment Minutes: 30 Minutes  Minutes:30  Transfer/Bed mobility trainin  Gait training:15  Neuro re education:10      Mami Hilliard PTA, 24 at 12:25 PM

## 2024-03-23 NOTE — PROGRESS NOTES
Warfarin Dosing - Pharmacy Consult Note  Consulting Provider: ELODIA Mello-CNP  Indication:  Atrial Fibrillation  Warfarin Dose prior to admission: 3 mg PO daily   Concurrent anticoagulants/antiplatelets: None  Significant Drug Interactions:  Azathioprine, sulfasalazine , Excedrin, APAP, tramadol, synthroid  Recent Labs     03/21/24  0548 03/22/24  0445 03/22/24  1426 03/23/24  0442   INR 2.3 2.2  --  2.1   HGB  --   --  12.8  --    PLT  --   --  266  --      Recent warfarin administrations                     warfarin (COUMADIN) tablet 3 mg (mg) 3 mg Given 03/22/24 1737    warfarin (COUMADIN) tablet 3 mg (mg) 3 mg Given 03/21/24 1754    warfarin (COUMADIN) tablet 3 mg (mg) 3 mg Given 03/20/24 1806                   Date   INR    Dose  03/12     1.6        6 mg   03/13     1.6        6 mg  03/14     2.0        4 mg   03/15     2.3        3 mg  03/16     2.4        3 mg  03/17     2.6        3 mg  03/18     2.5        3 mg   03/19     2.6        3 mg  03/20     2.5        3 mg  03/21     2.3        3 mg  03/22     2.2        3 mg  03/23     2.1        3 mg       Assessment/Plan  (Goal INR: 2 - 3)  INR is therapeutic at 2.1  Will order home dose of 3 mg again for tonight    Active problem list reviewed.  INR orders are placed.  Chart reviewed for pertinent labs, drug/diet interactions, and past doses.  Documentation of patient's clinical condition was reviewed.    Pharmacy Dosing:  Pharmacy will continue to follow.     Lavinia Maza, PharmD  3/23/2024 7:18 AM

## 2024-03-23 NOTE — PROGRESS NOTES
Pt assessment was completed. VSS. Pt C/O chronic pain in \"feet\" and acute pain R shoulder @ \"6\"  post fall. Medicated with ultram with HS med pass. Pt C/O \"dizziness\" and ataxic gait (lean to L). Dr. Guo saw pt 3/22 and ordered a MRI-was told by day FRANCES Mata in report if unable to do Saturday-perfect serve Sola Ellis NP. Pt on D/C list 3/23-pt verbalizes concerns of going home with same symptoms. Will relay to dayshift RN. Call light with in reach. Bed low, locked and alarm activated.

## 2024-03-23 NOTE — PROGRESS NOTES
OCCUPATIONAL THERAPY  INPATIENT REHAB TREATMENT NOTE  [facility]      NAME: Lisseth Harper  : 1950 (74 y.o.)  MRN: [id number]  CODE STATUS: Full Code  Room: R241/R241-01    Date of Service: 3/23/2024    Referring Physician: Dr Goyal  Rehab Diagnosis: Impaired mobility and ADL's due to OA flare up S/P fall      Restrictions  Restrictions/Precautions  Restrictions/Precautions: Fall Risk     Patient's date of birth confirmed: Yes    SAFETY:  Safety Devices  Safety Devices in place: Yes  Type of devices: All fall risk precautions in place    SUBJECTIVE:    Pain  Pain at start of treatment: Yes: 10    Pain at end of treatment: Yes: 7/10    Location: Right Shoulder  Nursing notified: Yes  RN: Elena  Intervention: None  Pt declined ice pack. RN to provide pain medication if available.       OBJECTIVE:    Blocks and Bolts: Pt used bilateral hands to assemble/disassemble a pattern using colored blocks, threaded rods, and wing nuts. Pt was able to replicate a hard complexity pattern with Min difficulty physically. Pt dropped 1 wing nut. Pt completed in a timely manner. Pt able to Independently problem solve. Pt completed activity to improve hand fine motor coordination in order to manage clothing fasteners in a timely manner.       ASSESSMENT:  Activity Tolerance: Patient tolerated treatment well      PLAN OF CARE:  Strengthening, Balance training, Functional mobility training, Endurance training, Neuromuscular re-education, Equipment evaluation, education, & procurement, Self-Care / ADL, Patient/Caregiver education & training, Coordination training, Home management training, Safety education & training  Continue per OT POC for planned discharge on 3-23-24.    Patient goals : \"I want to be able to completely care for myself.\"  Time Frame for Long Term Goals : Pt within 2 1/2 weeks will demonsatrate progess to treatment goals as stated on initial evaluation  to address areas of deficit as stated

## 2024-03-24 LAB
BACTERIA UR CULT: ABNORMAL
BACTERIA UR CULT: ABNORMAL
GLUCOSE BLD-MCNC: 106 MG/DL (ref 70–99)
GLUCOSE BLD-MCNC: 115 MG/DL (ref 70–99)
GLUCOSE BLD-MCNC: 123 MG/DL (ref 70–99)
GLUCOSE BLD-MCNC: 125 MG/DL (ref 70–99)
INR PPP: 2
ORGANISM: ABNORMAL
PERFORMED ON: ABNORMAL
PROTHROMBIN TIME: 22.4 SEC (ref 12.3–14.9)

## 2024-03-24 PROCEDURE — 6370000000 HC RX 637 (ALT 250 FOR IP): Performed by: PHYSICAL MEDICINE & REHABILITATION

## 2024-03-24 PROCEDURE — 1180000000 HC REHAB R&B

## 2024-03-24 PROCEDURE — 6370000000 HC RX 637 (ALT 250 FOR IP): Performed by: REGISTERED NURSE

## 2024-03-24 PROCEDURE — 36415 COLL VENOUS BLD VENIPUNCTURE: CPT

## 2024-03-24 PROCEDURE — 6360000002 HC RX W HCPCS: Performed by: REGISTERED NURSE

## 2024-03-24 PROCEDURE — 6370000000 HC RX 637 (ALT 250 FOR IP): Performed by: NURSE PRACTITIONER

## 2024-03-24 PROCEDURE — 85610 PROTHROMBIN TIME: CPT

## 2024-03-24 RX ORDER — WARFARIN SODIUM 3 MG/1
3 TABLET ORAL
Qty: 30 TABLET | Refills: 0 | Status: SHIPPED | OUTPATIENT
Start: 2024-03-24 | End: 2024-03-25 | Stop reason: HOSPADM

## 2024-03-24 RX ORDER — WARFARIN SODIUM 3 MG/1
3 TABLET ORAL
Status: COMPLETED | OUTPATIENT
Start: 2024-03-24 | End: 2024-03-24

## 2024-03-24 RX ORDER — CIPROFLOXACIN 500 MG/1
500 TABLET, FILM COATED ORAL EVERY 12 HOURS SCHEDULED
Status: DISCONTINUED | OUTPATIENT
Start: 2024-03-24 | End: 2024-03-25 | Stop reason: HOSPADM

## 2024-03-24 RX ORDER — CIPROFLOXACIN 500 MG/1
500 TABLET, FILM COATED ORAL EVERY 12 HOURS SCHEDULED
Qty: 13 TABLET | Refills: 0 | Status: SHIPPED | OUTPATIENT
Start: 2024-03-25 | End: 2024-04-01

## 2024-03-24 RX ADMIN — NITROFURANTOIN MONOHYDRATE/MACROCRYSTALS 100 MG: 75; 25 CAPSULE ORAL at 12:28

## 2024-03-24 RX ADMIN — FUROSEMIDE 20 MG: 20 TABLET ORAL at 09:07

## 2024-03-24 RX ADMIN — CIPROFLOXACIN HYDROCHLORIDE 500 MG: 500 TABLET, FILM COATED ORAL at 13:52

## 2024-03-24 RX ADMIN — FOLIC ACID 1 MG: 1 TABLET ORAL at 09:07

## 2024-03-24 RX ADMIN — METOPROLOL TARTRATE 50 MG: 50 TABLET, FILM COATED ORAL at 09:07

## 2024-03-24 RX ADMIN — Medication 1 CAPSULE: at 05:00

## 2024-03-24 RX ADMIN — SULFASALAZINE 500 MG: 500 TABLET ORAL at 20:22

## 2024-03-24 RX ADMIN — SULFASALAZINE 500 MG: 500 TABLET ORAL at 09:06

## 2024-03-24 RX ADMIN — LEVOTHYROXINE SODIUM 100 MCG: 100 TABLET ORAL at 04:55

## 2024-03-24 RX ADMIN — DILTIAZEM HYDROCHLORIDE 30 MG: 30 TABLET, FILM COATED ORAL at 04:55

## 2024-03-24 RX ADMIN — MECLIZINE HYDROCHLORIDE 25 MG: 25 TABLET ORAL at 09:06

## 2024-03-24 RX ADMIN — DILTIAZEM HYDROCHLORIDE 30 MG: 30 TABLET, FILM COATED ORAL at 13:52

## 2024-03-24 RX ADMIN — TRAMADOL HYDROCHLORIDE 50 MG: 50 TABLET ORAL at 20:29

## 2024-03-24 RX ADMIN — Medication 1 CAPSULE: at 09:06

## 2024-03-24 RX ADMIN — WARFARIN SODIUM 3 MG: 3 TABLET ORAL at 17:27

## 2024-03-24 RX ADMIN — MECLIZINE HYDROCHLORIDE 25 MG: 25 TABLET ORAL at 15:06

## 2024-03-24 RX ADMIN — Medication 100 MG: at 09:06

## 2024-03-24 RX ADMIN — DILTIAZEM HYDROCHLORIDE 30 MG: 30 TABLET, FILM COATED ORAL at 20:22

## 2024-03-24 RX ADMIN — METOPROLOL TARTRATE 50 MG: 50 TABLET, FILM COATED ORAL at 20:22

## 2024-03-24 RX ADMIN — MECLIZINE HYDROCHLORIDE 25 MG: 25 TABLET ORAL at 20:22

## 2024-03-24 RX ADMIN — Medication 2000 UNITS: at 09:07

## 2024-03-24 RX ADMIN — CETIRIZINE HYDROCHLORIDE 10 MG: 10 TABLET, FILM COATED ORAL at 09:06

## 2024-03-24 RX ADMIN — AZATHIOPRINE 100 MG: 50 TABLET ORAL at 09:06

## 2024-03-24 ASSESSMENT — PAIN DESCRIPTION - PAIN TYPE: TYPE: ACUTE PAIN

## 2024-03-24 ASSESSMENT — PAIN DESCRIPTION - LOCATION: LOCATION: SHOULDER

## 2024-03-24 ASSESSMENT — PAIN DESCRIPTION - ORIENTATION: ORIENTATION: RIGHT

## 2024-03-24 ASSESSMENT — PAIN SCALES - GENERAL: PAINLEVEL_OUTOF10: 7

## 2024-03-24 NOTE — PROGRESS NOTES
Warfarin Dosing - Pharmacy Consult Note  Consulting Provider: ELODIA Mello-GIULIA  Indication:  Atrial Fibrillation  Warfarin Dose prior to admission: 3 mg PO daily   Concurrent anticoagulants/antiplatelets: None  Significant Drug Interactions:  Azathioprine, sulfasalazine , Excedrin, APAP, tramadol, synthroid  Recent Labs     03/22/24  0445 03/22/24  1426 03/23/24  0442 03/24/24  0442   INR 2.2  --  2.1 2.0   HGB  --  12.8  --   --    PLT  --  266  --   --      Recent warfarin administrations                     warfarin (COUMADIN) tablet 3 mg (mg) 3 mg Given 03/23/24 1801    warfarin (COUMADIN) tablet 3 mg (mg) 3 mg Given 03/22/24 1737    warfarin (COUMADIN) tablet 3 mg (mg) 3 mg Given 03/21/24 1754                   Date   INR    Dose  03/12     1.6        6 mg   03/13     1.6        6 mg  03/14     2.0        4 mg   03/15     2.3        3 mg  03/16     2.4        3 mg  03/17     2.6        3 mg  03/18     2.5        3 mg   03/19     2.6        3 mg  03/20     2.5        3 mg  03/21     2.3        3 mg  03/22     2.2        3 mg  03/23     2.1        3 mg   03/24     2.0        3 mg       Assessment/Plan  (Goal INR: 2 - 3)  INR is therapeutic at 2.0  Will order home dose of 3 mg again for tonight    Active problem list reviewed.  INR orders are placed.  Chart reviewed for pertinent labs, drug/diet interactions, and past doses.  Documentation of patient's clinical condition was reviewed.    Pharmacy Dosing:  Pharmacy will continue to follow.     Lavinia Maza, PharmD  3/24/2024 12:41 PM

## 2024-03-24 NOTE — PROGRESS NOTES
Pt to bathroom and back to bed with FWW with SBA. Pt voided clear yellow malodorous urine. First dose of cipro given see MAR, use and possible side effects explained to pt. Bed alarm activated, call light in reach. Electronically signed by Bhavya Beltran RN on 3/24/2024 at 1:58 PM

## 2024-03-24 NOTE — PROGRESS NOTES
Subjective:  The patient complains of severe acute on chronic progressive fatigue and right rib pain right upper abdomen pain partially relieved by rest, medications, PT,  OT,   SLP and rest and exacerbated by recent fall.      Patient was seen in the ER on 3/12/2024 after falling at home for after tripping on a sock.  She has a left periorbital hematoma but no fracture.     She has a history of previous rehab stays in 2019-after revision of the hip replacement after falling.  She states that her body aches are quite severe especially in her low back and right flank and hip.  She states that muscle relaxers seem to help best though she is taking Ultram at lowest effective dose.    I am concerned about patient’s medical complexities and barriers to advancing in rehab goals including  pain controll.        I reviewed current care and plans for further care with other rehab providers including nursing and case management.  According to recent PA note, \" Repeat orthostatic vitals in a.m.  Consider repeat echocardiogram to reevaluate LV function as well as valvular heart disease\".     Though she tells me that her rib pain is better therapy tells me that her pain is limiting her function.  I will offer her a stronger pain medication-which has helped.  She also complains of active chronic nausea relieved with Zofran.  She is a poor medical historian      Cinthya Robbins, RN  Registered Nurse  Nursing     Progress Notes     Signed     Date of Service: 3/23/2024 12:23 AM     Signed         Pt assessment was completed. VSS. Pt C/O chronic pain in \"feet\" and acute pain R shoulder @ \"6\"  post fall. Medicated with ultram with HS med pass. Pt C/O \"dizziness\" and ataxic gait (lean to L). Dr. Guo saw pt 3/22 and ordered a MRI-was told by day FRANCES Mata in report if unable to do Saturday-perfect serve Sola Ellis NP. Pt on D/C list 3/23-pt verbalizes concerns of going home with same symptoms. Will relay to dayshift RN. Call light  home.  .  Functional and medical status reassessed regarding patient’s ability to participate in therapies and patient found to be able to participate in acute intensive comprehensive inpatient rehabilitation program including PT/OT to improve balance, ambulation, ADL’s, and to improve the P/AROM.  Therapeutic modifications regarding activities in therapies, place, amount of time per day and intensity of therapy made daily.  In bed therapies or bedside therapies prn.   Bowel and Bladder dysfunction, Neurogenic bowel and bladder:  frequent toileting, ambulate to bathroom with assistance, check post void residuals.  Check for C.difficile x1 if >2 loose stools in 24 hours, continue bowel & bladder program.  Monitor bowel and bladder function.  Lactinex 2 PO every AC.  MOM prn, Brown Bomb prn, Glycerin suppository prn, enema prn.  Encourage therapy and nursing to co-treat and problem solve re continence.    Severe back pain, right rib pain, RA (rheumatoid arthritis)  as well as generalized OA pain: reassess pain every shift and prior to and after each therapy session, give prn Tylenol versus Oxy IR versus Ultram versus Excedrin Migraine and consider scheduled Tylenol, modalities prn in therapy, masage, Lidoderm, K-pad prn. Consider scheduled AM pain meds.  Skin healing  right eye and forehead and breakdown risk:  continue pressure relief program.  Daily skin exams and reports from nursing.  Fatigue due to nutritional and hydration deficiency:     titrate vitamin B12 vitamin D and CoQ10 continue to monitor I&O’s, calorie counts prn, dietary consult prn.  Add healthy snack at night.  Acute episodic insomnia with situational adjustment disorder:  prn Ambien, monitor for day time sedation.  Falls risk elevated:  patient to use call light to get nursing assistance to get up, bed and chair alarm.  Elevated DVT risk: progressive activities in PT, continue prophylaxis ANABELA hose, elevation and meds-see MAR Coumadin.  Ultrasound

## 2024-03-25 VITALS
HEART RATE: 71 BPM | OXYGEN SATURATION: 98 % | SYSTOLIC BLOOD PRESSURE: 129 MMHG | BODY MASS INDEX: 30.81 KG/M2 | TEMPERATURE: 97.3 F | WEIGHT: 208 LBS | HEIGHT: 69 IN | RESPIRATION RATE: 17 BRPM | DIASTOLIC BLOOD PRESSURE: 82 MMHG

## 2024-03-25 LAB
GLUCOSE BLD-MCNC: 102 MG/DL (ref 70–99)
GLUCOSE BLD-MCNC: 113 MG/DL (ref 70–99)
INR PPP: 1.6
PERFORMED ON: ABNORMAL
PERFORMED ON: ABNORMAL
PROTHROMBIN TIME: 19.5 SEC (ref 12.3–14.9)

## 2024-03-25 PROCEDURE — 6370000000 HC RX 637 (ALT 250 FOR IP): Performed by: PHYSICAL MEDICINE & REHABILITATION

## 2024-03-25 PROCEDURE — 97535 SELF CARE MNGMENT TRAINING: CPT

## 2024-03-25 PROCEDURE — 97116 GAIT TRAINING THERAPY: CPT

## 2024-03-25 PROCEDURE — 97530 THERAPEUTIC ACTIVITIES: CPT

## 2024-03-25 PROCEDURE — 99232 SBSQ HOSP IP/OBS MODERATE 35: CPT | Performed by: NURSE PRACTITIONER

## 2024-03-25 PROCEDURE — 6370000000 HC RX 637 (ALT 250 FOR IP): Performed by: REGISTERED NURSE

## 2024-03-25 PROCEDURE — 97110 THERAPEUTIC EXERCISES: CPT

## 2024-03-25 PROCEDURE — 6370000000 HC RX 637 (ALT 250 FOR IP): Performed by: NURSE PRACTITIONER

## 2024-03-25 PROCEDURE — 36415 COLL VENOUS BLD VENIPUNCTURE: CPT

## 2024-03-25 PROCEDURE — 85610 PROTHROMBIN TIME: CPT

## 2024-03-25 PROCEDURE — 6360000002 HC RX W HCPCS: Performed by: REGISTERED NURSE

## 2024-03-25 RX ORDER — WARFARIN SODIUM 4 MG/1
4 TABLET ORAL
Status: DISCONTINUED | OUTPATIENT
Start: 2024-03-25 | End: 2024-03-25 | Stop reason: HOSPADM

## 2024-03-25 RX ADMIN — Medication 2000 UNITS: at 08:59

## 2024-03-25 RX ADMIN — MECLIZINE HYDROCHLORIDE 25 MG: 25 TABLET ORAL at 08:59

## 2024-03-25 RX ADMIN — CIPROFLOXACIN HYDROCHLORIDE 500 MG: 500 TABLET, FILM COATED ORAL at 09:03

## 2024-03-25 RX ADMIN — AZATHIOPRINE 100 MG: 50 TABLET ORAL at 08:59

## 2024-03-25 RX ADMIN — SULFASALAZINE 500 MG: 500 TABLET ORAL at 08:59

## 2024-03-25 RX ADMIN — METOPROLOL TARTRATE 50 MG: 50 TABLET, FILM COATED ORAL at 08:59

## 2024-03-25 RX ADMIN — Medication 1 CAPSULE: at 06:29

## 2024-03-25 RX ADMIN — DILTIAZEM HYDROCHLORIDE 30 MG: 30 TABLET, FILM COATED ORAL at 06:28

## 2024-03-25 RX ADMIN — LEVOTHYROXINE SODIUM 100 MCG: 100 TABLET ORAL at 06:28

## 2024-03-25 RX ADMIN — CETIRIZINE HYDROCHLORIDE 10 MG: 10 TABLET, FILM COATED ORAL at 08:59

## 2024-03-25 RX ADMIN — Medication 1 CAPSULE: at 08:58

## 2024-03-25 RX ADMIN — FUROSEMIDE 20 MG: 20 TABLET ORAL at 08:59

## 2024-03-25 RX ADMIN — Medication 100 MG: at 08:59

## 2024-03-25 RX ADMIN — FOLIC ACID 1 MG: 1 TABLET ORAL at 08:59

## 2024-03-25 ASSESSMENT — ENCOUNTER SYMPTOMS
COLOR CHANGE: 0
SHORTNESS OF BREATH: 0
VOMITING: 0
WHEEZING: 0
TROUBLE SWALLOWING: 0
NAUSEA: 0
COUGH: 0
CHEST TIGHTNESS: 0

## 2024-03-25 NOTE — PROGRESS NOTES
[facility] Neurology Daily Progress Note  Name: Lisseth Harper  Age: 74 y.o.  Gender: female  CodeStatus: Full Code  Allergies: Latex  Accupril [Quinapril Hcl]  Adhesive Tape  Bactine [Lidocaine-Benzalkonium]  Other    Chief Complaint:No chief complaint on file.    Primary Care Provider: Cathy Sims MD  InpatientTreatment Team: Treatment Team: Attending Provider: Grecia Goyal DO; Consulting Physician: Willy Menon MD; Consulting Physician: Elizabeth Damon MD; Consulting Physician: Sergio Guo MD; Registered Nurse: Macey Sanchez, RN; Registered Nurse: Ida Grubbs, RN; Patient Care Tech: Sherin Pritchard; Occupational Therapist Assistant: Abby Steel OTA; : Marya Andrade, MSW, LSW; Patient Care Tech: Julio Cesar Lopez; Occupational Therapist: Ida Hardy, OTR/L  Admission Date: 3/12/2024      HPI   Pt seen and examined for neuro follow up.  Currently alert and oriented x 3, no acute distress, cooperative.  Patient reports that vertigo has improved with meclizine.  Denies headache.  No focal deficits.  Gait ataxia has improved.  Some lightheadedness with standing.  This is chronic for her.      Vitals:    03/25/24 0719   BP: 129/82   Pulse: 71   Resp: 17   Temp: 97.3 °F (36.3 °C)   SpO2: 98%        Review of Systems   Constitutional:  Negative for appetite change, chills, fatigue and fever.   HENT:  Negative for hearing loss and trouble swallowing.    Eyes:  Negative for visual disturbance.   Respiratory:  Negative for cough, chest tightness, shortness of breath and wheezing.    Cardiovascular:  Negative for chest pain and palpitations.   Gastrointestinal:  Negative for nausea and vomiting.   Genitourinary:  Negative for difficulty urinating.   Skin:  Negative for color change and rash.   Neurological:  Positive for dizziness and light-headedness. Negative for tremors, seizures, syncope, facial asymmetry, speech difficulty, weakness, numbness and headaches.  using one or more of the following dose  reduction techniques:  automated exposure control, adjustment of the mA  and/or kV according to patient size, and/or use of iterative reconstruction  technique.    COMPARISON:    03/12/2024    FINDINGS:    Brain:  No evidence of acute intracranial process.  No acute ischemia.  No  mass or mass effect.  No acute intracranial hemorrhage.  Scattered  periventricular deep white matter hypodensity consistent with small vessel  ischemic deep white matter disease.    Ventricles:  No acute findings.  No ventriculomegaly.    Bones/joints:  See below.    Soft tissues:  Tiny residual of previous left lateral periorbital soft tissue  contusion.    Sinuses:  Unremarkable as visualized.  No acute sinusitis.    Mastoid air cells:  Unremarkable as visualized.  No mastoid effusion.    Nasal cavity/septum:  Chronic appearing deformity of the nasal bones,  unchanged.    Impression  1.  No evidence of acute intracranial process.    2.  Findings of presumed small vessel ischemic deep white matter disease.    3.  Tiny residual of previous left lateral periorbital soft tissue contusion.  No results found for this or any previous visit.  No results found for this or any previous visit.      Carotid duplex: No results found for this or any previous visit.  No results found for this or any previous visit.  Results for orders placed during the hospital encounter of 03/15/12    US CAROTID ARTERY BILATERAL    Narrative  CAROTID DUPLEX SONOGRAM    COMPARISONS  NONE    REASON FOR EXAMINATION  Amaurosis fugax right eye.    RIGHT CAROTID    No significant plaque formation is seen. Peak systolic velocity in the  ICA measures 116 cm per second with spectral broadening.  Values  correspond to stenosis of 16-49%.  The peak velocity in the external  carotid artery measures 80 cm/sec. The peak velocity in the common  carotid artery measures 79 cm/sec.    LEFT CAROTID    No significant plaque formation is seen.

## 2024-03-25 NOTE — CARE COORDINATION
Pt set to discharge home today. No new DME needed. Pt declined HHC and OP. Patient satisfaction survey completed on 3/22/24. Electronically signed by UMER Beltrán, FELI on 3/25/2024 at 4:35 PM

## 2024-03-25 NOTE — DISCHARGE SUMMARY
DISCHARGE SUMMARY    Hospital Course: The patient was admitted to the Rehabilitation Unit to address ADL and mobility deficits-as detailed above and below.  The patient was enrolled in acute PT, OT program.  Weekly team meetings were held to assess functional progress toward their goals-and modify the therapy program.  The patient's medical, emotional, psychosocial and functional issues were addressed.  The patient progressed in the rehab program and is now ready for discharge home.  Refer to functional assessments summary report for detailed functional status.  Refer to the medical problem list below to see the medical issues addressed.  The social and DC complexities are detailed in the DC planning section below.    Greater than 3 5 minutes was spent on coordinating patients discharge including follow-up care, medications and patient/family education.  Extended time needed because of the potential use of controlled medications are high risk medications and a high risk population individual.  Patient and family were instructed to use lowest effective dose of these medications and slowly titrate off over the next 2 to 4 weeks.  They are not to combine opiates with sedatives.     I reviewed her [place] prescription monitoring service data sheets in hopes of eliminating polypharmacy and weaning to the lowest effective dose of pain medications and eliminating the concomitant use of benzodiazepines.  I see   no medications of concern.  I see   no habits of combining sedatives and narcotics.  Subjective:  The patient complains of severe acute on chronic progressive fatigue and right rib pain right upper abdomen pain partially relieved by rest, medications, PT,  OT,   SLP and rest and exacerbated by recent fall.      Patient was seen in the ER on 3/12/2024 after falling at home for after tripping on a sock.  She has a left periorbital hematoma but no fracture.     She has a history of previous rehab stays in 2019-after

## 2024-03-25 NOTE — PROGRESS NOTES
Physical Therapy Rehab Treatment Note  Facility/Department: [facility]  [facility]       NAME: Lisseth Harper  : 1950 (74 y.o.)  MRN: [id number]  CODE STATUS: Full Code    Date of Service: 3/25/2024       Restrictions:  Restrictions/Precautions: Fall Risk    SUBJECTIVE:   Subjective: Pt states she is tired from the anti-dizziness meds.  States she has good days and bad days with her vision from macular degeneration.    Pain  Pain: Pt denies pain.    OBJECTIVE:         Bed mobility  Rolling to Left: Independent  Rolling to Right: Independent  Supine to Sit: Independent  Sit to Supine: Independent    Transfers  Sit to Stand: Modified independent  Stand to Sit: Modified independent  Bed to Chair: Modified independent  Car Transfer: Modified independent    Ambulation  Surface: Level tile;Uneven;Carpet  Device: Rolling Walker  Assistance: Supervision  Quality of Gait: decreased stance on R LE, R foot drop, pt compensates to clear  Gait Deviations: Slow Karine  Distance: 150ft    Stairs/Curb  Stairs?: Yes  Stairs  # Steps : 4  Rails: Bilateral  Assistance: Supervision  Comment: B rails ascending.  B grasp on 1 rail descending.                 ASSESSMENT/PROGRESS TOWARDS GOALS:   Assessment:Pt has met goals.  Pt reports fatigue this PM.  Pt D/Cing this PM.    Goals:  Long Term Goals  Long Term Goal 1: Pt will ambuate >50ft with LRD with stand by assistance-met  Long Term Goal 2: Pt will perform all transfers (bed, chair and car) independently-met  Long Term Goal 3: Pt will demonstrate indep and compliance with HEP-met  Long Term Goal 4: Pt will stand >2min with LRD indep-met  Long Term Goal 5: Pt will perform bed mobility indep.-met  Long term goal 6: Pt will negotiate 4 stairs with appropriate rails with SBA-met  Patient Goals   Patient Goals : decrease her pain and be able to return to PLOF    PLAN OF CARE/Safety: Cont per POC.       Therapy Time:   Individual   Time In 1300   Time Out 1330   Minutes 30

## 2024-03-25 NOTE — PROGRESS NOTES
History:   reports that she has quit smoking. She has never used smokeless tobacco. She reports that she does not currently use alcohol. She reports that she does not use drugs.     Family History: History reviewed. No pertinent family history.    Physical Examination      Vitals:  /82   Pulse 71   Temp 97.3 °F (36.3 °C) (Oral)   Resp 17   Ht 1.753 m (5' 9\")   Wt 94.3 kg (208 lb)   SpO2 98%   BMI 30.70 kg/m²   Temp (24hrs), Av.2 °F (36.8 °C), Min:97.3 °F (36.3 °C), Max:99 °F (37.2 °C)      I/O (24Hr):    Intake/Output Summary (Last 24 hours) at 3/25/2024 1135  Last data filed at 3/24/2024 204  Gross per 24 hour   Intake 480 ml   Output --   Net 480 ml         CONSTITUTIONAL:  Awake, alert, no apparent distress, and appears stated age  EYES: pupils equal, round and reactive to light   NECK:  Supple   LUNGS:  No increased work of breathing, good air exchange, clear to auscultation bilaterally, no crackles or wheezing  CARDIOVASCULAR:  Normal apical impulse, regular rate and rhythm  ABDOMEN:  No scars, normal bowel sounds, soft, non-distended, non-tender  MUSCULOSKELETAL:  There is no redness, warmth, or swelling of the joints.  Full range of motion noted  NEUROLOGIC:  Awake, alert.  No focal deficits noted  SKIN:  No bruising or bleeding, normal skin color, texture, turgor, no redness, warmth, or swelling, no rashes, and no lesions    Labs/Imaging/Diagnostics   Labs:  CBC:  Recent Labs     24  1426   WBC 8.6   RBC 3.89*   HGB 12.8   HCT 38.0   MCV 97.7*   RDW 13.2        CHEMISTRIES:  Recent Labs     24  1426      K 4.3      CO2 26   BUN 22   CREATININE 0.99*   GLUCOSE 163*     PT/INR:  Recent Labs     24  0442 24  0442 24  0605   PROTIME 23.8* 22.4* 19.5*   INR 2.1 2.0 1.6     APTT:No results for input(s): \"APTT\" in the last 72 hours.  LIVER PROFILE:No results for input(s): \"AST\", \"ALT\", \"BILIDIR\", \"BILITOT\", \"ALKPHOS\" in the last 72 hours.    Imaging  Last 24 Hours:  No results found.    Electronically signed by ELODIA Mello CNP on 3/25/24 at 11:35 AM EDT

## 2024-03-25 NOTE — PROGRESS NOTES
Subjective:  The patient complains of severe acute on chronic progressive fatigue and right rib pain right upper abdomen pain partially relieved by rest, medications, PT,  OT,   SLP and rest and exacerbated by recent fall.      Patient was seen in the ER on 3/12/2024 after falling at home for after tripping on a sock.  She has a left periorbital hematoma but no fracture.     She has a history of previous rehab stays in 2019-after revision of the hip replacement after falling.  She states that her body aches are quite severe especially in her low back and right flank and hip.  She states that muscle relaxers seem to help best though she is taking Ultram at lowest effective dose.    I am concerned about patient’s medical complexities and barriers to advancing in rehab goals including  pain controll.        I reviewed current care and plans for further care with other rehab providers including nursing and case management.  According to recent PA note, \" Repeat orthostatic vitals in a.m.  Consider repeat echocardiogram to reevaluate LV function as well as valvular heart disease\".     Though she tells me that her rib pain is better therapy tells me that her pain is limiting her function.  I will offer her a stronger pain medication-which has helped.  She also complains of active chronic nausea relieved with Zofran.  She is a poor medical historian       Macey Sanchez, RN  Registered Nurse     Plan of Care     Signed     Date of Service: 3/23/2024 11:22 PM     Signed            Problem: Discharge Planning  Goal: Discharge to home or other facility with appropriate resources  3/23/2024 2322 by Macey Sanchez, RN  Outcome: Progressing     Problem: Safety - Adult  Goal: Free from fall injury  3/23/2024 2322 by Macey Sanchez, RN  Outcome: Progressing     Problem: ABCDS Injury Assessment  Goal: Absence of physical injury  3/23/2024 2322 by Macey Sanchez, RN  Outcome: Progressing     Problem: Skin/Tissue Integrity  Goal: Absence

## 2024-03-25 NOTE — PROGRESS NOTES
Warfarin Dosing - Pharmacy Consult Note  Consulting Provider: ELODIA Mello-GIULIA  Indication:  Atrial Fibrillation  Warfarin Dose prior to admission: 3 mg PO daily   Concurrent anticoagulants/antiplatelets: None  Significant Drug Interactions:  Azathioprine, sulfasalazine , Excedrin, APAP, tramadol, synthroid, cipro (started 3/24)  Recent Labs     03/23/24  0442 03/24/24  0442 03/25/24  0605   INR 2.1 2.0 1.6     Recent warfarin administrations                     warfarin (COUMADIN) tablet 3 mg (mg) 3 mg Given 03/24/24 1727    warfarin (COUMADIN) tablet 3 mg (mg) 3 mg Given 03/23/24 1801    warfarin (COUMADIN) tablet 3 mg (mg) 3 mg Given 03/22/24 1737                   Date   INR    Dose  03/12     1.6        6 mg   03/13     1.6        6 mg  03/14     2.0        4 mg   03/15     2.3        3 mg  03/16     2.4        3 mg  03/17     2.6        3 mg  03/18     2.5        3 mg   03/19     2.6        3 mg  03/20     2.5        3 mg  03/21     2.3        3 mg  03/22     2.2        3 mg  03/23     2.1        3 mg   03/24     2.0        3 mg   03/25     1.6        4 mg    Assessment/Plan  (Goal INR: 2 - 3)  INR is therapeutic at 1.6. Will boost home dose and give warfarin 4 mg today. Of note started on cipro 3/24 x 7 days.     Active problem list reviewed.  INR orders are placed.  Chart reviewed for pertinent labs, drug/diet interactions, and past doses.  Documentation of patient's clinical condition was reviewed.    Pharmacy Dosing:  Pharmacy will continue to follow.     Maral Covarrubias RP PharmD

## 2024-03-25 NOTE — PLAN OF CARE
Problem: Discharge Planning  Goal: Discharge to home or other facility with appropriate resources  3/16/2024 2133 by Shasta Barr RN  Outcome: Progressing  3/16/2024 1104 by Elena Stallworth RN  Outcome: Progressing     Problem: Safety - Adult  Goal: Free from fall injury  3/16/2024 2133 by Shasta Barr RN  Outcome: Progressing  3/16/2024 1104 by Elena Stallworth RN  Outcome: Progressing     Problem: ABCDS Injury Assessment  Goal: Absence of physical injury  3/16/2024 2133 by Shasta Barr RN  Outcome: Progressing  3/16/2024 1104 by Elena Stallworth RN  Outcome: Progressing     Problem: Skin/Tissue Integrity  Goal: Absence of new skin breakdown  Description: 1.  Monitor for areas of redness and/or skin breakdown  2.  Assess vascular access sites hourly  3.  Every 4-6 hours minimum:  Change oxygen saturation probe site  4.  Every 4-6 hours:  If on nasal continuous positive airway pressure, respiratory therapy assess nares and determine need for appliance change or resting period.  3/16/2024 2133 by Shasta Barr RN  Outcome: Progressing  3/16/2024 1104 by Elena Stallworth RN  Outcome: Progressing     Problem: Pain  Goal: Verbalizes/displays adequate comfort level or baseline comfort level  3/16/2024 2133 by Shasta Barr RN  Outcome: Progressing  3/16/2024 1104 by Elena Stallworth RN  Outcome: Progressing     Problem: Chronic Conditions and Co-morbidities  Goal: Patient's chronic conditions and co-morbidity symptoms are monitored and maintained or improved  3/16/2024 2133 by Shasta Barr RN  Outcome: Progressing  3/16/2024 1104 by Elena Stallworth RN  Outcome: Progressing     
  Problem: Discharge Planning  Goal: Discharge to home or other facility with appropriate resources  3/20/2024 1220 by Bhavya Beltran RN  Outcome: Progressing  3/20/2024 0100 by Macey Sanchez RN  Outcome: Progressing     Problem: Safety - Adult  Goal: Free from fall injury  3/20/2024 1220 by Bhavya Beltran RN  Outcome: Progressing  3/20/2024 0100 by Macey Sancehz RN  Outcome: Progressing     Problem: ABCDS Injury Assessment  Goal: Absence of physical injury  3/20/2024 1220 by Bhavya Beltran RN  Outcome: Progressing  3/20/2024 0100 by Macey Sanchez RN  Outcome: Progressing     Problem: Skin/Tissue Integrity  Goal: Absence of new skin breakdown  Description: 1.  Monitor for areas of redness and/or skin breakdown  2.  Assess vascular access sites hourly  3.  Every 4-6 hours minimum:  Change oxygen saturation probe site  4.  Every 4-6 hours:  If on nasal continuous positive airway pressure, respiratory therapy assess nares and determine need for appliance change or resting period.  Outcome: Progressing     Problem: Pain  Goal: Verbalizes/displays adequate comfort level or baseline comfort level  Outcome: Progressing     Problem: Chronic Conditions and Co-morbidities  Goal: Patient's chronic conditions and co-morbidity symptoms are monitored and maintained or improved  Outcome: Progressing     
  Problem: Discharge Planning  Goal: Discharge to home or other facility with appropriate resources  3/21/2024 0016 by Macey Sanchez, RN  Outcome: Progressing     Problem: Safety - Adult  Goal: Free from fall injury  3/21/2024 0016 by Macey Sanchez, RN  Outcome: Progressing     Problem: ABCDS Injury Assessment  Goal: Absence of physical injury  3/21/2024 0016 by Macey Sanchez, RN  Outcome: Progressing     
  Problem: Discharge Planning  Goal: Discharge to home or other facility with appropriate resources  3/23/2024 0226 by Cinthya Robbins, RN  Outcome: Progressing  Flowsheets (Taken 3/22/2024 2045)  Discharge to home or other facility with appropriate resources: Identify barriers to discharge with patient and caregiver  3/22/2024 1355 by Virginia Ramirez, RN  Outcome: Progressing  Flowsheets (Taken 3/22/2024 1352)  Discharge to home or other facility with appropriate resources:   Identify barriers to discharge with patient and caregiver   Identify discharge learning needs (meds, wound care, etc)     
  Problem: Discharge Planning  Goal: Discharge to home or other facility with appropriate resources  3/23/2024 2322 by Macey Sanchez, RN  Outcome: Progressing     Problem: Safety - Adult  Goal: Free from fall injury  3/23/2024 2322 by Macey Sanchez, RN  Outcome: Progressing     Problem: ABCDS Injury Assessment  Goal: Absence of physical injury  3/23/2024 2322 by Macey Sanchez, RN  Outcome: Progressing     Problem: Skin/Tissue Integrity  Goal: Absence of new skin breakdown  Description: 1.  Monitor for areas of redness and/or skin breakdown  2.  Assess vascular access sites hourly  3.  Every 4-6 hours minimum:  Change oxygen saturation probe site  4.  Every 4-6 hours:  If on nasal continuous positive airway pressure, respiratory therapy assess nares and determine need for appliance change or resting period.  3/23/2024 2322 by Macey Sanchez, RN  Outcome: Progressing     
  Problem: Discharge Planning  Goal: Discharge to home or other facility with appropriate resources  3/24/2024 1227 by Bhavya Beltran RN  Outcome: Progressing  3/23/2024 2322 by Macey Sanchez RN  Outcome: Progressing     Problem: Safety - Adult  Goal: Free from fall injury  3/24/2024 1227 by Bhavya Beltran RN  Outcome: Progressing  3/23/2024 2322 by Macey Sanchez RN  Outcome: Progressing     Problem: ABCDS Injury Assessment  Goal: Absence of physical injury  3/24/2024 1227 by Bhavya Beltran RN  Outcome: Progressing  3/23/2024 2322 by Macey Sanchez RN  Outcome: Progressing     Problem: Skin/Tissue Integrity  Goal: Absence of new skin breakdown  Description: 1.  Monitor for areas of redness and/or skin breakdown  2.  Assess vascular access sites hourly  3.  Every 4-6 hours minimum:  Change oxygen saturation probe site  4.  Every 4-6 hours:  If on nasal continuous positive airway pressure, respiratory therapy assess nares and determine need for appliance change or resting period.  3/24/2024 1227 by Bhavya Beltran RN  Outcome: Progressing  3/23/2024 2322 by Macey Sanchez RN  Outcome: Progressing     Problem: Pain  Goal: Verbalizes/displays adequate comfort level or baseline comfort level  Outcome: Progressing     Problem: Chronic Conditions and Co-morbidities  Goal: Patient's chronic conditions and co-morbidity symptoms are monitored and maintained or improved  Outcome: Progressing     
  Problem: Discharge Planning  Goal: Discharge to home or other facility with appropriate resources  3/24/2024 2354 by Macey Sanchez, RN  Outcome: Progressing     Problem: Safety - Adult  Goal: Free from fall injury  3/24/2024 2354 by Macey Sanchez, RN  Outcome: Progressing     Problem: ABCDS Injury Assessment  Goal: Absence of physical injury  3/24/2024 2354 by Macey Sanchez, RN  Outcome: Progressing     Problem: Skin/Tissue Integrity  Goal: Absence of new skin breakdown  Description: 1.  Monitor for areas of redness and/or skin breakdown  2.  Assess vascular access sites hourly  3.  Every 4-6 hours minimum:  Change oxygen saturation probe site  4.  Every 4-6 hours:  If on nasal continuous positive airway pressure, respiratory therapy assess nares and determine need for appliance change or resting period.  3/24/2024 2354 by Macey Sanchez, RN  Outcome: Progressing     
  Problem: Discharge Planning  Goal: Discharge to home or other facility with appropriate resources  Outcome: Progressing     Problem: Safety - Adult  Goal: Free from fall injury  Outcome: Progressing     Problem: ABCDS Injury Assessment  Goal: Absence of physical injury  Outcome: Progressing     
  Problem: Discharge Planning  Goal: Discharge to home or other facility with appropriate resources  Outcome: Progressing     Problem: Safety - Adult  Goal: Free from fall injury  Outcome: Progressing     Problem: ABCDS Injury Assessment  Goal: Absence of physical injury  Outcome: Progressing     
  Problem: Discharge Planning  Goal: Discharge to home or other facility with appropriate resources  Outcome: Progressing     Problem: Safety - Adult  Goal: Free from fall injury  Outcome: Progressing     Problem: ABCDS Injury Assessment  Goal: Absence of physical injury  Outcome: Progressing     Problem: Skin/Tissue Integrity  Goal: Absence of new skin breakdown  Description: 1.  Monitor for areas of redness and/or skin breakdown  2.  Assess vascular access sites hourly  3.  Every 4-6 hours minimum:  Change oxygen saturation probe site  4.  Every 4-6 hours:  If on nasal continuous positive airway pressure, respiratory therapy assess nares and determine need for appliance change or resting period.  Outcome: Progressing     Problem: Pain  Goal: Verbalizes/displays adequate comfort level or baseline comfort level  Outcome: Progressing     Problem: Chronic Conditions and Co-morbidities  Goal: Patient's chronic conditions and co-morbidity symptoms are monitored and maintained or improved  Outcome: Progressing     
  Problem: Discharge Planning  Goal: Discharge to home or other facility with appropriate resources  Outcome: Progressing     Problem: Safety - Adult  Goal: Free from fall injury  Outcome: Progressing     Problem: ABCDS Injury Assessment  Goal: Absence of physical injury  Outcome: Progressing     Problem: Skin/Tissue Integrity  Goal: Absence of new skin breakdown  Description: 1.  Monitor for areas of redness and/or skin breakdown  2.  Assess vascular access sites hourly  3.  Every 4-6 hours minimum:  Change oxygen saturation probe site  4.  Every 4-6 hours:  If on nasal continuous positive airway pressure, respiratory therapy assess nares and determine need for appliance change or resting period.  Outcome: Progressing     Problem: Pain  Goal: Verbalizes/displays adequate comfort level or baseline comfort level  Outcome: Progressing     Problem: SLP Adult - Impaired Communication  Goal: By Discharge: Demonstrates communication skills at highest level of function for planned discharge setting.  See evaluation for individualized goals.  3/13/2024 1038 by Carolyn Arambula, SLP  Outcome: Progressing     
  Problem: Discharge Planning  Goal: Discharge to home or other facility with appropriate resources  Outcome: Progressing  Flowsheets (Taken 3/22/2024 1352)  Discharge to home or other facility with appropriate resources:   Identify barriers to discharge with patient and caregiver   Identify discharge learning needs (meds, wound care, etc)     Problem: Safety - Adult  Goal: Free from fall injury  Outcome: Progressing     Problem: ABCDS Injury Assessment  Goal: Absence of physical injury  Outcome: Progressing     Problem: Skin/Tissue Integrity  Goal: Absence of new skin breakdown  Description: 1.  Monitor for areas of redness and/or skin breakdown  2.  Assess vascular access sites hourly  3.  Every 4-6 hours minimum:  Change oxygen saturation probe site  4.  Every 4-6 hours:  If on nasal continuous positive airway pressure, respiratory therapy assess nares and determine need for appliance change or resting period.  Outcome: Progressing     Problem: Pain  Goal: Verbalizes/displays adequate comfort level or baseline comfort level  Outcome: Progressing     Problem: Chronic Conditions and Co-morbidities  Goal: Patient's chronic conditions and co-morbidity symptoms are monitored and maintained or improved  Outcome: Progressing  Flowsheets (Taken 3/22/2024 1352)  Care Plan - Patient's Chronic Conditions and Co-Morbidity Symptoms are Monitored and Maintained or Improved: Monitor and assess patient's chronic conditions and comorbid symptoms for stability, deterioration, or improvement     
BSE and SLE completed  
Conditions and Co-Morbidity Symptoms are Monitored and Maintained or Improved: Monitor and assess patient's chronic conditions and comorbid symptoms for stability, deterioration, or improvement

## 2024-03-25 NOTE — PROGRESS NOTES
OCCUPATIONAL THERAPY  INPATIENT REHAB TREATMENT NOTE  [facility]      NAME: Lisseth Harper  : 1950 (74 y.o.)  MRN: [id number]  CODE STATUS: Full Code  Room: R241/R241-01    Date of Service: 3/25/2024    Referring Physician: Dr Goyal  Rehab Diagnosis: Impaired mobility and ADL's due to OA flare up S/P fall    Hospital course:          Restrictions  Restrictions/Precautions  Restrictions/Precautions: Fall Risk     Patient's date of birth confirmed: Yes    SAFETY:  Safety Devices  Safety Devices in place: Yes  Type of devices: All fall risk precautions in place    SUBJECTIVE: \"I feel that I have progressed\"    Pain at start of treatment: No    Pain at end of treatment: No    COGNITION:  Orientation  Overall Orientation Status: Within Functional Limits  Orientation Level: Oriented X4      OBJECTIVE:    Grooming/Oral Hygiene  Assistance Level: Independent  Upper Extremity Bathing  Assistance Level: Independent  Lower Extremity Bathing  Assistance Level: Independent  Upper Extremity Dressing  Assistance Level: Independent  Lower Extremity Dressing  Equipment Provided: Reachers  Assistance Level: Modified independent  Putting On/Taking Off Footwear  Assistance Level: Minimal assistance  Skilled Clinical Factors: Assist with the application of compression sock on R leg. Patient was shown the bag method to acheive maximal results for independence when applying compression socks at home.  Toileting  Assistance Level: Modified independent  Skilled Clinical Factors: assist with grab bars  Toilet Transfers  Technique: Stand step  Equipment: Standard toilet  Assistance Level: Modified independent  Skilled Clinical Factors: assist withgrab bars  Tub/Shower Transfers  Type: Shower  Transfer From: Wheelchair  Transfer To: Shower chair with back  Additional Factors: Set-up  Assistance Level: Modified independent  Skilled Clinical Factors: assist with grab bars    Money Management   Patient engaged in Ex[facility] [employer]

## 2024-03-26 NOTE — PROGRESS NOTES
Independent (groceries delivered)  Homemaking Responsibilities: Yes  Meal Prep Responsibility: Primary (does not like premade meals/TV dinners so she only cooks and makes from scratch)  Laundry Responsibility: Primary (full flight to basement w/ 1 HR, pt will throw laundry in a bag down and will go down backwards. When coming up, she places clothes in a basket and goes up one step at a time)  Cleaning Responsibility: Primary (pt stated that she has started looking into hiring some help for cleaning)  Bill Paying/Finance Responsibility: Primary (online/over the phone/writes checks--has had issues with fraud)  Shopping Responsibility: Primary ([employer] delivers groceries--pt shops on smart phone)  Dependent Care Responsibility: Primary (spouse appears to have dementia?/memory deficits but has not been diagnosed and will not go to the doctor. Per pt, he has good and bad days, and needs help with ADLs.)  Health Care Management: Primary (uses pill organizer for self and spouse)  Ambulation Assistance: Independent (no AD or with ww)  Transfer Assistance: Independent  Active : No  Patient's  Info: neighbor will drive pt's van w/ pt for running errands  Mode of Transportation: Van  Education: 12th grade  Occupation: Retired  Type of Occupation: Management at [employer]  Leisure & Hobbies: painting bird houses, sewing  Additional Comments: Pt reports that she is the primary caregiver for spouse    Current Functional Status:  ADL  Equipment Provided: Reacher  Feeding: Independent  Grooming: Independent  UE Bathing: Independent  LE Bathing: Independent  UE Dressing: Independent  LE Dressing: Minimal assistance  Toileting: Independent  Functional Mobility: Modified independent   Toilet Transfers  Toilet - Technique: Stand step  Equipment Used: Standard toilet  Toilet Transfer: Independent  Tub Transfers  Tub - Transfer Type: To and From  Tub - Transfer To: Standing  Tub - Technique: Ambulating  Tub Transfers:  Independent  Shower Transfers  Shower - Transfer From: Wheelchair  Shower - Transfer Type: To and From  Shower - Transfer To: Shower seat with back  Shower - Technique: Stand pivot  Shower Transfers: Independent    Orientation Status:  Orientation  Overall Orientation Status: Within Functional Limits  Orientation Level: Oriented X4  Orientation  Overall Orientation Status: Within Normal Limits    Cognition Status:  Cognition  Overall Cognitive Status: [facility]  Cognition Comment: Comprehension-I, Expression-I, Social Interaction-I, Problem Solving-I, Memory-I    Perception Status:  Perception  Overall Perceptual Status: WFL    Sensation Status:  Sensation  Overall Sensation Status: [facility]  Light Touch: Partial deficits in the RUE, Partial deficits in the LUE, Severe deficits in the LLE, Severe deficits in the RLE  Sharp/Dull: Severe deficits in the RLE, Severe deficits in the LLE    Vision and Hearing Status:  Vision  Vision: Within Functional Limits  Vision Exceptions: Wears glasses for reading  Hearing  Hearing: Within functional limits  Hearing Exceptions: Hard of hearing/hearing concerns, No hearing aid     UE Function Status:    ROM:   LUE AROM (degrees)  LUE AROM : WFL  Left Hand AROM (degrees)  Left Hand AROM: WFL  RUE AROM (degrees)  RUE AROM : WFL  Right Hand AROM (degrees)  Right Hand AROM: WFL    Strength:  LUE Strength  Gross LUE Strength: WFL  RUE Strength  Gross RUE Strength: WFL    Coordination, Tone, Quality of Movement:   Tone RUE  RUE Tone: Normotonic  Tone LUE  LUE Tone: Normotonic  Coordination  Movements Are Fluid And Coordinated: Yes    D/C Recommendations:    Equipment Recommendations:  OT D/C Equipment  Equipment Needed: Yes  Equipment Recommended: Grab bars    OT Follow Up:  OT D/C RECOMMENDATIONS  REQUIRES OT FOLLOW-UP: No    Home Exercise Program Provided: [x] Yes [] No  If yes, type of HEP: UE strengthening HEP     Electronically signed by:    SUZANNE Trivedi/L,    3/26/2024, 4:49 PM

## 2024-03-27 NOTE — PROGRESS NOTES
Physical Therapy  Facility/Department: [facility]  Rehabilitation Discharge note    NAME: Lisseth Harper  : 1950  MRN: [id number]    Date of discharge: 3/25/24    Subjective: Pt reports she is ready for discharge    Past Medical History:   Diagnosis Date    A-fib (HCC)     Aplasia of adrenal gland     CAD (coronary artery disease)     Diabetes mellitus (HCC)     DJD (degenerative joint disease)     HLD (hyperlipidemia)     Hypertension     Hypothyroidism     Obesity     CHANA (obstructive sleep apnea)     RA (rheumatoid arthritis) (HCC)     Rotator cuff tendinitis     RIGHT     Past Surgical History:   Procedure Laterality Date    CARPAL TUNNEL RELEASE Left      SECTION      CHOLECYSTECTOMY      CORONARY ANGIOPLASTY WITH STENT PLACEMENT  2019    prior caths as well, total 7 stents    HIP CLOSED REDUCTION Left 2019    HIP CLOSED REDUCTION performed by Herman Galvez DO at [facility] OR    JOINT REPLACEMENT      BILAT KNEE, LEFT SHOULDER AND HIP AS WELL    PACEMAKER PLACEMENT         Restrictions  Restrictions/Precautions  Restrictions/Precautions: Fall Risk    Objective    Bed mobility  Rolling to Left: Independent  Rolling to Right: Independent  Supine to Sit: Independent  Sit to Supine: Independent     Transfers  Sit to Stand: Modified independent  Stand to Sit: Modified independent  Bed to Chair: Modified independent  Car Transfer: Modified independent     Ambulation  Surface: Level tile;Uneven;Carpet  Device: Rolling Walker  Assistance: Supervision  Quality of Gait: decreased stance on R LE, R foot drop, pt compensates to clear  Gait Deviations: Slow Karine  Distance: 150ft     Stairs/Curb  Stairs?: Yes  Stairs  # Steps : 4  Rails: Bilateral  Assistance: Supervision  Comment: B rails ascending.  B grasp on 1 rail descending.      Pt/ family education/training: Pt educated throughout stay in mobility and safety. Pt demonstrates good follow thru of education provided during stay on  rehab    Assessment:  Pt has made excellent gains. She has met all goals at this time    LTG established:  Long Term Goal 1: Pt will ambuate >50ft with LRD with stand by assistance-met  Long Term Goal 2: Pt will perform all transfers (bed, chair and car) independently-met  Long Term Goal 3: Pt will demonstrate indep and compliance with HEP-met  Long Term Goal 4: Pt will stand >2min with LRD indep-met  Long Term Goal 5: Pt will perform bed mobility indep.-met    Discharge Plan: d/c to home with follow up PT recommended        Electronically signed by Renee Alcala PT on 3/27/2024 at 7:34 AM

## 2024-04-10 ENCOUNTER — HOSPITAL ENCOUNTER (OUTPATIENT)
Dept: CARDIOLOGY | Facility: HOSPITAL | Age: 74
Discharge: HOME | End: 2024-04-10
Payer: COMMERCIAL

## 2024-04-10 DIAGNOSIS — I49.5 SICK SINUS SYNDROME (MULTI): ICD-10-CM

## 2024-04-10 DIAGNOSIS — Z95.0 PACEMAKER: Primary | ICD-10-CM

## 2024-04-10 DIAGNOSIS — Z95.0 PACEMAKER: ICD-10-CM

## 2024-04-10 PROCEDURE — 93296 REM INTERROG EVL PM/IDS: CPT

## 2024-04-10 PROCEDURE — 93294 REM INTERROG EVL PM/LDLS PM: CPT | Performed by: INTERNAL MEDICINE

## 2024-04-25 ENCOUNTER — LAB (OUTPATIENT)
Dept: LAB | Facility: LAB | Age: 74
End: 2024-04-25
Payer: MEDICARE

## 2024-04-25 DIAGNOSIS — I48.91 ATRIAL FIBRILLATION WITH RVR (MULTI): ICD-10-CM

## 2024-04-25 LAB
INR PPP: 1.4 (ref 0.9–1.1)
PROTHROMBIN TIME: 15.9 SECONDS (ref 9.8–12.8)

## 2024-04-25 PROCEDURE — 36415 COLL VENOUS BLD VENIPUNCTURE: CPT

## 2024-04-25 PROCEDURE — 85610 PROTHROMBIN TIME: CPT

## 2024-04-29 ENCOUNTER — ANTICOAGULATION - WARFARIN VISIT (OUTPATIENT)
Dept: CARDIOLOGY | Facility: CLINIC | Age: 74
End: 2024-04-29
Payer: COMMERCIAL

## 2024-05-10 ENCOUNTER — LAB (OUTPATIENT)
Dept: LAB | Facility: LAB | Age: 74
End: 2024-05-10
Payer: COMMERCIAL

## 2024-05-10 DIAGNOSIS — I48.91 ATRIAL FIBRILLATION WITH RVR (MULTI): ICD-10-CM

## 2024-05-10 LAB
INR PPP: 2.9 (ref 0.9–1.1)
PROTHROMBIN TIME: 32.8 SECONDS (ref 9.8–12.8)

## 2024-05-10 PROCEDURE — 85610 PROTHROMBIN TIME: CPT

## 2024-05-10 PROCEDURE — 36415 COLL VENOUS BLD VENIPUNCTURE: CPT

## 2024-05-13 ENCOUNTER — ANTICOAGULATION - WARFARIN VISIT (OUTPATIENT)
Dept: CARDIOLOGY | Facility: CLINIC | Age: 74
End: 2024-05-13
Payer: COMMERCIAL

## 2024-07-04 DIAGNOSIS — E03.9 HYPOTHYROIDISM, UNSPECIFIED TYPE: ICD-10-CM

## 2024-07-08 RX ORDER — LEVOTHYROXINE SODIUM 0.1 MG/1
100 TABLET ORAL DAILY
Qty: 30 TABLET | Refills: 0 | Status: SHIPPED | OUTPATIENT
Start: 2024-07-08

## 2024-07-09 ENCOUNTER — APPOINTMENT (OUTPATIENT)
Dept: CARDIOLOGY | Facility: HOSPITAL | Age: 74
End: 2024-07-09
Payer: COMMERCIAL

## 2024-07-09 ENCOUNTER — APPOINTMENT (OUTPATIENT)
Dept: CARDIOLOGY | Facility: CLINIC | Age: 74
End: 2024-07-09
Payer: COMMERCIAL

## 2024-07-12 ENCOUNTER — HOSPITAL ENCOUNTER (OUTPATIENT)
Dept: CARDIOLOGY | Facility: HOSPITAL | Age: 74
Discharge: HOME | End: 2024-07-12
Payer: COMMERCIAL

## 2024-07-12 ENCOUNTER — OFFICE VISIT (OUTPATIENT)
Dept: CARDIOLOGY | Facility: CLINIC | Age: 74
End: 2024-07-12
Payer: COMMERCIAL

## 2024-07-12 VITALS
WEIGHT: 233 LBS | HEART RATE: 72 BPM | DIASTOLIC BLOOD PRESSURE: 62 MMHG | BODY MASS INDEX: 34.51 KG/M2 | SYSTOLIC BLOOD PRESSURE: 102 MMHG | HEIGHT: 69 IN

## 2024-07-12 DIAGNOSIS — E78.2 MIXED HYPERLIPIDEMIA: ICD-10-CM

## 2024-07-12 DIAGNOSIS — Z98.890 HX OF ATRIOVENTRICULAR NODE ABLATION: ICD-10-CM

## 2024-07-12 DIAGNOSIS — E11.42 TYPE 2 DIABETES MELLITUS WITH DIABETIC POLYNEUROPATHY, UNSPECIFIED WHETHER LONG TERM INSULIN USE (MULTI): ICD-10-CM

## 2024-07-12 DIAGNOSIS — E66.9 OBESITY, CLASS I, BMI 30-34.9: ICD-10-CM

## 2024-07-12 DIAGNOSIS — Z98.61 CAD S/P PERCUTANEOUS CORONARY ANGIOPLASTY: ICD-10-CM

## 2024-07-12 DIAGNOSIS — I10 ESSENTIAL HYPERTENSION, BENIGN: ICD-10-CM

## 2024-07-12 DIAGNOSIS — Z71.89 ENCOUNTER FOR MEDICATION REVIEW AND COUNSELING: ICD-10-CM

## 2024-07-12 DIAGNOSIS — Z95.0 CARDIAC PACEMAKER IN SITU: ICD-10-CM

## 2024-07-12 DIAGNOSIS — I25.10 CAD S/P PERCUTANEOUS CORONARY ANGIOPLASTY: ICD-10-CM

## 2024-07-12 DIAGNOSIS — J44.9 CHRONIC OBSTRUCTIVE PULMONARY DISEASE, UNSPECIFIED COPD TYPE (MULTI): ICD-10-CM

## 2024-07-12 DIAGNOSIS — G47.33 OSA (OBSTRUCTIVE SLEEP APNEA): ICD-10-CM

## 2024-07-12 DIAGNOSIS — Z71.89 ENCOUNTER TO DISCUSS TREATMENT OPTIONS: ICD-10-CM

## 2024-07-12 DIAGNOSIS — Z87.891 FORMER SMOKER: ICD-10-CM

## 2024-07-12 DIAGNOSIS — Z79.01 LONG TERM CURRENT USE OF ANTICOAGULANT THERAPY: ICD-10-CM

## 2024-07-12 DIAGNOSIS — I48.91 ATRIAL FIBRILLATION WITH RVR (MULTI): ICD-10-CM

## 2024-07-12 DIAGNOSIS — I50.22 CHRONIC SYSTOLIC HEART FAILURE, ACC/AHA STAGE C (MULTI): ICD-10-CM

## 2024-07-12 DIAGNOSIS — I48.91 ATRIAL FIBRILLATION WITH RVR (MULTI): Primary | ICD-10-CM
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
## 2024-07-12 PROCEDURE — 3078F DIAST BP <80 MM HG: CPT | Performed by: INTERNAL MEDICINE

## 2024-07-12 PROCEDURE — 4010F ACE/ARB THERAPY RXD/TAKEN: CPT | Performed by: INTERNAL MEDICINE

## 2024-07-12 PROCEDURE — 1157F ADVNC CARE PLAN IN RCRD: CPT | Performed by: INTERNAL MEDICINE

## 2024-07-12 PROCEDURE — 99215 OFFICE O/P EST HI 40 MIN: CPT | Performed by: INTERNAL MEDICINE

## 2024-07-12 PROCEDURE — 3074F SYST BP LT 130 MM HG: CPT | Performed by: INTERNAL MEDICINE

## 2024-07-12 PROCEDURE — 93280 PM DEVICE PROGR EVAL DUAL: CPT

## 2024-07-12 PROCEDURE — 1036F TOBACCO NON-USER: CPT | Performed by: INTERNAL MEDICINE

## 2024-07-12 ASSESSMENT — ENCOUNTER SYMPTOMS
DYSPNEA ON EXERTION: 0
FALLS: 1
PALPITATIONS: 0

## 2024-07-12 NOTE — PATIENT INSTRUCTIONS
Continue same medications/treatment.  Patient educated on proper medication use.  Patient educated on risk factor modification.  Please bring any lab results from other providers/physicians to your next appointment.    Please bring all medicines, vitamins, and herbal supplements with you when you come to the office.    Prescriptions will not be filled unless you are compliant with your follow up appointments or have a follow up appointment scheduled as per instruction of your physician. Refills should be requested at the time of your visit.    REFER to Dr. Heath for possible watchman device.   Follow up with Ai in 6 months with device check  Continue remote checks at 3 and 9 months    ADELINE VIDAL RN, AM SCRIBING FOR AND IN THE PRESENCE OF DR. GABBY NEVILLE MD, FACC, FACP, FHRS

## 2024-07-12 NOTE — PROGRESS NOTES
"Chief Complaint:   Follow-up (With device check)     History Of Present Illness:    Yen Montoya is a 74 y.o. female presenting with follow-up.     She is accompanied by her family.    She reports that she walks in her socks at home.  She tripped over her sock and fell and had skin lacerations and bleeding.  She also has a history of anemia.    She denies any syncope or palpitation.    Last Recorded Vitals:  Vitals:    07/12/24 1244   BP: 102/62   BP Location: Left arm   Patient Position: Sitting   Pulse: 72   Weight: 106 kg (233 lb)   Height: 1.753 m (5' 9\")       Past Medical History:  See List     Past Surgical History:  See List      Social History:  She reports that she quit smoking about 36 years ago. Her smoking use included cigarettes. She has never used smokeless tobacco. She reports current alcohol use. She reports that she does not use drugs.    Family History:  Family History   Problem Relation Name Age of Onset    Other (ptca) Mother      Heart failure Father      Coronary artery disease Father      Breast cancer Sister          Allergies:  Adhesive tape-silicones, Latex, and Valdecoxib    Outpatient Medications:  Current Outpatient Medications   Medication Instructions    albuterol 90 mcg/actuation inhaler 2 puffs, inhalation, Every 6 hours PRN    aspirin 81 mg, oral, Daily    atorvastatin (LIPITOR) 80 mg, oral, Daily    azaTHIOprine (Imuran) 50 mg tablet 2 tablets, oral, Daily    b complex vitamins (Vitamins B Complex) capsule 1 capsule, oral, Daily    blood sugar diagnostic strip Use bid to check bs Dx e11.65    buPROPion XL (Wellbutrin XL) 300 mg 24 hr tablet 1 tablet, oral, Daily    cetirizine (ZyrTEC) 10 mg tablet 1 tablet, oral, Daily    cholecalciferol (Vitamin D-3) 5,000 Units tablet 1 tablet, oral, Daily    diclofenac sodium (Voltaren) 1 % gel gel APPLY 2 GRAMS TOPICALLY 4 TIMES DAILY TO AFFECTED AREA    dilTIAZem (CARDIZEM) 60 mg, oral, Every 8 hours    diphenoxylate-atropine (Lomotil) " 2.5-0.025 mg tablet 1 tablet, oral, 4 times daily PRN    folic acid 20 mg capsule 1 tablet, oral, Daily    furosemide (Lasix) 20 mg tablet Take one tablet daily.  Take an additional tablet daily as needed for a 3 pound weight gain or more over night or 5 pounds in 5 days.    INSULIN LISPRO SUBQ as directed    levothyroxine (Synthroid, Levoxyl) 100 mcg tablet 1 tablet, oral, Daily    lisinopril 10 mg tablet 1 tablet, oral, Daily    melatonin 5 mg, oral, Nightly PRN    metoprolol tartrate (LOPRESSOR) 100 mg, oral, 2 times daily    nitroglycerin (NITROSTAT) 0.4 mg, sublingual    vit A/vit C/vit E/zinc/copper (VITAMINS A,C,E-ZINC-COPPER ORAL) oral    warfarin (COUMADIN) 2 mg, oral, Daily, Or as directed per [facility]   Review of Systems   Cardiovascular:  Negative for chest pain, dyspnea on exertion and palpitations.   Musculoskeletal:  Positive for falls.        Mechanical   All other systems reviewed and are negative.        Physical Exam:  Constitutional:       General: Awake.      Appearance: Normal and healthy appearance. Well-developed and not in distress.   Neck:      Vascular: No JVR. JVD normal.   Pulmonary:      Effort: Pulmonary effort is normal.      Breath sounds: Normal breath sounds. No wheezing. No rhonchi. No rales.   Chest:      Chest wall: Not tender to palpatation.      Comments: Left sided device pocket- healed and well approximated. No swelling or hematoma      Cardiovascular:      PMI at left midclavicular line. Normal rate. Regular rhythm. Normal S1. Normal S2.       Murmurs: There is no murmur.      No gallop.  No click. No rub.   Pulses:     Intact distal pulses.   Edema:     Peripheral edema absent.   Abdominal:      Tenderness: There is no abdominal tenderness.   Musculoskeletal: Normal range of motion.         General: No tenderness. Skin:     General: Skin is warm and dry.   Neurological:      General: No focal deficit present.      Mental Status: Alert and oriented to person, place  and time.            Last Labs:  CBC -  Lab Results   Component Value Date    WBC 6.1 08/18/2023    HGB 12.8 08/18/2023    HCT 38.6 08/18/2023     (H) 08/18/2023     08/18/2023       CMP -  Lab Results   Component Value Date    CALCIUM 9.9 08/23/2023    PHOS 3.1 02/22/2021    PROT 6.4 07/22/2022    ALBUMIN 3.9 07/22/2022    AST 25 07/22/2022    ALT 11 07/22/2022    ALKPHOS 83 07/22/2022    BILITOT 1.1 07/22/2022       LIPID PANEL -   Lab Results   Component Value Date    CHOL 173 09/29/2021    TRIG 241 (H) 09/29/2021    HDL 41.0 09/29/2021    CHHDL 4.2 09/29/2021    LDLF 84 09/29/2021    VLDL 48 (H) 09/29/2021    NHDL 132 09/29/2021       RENAL FUNCTION PANEL -   Lab Results   Component Value Date    GLUCOSE 137 (H) 08/23/2023     08/23/2023    K 3.7 08/23/2023     (H) 08/23/2023    CO2 24 08/23/2023    ANIONGAP 13 08/23/2023    BUN 16 08/23/2023    CREATININE 0.94 08/23/2023    CALCIUM 9.9 08/23/2023    PHOS 3.1 02/22/2021    ALBUMIN 3.9 07/22/2022        Lab Results   Component Value Date     (H) 07/22/2022    HGBA1C 7.0 (H) 03/13/2024    HGBA1C 6.1 (H) 12/09/2022       Last Cardiology Tests:  ECG:    Jan 2024.  Atrial fibrillation.  Appropriate ventricular pacing.    Device check today.  [employer] 2272 pacemaker.  Estimated longevity device over 9 years.  Complete heart block.  100% ventricular pacing.  All rhythms atrial fibrillation.    Lab review: I have personally reviewed the laboratory result(s) see above    Assessment/Plan   Diagnoses and all orders for this visit:  Cardiac pacemaker in situ  Hx of atrioventricular node ablation  Atrial fibrillation with RVR (Multi)  Essential hypertension, benign  Mixed hyperlipidemia  Chronic systolic heart failure, ACC/AHA stage C (Multi)  Type 2 diabetes mellitus with diabetic polyneuropathy, unspecified whether long term insulin use (Multi)  CAD S/P percutaneous coronary angioplasty  Chronic obstructive pulmonary disease, unspecified  COPD type (Multi)  Long term current use of anticoagulant therapy  SAMAN (obstructive sleep apnea)  Former smoker  Obesity, Class I, BMI 30-34.9  Encounter for medication review and counseling  Encounter to discuss treatment options    Tiffanie Mathis RN                                Shared Decision Tool - Referral for Left Atrial Appendage Occlusion    My patient Yen Montoya 1950 has been evaluated by me and is referred to [facility] for a left atrial appendage implant due thromboembolic stroke risk from atrial fibrillation with CHADS2 score >= 2 or a QVC2UE3-REYr score >= 3.    This patient is not a good candidate for long term anticoagulation for the following reason(s):    This patient however should be able to tolerate short term anticoagulation as necessary for LAAO device implant.  My Patient and I, the referring physician, have reviewed the following:  Yes  Atrial Fibrillation increases the risk of stroke.  Yes Anticoagulants or blood thinners help reduce the risk of stroke for most people.  Yes    Blood thinners may cause minor or serious bleeding issues.  Yes  Blood thinners include the medications aspirin, warfarin, and NOAC (dabigatran, edoxaban, rivaroxaban, apixaban...), each with unique risk and safety profiles.  Yes We reviewed his/her anticoagulation history and previous experiences.  Yes We discussed lack of other alternative treatments available to prevent stroke risk.  Yes We discussed the LAAO device implant vs taking anticoagulation to reduce stroke risk.  Yes We referred the patient to a LAAO outpatient clinic for further explanation and consideration.          Yes The LAAO device has been adequately explained along with the risks and benefits of treatment. Alternative treatment has been discussed with all questions answered. After discussion of the above considerations, it has been decided to be evaluated for the LAAO therapies.    Reviewed and approved by Dr. Maria L Gracia on 7/12/24 at  1:01 PM.       Permanent atrial fibrillation. Rapid ventricular rate. Remains tachycardic despite high-dose AV dragan medications. LVEF 44 5% prior echocardiogram 2022. Status post AV node ablation and pacemaker 2023  Complete heart block.  Pacemaker dependent.  [employer] 2272 pacemaker.  Normal device function.  Discussed device follow-up.  Ordered device checks.  Alternate remote checks with device clinic paired with EP office visit.  High-risk medication-warfarin.  Recent fall.  Also prone to falls.  Has history of anemia in the past.  Will refer to Dr. Heath for evaluation for Watchman device.  Model of  heart and Watchman device reviewed with patient and family.  Coronary artery disease. Chronic. Stable. Status post PCI stent proximal circumflex coronary artery 2015 and PCI stent right PLVB 2019 with left heart catheterization 2021 with 10% left main, 10 to 30% proximal and mid LAD, patent circumflex stent, 10 to 30% mid circumflex and mid and distal RCA stenosis. Asymptomatic on medications.  Continue medications.  Refills discussed  Fall without syncope.  See above regarding referral to Dr. Heath for watchman.  Chronic systolic heart failure. Stable. NYHA IIc heart failure. LVEF 40 to 45% by echocardiogram July 2022. Follows with Dr. Tolbert for adjustment medication. Discussed meds.  Refills discussed  Hypertension, controlled, chronic. Stable. Reviewed medications.  Refills discussed  Hyperlipidemia. Chronic. Stable. Reviewed medication. On statin.   COPD. Tolerates metoprolol.  Follows with pulmonary   Diabetes mellitus. Chronic. Stable.  Hypothyroidism. Stable. Chronic.   Peripheral neuropathy on gabapentin. Stable. Chronic.  Obstructive sleep apnea. Discussed association of sleep apnea and arrhythmia. Discussed compliance with CPAP.  Osteoporosis status post remote bilateral knee replacement surgeries, left total hip replacement surgery, and left shoulder replacement surgery.  Anxiety  disorder.  Overweight   AHA recommendations for exercise, diet, and behavioral modification reviewed with pt.     Counseling over 50% visit performed.The patient, family, and I discussed the mechanism of arrhythmia, atrial fibrillation, ECG, heart block, pacemaker, anticoagulation and falls, watchman evaluation, referral to Dr. Heath, discussion if and what medication refills needed, treatment options, risks, benefits, and imponderables. American Heart Association lifestyle changes and behavioral modification discussed. All questions answered in detail.  Patient appreciative of care.

## 2024-07-15 ENCOUNTER — TELEPHONE (OUTPATIENT)
Dept: CARDIOLOGY | Facility: CLINIC | Age: 74
End: 2024-07-15
Payer: COMMERCIAL

## 2024-07-15 DIAGNOSIS — Z95.0 CARDIAC PACEMAKER IN SITU: Primary | ICD-10-CM

## 2024-08-06 DIAGNOSIS — E03.9 HYPOTHYROIDISM, UNSPECIFIED TYPE: ICD-10-CM

## 2024-08-06 RX ORDER — LEVOTHYROXINE SODIUM 100 UG/1
100 TABLET ORAL DAILY
Qty: 30 TABLET | Refills: 0 | OUTPATIENT
Start: 2024-08-06

## 2024-09-27 DIAGNOSIS — I48.91 ATRIAL FIBRILLATION WITH RVR (MULTI): ICD-10-CM

## 2024-09-27 NOTE — PROGRESS NOTES
Modifying standing INR order from [facility] provider for [facility].   Pending for provider signature

## 2024-10-15 ENCOUNTER — HOSPITAL ENCOUNTER (OUTPATIENT)
Dept: CARDIOLOGY | Facility: HOSPITAL | Age: 74
Discharge: HOME | End: 2024-10-15
Payer: COMMERCIAL

## 2024-10-15 DIAGNOSIS — I49.5 SICK SINUS SYNDROME (MULTI): ICD-10-CM

## 2024-10-15 DIAGNOSIS — Z95.0 PACEMAKER: ICD-10-CM

## 2024-10-15 PROCEDURE — 93296 REM INTERROG EVL PM/IDS: CPT

## 2024-11-02 ENCOUNTER — APPOINTMENT (OUTPATIENT)
Dept: CT IMAGING | Age: 74
End: 2024-11-02
Payer: COMMERCIAL

## 2024-11-02 ENCOUNTER — HOSPITAL ENCOUNTER (EMERGENCY)
Age: 74
Discharge: ANOTHER HOSPITAL | End: 2024-11-03
Attending: STUDENT IN AN ORGANIZED HEALTH CARE EDUCATION/TRAINING PROGRAM
Payer: COMMERCIAL

## 2024-11-02 DIAGNOSIS — N30.01 ACUTE CYSTITIS WITH HEMATURIA: ICD-10-CM

## 2024-11-02 DIAGNOSIS — N20.0 KIDNEY STONE: Primary | ICD-10-CM

## 2024-11-02 LAB
ALBUMIN SERPL-MCNC: 3.9 G/DL (ref 3.5–4.6)
ALP SERPL-CCNC: 72 U/L (ref 40–130)
ALT SERPL-CCNC: 7 U/L (ref 0–33)
ANION GAP SERPL CALCULATED.3IONS-SCNC: 14 MEQ/L (ref 9–15)
ANISOCYTOSIS BLD QL SMEAR: ABNORMAL
AST SERPL-CCNC: 15 U/L (ref 0–35)
BASOPHILS # BLD: 0 K/UL (ref 0–0.2)
BASOPHILS NFR BLD: 0.3 %
BILIRUB SERPL-MCNC: 0.8 MG/DL (ref 0.2–0.7)
BUN SERPL-MCNC: 15 MG/DL (ref 8–23)
CALCIUM SERPL-MCNC: 10 MG/DL (ref 8.5–9.9)
CHLORIDE SERPL-SCNC: 99 MEQ/L (ref 95–107)
CO2 SERPL-SCNC: 23 MEQ/L (ref 20–31)
CREAT SERPL-MCNC: 1.41 MG/DL (ref 0.5–0.9)
EOSINOPHIL # BLD: 0 K/UL (ref 0–0.7)
EOSINOPHIL NFR BLD: 0.4 %
ERYTHROCYTE [DISTWIDTH] IN BLOOD BY AUTOMATED COUNT: 13.5 % (ref 11.5–14.5)
GLOBULIN SER CALC-MCNC: 2.9 G/DL (ref 2.3–3.5)
GLUCOSE SERPL-MCNC: 175 MG/DL (ref 70–99)
HCT VFR BLD AUTO: 39.3 % (ref 37–47)
HGB BLD-MCNC: 13 G/DL (ref 12–16)
INR PPP: 1.1
LACTIC ACID, SEPSIS: 3.2 MMOL/L (ref 0.5–1.9)
LYMPHOCYTES # BLD: 1.2 K/UL (ref 1–4.8)
LYMPHOCYTES NFR BLD: 5 %
MCH RBC QN AUTO: 32.7 PG (ref 27–31.3)
MCHC RBC AUTO-ENTMCNC: 33.1 % (ref 33–37)
MCV RBC AUTO: 98.7 FL (ref 79.4–94.8)
MONOCYTES # BLD: 0.4 K/UL (ref 0.2–0.8)
MONOCYTES NFR BLD: 1.9 %
NEUTROPHILS # BLD: 18.1 K/UL (ref 1.4–6.5)
NEUTS BAND NFR BLD MANUAL: 9 % (ref 5–11)
NEUTS SEG NFR BLD: 84 %
PLATELET # BLD AUTO: 215 K/UL (ref 130–400)
PLATELET BLD QL SMEAR: ADEQUATE
POC CREATININE WHOLE BLOOD: 1.4
POIKILOCYTOSIS BLD QL SMEAR: ABNORMAL
POLYCHROMASIA BLD QL SMEAR: ABNORMAL
POTASSIUM SERPL-SCNC: 4.2 MEQ/L (ref 3.4–4.9)
PROT SERPL-MCNC: 6.8 G/DL (ref 6.3–8)
PROTHROMBIN TIME: 14.3 SEC (ref 12.3–14.9)
RBC # BLD AUTO: 3.98 M/UL (ref 4.2–5.4)
SLIDE REVIEW: ABNORMAL
SMUDGE CELLS BLD QL SMEAR: 4.8
SODIUM SERPL-SCNC: 136 MEQ/L (ref 135–144)
VARIANT LYMPHS NFR BLD: 1 %
WBC # BLD AUTO: 19.5 K/UL (ref 4.8–10.8)

## 2024-11-02 PROCEDURE — 80053 COMPREHEN METABOLIC PANEL: CPT

## 2024-11-02 PROCEDURE — 83605 ASSAY OF LACTIC ACID: CPT

## 2024-11-02 PROCEDURE — 96361 HYDRATE IV INFUSION ADD-ON: CPT

## 2024-11-02 PROCEDURE — 6360000004 HC RX CONTRAST MEDICATION: Performed by: PHYSICIAN ASSISTANT

## 2024-11-02 PROCEDURE — 85025 COMPLETE CBC W/AUTO DIFF WBC: CPT

## 2024-11-02 PROCEDURE — 74177 CT ABD & PELVIS W/CONTRAST: CPT

## 2024-11-02 PROCEDURE — 87186 SC STD MICRODIL/AGAR DIL: CPT

## 2024-11-02 PROCEDURE — 6370000000 HC RX 637 (ALT 250 FOR IP): Performed by: PHYSICIAN ASSISTANT

## 2024-11-02 PROCEDURE — 2580000003 HC RX 258: Performed by: PHYSICIAN ASSISTANT

## 2024-11-02 PROCEDURE — 6360000002 HC RX W HCPCS: Performed by: PHYSICIAN ASSISTANT

## 2024-11-02 PROCEDURE — 96374 THER/PROPH/DIAG INJ IV PUSH: CPT

## 2024-11-02 PROCEDURE — 85610 PROTHROMBIN TIME: CPT

## 2024-11-02 PROCEDURE — 96375 TX/PRO/DX INJ NEW DRUG ADDON: CPT

## 2024-11-02 PROCEDURE — 70450 CT HEAD/BRAIN W/O DYE: CPT

## 2024-11-02 PROCEDURE — 81001 URINALYSIS AUTO W/SCOPE: CPT

## 2024-11-02 PROCEDURE — 36415 COLL VENOUS BLD VENIPUNCTURE: CPT

## 2024-11-02 PROCEDURE — 87150 DNA/RNA AMPLIFIED PROBE: CPT

## 2024-11-02 PROCEDURE — 99285 EMERGENCY DEPT VISIT HI MDM: CPT

## 2024-11-02 PROCEDURE — 87040 BLOOD CULTURE FOR BACTERIA: CPT

## 2024-11-02 RX ORDER — ONDANSETRON 2 MG/ML
4 INJECTION INTRAMUSCULAR; INTRAVENOUS ONCE
Status: COMPLETED | OUTPATIENT
Start: 2024-11-02 | End: 2024-11-02

## 2024-11-02 RX ORDER — 0.9 % SODIUM CHLORIDE 0.9 %
500 INTRAVENOUS SOLUTION INTRAVENOUS ONCE
Status: COMPLETED | OUTPATIENT
Start: 2024-11-02 | End: 2024-11-02

## 2024-11-02 RX ORDER — IOPAMIDOL 755 MG/ML
75 INJECTION, SOLUTION INTRAVASCULAR
Status: COMPLETED | OUTPATIENT
Start: 2024-11-02 | End: 2024-11-02

## 2024-11-02 RX ORDER — 0.9 % SODIUM CHLORIDE 0.9 %
500 INTRAVENOUS SOLUTION INTRAVENOUS ONCE
Status: COMPLETED | OUTPATIENT
Start: 2024-11-02 | End: 2024-11-03

## 2024-11-02 RX ORDER — ACETAMINOPHEN 500 MG
1000 TABLET ORAL ONCE
Status: COMPLETED | OUTPATIENT
Start: 2024-11-02 | End: 2024-11-02

## 2024-11-02 RX ADMIN — ACETAMINOPHEN 1000 MG: 500 TABLET ORAL at 21:42

## 2024-11-02 RX ADMIN — ONDANSETRON 4 MG: 2 INJECTION, SOLUTION INTRAMUSCULAR; INTRAVENOUS at 21:40

## 2024-11-02 RX ADMIN — IOPAMIDOL 75 ML: 755 INJECTION, SOLUTION INTRAVENOUS at 22:01

## 2024-11-02 RX ADMIN — WATER 1000 MG: 1 INJECTION INTRAMUSCULAR; INTRAVENOUS; SUBCUTANEOUS at 22:26

## 2024-11-02 RX ADMIN — SODIUM CHLORIDE 500 ML: 9 INJECTION, SOLUTION INTRAVENOUS at 21:38

## 2024-11-02 RX ADMIN — SODIUM CHLORIDE 500 ML: 9 INJECTION, SOLUTION INTRAVENOUS at 23:25

## 2024-11-02 ASSESSMENT — PAIN SCALES - GENERAL: PAINLEVEL_OUTOF10: 4

## 2024-11-03 ENCOUNTER — APPOINTMENT (OUTPATIENT)
Dept: RADIOLOGY | Facility: HOSPITAL | Age: 74
DRG: 854 | End: 2024-11-03
Payer: COMMERCIAL

## 2024-11-03 ENCOUNTER — ANESTHESIA EVENT (OUTPATIENT)
Dept: OPERATING ROOM | Facility: HOSPITAL | Age: 74
End: 2024-11-03
Payer: COMMERCIAL

## 2024-11-03 ENCOUNTER — ANESTHESIA (OUTPATIENT)
Dept: OPERATING ROOM | Facility: HOSPITAL | Age: 74
End: 2024-11-03
Payer: COMMERCIAL

## 2024-11-03 ENCOUNTER — HOSPITAL ENCOUNTER (INPATIENT)
Facility: HOSPITAL | Age: 74
End: 2024-11-03
Attending: INTERNAL MEDICINE | Admitting: INTERNAL MEDICINE
Payer: COMMERCIAL

## 2024-11-03 ENCOUNTER — APPOINTMENT (OUTPATIENT)
Dept: CARDIOLOGY | Facility: HOSPITAL | Age: 74
DRG: 854 | End: 2024-11-03
Payer: COMMERCIAL

## 2024-11-03 VITALS
OXYGEN SATURATION: 96 % | WEIGHT: 228.84 LBS | HEART RATE: 70 BPM | TEMPERATURE: 97.2 F | SYSTOLIC BLOOD PRESSURE: 116 MMHG | HEIGHT: 69 IN | BODY MASS INDEX: 33.89 KG/M2 | DIASTOLIC BLOOD PRESSURE: 71 MMHG | RESPIRATION RATE: 16 BRPM

## 2024-11-03 VITALS
SYSTOLIC BLOOD PRESSURE: 147 MMHG | TEMPERATURE: 98.3 F | BODY MASS INDEX: 33.33 KG/M2 | HEART RATE: 71 BPM | WEIGHT: 225 LBS | RESPIRATION RATE: 30 BRPM | OXYGEN SATURATION: 97 % | DIASTOLIC BLOOD PRESSURE: 62 MMHG | HEIGHT: 69 IN

## 2024-11-03 DIAGNOSIS — F41.9 ANXIETY: ICD-10-CM

## 2024-11-03 DIAGNOSIS — I49.9 CARDIAC ARRHYTHMIA, UNSPECIFIED CARDIAC ARRHYTHMIA TYPE: ICD-10-CM

## 2024-11-03 DIAGNOSIS — N13.2 HYDRONEPHROSIS WITH URINARY OBSTRUCTION DUE TO RENAL CALCULUS: ICD-10-CM

## 2024-11-03 DIAGNOSIS — I10 PRIMARY HYPERTENSION: ICD-10-CM

## 2024-11-03 DIAGNOSIS — N30.01 ACUTE CYSTITIS WITH HEMATURIA: ICD-10-CM

## 2024-11-03 DIAGNOSIS — Z79.01 ANTICOAGULATION MANAGEMENT ENCOUNTER: ICD-10-CM

## 2024-11-03 DIAGNOSIS — N20.1 URETERAL CALCULUS, RIGHT: ICD-10-CM

## 2024-11-03 DIAGNOSIS — Z51.81 ANTICOAGULATION MANAGEMENT ENCOUNTER: ICD-10-CM

## 2024-11-03 DIAGNOSIS — B96.1 BACTEREMIA DUE TO KLEBSIELLA PNEUMONIAE: ICD-10-CM

## 2024-11-03 DIAGNOSIS — R78.81 BACTEREMIA DUE TO KLEBSIELLA PNEUMONIAE: ICD-10-CM

## 2024-11-03 DIAGNOSIS — R19.5 LOOSE STOOLS: ICD-10-CM

## 2024-11-03 DIAGNOSIS — I10 ESSENTIAL HYPERTENSION, BENIGN: Primary | ICD-10-CM

## 2024-11-03 PROBLEM — D72.829 LEUKOCYTOSIS: Status: ACTIVE | Noted: 2024-11-03

## 2024-11-03 PROBLEM — R79.89 ELEVATED TROPONIN I LEVEL: Status: ACTIVE | Noted: 2024-11-03

## 2024-11-03 PROBLEM — Q62.11 HYDRONEPHROSIS WITH URETEROPELVIC JUNCTION (UPJ) OBSTRUCTION: Status: ACTIVE | Noted: 2024-11-03

## 2024-11-03 PROBLEM — E83.42 HYPOMAGNESEMIA: Status: ACTIVE | Noted: 2024-11-03

## 2024-11-03 PROBLEM — N17.9 ACUTE KIDNEY INJURY (CMS-HCC): Status: ACTIVE | Noted: 2024-11-03

## 2024-11-03 LAB
A BAUMANNII DNA BLD POS QL NAA+NON-PROBE: NOT DETECTED
ALBUMIN SERPL BCP-MCNC: 3.4 G/DL (ref 3.4–5)
ALP SERPL-CCNC: 49 U/L (ref 33–136)
ALT SERPL W P-5'-P-CCNC: 9 U/L (ref 7–45)
ANION GAP SERPL CALC-SCNC: 15 MMOL/L (ref 10–20)
AST SERPL W P-5'-P-CCNC: 18 U/L (ref 9–39)
BACTERIA BLD CULT ORG #2: ABNORMAL
BACTERIA BLD CULT: ABNORMAL
BACTERIA URNS QL MICRO: ABNORMAL /HPF
BASOPHILS # BLD AUTO: 0.08 X10*3/UL (ref 0–0.1)
BASOPHILS NFR BLD AUTO: 0.4 %
BILIRUB SERPL-MCNC: 0.9 MG/DL (ref 0–1.2)
BILIRUB UR QL STRIP: NEGATIVE
BLACTX-M ISLT/SPM QL: NOT DETECTED
BLAIMP ISLT/SPM QL: NOT DETECTED
BLAKPC ISLT/SPM QL: NOT DETECTED
BLAVIM ISLT/SPM QL: NOT DETECTED
BUN SERPL-MCNC: 18 MG/DL (ref 6–23)
C ALBICANS DNA BLD POS QL NAA+NON-PROBE: NOT DETECTED
C AURIS DNA BLD POS QL NAA+PROBE: NOT DETECTED
C GLABRATA DNA BLD POS QL NAA+NON-PROBE: NOT DETECTED
C KRUSEI DNA BLD POS QL NAA+NON-PROBE: NOT DETECTED
C PARAP DNA BLD POS QL NAA+NON-PROBE: NOT DETECTED
C TROPICLS DNA BLD POS QL NAA+NON-PROBE: NOT DETECTED
CALCIUM SERPL-MCNC: 9.3 MG/DL (ref 8.6–10.3)
CARBAPENEM RESISTANCE NDM GENE BY PCR: NOT DETECTED
CARBAPENEM RESISTANCE OXA-48 GENE BY PCR: NOT DETECTED
CARDIAC TROPONIN I PNL SERPL HS: 17 NG/L (ref 0–13)
CARDIAC TROPONIN I PNL SERPL HS: 17 NG/L (ref 0–13)
CHLORIDE SERPL-SCNC: 103 MMOL/L (ref 98–107)
CLARITY UR: ABNORMAL
CO2 SERPL-SCNC: 22 MMOL/L (ref 21–32)
COLISTIN RES MCR-1 ISLT/SPM QL: NOT DETECTED
COLOR UR: ABNORMAL
CREAT SERPL-MCNC: 1.3 MG/DL (ref 0.5–1.05)
CRYPTOCOCCUS NEOFORMANS/GATTII BY PCR: NOT DETECTED
E CLOAC COMP DNA BLD POS NAA+NON-PROBE: NOT DETECTED
E COLI DNA BLD POS QL NAA+NON-PROBE: NOT DETECTED
E FAECALIS DNA BLD POS QL NAA+PROBE: NOT DETECTED
E FAECIUM DNA BLD POS QL NAA+PROBE: NOT DETECTED
EGFRCR SERPLBLD CKD-EPI 2021: 44 ML/MIN/1.73M*2
ENTEROBACT DNA BLD POS QL NAA+NON-PROBE: DETECTED
ENTEROCOC DNA BLD POS QL NAA+NON-PROBE: NOT DETECTED
EOSINOPHIL # BLD AUTO: 0.03 X10*3/UL (ref 0–0.4)
EOSINOPHIL NFR BLD AUTO: 0.1 %
EPI CELLS #/AREA URNS HPF: ABNORMAL /HPF
ERYTHROCYTE [DISTWIDTH] IN BLOOD BY AUTOMATED COUNT: 13.9 % (ref 11.5–14.5)
GLUCOSE BLD MANUAL STRIP-MCNC: 127 MG/DL (ref 74–99)
GLUCOSE BLD MANUAL STRIP-MCNC: 142 MG/DL (ref 74–99)
GLUCOSE BLD MANUAL STRIP-MCNC: 146 MG/DL (ref 74–99)
GLUCOSE BLD MANUAL STRIP-MCNC: 147 MG/DL (ref 74–99)
GLUCOSE BLD MANUAL STRIP-MCNC: 196 MG/DL (ref 74–99)
GLUCOSE SERPL-MCNC: 144 MG/DL (ref 74–99)
GLUCOSE UR STRIP-MCNC: NEGATIVE MG/DL
GN BLD CULTURE PNL BLD POS NAA+PROBE: NOT DETECTED
GP B STREP DNA BLD POS QL NAA+NON-PROBE: NOT DETECTED
HCT VFR BLD AUTO: 39.1 % (ref 36–46)
HGB BLD-MCNC: 12.7 G/DL (ref 12–16)
HGB UR QL STRIP: ABNORMAL
HOLD SPECIMEN: NORMAL
HOLD SPECIMEN: NORMAL
IMM GRANULOCYTES # BLD AUTO: 0.39 X10*3/UL (ref 0–0.5)
IMM GRANULOCYTES NFR BLD AUTO: 1.9 % (ref 0–0.9)
INR PPP: 1.5 (ref 0.9–1.1)
K OXYTOCA DNA BLD POS QL NAA+NON-PROBE: NOT DETECTED
K PNEUMON DNA SPEC QL NAA+PROBE: DETECTED
K. AEROGENES DNA SPEC QL NAA+PROBE: NOT DETECTED
KETONES UR STRIP-MCNC: NEGATIVE MG/DL
L MONOCYTOG DNA BLD POS QL NAA+NON-PROBE: NOT DETECTED
LACTATE SERPL-SCNC: 2 MMOL/L (ref 0.4–2)
LACTIC ACID, SEPSIS: 1.8 MMOL/L (ref 0.5–1.9)
LEUKOCYTE ESTERASE UR QL STRIP: ABNORMAL
LYMPHOCYTES # BLD AUTO: 0.92 X10*3/UL (ref 0.8–3)
LYMPHOCYTES NFR BLD AUTO: 4.6 %
MAGNESIUM SERPL-MCNC: 1.3 MG/DL (ref 1.6–2.4)
MCH RBC QN AUTO: 33.2 PG (ref 26–34)
MCHC RBC AUTO-ENTMCNC: 32.5 G/DL (ref 32–36)
MCV RBC AUTO: 102 FL (ref 80–100)
MONOCYTES # BLD AUTO: 1.61 X10*3/UL (ref 0.05–0.8)
MONOCYTES NFR BLD AUTO: 8 %
N MEN DNA BLD POS QL NAA+NON-PROBE: NOT DETECTED
NEUTROPHILS # BLD AUTO: 17.15 X10*3/UL (ref 1.6–5.5)
NEUTROPHILS NFR BLD AUTO: 85 %
NITRITE UR QL STRIP: POSITIVE
NRBC BLD-RTO: 0 /100 WBCS (ref 0–0)
P AERUGINOSA DNA BLD POS NAA+NON-PROBE: NOT DETECTED
PERFORMED ON: ABNORMAL
PH UR STRIP: 6 [PH] (ref 5–9)
PHOSPHATE SERPL-MCNC: 3.4 MG/DL (ref 2.5–4.9)
PLATELET # BLD AUTO: 173 X10*3/UL (ref 150–450)
POC CREATININE: 1.4 MG/DL (ref 0.6–1.2)
POC SAMPLE TYPE: ABNORMAL
POTASSIUM SERPL-SCNC: 4.2 MMOL/L (ref 3.5–5.3)
PROT SERPL-MCNC: 5.8 G/DL (ref 6.4–8.2)
PROT UR STRIP-MCNC: >=300 MG/DL
PROTEUS SP DNA BLD POS QL NAA+NON-PROBE: NOT DETECTED
PROTHROMBIN TIME: 16.9 SECONDS (ref 9.8–12.8)
RBC # BLD AUTO: 3.83 X10*6/UL (ref 4–5.2)
RBC #/AREA URNS HPF: ABNORMAL /HPF (ref 0–2)
S AUREUS DNA BLD POS QL NAA+NON-PROBE: NOT DETECTED
S AUREUS+CONS DNA BLD POS NAA+NON-PROBE: NOT DETECTED
S EPIDERMIDIS DNA BLD POS QL NAA+PROBE: NOT DETECTED
S LUGDUNENSIS DNA BLD POS QL NAA+PROBE: NOT DETECTED
S MALTOPH DNA BLD POS QL NAA+PROBE: NOT DETECTED
S MARCESCENS DNA BLD POS NAA+NON-PROBE: NOT DETECTED
S PNEUM DNA BLD POS QL NAA+NON-PROBE: NOT DETECTED
S PYO DNA BLD POS QL NAA+NON-PROBE: NOT DETECTED
SALMONELLA DNA BLD POS QL NAA+PROBE: NOT DETECTED
SODIUM SERPL-SCNC: 136 MMOL/L (ref 136–145)
SP GR UR STRIP: 1.02 (ref 1–1.03)
STREPTOCOCCUS DNA BLD POS NAA+NON-PROBE: NOT DETECTED
URINE REFLEX TO CULTURE: YES
UROBILINOGEN UR STRIP-ACNC: 0.2 E.U./DL
WBC # BLD AUTO: 20.2 X10*3/UL (ref 4.4–11.3)
WBC #/AREA URNS HPF: >100 /HPF (ref 0–5)

## 2024-11-03 PROCEDURE — 84484 ASSAY OF TROPONIN QUANT: CPT | Performed by: NURSE PRACTITIONER

## 2024-11-03 PROCEDURE — 99100 ANES PT EXTEME AGE<1 YR&>70: CPT | Performed by: ANESTHESIOLOGY

## 2024-11-03 PROCEDURE — 93010 ELECTROCARDIOGRAM REPORT: CPT | Performed by: INTERNAL MEDICINE

## 2024-11-03 PROCEDURE — 2500000001 HC RX 250 WO HCPCS SELF ADMINISTERED DRUGS (ALT 637 FOR MEDICARE OP): Performed by: NURSE PRACTITIONER

## 2024-11-03 PROCEDURE — 85025 COMPLETE CBC W/AUTO DIFF WBC: CPT | Performed by: NURSE PRACTITIONER

## 2024-11-03 PROCEDURE — 2500000004 HC RX 250 GENERAL PHARMACY W/ HCPCS (ALT 636 FOR OP/ED): Performed by: NURSE PRACTITIONER

## 2024-11-03 PROCEDURE — 7100000002 HC RECOVERY ROOM TIME - EACH INCREMENTAL 1 MINUTE: Performed by: UROLOGY

## 2024-11-03 PROCEDURE — 85610 PROTHROMBIN TIME: CPT | Performed by: NURSE PRACTITIONER

## 2024-11-03 PROCEDURE — 3600000008 HC OR TIME - EACH INCREMENTAL 1 MINUTE - PROCEDURE LEVEL THREE: Performed by: UROLOGY

## 2024-11-03 PROCEDURE — 3700000002 HC GENERAL ANESTHESIA TIME - EACH INCREMENTAL 1 MINUTE: Performed by: UROLOGY

## 2024-11-03 PROCEDURE — 87086 URINE CULTURE/COLONY COUNT: CPT

## 2024-11-03 PROCEDURE — A52320 PR CYSTOSCOPY,REMV URETERAL STONE: Performed by: ANESTHESIOLOGIST ASSISTANT

## 2024-11-03 PROCEDURE — 84450 TRANSFERASE (AST) (SGOT): CPT | Performed by: NURSE PRACTITIONER

## 2024-11-03 PROCEDURE — 82365 CALCULUS SPECTROSCOPY: CPT | Performed by: UROLOGY

## 2024-11-03 PROCEDURE — 3600000003 HC OR TIME - INITIAL BASE CHARGE - PROCEDURE LEVEL THREE: Performed by: UROLOGY

## 2024-11-03 PROCEDURE — 2500000005 HC RX 250 GENERAL PHARMACY W/O HCPCS: Performed by: NURSE PRACTITIONER

## 2024-11-03 PROCEDURE — 36415 COLL VENOUS BLD VENIPUNCTURE: CPT | Performed by: NURSE PRACTITIONER

## 2024-11-03 PROCEDURE — 82947 ASSAY GLUCOSE BLOOD QUANT: CPT

## 2024-11-03 PROCEDURE — C1769 GUIDE WIRE: HCPCS | Performed by: UROLOGY

## 2024-11-03 PROCEDURE — 87077 CULTURE AEROBIC IDENTIFY: CPT

## 2024-11-03 PROCEDURE — 1200000002 HC GENERAL ROOM WITH TELEMETRY DAILY

## 2024-11-03 PROCEDURE — 93005 ELECTROCARDIOGRAM TRACING: CPT

## 2024-11-03 PROCEDURE — C2617 STENT, NON-COR, TEM W/O DEL: HCPCS | Performed by: UROLOGY

## 2024-11-03 PROCEDURE — 99223 1ST HOSP IP/OBS HIGH 75: CPT | Performed by: NURSE PRACTITIONER

## 2024-11-03 PROCEDURE — 2780000003 HC OR 278 NO HCPCS: Performed by: UROLOGY

## 2024-11-03 PROCEDURE — 94760 N-INVAS EAR/PLS OXIMETRY 1: CPT

## 2024-11-03 PROCEDURE — 2720000007 HC OR 272 NO HCPCS: Performed by: UROLOGY

## 2024-11-03 PROCEDURE — 87186 SC STD MICRODIL/AGAR DIL: CPT

## 2024-11-03 PROCEDURE — 3700000001 HC GENERAL ANESTHESIA TIME - INITIAL BASE CHARGE: Performed by: UROLOGY

## 2024-11-03 PROCEDURE — 0T768DZ DILATION OF RIGHT URETER WITH INTRALUMINAL DEVICE, VIA NATURAL OR ARTIFICIAL OPENING ENDOSCOPIC: ICD-10-PCS | Performed by: UROLOGY

## 2024-11-03 PROCEDURE — 83605 ASSAY OF LACTIC ACID: CPT | Performed by: NURSE PRACTITIONER

## 2024-11-03 PROCEDURE — 83735 ASSAY OF MAGNESIUM: CPT | Performed by: NURSE PRACTITIONER

## 2024-11-03 PROCEDURE — 7100000001 HC RECOVERY ROOM TIME - INITIAL BASE CHARGE: Performed by: UROLOGY

## 2024-11-03 PROCEDURE — 87086 URINE CULTURE/COLONY COUNT: CPT | Mod: STJLAB | Performed by: UROLOGY

## 2024-11-03 PROCEDURE — 2500000004 HC RX 250 GENERAL PHARMACY W/ HCPCS (ALT 636 FOR OP/ED): Performed by: ANESTHESIOLOGIST ASSISTANT

## 2024-11-03 PROCEDURE — 84100 ASSAY OF PHOSPHORUS: CPT | Performed by: NURSE PRACTITIONER

## 2024-11-03 PROCEDURE — 87040 BLOOD CULTURE FOR BACTERIA: CPT | Mod: STJLAB | Performed by: PHYSICIAN ASSISTANT

## 2024-11-03 PROCEDURE — 2500000002 HC RX 250 W HCPCS SELF ADMINISTERED DRUGS (ALT 637 FOR MEDICARE OP, ALT 636 FOR OP/ED): Performed by: NURSE PRACTITIONER

## 2024-11-03 PROCEDURE — A52320 PR CYSTOSCOPY,REMV URETERAL STONE: Performed by: ANESTHESIOLOGY

## 2024-11-03 DEVICE — URETERAL STENT
Type: IMPLANTABLE DEVICE | Site: URETER | Status: FUNCTIONAL
Brand: CONTOUR™

## 2024-11-03 RX ORDER — ACETAMINOPHEN 325 MG/1
650 TABLET ORAL EVERY 6 HOURS PRN
Status: DISCONTINUED | OUTPATIENT
Start: 2024-11-03 | End: 2024-11-18 | Stop reason: HOSPADM

## 2024-11-03 RX ORDER — HYDRALAZINE HYDROCHLORIDE 20 MG/ML
5 INJECTION INTRAMUSCULAR; INTRAVENOUS EVERY 30 MIN PRN
Status: DISCONTINUED | OUTPATIENT
Start: 2024-11-03 | End: 2024-11-03 | Stop reason: HOSPADM

## 2024-11-03 RX ORDER — DILTIAZEM HYDROCHLORIDE 180 MG/1
180 CAPSULE, COATED, EXTENDED RELEASE ORAL NIGHTLY
Status: DISCONTINUED | OUTPATIENT
Start: 2024-11-03 | End: 2024-11-08

## 2024-11-03 RX ORDER — DEXTROSE 50 % IN WATER (D50W) INTRAVENOUS SYRINGE
12.5
Status: DISCONTINUED | OUTPATIENT
Start: 2024-11-03 | End: 2024-11-18 | Stop reason: HOSPADM

## 2024-11-03 RX ORDER — LIDOCAINE HYDROCHLORIDE 20 MG/ML
INJECTION, SOLUTION EPIDURAL; INFILTRATION; INTRACAUDAL; PERINEURAL AS NEEDED
Status: DISCONTINUED | OUTPATIENT
Start: 2024-11-03 | End: 2024-11-03

## 2024-11-03 RX ORDER — INSULIN LISPRO 100 [IU]/ML
0-10 INJECTION, SOLUTION INTRAVENOUS; SUBCUTANEOUS
Status: DISCONTINUED | OUTPATIENT
Start: 2024-11-03 | End: 2024-11-18 | Stop reason: HOSPADM

## 2024-11-03 RX ORDER — POLYETHYLENE GLYCOL 3350 17 G/17G
17 POWDER, FOR SOLUTION ORAL DAILY PRN
Status: DISCONTINUED | OUTPATIENT
Start: 2024-11-03 | End: 2024-11-18 | Stop reason: HOSPADM

## 2024-11-03 RX ORDER — DILTIAZEM HYDROCHLORIDE 180 MG/1
180 CAPSULE, COATED, EXTENDED RELEASE ORAL NIGHTLY
COMMUNITY
End: 2024-11-18 | Stop reason: HOSPADM

## 2024-11-03 RX ORDER — DIPHENHYDRAMINE HYDROCHLORIDE 50 MG/ML
12.5 INJECTION INTRAMUSCULAR; INTRAVENOUS ONCE AS NEEDED
Status: DISCONTINUED | OUTPATIENT
Start: 2024-11-03 | End: 2024-11-03 | Stop reason: HOSPADM

## 2024-11-03 RX ORDER — FENTANYL CITRATE 50 UG/ML
INJECTION, SOLUTION INTRAMUSCULAR; INTRAVENOUS AS NEEDED
Status: DISCONTINUED | OUTPATIENT
Start: 2024-11-03 | End: 2024-11-03

## 2024-11-03 RX ORDER — MORPHINE SULFATE 2 MG/ML
2 INJECTION, SOLUTION INTRAMUSCULAR; INTRAVENOUS EVERY 4 HOURS PRN
Status: DISCONTINUED | OUTPATIENT
Start: 2024-11-03 | End: 2024-11-05

## 2024-11-03 RX ORDER — ATORVASTATIN CALCIUM 80 MG/1
80 TABLET, FILM COATED ORAL NIGHTLY
Status: DISCONTINUED | OUTPATIENT
Start: 2024-11-03 | End: 2024-11-18 | Stop reason: HOSPADM

## 2024-11-03 RX ORDER — TALC
3 POWDER (GRAM) TOPICAL NIGHTLY PRN
Status: DISCONTINUED | OUTPATIENT
Start: 2024-11-03 | End: 2024-11-18 | Stop reason: HOSPADM

## 2024-11-03 RX ORDER — LEVOTHYROXINE SODIUM 100 UG/1
100 TABLET ORAL
Status: DISCONTINUED | OUTPATIENT
Start: 2024-11-04 | End: 2024-11-18 | Stop reason: HOSPADM

## 2024-11-03 RX ORDER — METOCLOPRAMIDE HYDROCHLORIDE 5 MG/ML
10 INJECTION INTRAMUSCULAR; INTRAVENOUS ONCE AS NEEDED
Status: DISCONTINUED | OUTPATIENT
Start: 2024-11-03 | End: 2024-11-03 | Stop reason: HOSPADM

## 2024-11-03 RX ORDER — ACETAMINOPHEN 650 MG/1
650 SUPPOSITORY RECTAL EVERY 6 HOURS PRN
Status: DISCONTINUED | OUTPATIENT
Start: 2024-11-03 | End: 2024-11-18 | Stop reason: HOSPADM

## 2024-11-03 RX ORDER — LISINOPRIL 10 MG/1
10 TABLET ORAL DAILY
Status: DISCONTINUED | OUTPATIENT
Start: 2024-11-03 | End: 2024-11-06

## 2024-11-03 RX ORDER — DEXTROSE 50 % IN WATER (D50W) INTRAVENOUS SYRINGE
25
Status: DISCONTINUED | OUTPATIENT
Start: 2024-11-03 | End: 2024-11-18 | Stop reason: HOSPADM

## 2024-11-03 RX ORDER — ASPIRIN 81 MG/1
81 TABLET ORAL DAILY
Status: DISCONTINUED | OUTPATIENT
Start: 2024-11-03 | End: 2024-11-18 | Stop reason: HOSPADM

## 2024-11-03 RX ORDER — CEFTRIAXONE 1 G/50ML
1 INJECTION, SOLUTION INTRAVENOUS EVERY 24 HOURS
Status: CANCELLED | OUTPATIENT
Start: 2024-11-03 | End: 2024-11-08

## 2024-11-03 RX ORDER — ACETAMINOPHEN 160 MG/5ML
650 SOLUTION ORAL EVERY 6 HOURS PRN
Status: DISCONTINUED | OUTPATIENT
Start: 2024-11-03 | End: 2024-11-18 | Stop reason: HOSPADM

## 2024-11-03 RX ORDER — LABETALOL HYDROCHLORIDE 5 MG/ML
5 INJECTION, SOLUTION INTRAVENOUS
Status: DISCONTINUED | OUTPATIENT
Start: 2024-11-03 | End: 2024-11-03 | Stop reason: HOSPADM

## 2024-11-03 RX ORDER — AZATHIOPRINE 50 MG/1
100 TABLET ORAL DAILY
Status: DISCONTINUED | OUTPATIENT
Start: 2024-11-03 | End: 2024-11-18 | Stop reason: HOSPADM

## 2024-11-03 RX ORDER — ALBUTEROL SULFATE 0.83 MG/ML
2.5 SOLUTION RESPIRATORY (INHALATION)
Status: DISCONTINUED | OUTPATIENT
Start: 2024-11-03 | End: 2024-11-03 | Stop reason: HOSPADM

## 2024-11-03 RX ORDER — ONDANSETRON HYDROCHLORIDE 2 MG/ML
INJECTION, SOLUTION INTRAVENOUS AS NEEDED
Status: DISCONTINUED | OUTPATIENT
Start: 2024-11-03 | End: 2024-11-03

## 2024-11-03 RX ORDER — METOPROLOL TARTRATE 100 MG/1
100 TABLET ORAL 2 TIMES DAILY
Status: DISCONTINUED | OUTPATIENT
Start: 2024-11-03 | End: 2024-11-18 | Stop reason: HOSPADM

## 2024-11-03 RX ORDER — WARFARIN 2 MG/1
2 TABLET ORAL
Status: DISCONTINUED | OUTPATIENT
Start: 2024-11-03 | End: 2024-11-18 | Stop reason: HOSPADM

## 2024-11-03 RX ORDER — SODIUM CHLORIDE 9 MG/ML
75 INJECTION, SOLUTION INTRAVENOUS CONTINUOUS
Status: DISCONTINUED | OUTPATIENT
Start: 2024-11-03 | End: 2024-11-04

## 2024-11-03 RX ORDER — MORPHINE SULFATE 2 MG/ML
1 INJECTION, SOLUTION INTRAMUSCULAR; INTRAVENOUS EVERY 4 HOURS PRN
Status: DISCONTINUED | OUTPATIENT
Start: 2024-11-03 | End: 2024-11-05

## 2024-11-03 RX ORDER — UBIDECARENONE 30 MG
30 CAPSULE ORAL DAILY
COMMUNITY

## 2024-11-03 RX ORDER — ACETAMINOPHEN 500 MG
5000 TABLET ORAL DAILY
Status: DISCONTINUED | OUTPATIENT
Start: 2024-11-03 | End: 2024-11-18 | Stop reason: HOSPADM

## 2024-11-03 RX ORDER — BUPROPION HYDROCHLORIDE 150 MG/1
300 TABLET ORAL DAILY
Status: DISCONTINUED | OUTPATIENT
Start: 2024-11-03 | End: 2024-11-18 | Stop reason: HOSPADM

## 2024-11-03 RX ORDER — HYDROMORPHONE HYDROCHLORIDE 1 MG/ML
1 INJECTION, SOLUTION INTRAMUSCULAR; INTRAVENOUS; SUBCUTANEOUS EVERY 5 MIN PRN
Status: DISCONTINUED | OUTPATIENT
Start: 2024-11-03 | End: 2024-11-03 | Stop reason: HOSPADM

## 2024-11-03 RX ORDER — WARFARIN 4 MG/1
4 TABLET ORAL
Status: DISCONTINUED | OUTPATIENT
Start: 2024-11-04 | End: 2024-11-18 | Stop reason: HOSPADM

## 2024-11-03 RX ORDER — WARFARIN 2 MG/1
4 TABLET ORAL 2 TIMES WEEKLY
COMMUNITY

## 2024-11-03 RX ORDER — SODIUM CHLORIDE, SODIUM LACTATE, POTASSIUM CHLORIDE, CALCIUM CHLORIDE 600; 310; 30; 20 MG/100ML; MG/100ML; MG/100ML; MG/100ML
INJECTION, SOLUTION INTRAVENOUS CONTINUOUS PRN
Status: DISCONTINUED | OUTPATIENT
Start: 2024-11-03 | End: 2024-11-03

## 2024-11-03 RX ORDER — PROPOFOL 10 MG/ML
INJECTION, EMULSION INTRAVENOUS AS NEEDED
Status: DISCONTINUED | OUTPATIENT
Start: 2024-11-03 | End: 2024-11-03

## 2024-11-03 RX ORDER — SODIUM CHLORIDE, SODIUM LACTATE, POTASSIUM CHLORIDE, CALCIUM CHLORIDE 600; 310; 30; 20 MG/100ML; MG/100ML; MG/100ML; MG/100ML
50 INJECTION, SOLUTION INTRAVENOUS CONTINUOUS
Status: DISCONTINUED | OUTPATIENT
Start: 2024-11-03 | End: 2024-11-03 | Stop reason: HOSPADM

## 2024-11-03 RX ORDER — CEFTRIAXONE 1 G/50ML
1 INJECTION, SOLUTION INTRAVENOUS EVERY 24 HOURS
Status: DISCONTINUED | OUTPATIENT
Start: 2024-11-03 | End: 2024-11-04

## 2024-11-03 RX ORDER — SODIUM CHLORIDE 9 MG/ML
75 INJECTION, SOLUTION INTRAVENOUS CONTINUOUS
Status: DISCONTINUED | OUTPATIENT
Start: 2024-11-03 | End: 2024-11-03

## 2024-11-03 RX ADMIN — INSULIN LISPRO 2 UNITS: 100 INJECTION, SOLUTION INTRAVENOUS; SUBCUTANEOUS at 20:56

## 2024-11-03 RX ADMIN — PROPOFOL 90 MG: 10 INJECTION, EMULSION INTRAVENOUS at 13:53

## 2024-11-03 RX ADMIN — AZATHIOPRINE 100 MG: 50 TABLET ORAL at 15:36

## 2024-11-03 RX ADMIN — SODIUM CHLORIDE 75 ML/HR: 9 INJECTION, SOLUTION INTRAVENOUS at 15:55

## 2024-11-03 RX ADMIN — CHOLECALCIFEROL TAB 125 MCG (5000 UNIT) 5000 UNITS: 125 TAB at 15:36

## 2024-11-03 RX ADMIN — Medication 6 L/MIN: at 14:13

## 2024-11-03 RX ADMIN — BUPROPION HYDROCHLORIDE 300 MG: 150 TABLET, EXTENDED RELEASE ORAL at 15:36

## 2024-11-03 RX ADMIN — SODIUM CHLORIDE 75 ML/HR: 9 INJECTION, SOLUTION INTRAVENOUS at 15:50

## 2024-11-03 RX ADMIN — ONDANSETRON 4 MG: 2 INJECTION INTRAMUSCULAR; INTRAVENOUS at 13:59

## 2024-11-03 RX ADMIN — SODIUM CHLORIDE, SODIUM LACTATE, POTASSIUM CHLORIDE, AND CALCIUM CHLORIDE: .6; .31; .03; .02 INJECTION, SOLUTION INTRAVENOUS at 13:46

## 2024-11-03 RX ADMIN — DEXAMETHASONE SODIUM PHOSPHATE 4 MG: 4 INJECTION, SOLUTION INTRAMUSCULAR; INTRAVENOUS at 13:59

## 2024-11-03 RX ADMIN — LIDOCAINE HYDROCHLORIDE 100 MG: 20 INJECTION, SOLUTION EPIDURAL; INFILTRATION; INTRACAUDAL; PERINEURAL at 13:53

## 2024-11-03 RX ADMIN — METOPROLOL TARTRATE 100 MG: 100 TABLET, FILM COATED ORAL at 15:39

## 2024-11-03 RX ADMIN — DILTIAZEM HYDROCHLORIDE 180 MG: 180 CAPSULE, COATED, EXTENDED RELEASE ORAL at 20:57

## 2024-11-03 RX ADMIN — FENTANYL CITRATE 25 MCG: 50 INJECTION, SOLUTION INTRAMUSCULAR; INTRAVENOUS at 14:00

## 2024-11-03 RX ADMIN — FENTANYL CITRATE 25 MCG: 50 INJECTION, SOLUTION INTRAMUSCULAR; INTRAVENOUS at 13:53

## 2024-11-03 RX ADMIN — ATORVASTATIN CALCIUM 80 MG: 80 TABLET, FILM COATED ORAL at 20:57

## 2024-11-03 RX ADMIN — CEFTRIAXONE SODIUM 1 G: 1 INJECTION, SOLUTION INTRAVENOUS at 10:34

## 2024-11-03 RX ADMIN — MORPHINE SULFATE 2 MG: 2 INJECTION, SOLUTION INTRAMUSCULAR; INTRAVENOUS at 22:33

## 2024-11-03 SDOH — ECONOMIC STABILITY: FOOD INSECURITY: WITHIN THE PAST 12 MONTHS, THE FOOD YOU BOUGHT JUST DIDN'T LAST AND YOU DIDN'T HAVE MONEY TO GET MORE.: NEVER TRUE

## 2024-11-03 SDOH — SOCIAL STABILITY: SOCIAL INSECURITY: HAVE YOU HAD ANY THOUGHTS OF HARMING ANYONE ELSE?: NO

## 2024-11-03 SDOH — SOCIAL STABILITY: SOCIAL INSECURITY: DO YOU FEEL UNSAFE GOING BACK TO THE PLACE WHERE YOU ARE LIVING?: NO

## 2024-11-03 SDOH — SOCIAL STABILITY: SOCIAL INSECURITY: DO YOU FEEL ANYONE HAS EXPLOITED OR TAKEN ADVANTAGE OF YOU FINANCIALLY OR OF YOUR PERSONAL PROPERTY?: NO

## 2024-11-03 SDOH — SOCIAL STABILITY: SOCIAL INSECURITY: ABUSE: ADULT

## 2024-11-03 SDOH — ECONOMIC STABILITY: FOOD INSECURITY: WITHIN THE PAST 12 MONTHS, YOU WORRIED THAT YOUR FOOD WOULD RUN OUT BEFORE YOU GOT THE MONEY TO BUY MORE.: NEVER TRUE

## 2024-11-03 SDOH — SOCIAL STABILITY: SOCIAL INSECURITY: WITHIN THE LAST YEAR, HAVE YOU BEEN AFRAID OF YOUR PARTNER OR EX-PARTNER?: NO

## 2024-11-03 SDOH — HEALTH STABILITY: MENTAL HEALTH: CURRENT SMOKER: 0

## 2024-11-03 SDOH — SOCIAL STABILITY: SOCIAL INSECURITY: HAS ANYONE EVER THREATENED TO HURT YOUR FAMILY OR YOUR PETS?: NO

## 2024-11-03 SDOH — SOCIAL STABILITY: SOCIAL INSECURITY
WITHIN THE LAST YEAR, HAVE YOU BEEN RAPED OR FORCED TO HAVE ANY KIND OF SEXUAL ACTIVITY BY YOUR PARTNER OR EX-PARTNER?: NO

## 2024-11-03 SDOH — SOCIAL STABILITY: SOCIAL INSECURITY: WITHIN THE LAST YEAR, HAVE YOU BEEN HUMILIATED OR EMOTIONALLY ABUSED IN OTHER WAYS BY YOUR PARTNER OR EX-PARTNER?: NO

## 2024-11-03 SDOH — SOCIAL STABILITY: SOCIAL INSECURITY: DOES ANYONE TRY TO KEEP YOU FROM HAVING/CONTACTING OTHER FRIENDS OR DOING THINGS OUTSIDE YOUR HOME?: NO

## 2024-11-03 SDOH — SOCIAL STABILITY: SOCIAL INSECURITY: HAVE YOU HAD THOUGHTS OF HARMING ANYONE ELSE?: NO

## 2024-11-03 SDOH — SOCIAL STABILITY: SOCIAL INSECURITY: ARE THERE ANY APPARENT SIGNS OF INJURIES/BEHAVIORS THAT COULD BE RELATED TO ABUSE/NEGLECT?: NO

## 2024-11-03 SDOH — SOCIAL STABILITY: SOCIAL INSECURITY: ARE YOU OR HAVE YOU BEEN THREATENED OR ABUSED PHYSICALLY, EMOTIONALLY, OR SEXUALLY BY ANYONE?: NO

## 2024-11-03 SDOH — ECONOMIC STABILITY: INCOME INSECURITY
IN THE PAST 12 MONTHS HAS THE ELECTRIC, GAS, OIL, OR WATER COMPANY THREATENED TO SHUT OFF SERVICES IN YOUR HOME?: PATIENT DECLINED

## 2024-11-03 SDOH — SOCIAL STABILITY: SOCIAL INSECURITY: WERE YOU ABLE TO COMPLETE ALL THE BEHAVIORAL HEALTH SCREENINGS?: YES

## 2024-11-03 ASSESSMENT — PAIN SCALES - GENERAL
PAINLEVEL_OUTOF10: 0 - NO PAIN
PAINLEVEL_OUTOF10: 8
PAINLEVEL_OUTOF10: 0 - NO PAIN
PAINLEVEL_OUTOF10: 3
PAINLEVEL_OUTOF10: 0 - NO PAIN
PAINLEVEL_OUTOF10: 0 - NO PAIN

## 2024-11-03 ASSESSMENT — COLUMBIA-SUICIDE SEVERITY RATING SCALE - C-SSRS
1. IN THE PAST MONTH, HAVE YOU WISHED YOU WERE DEAD OR WISHED YOU COULD GO TO SLEEP AND NOT WAKE UP?: NO
6. HAVE YOU EVER DONE ANYTHING, STARTED TO DO ANYTHING, OR PREPARED TO DO ANYTHING TO END YOUR LIFE?: NO
2. HAVE YOU ACTUALLY HAD ANY THOUGHTS OF KILLING YOURSELF?: NO

## 2024-11-03 ASSESSMENT — LIFESTYLE VARIABLES
SUBSTANCE_ABUSE_PAST_12_MONTHS: NO
AUDIT-C TOTAL SCORE: 1
AUDIT-C TOTAL SCORE: 1
HOW MANY STANDARD DRINKS CONTAINING ALCOHOL DO YOU HAVE ON A TYPICAL DAY: 1 OR 2
HOW OFTEN DO YOU HAVE A DRINK CONTAINING ALCOHOL: MONTHLY OR LESS
SKIP TO QUESTIONS 9-10: 1
HOW OFTEN DO YOU HAVE 6 OR MORE DRINKS ON ONE OCCASION: NEVER
PRESCIPTION_ABUSE_PAST_12_MONTHS: NO

## 2024-11-03 ASSESSMENT — COGNITIVE AND FUNCTIONAL STATUS - GENERAL
DRESSING REGULAR LOWER BODY CLOTHING: A LITTLE
CLIMB 3 TO 5 STEPS WITH RAILING: A LOT
MOVING FROM LYING ON BACK TO SITTING ON SIDE OF FLAT BED WITH BEDRAILS: A LITTLE
DRESSING REGULAR UPPER BODY CLOTHING: A LITTLE
MOBILITY SCORE: 17
MOVING TO AND FROM BED TO CHAIR: A LITTLE
DAILY ACTIVITIY SCORE: 23
STANDING UP FROM CHAIR USING ARMS: A LITTLE
DAILY ACTIVITIY SCORE: 19
WALKING IN HOSPITAL ROOM: A LITTLE
TOILETING: A LITTLE
HELP NEEDED FOR BATHING: A LITTLE
STANDING UP FROM CHAIR USING ARMS: A LITTLE
PATIENT BASELINE BEDBOUND: NO
CLIMB 3 TO 5 STEPS WITH RAILING: A LOT
WALKING IN HOSPITAL ROOM: A LITTLE
TOILETING: A LITTLE
TURNING FROM BACK TO SIDE WHILE IN FLAT BAD: A LITTLE
MOBILITY SCORE: 17
MOVING TO AND FROM BED TO CHAIR: A LITTLE
PERSONAL GROOMING: A LITTLE
TURNING FROM BACK TO SIDE WHILE IN FLAT BAD: A LITTLE
MOVING FROM LYING ON BACK TO SITTING ON SIDE OF FLAT BED WITH BEDRAILS: A LITTLE

## 2024-11-03 ASSESSMENT — PATIENT HEALTH QUESTIONNAIRE - PHQ9
2. FEELING DOWN, DEPRESSED OR HOPELESS: SEVERAL DAYS
SUM OF ALL RESPONSES TO PHQ9 QUESTIONS 1 & 2: 2
1. LITTLE INTEREST OR PLEASURE IN DOING THINGS: SEVERAL DAYS

## 2024-11-03 ASSESSMENT — ACTIVITIES OF DAILY LIVING (ADL)
DRESSING YOURSELF: INDEPENDENT
FEEDING YOURSELF: INDEPENDENT
LACK_OF_TRANSPORTATION: NO
TOILETING: NEEDS ASSISTANCE
HEARING - RIGHT EAR: FUNCTIONAL
JUDGMENT_ADEQUATE_SAFELY_COMPLETE_DAILY_ACTIVITIES: YES
ASSISTIVE_DEVICE: CANE;WHEELCHAIR
PATIENT'S MEMORY ADEQUATE TO SAFELY COMPLETE DAILY ACTIVITIES?: YES
GROOMING: INDEPENDENT
BATHING: INDEPENDENT
ADEQUATE_TO_COMPLETE_ADL: YES
WALKS IN HOME: NEEDS ASSISTANCE
LACK_OF_TRANSPORTATION: NO
HEARING - LEFT EAR: FUNCTIONAL

## 2024-11-03 ASSESSMENT — PAIN DESCRIPTION - LOCATION: LOCATION: OTHER (COMMENT)

## 2024-11-03 ASSESSMENT — PAIN - FUNCTIONAL ASSESSMENT
PAIN_FUNCTIONAL_ASSESSMENT: 0-10

## 2024-11-03 ASSESSMENT — PAIN DESCRIPTION - DESCRIPTORS: DESCRIPTORS: BURNING

## 2024-11-03 NOTE — ED TRIAGE NOTES
Pt arrived via stretcher from home. Pt was sitting on the edge of bed and slid off bed onto floor. Pt stated she hit the back of head on bed, rates pain 3/10.  Denies LOC. Pt states she's on coumadin, HX of HTN. (Took BP meds today). Denies SOB, Chest pain, dizziness, lightheadedness. Pt stated she has N/V, last emesis was 1600, pt states she has been nausea all day. Did not taken medication for N/V.

## 2024-11-03 NOTE — H&P
History Of Present Illness  Yen Montoya is a 74 y.o. female presenting to [facility] on 11/3/2024 with pertinent medical history of atrial fibrillation on Coumadin, sick sinus syndrome status post pacemaker insertion, Former tobacco use, RA, obesity, systolic heart failure (3/2024 Echo: EF 40-45%), adrenal mass, CAD status post 8 PCI, HTN, HLD, PVD, hypothyroidism, peripheral neuropathy, anxiety, SAMAN on CPAP, type 2 diabetes, pulmonary embolism, DVT RLE and nephrolithiasis. Presented to an outside emergency room for complaint of nausea and vomiting x 4 days with right lower quadrant pain and dysuria. In addition, the patient complaints of intermittent fevers x1 day, dizziness, chest pain, and shortness of breath. The patient reports that the chest pain is similar to her chronic chest pain. EMS reported to outside facility that patient slipped off bed. The patient denies hitting her head. She does report a headache x 4-5 days in the back of her head and across her forehead. However, she was unable to get up thus why she called EMS. The patient denies any recent palpitations, cough, D/C, or hematuria.    ED course at outside hospital: BMP remarkable for a BUN/creatinine of 15/1.41. PT/INR 14.3/1.1. Lactate was 3.2 with a redraw of 1.8. WBC count 19.5, H&H 13.2/39.3, platelet 215. Patient was febrile with a temperature of 100.9. UA large leuk esterase, nitrate, with many bacteria. CT head negative for any acute intracranial process. CT abdomen pelvis w IV constrat concerning for 4 mm stone at the right UVJ with mild hydronephrosis.  Bilateral nonobstructing nephrolithiasis. Hypoenhancement of the right kidney with free fluid likely relates to UTI. Infected stone cannot be excluded. [facility] at [facility].  Medications received at [facility] emergency room: 1 L of IV fluid, 1 g Rocephin, Zofran, and 1 g Tylenol. Patient transferred to [facility] to be seen by urology service.       Past Medical History  She has a past medical history of  Acute embolism and thrombosis of right popliteal vein (Multi) (2020), Anxiety, Atrial fib/flutter, transient (Multi), CAD (coronary artery disease), Calculus of ureter (2020), Chronic venous hypertension (idiopathic) with inflammation of left lower extremity, Chronic venous hypertension (idiopathic) with inflammation of right lower extremity, Chronic venous hypertension (idiopathic) with ulcer of unspecified lower extremity (CODE), Complete heart block, Hypertension, Hypothyroidism, Long term (current) use of anticoagulants (2022), SAMAN (obstructive sleep apnea), Osteoporosis, Other bursitis of hip, right hip, Other mechanical complication of other internal joint prosthesis, initial encounter (CMS-HCC) (2020), Other postprocedural complications and disorders of the circulatory system, not elsewhere classified (10/08/2020), Other specified joint disorders, right shoulder, Personal history of diseases of the blood and blood-forming organs and certain disorders involving the immune mechanism, Pulmonary embolism, PVD (peripheral vascular disease) (CMS-HCC), RA (rheumatoid arthritis), Renal calculi, Sick sinus syndrome (Multi), Toxic effect of unspecified metal, accidental (unintentional), initial encounter (2020), Unspecified fracture of lower end of unspecified femur, initial encounter for closed fracture (Multi) (2021), and UTI (urinary tract infection).    Surgical History  She has a past surgical history that includes Other surgical history (2021); XR chest pacemaker w fluoro (10/17/2021); Cardiac catheterization; Cholecystectomy;  section, low transverse; Tonsillectomy; Total hip arthroplasty (Bilateral); Total shoulder arthroplasty (Left); Total knee arthroplasty (Bilateral); Colonoscopy; pacemaker placement; Femur fracture surgery; Wrist surgery; Cardiac electrophysiology mapping and ablation; and Coronary stent placement.     Social History  She reports that she  quit smoking about 36 years ago. Her smoking use included cigarettes. She has never used smokeless tobacco. She reports current alcohol use. She reports that she does not use drugs.    Family History  Family History   Problem Relation Name Age of Onset    Other (ptca) Mother      Heart failure Father      Coronary artery disease Father      Breast cancer Sister      Breast cancer Sister          Allergies  Adhesive tape-silicones, Latex, and Valdecoxib    Review of Systems (Bold words are positive)    Constitutional: NEGATIVE: Fever, Chills, Malaise, Weight Loss, Weight gain, Fatigue     Eyes: NEGATIVE: Blurry Vision, Vision Loss/ Change, Redness, Drainage     ENMT: NEGATIVE: Nasal Discharge, Nasal Congestion, Throat Pain, Mouth Pain, Ear Pain     Respiratory: NEGATIVE: Dry Cough, Productive Cough, Shortness of Breath, Wheezing, Hemoptysis     Cardiac: NEGATIVE: Chest Pain, Dyspnea on Exertion, Palpitations, Orthopnea, Syncope      Gastrointestinal: Negative: Nausea, Vomiting, Diarrhea, Constipation, Abdominal Pain     Genitourinary: NEGATIVE: Dysuria, Frequency, Urgency, Hematuria, Flank Pain     Musculoskeletal: Negative: Decreased ROM, Pain, Weakness, Swelling     Neurological: NEGATIVE: Confusion, Dizziness, Headache, Syncope, Seizures     Psychiatric: NEGATIVE: Anxiety, Depression, Hallucinations     Skin: NEGATIVE: Mass, Rash, Pruritus,  Ulcer, Pain     Endocrine: NEGATIVE: Sweat, Excessive Thirst, Polydipsia, Night Sweats     Hematologic/Lymph: NEGATIVE: Anemia, Bruising     Allergic/Immunologic: NEGATIVE: Itching, Sneezing, Hives        Physical Exam  Constitutional: Awake and alert, Calm, and Cooperative. Speech clear. Obese. No acute distress.  Skin: Pale, warm, and dry. No lesions or rashes.  Eyes: PERRL, sclera clear, No swelling or drainage noted  ENMT: Mucous membranes pink and moist, no apparent injury, no lesions seen  Head/Neck: Neck Supple, no apparent injury, trachea midline, no palpable masses on  "head  Cardiac: irregular rhythm and rate, Auscultated S1 & S2, no murmurs  Lungs: CTAB, symmetrical chest rise, no accessory muscle usage, unlabored  Abdomen: Soft, tender to palpitation in bilateral lower quadrants, no rebound tenderness or guarding, nondistended, positive bowel sounds in all quadrants  Musculoskeletal: ROM intact, no joint swelling, normal strength  Extremities: BLE np edema, No cyanosis, 1-2+ pulses of the extremities, see skin assessment for wounds  Neurological: Alert and Oriented x 3, intact senses, bilateral hand grasps and foot push/pulls equal.  Psychological: Appropriate mood and behavior.       Last Recorded Vitals  Blood pressure 151/70, pulse 54, temperature 35.9 °C (96.6 °F), temperature source Temporal, resp. rate 18, height 1.753 m (5' 9.02\"), weight 104 kg (228 lb 13.4 oz), SpO2 98%.  Visit Vitals  Smoking Status Former                Relevant Results  Results for orders placed or performed during the hospital encounter of 11/03/24 (from the past 24 hours)   Lactate   Result Value Ref Range    Lactate 2.0 0.4 - 2.0 mmol/L   POCT GLUCOSE   Result Value Ref Range    POCT Glucose 147 (H) 74 - 99 mg/dL   Magnesium   Result Value Ref Range    Magnesium 1.30 (L) 1.60 - 2.40 mg/dL   Phosphorus   Result Value Ref Range    Phosphorus 3.4 2.5 - 4.9 mg/dL   Troponin I, High Sensitivity   Result Value Ref Range    Troponin I, High Sensitivity 17 (H) 0 - 13 ng/L   Protime-INR   Result Value Ref Range    Protime 16.9 (H) 9.8 - 12.8 seconds    INR 1.5 (H) 0.9 - 1.1   POCT GLUCOSE   Result Value Ref Range    POCT Glucose 142 (H) 74 - 99 mg/dL      CT abdomen pelvis w IV contrast    Result Date: 11/2/2024  EXAMINATION: CT OF THE ABDOMEN AND PELVIS WITH CONTRAST 11/2/2024 8:54 pm TECHNIQUE: CT of the abdomen and pelvis was performed with the administration of intravenous contrast. Multiplanar reformatted images are provided for review. Automated exposure control, iterative reconstruction, and/or " weight based adjustment of the mA/kV was utilized to reduce the radiation dose to as low as reasonably achievable. COMPARISON: CT abdomen 03/12/2024 and CT abdomen and pelvis 06/04/2023. HISTORY: ORDERING SYSTEM PROVIDED HISTORY: left lower abd pain TECHNOLOGIST PROVIDED HISTORY: Additional Contrast?->None Reason for exam:->left lower abd pain Decision Support Exception - unselect if not a suspected or confirmed emergency medical condition->Emergency Medical Condition (MA) What reading provider will be dictating this exam?->CRC FINDINGS: Lower Chest: No acute findings. Organs: Scattered hypodense lesions in the liver compatible with cysts or hemangiomas.  Gallbladder surgically absent. No acute findings of the spleen, or pancreas.  1.8 cm right adrenal lesion unchanged dating back to CT 06/04/2023, with findings compatible with adenoma on comparison CT. Right-sided hydroureteronephrosis with a delayed nephrogram and 4 mm urinary tract stone at the right ureterovesicular junction. Additional nonobstructive bilateral nephrolithiasis. GI/Bowel: No evidence of a bowel obstruction.  Diffuse distal colonic diverticulosis.  Appendix is normal. Pelvis: Evaluation significantly limited by beam hardening artifact from bilateral total hip arthroplasties. Peritoneum/Retroperitoneum: No free intraperitoneal air or encapsulated fluid. Bones/Soft Tissues: Osteoporosis lymph attic cute osseous abnormality. Patient is status post bilateral total hip arthroplasties without evidence of hardware complication or periprosthetic fracture.    1.  Obstructing 4 mm urinary tract stone at the RIGHT ureterovesicular junction with mild ipsilateral hydroureteronephrosis. 2.  Bilateral nonobstructive nephrolithiasis. 3.  Hypoenhancement of the right kidney with adjacent free fluid likely relates to urinary tract obstruction.  An infected stone is not excluded. Recommend correlation with urinalysis. 4.  Colonic diverticulosis, right adrenal  "adenoma, and other chronic findings as above.    CT head wo IV contrast    Result Date: 11/2/2024  EXAMINATION: CT OF THE HEAD WITHOUT CONTRAST  11/2/2024 8:54 pm TECHNIQUE: CT of the head was performed without the administration of intravenous contrast. Automated exposure control, iterative reconstruction, and/or weight based adjustment of the mA/kV was utilized to reduce the radiation dose to as low as reasonably achievable. COMPARISON: CT head 03/23/2024. HISTORY: ORDERING SYSTEM PROVIDED HISTORY: fall +bt, cva/bleed/trauma TECHNOLOGIST PROVIDED HISTORY: Has a \"code stroke\" or \"stroke alert\" been called?->No Reason for exam:->fall +bt Decision Support Exception - unselect if not a suspected or confirmed emergency medical condition->Emergency Medical Condition (MA) What reading provider will be dictating this exam?->CRC FINDINGS: BRAIN/VENTRICLES: There is no acute intracranial hemorrhage, mass effect or midline shift.  No abnormal extra-axial fluid collection.  The gray-white differentiation is maintained without evidence of an acute infarct.  There is no evidence of hydrocephalus.   Unchanged periventricular white matter hypodensities that most commonly relate to microvascular ischemic disease. ORBITS: The visualized portion of the orbits demonstrate no acute abnormality. SINUSES: The visualized paranasal sinuses and mastoid air cells demonstrate no acute abnormality. SOFT TISSUES/SKULL:  No acute abnormality of the visualized skull or soft tissues.  Retro odontoid calcifications contribute to severe narrowing at the craniocervical junction.    1.  No acute intracranial abnormality. 2.  Chronic retro-odontoid calcifications contribute to severe narrowing at the craniocervical junction.      EKG: V paced, ventricular rate 70, AK *, , QTc 486. No ST elevation or depression noted.     Home Medications  Prior to Admission medications    Medication Sig Start Date End Date Taking? Authorizing Provider "   albuterol 90 mcg/actuation inhaler Inhale 2 puffs every 6 hours if needed.    Historical Provider, MD   aspirin 81 mg EC tablet Take 1 tablet (81 mg) by mouth once daily. 9/29/21   Historical Provider, MD   atorvastatin (Lipitor) 80 mg tablet Take 1 tablet (80 mg) by mouth once daily. 9/29/21   Historical Provider, MD   azaTHIOprine (Imuran) 50 mg tablet Take 2 tablets (100 mg) by mouth once daily.    Historical Provider, MD   b complex vitamins (Vitamins B Complex) capsule Take 1 capsule by mouth once daily.    Historical Provider, MD   blood sugar diagnostic strip Use bid to check bs Dx e11.65 7/12/17   Historical Provider, MD   buPROPion XL (Wellbutrin XL) 300 mg 24 hr tablet Take 1 tablet (300 mg) by mouth once daily. 7/6/21   Historical Provider, MD   cetirizine (ZyrTEC) 10 mg tablet Take 1 tablet (10 mg) by mouth once daily.    Historical Provider, MD   cholecalciferol (Vitamin D-3) 5,000 Units tablet Take 1 tablet (5,000 Units) by mouth once daily.    Historical Provider, MD   diclofenac sodium (Voltaren) 1 % gel gel APPLY 2 GRAMS TOPICALLY 4 TIMES DAILY TO AFFECTED AREA 7/6/21   Historical Provider, MD   dilTIAZem (Cardizem) 60 mg immediate release tablet Take 1 tablet (60 mg) by mouth every 8 hours. 1/5/24 1/4/25  RAJAN Hernandez   diphenoxylate-atropine (Lomotil) 2.5-0.025 mg tablet Take 1 tablet by mouth 4 times a day as needed.    Historical Provider, MD   folic acid 20 mg capsule Take 1 tablet by mouth once daily.    Historical Provider, MD   furosemide (Lasix) 20 mg tablet Take one tablet daily.  Take an additional tablet daily as needed for a 3 pound weight gain or more over night or 5 pounds in 5 days. 1/4/24   RAJAN Hernandez   INSULIN LISPRO SUBQ as directed    Historical Provider, MD   levothyroxine (Synthroid, Levoxyl) 100 mcg tablet Take 1 tablet (100 mcg) by mouth once daily. 10/15/20   Historical Provider, MD   lisinopril 10 mg tablet Take 1 tablet (10 mg)  by mouth once daily. 6/16/23   Historical Provider, MD   melatonin 5 mg tablet Take 1 tablet (5 mg) by mouth as needed at bedtime.    Historical Provider, MD   metoprolol tartrate (Lopressor) 50 mg tablet Take 2 tablets by mouth 2 times a day. 1/5/24 1/4/25  ALLA Hernandez-CNP   nitroglycerin (Nitrostat) 0.4 mg SL tablet Place 1 tablet (0.4 mg) under the tongue. 8/31/15   Historical Provider, MD   vit A/vit C/vit E/zinc/copper (VITAMINS A,C,E-ZINC-COPPER ORAL) Take by mouth.    Historical Provider, MD   warfarin (Coumadin) 2 mg tablet Take 1 tablet (2 mg) by mouth once daily. Or as directed per [facility] 11/12/20   Historical Provider, MD       Medications  Scheduled medications  [Held by provider] aspirin, 81 mg, oral, Daily  atorvastatin, 80 mg, oral, Nightly  azaTHIOprine, 100 mg, oral, Daily  buPROPion XL, 300 mg, oral, Daily  cefTRIAXone, 1 g, intravenous, q24h  cholecalciferol, 5,000 Units, oral, Daily  dilTIAZem CD, 180 mg, oral, Nightly  insulin lispro, 0-10 Units, subcutaneous, After meals & nightly  [Held by provider] insulin NPH (Isophane), 30 Units, subcutaneous, Nightly  [START ON 11/4/2024] levothyroxine, 100 mcg, oral, Daily before breakfast  [Held by provider] lisinopril, 10 mg, oral, Daily  metoprolol tartrate, 100 mg, oral, BID  [Held by provider] warfarin, 2 mg, oral, Once per day on Sunday Tuesday Thursday Friday Saturday  [Held by provider] warfarin, 4 mg, oral, Once per day on Monday Wednesday      Continuous medications  sodium chloride 0.9%, 75 mL/hr      PRN medications  PRN medications: acetaminophen **OR** acetaminophen **OR** acetaminophen, dextrose, dextrose, glucagon, glucagon, melatonin, morphine, morphine, oxygen, polyethylene glycol           Assessment & Plan  Hydronephrosis with urinary obstruction due to renal calculus    Elevated lactic acid level    Hydronephrosis with ureteropelvic junction (UPJ) obstruction    Acute cystitis with hematuria    Acute kidney  injury (CMS-HCC)    Leukocytosis    Ureteral calculus, right    Elevated troponin I level    Hypomagnesemia                Plan:  Code Status: Full Code   Consult: Urology, Cardiology  ATB: [place] 1 gm IV Q 24 hours  IVFs: Ns 75 mL/ hr  Meds:  morphine 1 mg IV every 4 hours as needed for moderate pain 4-6, morphine 2 mg IV every 4 hours as needed for severe pain 7-10, Tylenol 650 mg p.o. every 6 hours as needed for pain 1-3/headaches, melatonin 3 milligrams p.o. daily as needed for insomnia, MiraLAX 17 gms p.o daily as needed for constipation, Lispro SS per Accucheck AC/HS, Hypoglycemic protocol, Magnesium 2 gms IV.  Home meds resumed as appropriate  Avoid nephrotoxic medications   Avoid medication that will prolong the QTc    Monitor for hypo/hyperglycemia signs and symptoms   Diet: NPO for stent  Telemetry monitoring   Vital signs with BP, HR, & RR Q 4 hours.  Pulse ox Q 4 hours.   Strict I&Os every shift  Admission height and weight.  Strain all urine  CPAP at HS  Respiratory evaluation and treatment per protocol   Labs ordered: CBC, BMP, Mg, Phos, Urine culture and Blood cultures x 2 obtained in the ED, results pending  Test ordered:  Defer to attending and/or consults  Monitor / trend labs while inpatient.  Replace electrolytes as needed.  Replace K to 4.0 & Mg to 2.0   Aspiration precautions.  Fall precautions  Out of bed with assistance   PT/OT eval and treat as indicated   Call physician for temperature < 36.5 or >38.0 degrees celsius.  Call physician for pulse <50 or >120.  Call physician for respiratory rate <10 or >22.  Call physician for systolic blood pressure <90 or >170.  Call physician for diastolic blood pressure < 50 or >100.  Call physician for pulse ox <92%.  See orders for further plan of care ordered.     VTE prophylaxis:   Coumadin on hold for stent placement  BLE SCDs.  Monitor for s/s of bleeding    Further evaluation and management per attending and consulting physicians.      This note  has been transcribed using Dragon voice recognition system and there is a possibility of unintentional typing misprints.  Any information found to be copied from previous providers is done in the best interest of the patient to provide accurate, quality, and continuity of care.     I spent 40 minutes in the professional and overall care of this patient.      LALA [facility]  [facility]. House

## 2024-11-03 NOTE — CONSULTS
Reason For Consult  Uti, right ureteral calculus , hx of stones    History Of Present Illness  Yen Montoya is a 74 y.o. female presenting with uti and sepsis with lactate and wbc ct elev, no urologist available at [facility] on weekends so transferred to [facility] for us to care for.     Past Medical History  She has a past medical history of Acute embolism and thrombosis of right popliteal vein (Multi) (2020), Atrial fib/flutter, transient (Multi), CAD (coronary artery disease), Calculus of ureter (2020), Chronic venous hypertension (idiopathic) with inflammation of left lower extremity, Chronic venous hypertension (idiopathic) with inflammation of right lower extremity, Chronic venous hypertension (idiopathic) with ulcer of unspecified lower extremity (CODE), Hypertension, Hyperthyroidism, Long term (current) use of anticoagulants (2022), Other bursitis of hip, right hip, Other mechanical complication of other internal joint prosthesis, initial encounter (CMS-HCC) (2020), Other postprocedural complications and disorders of the circulatory system, not elsewhere classified (10/08/2020), Other specified joint disorders, right shoulder, Personal history of diseases of the blood and blood-forming organs and certain disorders involving the immune mechanism, PVD (peripheral vascular disease) (CMS-HCC), RA (rheumatoid arthritis), Renal calculi, Sick sinus syndrome (Multi), Toxic effect of unspecified metal, accidental (unintentional), initial encounter (2020), Unspecified fracture of lower end of unspecified femur, initial encounter for closed fracture (Multi) (2021), and UTI (urinary tract infection).    Surgical History  She has a past surgical history that includes Other surgical history (2021); XR chest pacemaker w fluoro (10/17/2021); Cardiac catheterization; Cholecystectomy;  section, low transverse; Tonsillectomy; Total hip arthroplasty; Total shoulder arthroplasty;  Total knee arthroplasty; Colonoscopy; pacemaker placement; Femur fracture surgery; and Wrist surgery.     Social History  She reports that she quit smoking about 36 years ago. Her smoking use included cigarettes. She has never used smokeless tobacco. She reports current alcohol use. She reports that she does not use drugs.    Family History  Family History   Problem Relation Name Age of Onset    Other (ptca) Mother      Heart failure Father      Coronary artery disease Father      Breast cancer Sister          Allergies  Adhesive tape-silicones, Latex, and Valdecoxib    Review of Systems  Has had stone surgery in past at [facility]     Physical Exam  AVSS currently, minimal pain     Last Recorded Vitals  There were no vitals taken for this visit.    Relevant Results  Lactate was 3.8 and wbc ct 19 and ct shows 4mm distal right ureteral stone and bilateral renal stones     Assessment/Plan     Npo for stent today    I spent 20 minutes in the professional and overall care of this patient.      Jaziel Stewart MD

## 2024-11-03 NOTE — ED PROVIDER NOTES
lactate level: ordered and pending at this time    Reassessment Exam: I have reassessed tissue perfusion and hemodynamic status after fluid bolus at this date/time: 11/2/24 2300        Medical Decision Making  Amount and/or Complexity of Data Reviewed  Labs: ordered.  Radiology: ordered.    Risk  OTC drugs.  Prescription drug management.    Patient presents to the emergency department after fall.  She is on Coumadin think she might of hit her head CT scan ordered.  She does have a normal neurological exam.  She is been complaining of dysuria left lower quadrant pain with nausea and vomiting.  She is febrile on arrival to the emergency department she is alert oriented.  CT brain is negative.  CT abdomen pelvis shows a 4 mm stone with hydronephrosis with an associated urinary tract infection.  Patient was treated with 1 g of Rocephin while in the emergency department.  Patient will require admission for further treatment and management.  Due to no urology coverage patient needed to be transferred after consulting hospitalist Dr. Giron.   Dr. Richter hospitalist accepted [facility]     REASSESSMENT          CRITICAL CARE TIME   None    CONSULTS:  None    PROCEDURES:  Unless otherwise noted below, none     Procedures      FINAL IMPRESSION      1. Kidney stone    2. Acute cystitis with hematuria          DISPOSITION/PLAN   DISPOSITION Decision To Transfer 11/02/2024 11:17:51 PM      PATIENT REFERRED TO:  No follow-up provider specified.    DISCHARGE MEDICATIONS:  New Prescriptions    No medications on file     Controlled Substances Monitoring:     RX Monitoring Periodic Controlled Substance Monitoring Chronic Pain > 50 MEDD   10/6/2019   8:47 AM Possible medication side effects, risk of tolerance/dependence & alternative treatments discussed.;No signs of potential drug abuse or diversion identified.;Assessed functional status.;Obtaining appropriate analgesic effect of treatment. Re-evaluated the status of the patient's

## 2024-11-04 PROBLEM — R78.81 BACTEREMIA DUE TO KLEBSIELLA PNEUMONIAE: Status: ACTIVE | Noted: 2024-11-04

## 2024-11-04 PROBLEM — B96.1 BACTEREMIA DUE TO KLEBSIELLA PNEUMONIAE: Status: ACTIVE | Noted: 2024-11-04

## 2024-11-04 LAB
ANION GAP SERPL CALC-SCNC: 11 MMOL/L (ref 10–20)
BUN SERPL-MCNC: 24 MG/DL (ref 6–23)
CALCIUM SERPL-MCNC: 9.8 MG/DL (ref 8.6–10.3)
CHLORIDE SERPL-SCNC: 108 MMOL/L (ref 98–107)
CO2 SERPL-SCNC: 23 MMOL/L (ref 21–32)
CREAT SERPL-MCNC: 1.08 MG/DL (ref 0.5–1.05)
EGFRCR SERPLBLD CKD-EPI 2021: 54 ML/MIN/1.73M*2
ERYTHROCYTE [DISTWIDTH] IN BLOOD BY AUTOMATED COUNT: 14 % (ref 11.5–14.5)
GLUCOSE BLD MANUAL STRIP-MCNC: 149 MG/DL (ref 74–99)
GLUCOSE BLD MANUAL STRIP-MCNC: 168 MG/DL (ref 74–99)
GLUCOSE BLD MANUAL STRIP-MCNC: 179 MG/DL (ref 74–99)
GLUCOSE BLD MANUAL STRIP-MCNC: 199 MG/DL (ref 74–99)
GLUCOSE BLD MANUAL STRIP-MCNC: 212 MG/DL (ref 74–99)
GLUCOSE SERPL-MCNC: 155 MG/DL (ref 74–99)
HCT VFR BLD AUTO: 35.3 % (ref 36–46)
HGB BLD-MCNC: 11 G/DL (ref 12–16)
INR PPP: 1.6 (ref 0.9–1.1)
MAGNESIUM SERPL-MCNC: 1.71 MG/DL (ref 1.6–2.4)
MCH RBC QN AUTO: 32.6 PG (ref 26–34)
MCHC RBC AUTO-ENTMCNC: 31.2 G/DL (ref 32–36)
MCV RBC AUTO: 105 FL (ref 80–100)
NRBC BLD-RTO: 0 /100 WBCS (ref 0–0)
PHOSPHATE SERPL-MCNC: 2.3 MG/DL (ref 2.5–4.9)
PLATELET # BLD AUTO: 176 X10*3/UL (ref 150–450)
POTASSIUM SERPL-SCNC: 4.2 MMOL/L (ref 3.5–5.3)
PROTHROMBIN TIME: 17.8 SECONDS (ref 9.8–12.8)
Q ONSET: 190 MS
QRS COUNT: 12 BEATS
QRS DURATION: 158 MS
QT INTERVAL: 450 MS
QTC CALCULATION(BAZETT): 486 MS
QTC FREDERICIA: 474 MS
R AXIS: 113 DEGREES
RBC # BLD AUTO: 3.37 X10*6/UL (ref 4–5.2)
SODIUM SERPL-SCNC: 138 MMOL/L (ref 136–145)
T AXIS: 81 DEGREES
T OFFSET: 415 MS
VENTRICULAR RATE: 70 BPM
WBC # BLD AUTO: 16.4 X10*3/UL (ref 4.4–11.3)

## 2024-11-04 PROCEDURE — 97161 PT EVAL LOW COMPLEX 20 MIN: CPT | Mod: GP

## 2024-11-04 PROCEDURE — 85610 PROTHROMBIN TIME: CPT | Performed by: NURSE PRACTITIONER

## 2024-11-04 PROCEDURE — 2500000002 HC RX 250 W HCPCS SELF ADMINISTERED DRUGS (ALT 637 FOR MEDICARE OP, ALT 636 FOR OP/ED): Performed by: NURSE PRACTITIONER

## 2024-11-04 PROCEDURE — 97530 THERAPEUTIC ACTIVITIES: CPT | Mod: GP

## 2024-11-04 PROCEDURE — 84100 ASSAY OF PHOSPHORUS: CPT | Performed by: NURSE PRACTITIONER

## 2024-11-04 PROCEDURE — 2500000001 HC RX 250 WO HCPCS SELF ADMINISTERED DRUGS (ALT 637 FOR MEDICARE OP): Performed by: NURSE PRACTITIONER

## 2024-11-04 PROCEDURE — 80048 BASIC METABOLIC PNL TOTAL CA: CPT | Performed by: NURSE PRACTITIONER

## 2024-11-04 PROCEDURE — 2500000004 HC RX 250 GENERAL PHARMACY W/ HCPCS (ALT 636 FOR OP/ED): Performed by: NURSE PRACTITIONER

## 2024-11-04 PROCEDURE — 85027 COMPLETE CBC AUTOMATED: CPT | Performed by: NURSE PRACTITIONER

## 2024-11-04 PROCEDURE — 1200000002 HC GENERAL ROOM WITH TELEMETRY DAILY

## 2024-11-04 PROCEDURE — 36415 COLL VENOUS BLD VENIPUNCTURE: CPT | Performed by: NURSE PRACTITIONER

## 2024-11-04 PROCEDURE — 83735 ASSAY OF MAGNESIUM: CPT | Performed by: NURSE PRACTITIONER

## 2024-11-04 PROCEDURE — 82947 ASSAY GLUCOSE BLOOD QUANT: CPT

## 2024-11-04 RX ORDER — CEFTRIAXONE 2 G/50ML
2 INJECTION, SOLUTION INTRAVENOUS EVERY 24 HOURS
Status: DISCONTINUED | OUTPATIENT
Start: 2024-11-05 | End: 2024-11-05

## 2024-11-04 RX ORDER — LANOLIN ALCOHOL/MO/W.PET/CERES
400 CREAM (GRAM) TOPICAL 2 TIMES DAILY
Status: COMPLETED | OUTPATIENT
Start: 2024-11-04 | End: 2024-11-04

## 2024-11-04 RX ADMIN — CHOLECALCIFEROL TAB 125 MCG (5000 UNIT) 5000 UNITS: 125 TAB at 08:21

## 2024-11-04 RX ADMIN — INSULIN LISPRO 2 UNITS: 100 INJECTION, SOLUTION INTRAVENOUS; SUBCUTANEOUS at 08:37

## 2024-11-04 RX ADMIN — MORPHINE SULFATE 2 MG: 2 INJECTION, SOLUTION INTRAMUSCULAR; INTRAVENOUS at 02:33

## 2024-11-04 RX ADMIN — INSULIN LISPRO 2 UNITS: 100 INJECTION, SOLUTION INTRAVENOUS; SUBCUTANEOUS at 21:36

## 2024-11-04 RX ADMIN — MORPHINE SULFATE 2 MG: 2 INJECTION, SOLUTION INTRAMUSCULAR; INTRAVENOUS at 21:37

## 2024-11-04 RX ADMIN — ATORVASTATIN CALCIUM 80 MG: 80 TABLET, FILM COATED ORAL at 21:36

## 2024-11-04 RX ADMIN — METOPROLOL TARTRATE 100 MG: 100 TABLET, FILM COATED ORAL at 08:22

## 2024-11-04 RX ADMIN — Medication 400 MG: at 12:24

## 2024-11-04 RX ADMIN — AZATHIOPRINE 100 MG: 50 TABLET ORAL at 08:21

## 2024-11-04 RX ADMIN — Medication 500 MG: at 17:27

## 2024-11-04 RX ADMIN — BUPROPION HYDROCHLORIDE 300 MG: 150 TABLET, EXTENDED RELEASE ORAL at 08:21

## 2024-11-04 RX ADMIN — DILTIAZEM HYDROCHLORIDE 180 MG: 180 CAPSULE, COATED, EXTENDED RELEASE ORAL at 21:36

## 2024-11-04 RX ADMIN — METOPROLOL TARTRATE 100 MG: 100 TABLET, FILM COATED ORAL at 21:35

## 2024-11-04 RX ADMIN — MORPHINE SULFATE 1 MG: 2 INJECTION, SOLUTION INTRAMUSCULAR; INTRAVENOUS at 08:57

## 2024-11-04 RX ADMIN — Medication 500 MG: at 21:35

## 2024-11-04 RX ADMIN — WARFARIN SODIUM 4 MG: 4 TABLET ORAL at 17:27

## 2024-11-04 RX ADMIN — CEFTRIAXONE SODIUM 1 G: 1 INJECTION, SOLUTION INTRAVENOUS at 08:22

## 2024-11-04 RX ADMIN — Medication 400 MG: at 21:35

## 2024-11-04 RX ADMIN — Medication 500 MG: at 12:25

## 2024-11-04 RX ADMIN — INSULIN LISPRO 2 UNITS: 100 INJECTION, SOLUTION INTRAVENOUS; SUBCUTANEOUS at 12:21

## 2024-11-04 RX ADMIN — LEVOTHYROXINE SODIUM 100 MCG: 0.1 TABLET ORAL at 08:21

## 2024-11-04 SDOH — SOCIAL STABILITY: SOCIAL INSECURITY: ARE YOU MARRIED, WIDOWED, DIVORCED, SEPARATED, NEVER MARRIED, OR LIVING WITH A PARTNER?: MARRIED

## 2024-11-04 SDOH — HEALTH STABILITY: PHYSICAL HEALTH
HOW OFTEN DO YOU NEED TO HAVE SOMEONE HELP YOU WHEN YOU READ INSTRUCTIONS, PAMPHLETS, OR OTHER WRITTEN MATERIAL FROM YOUR DOCTOR OR PHARMACY?: OFTEN

## 2024-11-04 SDOH — HEALTH STABILITY: PHYSICAL HEALTH: ON AVERAGE, HOW MANY MINUTES DO YOU ENGAGE IN EXERCISE AT THIS LEVEL?: 0 MIN

## 2024-11-04 SDOH — HEALTH STABILITY: MENTAL HEALTH
DO YOU FEEL STRESS - TENSE, RESTLESS, NERVOUS, OR ANXIOUS, OR UNABLE TO SLEEP AT NIGHT BECAUSE YOUR MIND IS TROUBLED ALL THE TIME - THESE DAYS?: TO SOME EXTENT

## 2024-11-04 SDOH — SOCIAL STABILITY: SOCIAL NETWORK
DO YOU BELONG TO ANY CLUBS OR ORGANIZATIONS SUCH AS CHURCH GROUPS, UNIONS, FRATERNAL OR ATHLETIC GROUPS, OR SCHOOL GROUPS?: NO

## 2024-11-04 SDOH — SOCIAL STABILITY: SOCIAL NETWORK: HOW OFTEN DO YOU GET TOGETHER WITH FRIENDS OR RELATIVES?: ONCE A WEEK

## 2024-11-04 SDOH — HEALTH STABILITY: PHYSICAL HEALTH: ON AVERAGE, HOW MANY DAYS PER WEEK DO YOU ENGAGE IN MODERATE TO STRENUOUS EXERCISE (LIKE A BRISK WALK)?: 0 DAYS

## 2024-11-04 SDOH — SOCIAL STABILITY: SOCIAL NETWORK: HOW OFTEN DO YOU ATTEND MEETINGS OF THE CLUBS OR ORGANIZATIONS YOU BELONG TO?: NEVER

## 2024-11-04 SDOH — SOCIAL STABILITY: SOCIAL NETWORK: HOW OFTEN DO YOU ATTEND CHURCH OR RELIGIOUS SERVICES?: NEVER

## 2024-11-04 ASSESSMENT — PAIN SCALES - GENERAL
PAINLEVEL_OUTOF10: 8
PAINLEVEL_OUTOF10: 3
PAINLEVEL_OUTOF10: 4
PAINLEVEL_OUTOF10: 5 - MODERATE PAIN
PAINLEVEL_OUTOF10: 3
PAINLEVEL_OUTOF10: 7
PAINLEVEL_OUTOF10: 5 - MODERATE PAIN

## 2024-11-04 ASSESSMENT — PAIN DESCRIPTION - LOCATION
LOCATION: OTHER (COMMENT)
LOCATION: GROIN

## 2024-11-04 ASSESSMENT — PAIN - FUNCTIONAL ASSESSMENT
PAIN_FUNCTIONAL_ASSESSMENT: 0-10

## 2024-11-04 ASSESSMENT — PAIN DESCRIPTION - DESCRIPTORS
DESCRIPTORS: BURNING
DESCRIPTORS: BURNING

## 2024-11-04 ASSESSMENT — COGNITIVE AND FUNCTIONAL STATUS - GENERAL
WALKING IN HOSPITAL ROOM: A LITTLE
MOVING TO AND FROM BED TO CHAIR: A LITTLE
TURNING FROM BACK TO SIDE WHILE IN FLAT BAD: A LITTLE
MOBILITY SCORE: 17
MOVING FROM LYING ON BACK TO SITTING ON SIDE OF FLAT BED WITH BEDRAILS: A LITTLE
STANDING UP FROM CHAIR USING ARMS: A LITTLE
CLIMB 3 TO 5 STEPS WITH RAILING: A LOT

## 2024-11-04 NOTE — CARE PLAN
The patient's goals for the shift include      The clinical goals for the shift include Remain free from falls      Problem: Fall/Injury  Goal: Not fall by end of shift  Outcome: Progressing

## 2024-11-04 NOTE — CONSULTS
Infectious Disease Consult    PATIENT NAME: Yen Montoya    MRN: [id number]  SERVICE DATE:  11/4/2024   SERVICE TIME:  12:09 PM    SIGNATURE: Ivone Blandon MD    PRIMARY CARE PHYSICIAN: Fiona Reyes MD  REASON FOR CONSULT: Bacteremia, UTI  REQUESTING PHYSICIAN: Dr.Alzoubi SAHA  74-year-old female with past medical history as listed below who was admitted for right UVJ obstructing stone causing pyelonephritis, sepsis and bacteremia.  The patient underwent cystoscopy with stent placement on 11/3.  Blood culture grew Klebsiella pneumoniae at [facility]. Currently on ceftriaxone    PAST MEDICAL HISTORY:   Past Medical History:   Diagnosis Date    Acute embolism and thrombosis of right popliteal vein (Multi) 04/24/2020    Anxiety     Atrial fib/flutter, transient (Multi)     CAD (coronary artery disease)     Calculus of ureter 04/29/2020    Ureteral stone    Chronic venous hypertension (idiopathic) with inflammation of left lower extremity     Chronic venous hypertension (idiopathic) with inflammation of right lower extremity     Chronic venous hypertension (idiopathic) with ulcer of unspecified lower extremity (CODE)     Complete heart block     Per Dr. Gracia    Hypertension     Hypothyroidism     Long term (current) use of anticoagulants 01/20/2022    Coumadin    SAMAN (obstructive sleep apnea)     on CPAP    Osteoporosis     Other bursitis of hip, right hip     Other mechanical complication of other internal joint prosthesis, initial encounter (CMS-[facility]) 05/22/2020    Mechanical instability of hip prosthesis    Other postprocedural complications and disorders of the circulatory system, not elsewhere classified 10/08/2020    Necrosis of surgical wound    Other specified joint disorders, right shoulder     Mass of joint of right shoulder    Personal history of diseases of the blood and blood-forming organs and certain disorders involving the immune mechanism     History of immune disorder    Pulmonary  embolism     PVD (peripheral vascular disease) (CMS-[facility])     RA (rheumatoid arthritis)     Renal calculi     Sick sinus syndrome (Multi)     Toxic effect of unspecified metal, accidental (unintentional), 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    Unspecified fracture of lower end of unspecified femur, initial encounter for closed fracture (Multi) 2021    Fracture of distal femur    UTI (urinary tract infection)      PAST SURGICAL HISTORY:   Past Surgical History:   Procedure Laterality Date    CARDIAC CATHETERIZATION      CARDIAC ELECTROPHYSIOLOGY MAPPING AND ABLATION      AV node ablation     SECTION, LOW TRANSVERSE      CHOLECYSTECTOMY      COLONOSCOPY      CORONARY STENT PLACEMENT      Has 8 stents    FEMUR FRACTURE SURGERY      OTHER SURGICAL HISTORY  2021    Neuroplasty    PACEMAKER PLACEMENT      [facility]    TONSILLECTOMY      TOTAL HIP ARTHROPLASTY Bilateral     TOTAL KNEE ARTHROPLASTY Bilateral     TOTAL SHOULDER ARTHROPLASTY Left     WRIST SURGERY      XR CHEST PACEMAKER WITH FLUORO  10/17/2021    XR CHEST PACEMAKER WITH FLUORO 10/17/2021 [facility]     FAMILY HISTORY:   Family History   Problem Relation Name Age of Onset    Other (ptca) Mother      Heart failure Father      Coronary artery disease Father      Breast cancer Sister      Breast cancer Sister       SOCIAL HISTORY:   Social History     Tobacco Use    Smoking status: Former     Current packs/day: 0.00     Types: Cigarettes     Quit date:      Years since quittin.8    Smokeless tobacco: Never   Substance Use Topics    Alcohol use: Yes     Comment: occasionally    Drug use: Never     CURRENT ALLERGIES:   Allergies as of 2024 - Reviewed 2024   Allergen Reaction Noted    Adhesive tape-silicones Rash 2015    Latex Unknown 2015    Valdecoxib Unknown 2023     MEDICATIONS:    Current Facility-Administered Medications:     acetaminophen (Tylenol) tablet 650 mg, 650 mg,  oral, q6h PRN **OR** acetaminophen (Tylenol) oral liquid 650 mg, 650 mg, oral, q6h PRN **OR** acetaminophen (Tylenol) suppository 650 mg, 650 mg, rectal, q6h PRN, LALA Vogel-CNP    [Held by provider] aspirin EC tablet 81 mg, 81 mg, oral, Daily, Anna Marie Kendall APRN-CNP    atorvastatin (Lipitor) tablet 80 mg, 80 mg, oral, Nightly, ALLA Vogel-CNP, 80 mg at 11/03/24 2057    azaTHIOprine (Imuran) tablet 100 mg, 100 mg, oral, Daily, ALLA Vogel-CNP, 100 mg at 11/04/24 0821    buPROPion XL (Wellbutrin XL) 24 hr tablet 300 mg, 300 mg, oral, Daily, ALLA Vogel-CNP, 300 mg at 11/04/24 0821    cefTRIAXone (Rocephin) 1 g in dextrose (iso) IV 50 mL, 1 g, intravenous, q24h, ALLA Vogel-CNP, Stopped at 11/04/24 0851    cholecalciferol (Vitamin D-3) tablet 5,000 Units, 5,000 Units, oral, Daily, ALLA Vogel-CNP, 5,000 Units at 11/04/24 0821    dextrose 50 % injection 12.5 g, 12.5 g, intravenous, q15 min PRN, Anna Marie Kendall APRN-CNP    dextrose 50 % injection 25 g, 25 g, intravenous, q15 min PRN, Anna Marie Kendall APRN-CNP    dilTIAZem CD (Cardizem CD) 24 hr capsule 180 mg, 180 mg, oral, Nightly, ALLA Vogel-CNP, 180 mg at 11/03/24 2057    glucagon (Glucagen) injection 1 mg, 1 mg, intramuscular, q15 min PRN, Anna Marie Kendall APRN-CNP    glucagon (Glucagen) injection 1 mg, 1 mg, intramuscular, q15 min PRN, Anna Marie Kendall APRN-CNP    insulin lispro injection 0-10 Units, 0-10 Units, subcutaneous, After meals & nightly, ALLA Vogel-CNP, 2 Units at 11/04/24 0837    [Held by provider] insulin NPH (Isophane) (HumuLIN N,NovoLIN N) injection 30 Units, 30 Units, subcutaneous, Nightly, RAJAN Vogel    levothyroxine (Synthroid, Levoxyl) tablet 100 mcg, 100 mcg, oral, Daily before breakfast, RAJAN Vogel, 100 mcg at 11/04/24 0821    [Held by provider] lisinopril tablet 10 mg, 10 mg, oral, Daily, RAJAN Vogel    magnesium oxide (Mag-Ox)  tablet 400 mg, 400 mg, oral, BID, RAJAN Schmitz    melatonin tablet 3 mg, 3 mg, oral, Nightly PRN, RAJAN Vogel    metoprolol tartrate (Lopressor) tablet 100 mg, 100 mg, oral, BID, ALLA Vogel-CNP, 100 mg at 11/04/24 0822    morphine injection 1 mg, 1 mg, intravenous, q4h PRN, RAJAN Vogel, 1 mg at 11/04/24 0857    morphine injection 2 mg, 2 mg, intravenous, q4h PRN, RAJAN Vogel, 2 mg at 11/04/24 0233    oxygen (O2) therapy, , inhalation, Continuous PRN - O2/gases, RAJAN Vogel, 3 L/min at 11/03/24 1430    polyethylene glycol (Glycolax, Miralax) packet 17 g, 17 g, oral, Daily PRN, RAJAN Vogel    potassium phosphate (monobasic) (K-Phos) tablet 500 mg, 500 mg, oral, 4x daily, RAJAN Schmitz    [Held by provider] warfarin (Coumadin) tablet 2 mg, 2 mg, oral, Once per day on Sunday Tuesday Thursday Friday Saturday, RAJAN Vogel    [Held by provider] warfarin (Coumadin) tablet 4 mg, 4 mg, oral, Once per day on Monday Wednesday, RAJAN Vogel       COMPLETE REVIEW OF SYSTEMS:    Review of systems shows no findings other than what is described in the narrative above.  Specifically, the patient has had no significant changes in eyesight, no sore throat, no post nasal drip, no ear pains.  There has been no cough or chest pains, pleuritic or otherwise.  There has been no abdominal pain, no nausea or vomiting, nor diarrhea.   There has been no dysuria, urinary frequency, or flank pain.  There is nothing unusual in the joints or muscles, or any skin rashes or skin swellings or abscesses noted.   All other systems were reviewed and are negative.        PHYSICAL EXAM:  Patient Vitals for the past 24 hrs:   BP Temp Temp src Pulse Resp SpO2   11/04/24 0800 145/79 36 °C (96.8 °F) Temporal 70 18 95 %   11/04/24 0000 152/89 35.1 °C (95.2 °F) Oral 70 16 96 %   11/03/24 1900 116/71 36.2 °C (97.2 °F) Oral 70 16 96  %   11/03/24 1600 173/79 35.1 °C (95.2 °F) Temporal 75 18 100 %   11/03/24 1515 149/66 -- -- 69 16 98 %   11/03/24 1500 153/72 -- -- 69 17 100 %   11/03/24 1445 147/66 -- -- 69 13 99 %   11/03/24 1430 152/86 -- -- 69 18 100 %   11/03/24 1415 171/73 -- -- 69 14 100 %   11/03/24 1413 175/72 36.7 °C (98.1 °F) Temporal 70 12 99 %   11/03/24 1315 (!) 201/96 37.1 °C (98.8 °F) Temporal 70 16 94 %     Body mass index is 33.78 kg/m².  Gen: NAD  Neck: symmetric, no mass  Cardiovascular: RRR  Respiratory: No distress   GI: Abd soft, nontender, non-distended  Extremities: no leg edema  Skin: Warm and dry.  Neuro: alert and oriented times 3  : no ford     Labs:  Lab Results   Component Value Date    WBC 16.4 (H) 11/04/2024    HGB 11.0 (L) 11/04/2024    HCT 35.3 (L) 11/04/2024     (H) 11/04/2024     11/04/2024     Lab Results   Component Value Date    GLUCOSE 155 (H) 11/04/2024    CALCIUM 9.8 11/04/2024     11/04/2024    K 4.2 11/04/2024    CO2 23 11/04/2024     (H) 11/04/2024    BUN 24 (H) 11/04/2024    CREATININE 1.08 (H) 11/04/2024   ESR: --  Lab Results   Component Value Date    SEDRATE 13 01/06/2021     Lab Results   Component Value Date    CRP 4.56 (A) 10/29/2019     Lab Results   Component Value Date    ALT 9 11/03/2024    AST 18 11/03/2024    ALKPHOS 49 11/03/2024    BILITOT 0.9 11/03/2024       DATA:   Diagnostic tests reviewed for today's visit:    Labs this admission reviewed  Imagings this admission reviewed  Cultures: Reviewed        ASSESSMENT :   -Sepsis secondary to right pyelonephritis  -Klebsiella pneumoniae bacteremia  -Right hydronephrosis secondary to right UVJ obstructing stone  -Status post cystoscopy with right stent placement on 11/3  -Leukocytosis  -Renal failure    PLAN:  -Continue ceftriaxone but increase dose to 2 g IV every 24 hours  -Closely monitor for antibiotics side effects including rash, Diarrhea/CDI, thrombocytopenia, SABRINA, etc.  -Follow-up blood culture  "susceptibilities    Will continue to follow     Thank you so much for this consultation         Ivone Blandon MD.   Infectious Disease Attending        This note was partially created using voice recognition software and is inherently subject to errors including those of syntax and \"sound-alike\" substitutions which may escape proofreading. In such instances, original meaning may be extrapolated by contextual derivation       "

## 2024-11-04 NOTE — PROGRESS NOTES
Physical Therapy    Physical Therapy Evaluation    Patient Name: eYn Montoya  MRN: [id number]  Today's Date: 11/4/2024   Time Calculation  Start Time: 1107  Stop Time: 1129  Time Calculation (min): 22 min  3115/3115-A    Assessment/Plan   PT Assessment: Pt demonstrates decreased strength, decreased activity tolerance, increased pain, and impaired balance/mobility.  Pt appears below baseline level of function and based on current level of function, pt would benefit from continued skilled therapy while in the hospital to ensure safety, decrease risk of falls, and regain strength/mobility back to baseline.  Once stable enough for discharge, pt would benefit from moderate versus high intensity therapy pending tolerance.     PT Assessment Results: Decreased strength, Decreased range of motion, Decreased endurance, Impaired balance, Decreased mobility, Decreased safety awareness, Pain  Rehab Prognosis: Good  Evaluation/Treatment Tolerance: Patient tolerated treatment well  Medical Staff Made Aware: Yes  End of Session Communication: Bedside nurse  End of Session Patient Position: Up in chair, Alarm on  IP OR SWING BED PT PLAN  Inpatient or Swing Bed: Inpatient  PT Plan  Treatment/Interventions: Bed mobility, Transfer training, Gait training, Stair training, Balance training, Neuromuscular re-education, Strengthening, Endurance training, Range of motion, Therapeutic exercise, Therapeutic activity, Home exercise program  PT Plan: Ongoing PT  PT Frequency: 5 times per week  PT Discharge Recommendations: Moderate intensity level of continued care, High intensity level of continued care (pending tolerance)  Equipment Recommended upon Discharge: Wheeled walker  PT Recommended Transfer Status: Assist x1 (Min A)  PT - OK to Discharge: Yes - To next level of care when cleared by medical team    Subjective     Current Problem:  1. Hydronephrosis with urinary obstruction due to renal calculus        2. Ureteral calculus, right   Case Request Operating Room: Cystoscopy with Insertion Stent Ureter    Case Request Operating Room: Cystoscopy with Insertion Stent Ureter    Calculi (Stone) Analysis    Calculi (Stone) Analysis    Urine Culture    Urine Culture          Past Medical History:  Patient Active Problem List   Diagnosis    Adrenal mass (Multi)    Angina, class III (CMS-HCC)    Anxiety and depression    Bilateral hand pain    Bilateral wrist pain    Bilateral lower extremity edema    Bilateral nonexudative age-related macular degeneration    CAD S/P percutaneous coronary angioplasty    Cervical spondylosis without myelopathy    Chronic deep vein thrombosis (DVT) of proximal vein of right lower extremity (Multi)    Chronic hip pain after total replacement of right hip joint    Chronic obstructive pulmonary disease (Multi)    Chronic pain of both knees    Chronic systolic heart failure, ACC/AHA stage C    Closed dislocation of left hip (Multi)    Coronary artery disease    Diverticulitis    Elevated lactic acid level    Elevated sed rate    Elevated C-reactive protein (CRP)    Essential hypertension, benign    Fall    Chronic bilateral low back pain without sciatica    Chronic right shoulder pain    Fibromyalgia    Fracture of femur, distal, right, closed    Gait instability    Hyperuricemia    Mild cognitive impairment with memory loss    Mixed hyperlipidemia    Myalgia    Nuclear sclerosis    Nuclear sclerotic cataract of both eyes    Obesity, Class I, BMI 30-34.9    Osteoarthritis of right shoulder    Other specified hypothyroidism    Atrial fibrillation with RVR (Multi)    Primary osteoarthritis involving multiple joints    Pulmonary embolism on right (Multi)    PVD (peripheral vascular disease) (CMS-HCC)    Rheumatoid arthritis involving multiple sites with positive rheumatoid factor (Multi)    Rotator cuff tendinitis    S/p bilateral revision of total hip arthroplasty    SAMAN (obstructive sleep apnea)    SOB (shortness of breath)     Status post total replacement of both hips    Trochanteric bursitis of right hip    Chest pain    Upper back pain    Vitamin B12 deficiency    Vitamin D deficiency    Hemorrhagic disorder due to extrinsic circulating anticoagulants (Multi)    Type 2 diabetes mellitus with diabetic polyneuropathy, unspecified whether long term insulin use    Long term current use of anticoagulant therapy    Former smoker    Encounter for medication review and counseling    Encounter to discuss treatment options    Hydronephrosis with ureteropelvic junction (UPJ) obstruction    Acute cystitis with hematuria    Acute kidney injury (CMS-HCC)    Leukocytosis    Hydronephrosis with urinary obstruction due to renal calculus    Ureteral calculus, right    Elevated troponin I level    Hypomagnesemia       General Visit Information:  Per EMR: pt presented to an outside emergency room for complaint of nausea and vomiting x 4 days with right lower quadrant pain and dysuria. In addition, the patient complaints of intermittent fevers x1 day, dizziness, chest pain, and shortness of breath. The patient reports that the chest pain is similar to her chronic chest pain. EMS reported to outside facility that patient slipped off bed. The patient denies hitting her head. She does report a headache x 4-5 days in the back of her head and across her forehead. However, she was unable to get up thus why she called EMS. The patient denies any recent palpitations, cough, D/C, or hematuria.     On arrival, pt supine in bed.  Pt in no apparent distress and agreeable to therapy.    General  Reason for Referral: impaired mobility  Referred By: Kelsey Plummer  Prior to Session Communication: Bedside nurse  Patient Position Received: Bed, 3 rail up, Alarm on    Home Living/PLOF:  Pt lives with  in house with 2 levels, but states they only use lower level.  4 LASHAY with B rails.  1 floor set up.  Has tub shower with transfer bench and Gbs.  Indep with ADLs at  baseline but states things have become increasingly difficult recently and has almost fallen getting in/out of tub.  Pt states she either furniture walks or uses FWW rollator for mobility.  Pt is able to do housekeeping with rest breaks.  Pt nor her  drive, so they rely on family for transportation.  Pt denies any falls in the last 3 months aside from most recent slide OOB.    Precautions:  Precautions  Medical Precautions: Fall precautions    Vital Signs:  Vital Signs  SpO2: 96 % (on RA)  Objective     Pain:  Pain Assessment  Pain Assessment: 0-10  0-10 (Numeric) Pain Score: 5 - Moderate pain  Pain Type: Acute pain  Pain Location: Abdomen  Pain Interventions: Repositioned, Ambulation/increased activity    Cognition:  Cognition  Overall Cognitive Status: Within Functional Limits  Orientation Level: Oriented X4    General Assessments:      Activity Tolerance  Endurance: Tolerates 10 - 20 min exercise with multiple rests  Sensation  Sensation Comment: pt reports numbness/tingling in B hands/feet    Static Sitting Balance  Static Sitting-Comment/Number of Minutes: fair plus  Dynamic Sitting Balance  Dynamic Sitting-Comments: fair  Static Standing Balance  Static Standing-Comment/Number of Minutes: fair plus  Dynamic Standing Balance  Dynamic Standing-Comments: fair    Extremity/Trunk Assessments:  BLE strength: at least 3/5    Functional Mobility:  Bed mobility  Supine to sit: CGA with increased time and effort; Vcs not to hold breath     Transfers  Sit to stand: Min A x2; Vcs for hand placement     Stand to sit: Min A x2; Vcs for hand placement and guidance     Ambulation/Stairs  Pt ambulated 5 ft from bed to chair with close CGA-Min A x1 and FWW; VC for guidance; slow steven    Outcome Measures:  [facility] Basic Mobility  Turning from your back to your side while in a flat bed without using bedrails: A little  Moving from lying on your back to sitting on the side of a flat bed without using bedrails: A  little  Moving to and from bed to chair (including a wheelchair): A little  Standing up from a chair using your arms (e.g. wheelchair or bedside chair): A little  To walk in hospital room: A little  Climbing 3-5 steps with railing: A lot  Basic Mobility - Total Score: 17    Goals:  Encounter Problems       Encounter Problems (Active)       PT Problem       Pt will be able to perform all bed mobility tasks with Mod I.  (Progressing)       Start:  11/04/24    Expected End:  11/18/24            Pt will perform all transfers with Mod I and FWW with proper safety mechanics.   (Progressing)       Start:  11/04/24    Expected End:  11/18/24            Pt will ambulate 100 ft with Mod I using FWW for improved functional independence.  (Progressing)       Start:  11/04/24    Expected End:  11/18/24            Pt will be able to negotiate 4 steps with 1 HR with SBA.  (Progressing)       Start:  11/04/24    Expected End:  11/18/24            Pt will be able to ambulate at least 100 ft with < or equal to 1 rest break while maintaining SpO2 > 90%.  (Progressing)       Start:  11/04/24    Expected End:  11/18/24                 Education Documentation  Body Mechanics, taught by Juanita Farias PT at 11/4/2024 12:52 PM.  Learner: Patient  Readiness: Acceptance  Method: Explanation  Response: Verbalizes Understanding    Home Exercise Program, taught by Juanita Farias PT at 11/4/2024 12:52 PM.  Learner: Patient  Readiness: Acceptance  Method: Explanation  Response: Verbalizes Understanding    Mobility Training, taught by Juanita Farias PT at 11/4/2024 12:52 PM.  Learner: Patient  Readiness: Acceptance  Method: Explanation  Response: Verbalizes Understanding    Education Comments  No comments found.

## 2024-11-04 NOTE — H&P
Yen Montoya  11/03/24  [id number]  Kelsey Plummer MD  This is a patient with a past medical history as detailed below admitted to the [facility] ED with chief complaint of 74 y.o. female presenting to [facility] on 11/3/2024 with pertinent medical history of atrial fibrillation on Coumadin, sick sinus syndrome status post pacemaker insertion, Former tobacco use, RA, obesity, systolic heart failure (3/2024 Echo: EF 40-45%), adrenal mass, CAD status post 8 PCI, HTN, HLD, PVD, hypothyroidism, peripheral neuropathy, anxiety, SAMAN on CPAP, type 2 diabetes, pulmonary embolism, DVT RLE and nephrolithiasis. Presented to an outside emergency room for complaint of nausea and vomiting x 4 days with right lower quadrant pain and dysuria. In addition, the patient complaints of intermittent fevers x1 day, dizziness, chest pain, and shortness of breath.  Patient is sleepy easily arousable.  Postop.      Assessment and plan   1-hydronephrosis admitted to the hospital pain control CT abdomen pelvis monitor electrolytes CBC lactic acid consulted urology taken to the OR for stent placement  2-SABRINA hydrate follow-up CMP monitor urine output  3-hypomagnesemia replace  4-urinary tract infection leukocytosis follow-up CBC follow-up culture started on IV ceftriaxone .  5 history of coronary artery disease currently has no chest pain   6 hyperlipidemia hypertension resume home medication  Discussed with the ED /consultants.      Review of other systems negative other than HPI  Past medical history    Epic& consultants notes reviewed  Most recent images Reviewed  Last EKG/Rhythm reviewed      Vital signs has been reviewed  GENERAL: o x 3 in distress.   SKIN:  No rashes .   ENT mucosa,  Normal/nose normal   NECK: no jugulovenous distention  NO lymphadenopathy   LUNGS:No wheezing,no ronchi  no rales.  CARDIAC:  S1 and S2  ABDOMEN: Abdomen soft, mild-tender.    EXTREMITIES: Extremities normal.   NEURO: non focal    PULSES: + pedal / radial   No  edema  No goiter   No carotid bruits.                   Past Medical History  She has a past medical history of Acute embolism and thrombosis of right popliteal vein (Multi) (2020), Anxiety, Atrial fib/flutter, transient (Multi), CAD (coronary artery disease), Calculus of ureter (2020), Chronic venous hypertension (idiopathic) with inflammation of left lower extremity, Chronic venous hypertension (idiopathic) with inflammation of right lower extremity, Chronic venous hypertension (idiopathic) with ulcer of unspecified lower extremity (CODE), Complete heart block, Hypertension, Hypothyroidism, Long term (current) use of anticoagulants (2022), SAMAN (obstructive sleep apnea), Osteoporosis, Other bursitis of hip, right hip, Other mechanical complication of other internal joint prosthesis, initial encounter (CMS-HCC) (2020), Other postprocedural complications and disorders of the circulatory system, not elsewhere classified (10/08/2020), Other specified joint disorders, right shoulder, Personal history of diseases of the blood and blood-forming organs and certain disorders involving the immune mechanism, Pulmonary embolism, PVD (peripheral vascular disease) (CMS-HCC), RA (rheumatoid arthritis), Renal calculi, Sick sinus syndrome (Multi), Toxic effect of unspecified metal, accidental (unintentional), initial encounter (2020), Unspecified fracture of lower end of unspecified femur, initial encounter for closed fracture (Multi) (2021), and UTI (urinary tract infection).    Surgical History  She has a past surgical history that includes Other surgical history (2021); XR chest pacemaker w fluoro (10/17/2021); Cardiac catheterization; Cholecystectomy;  section, low transverse; Tonsillectomy; Total hip arthroplasty (Bilateral); Total shoulder arthroplasty (Left); Total knee arthroplasty (Bilateral); Colonoscopy; pacemaker placement; Femur fracture surgery; Wrist surgery; Cardiac  electrophysiology mapping and ablation; and Coronary stent placement.     Social History  She reports that she quit smoking about 36 years ago. Her smoking use included cigarettes. She has never used smokeless tobacco. She reports current alcohol use. She reports that she does not use drugs.    Family History  Family History   Problem Relation Name Age of Onset    Other (ptca) Mother      Heart failure Father      Coronary artery disease Father      Breast cancer Sister      Breast cancer Sister          Allergies  Adhesive tape-silicones, Latex, and Valdecoxib          Last Recorded Vitals  /71 (BP Location: Left arm, Patient Position: Lying)   Pulse 70   Temp 36.2 °C (97.2 °F) (Oral)   Resp 16   Wt 104 kg (228 lb 13.4 oz)   SpO2 96%          Kelsey Plummer MD

## 2024-11-04 NOTE — PROGRESS NOTES
11/04/24 1544   Discharge Planning   Assistance Needed states she uses a walker and wheelchair. difficulty walkingon uneven surfaces.  can only walk for short distances.  Neighbors are checking onhusband while in thehospital  he is mobile.   Number of Stairs to Enter Residence 4   Number of Stairs Within Residence 26  (lives on first floor. has a basement and upstairs.)   Who is requesting discharge planning? Provider   Expected Discharge Disposition Rehab   Does the patient need discharge transport arranged? Yes   RoundTrip coordination needed? Yes   Has discharge transport been arranged? No   Patient Choice   Provider Choice list and CMS website (https://medicare.gov/care-compare#search) for post-acute Quality and Resource Measure Data were provided and reviewed with: Patient     Introduced myself and my role.  States family will help her and her  if she calls and says she needs it.   States would like to get stronger and increase her ability to walk.  Choice list for [facility] given. MALINI Wiseman DSC requested to end referral. Will need precert if accepted.

## 2024-11-04 NOTE — CARE PLAN
The patient's goals for the shift include      The clinical goals for the shift include Remain free from falls

## 2024-11-04 NOTE — PROGRESS NOTES
"Yen Montoya is a 74 y.o. female on day 1 of admission presenting with Hydronephrosis with urinary obstruction due to renal calculus.    Subjective   No sig pain, pt feels weak.   Urine is clear       Objective     Physical Exam    Last Recorded Vitals  Blood pressure 150/68, pulse 70, temperature 35.7 °C (96.3 °F), temperature source Oral, resp. rate 18, height 1.753 m (5' 9.02\"), weight 104 kg (228 lb 13.4 oz), SpO2 98%.  Intake/Output last 3 Shifts:  I/O last 3 completed shifts:  In: 1056.3 (10.2 mL/kg) [I.V.:1006.3 (9.7 mL/kg); IV Piggyback:50]  Out: 850 (8.2 mL/kg) [Urine:850 (0.2 mL/kg/hr)]  Weight: 103.8 kg     Relevant Results                 This patient has a urinary catheter   Reason for the urinary catheter remaining today? Urine catheter unnecessary, will be removed today               Assessment/Plan   Assessment & Plan  Hydronephrosis with urinary obstruction due to renal calculus    Elevated lactic acid level    Hydronephrosis with ureteropelvic junction (UPJ) obstruction    Acute cystitis with hematuria    Acute kidney injury (CMS-HCC)    Leukocytosis    Ureteral calculus, right    Elevated troponin I level    Hypomagnesemia    Ok to tamika ford and home when ok with dr Mcdermott.  Follow up in office in 2 weeks to plan defintiive tx of possible stone as no way to know if stone removed from bladder was ureteral stone or a prior passed stone       I spent 10 minutes in the professional and overall care of this patient.      Jaziel Stewart MD      "

## 2024-11-05 ENCOUNTER — APPOINTMENT (OUTPATIENT)
Dept: RADIOLOGY | Facility: HOSPITAL | Age: 74
DRG: 854 | End: 2024-11-05
Payer: COMMERCIAL

## 2024-11-05 LAB
ANION GAP SERPL CALC-SCNC: 11 MMOL/L (ref 10–20)
BACTERIA UR CULT: ABNORMAL
BACTERIA UR CULT: ABNORMAL
BACTERIA UR CULT: NORMAL
BASE EXCESS BLDA CALC-SCNC: -1.1 MMOL/L (ref -2–3)
BODY TEMPERATURE: ABNORMAL
BUN SERPL-MCNC: 29 MG/DL (ref 6–23)
CALCIUM SERPL-MCNC: 10.4 MG/DL (ref 8.6–10.3)
CHLORIDE SERPL-SCNC: 105 MMOL/L (ref 98–107)
CO2 SERPL-SCNC: 24 MMOL/L (ref 21–32)
CREAT SERPL-MCNC: 1.08 MG/DL (ref 0.5–1.05)
CULTURE, BLOOD ID SENSITIVITY: ABNORMAL
EGFRCR SERPLBLD CKD-EPI 2021: 54 ML/MIN/1.73M*2
ERYTHROCYTE [DISTWIDTH] IN BLOOD BY AUTOMATED COUNT: 13.8 % (ref 11.5–14.5)
GLUCOSE BLD MANUAL STRIP-MCNC: 137 MG/DL (ref 74–99)
GLUCOSE BLD MANUAL STRIP-MCNC: 167 MG/DL (ref 74–99)
GLUCOSE BLD MANUAL STRIP-MCNC: 172 MG/DL (ref 74–99)
GLUCOSE BLD MANUAL STRIP-MCNC: 181 MG/DL (ref 74–99)
GLUCOSE BLD MANUAL STRIP-MCNC: 182 MG/DL (ref 74–99)
GLUCOSE BLD MANUAL STRIP-MCNC: 195 MG/DL (ref 74–99)
GLUCOSE SERPL-MCNC: 173 MG/DL (ref 74–99)
HCO3 BLDA-SCNC: 23.5 MMOL/L (ref 22–26)
HCT VFR BLD AUTO: 37.7 % (ref 36–46)
HGB BLD-MCNC: 11.8 G/DL (ref 12–16)
HOLD SPECIMEN: NORMAL
HOLD SPECIMEN: NORMAL
INHALED O2 CONCENTRATION: 21 %
INR PPP: 1.3 (ref 0.9–1.1)
LACTATE SERPL-SCNC: 1.7 MMOL/L (ref 0.4–2)
MAGNESIUM SERPL-MCNC: 1.74 MG/DL (ref 1.6–2.4)
MCH RBC QN AUTO: 32.7 PG (ref 26–34)
MCHC RBC AUTO-ENTMCNC: 31.3 G/DL (ref 32–36)
MCV RBC AUTO: 104 FL (ref 80–100)
NRBC BLD-RTO: 0.1 /100 WBCS (ref 0–0)
ORGANISM: ABNORMAL
OXYHGB MFR BLDA: 93.2 % (ref 94–98)
PCO2 BLDA: 38 MM HG (ref 38–42)
PH BLDA: 7.4 PH (ref 7.38–7.42)
PLATELET # BLD AUTO: 222 X10*3/UL (ref 150–450)
PO2 BLDA: 74 MM HG (ref 85–95)
POTASSIUM SERPL-SCNC: 4.3 MMOL/L (ref 3.5–5.3)
PROTHROMBIN TIME: 14.4 SECONDS (ref 9.8–12.8)
RBC # BLD AUTO: 3.61 X10*6/UL (ref 4–5.2)
SAO2 % BLDA: 96 % (ref 94–100)
SODIUM SERPL-SCNC: 136 MMOL/L (ref 136–145)
WBC # BLD AUTO: 19.5 X10*3/UL (ref 4.4–11.3)

## 2024-11-05 PROCEDURE — 82947 ASSAY GLUCOSE BLOOD QUANT: CPT

## 2024-11-05 PROCEDURE — 97535 SELF CARE MNGMENT TRAINING: CPT | Mod: GO

## 2024-11-05 PROCEDURE — 2500000004 HC RX 250 GENERAL PHARMACY W/ HCPCS (ALT 636 FOR OP/ED): Performed by: PSYCHIATRY & NEUROLOGY

## 2024-11-05 PROCEDURE — 80048 BASIC METABOLIC PNL TOTAL CA: CPT | Performed by: NURSE PRACTITIONER

## 2024-11-05 PROCEDURE — 2500000001 HC RX 250 WO HCPCS SELF ADMINISTERED DRUGS (ALT 637 FOR MEDICARE OP): Performed by: NURSE PRACTITIONER

## 2024-11-05 PROCEDURE — 36415 COLL VENOUS BLD VENIPUNCTURE: CPT | Performed by: NURSE PRACTITIONER

## 2024-11-05 PROCEDURE — 74018 RADEX ABDOMEN 1 VIEW: CPT

## 2024-11-05 PROCEDURE — 85027 COMPLETE CBC AUTOMATED: CPT | Performed by: NURSE PRACTITIONER

## 2024-11-05 PROCEDURE — 2500000004 HC RX 250 GENERAL PHARMACY W/ HCPCS (ALT 636 FOR OP/ED): Performed by: INTERNAL MEDICINE

## 2024-11-05 PROCEDURE — 97165 OT EVAL LOW COMPLEX 30 MIN: CPT | Mod: GO

## 2024-11-05 PROCEDURE — 83735 ASSAY OF MAGNESIUM: CPT | Performed by: NURSE PRACTITIONER

## 2024-11-05 PROCEDURE — 83605 ASSAY OF LACTIC ACID: CPT | Performed by: NURSE PRACTITIONER

## 2024-11-05 PROCEDURE — 2500000004 HC RX 250 GENERAL PHARMACY W/ HCPCS (ALT 636 FOR OP/ED): Performed by: NURSE PRACTITIONER

## 2024-11-05 PROCEDURE — 74018 RADEX ABDOMEN 1 VIEW: CPT | Performed by: RADIOLOGY

## 2024-11-05 PROCEDURE — 85610 PROTHROMBIN TIME: CPT | Performed by: NURSE PRACTITIONER

## 2024-11-05 PROCEDURE — 82805 BLOOD GASES W/O2 SATURATION: CPT | Performed by: NURSE PRACTITIONER

## 2024-11-05 PROCEDURE — 2500000002 HC RX 250 W HCPCS SELF ADMINISTERED DRUGS (ALT 637 FOR MEDICARE OP, ALT 636 FOR OP/ED): Performed by: NURSE PRACTITIONER

## 2024-11-05 PROCEDURE — 87040 BLOOD CULTURE FOR BACTERIA: CPT | Mod: STJLAB | Performed by: NURSE PRACTITIONER

## 2024-11-05 PROCEDURE — 36600 WITHDRAWAL OF ARTERIAL BLOOD: CPT

## 2024-11-05 PROCEDURE — 1200000002 HC GENERAL ROOM WITH TELEMETRY DAILY

## 2024-11-05 RX ORDER — HALOPERIDOL 5 MG/ML
1 INJECTION INTRAMUSCULAR ONCE
Status: COMPLETED | OUTPATIENT
Start: 2024-11-05 | End: 2024-11-05

## 2024-11-05 RX ORDER — LORAZEPAM 0.5 MG/1
0.5 TABLET ORAL ONCE
Status: COMPLETED | OUTPATIENT
Start: 2024-11-05 | End: 2024-11-05

## 2024-11-05 RX ORDER — WARFARIN 2 MG/1
2 TABLET ORAL ONCE
Status: COMPLETED | OUTPATIENT
Start: 2024-11-05 | End: 2024-11-05

## 2024-11-05 RX ORDER — WATER
200 LIQUID (ML) MISCELLANEOUS
Status: COMPLETED | OUTPATIENT
Start: 2024-11-05 | End: 2024-11-05

## 2024-11-05 RX ORDER — HALOPERIDOL 5 MG/ML
1 INJECTION INTRAMUSCULAR ONCE
Status: DISCONTINUED | OUTPATIENT
Start: 2024-11-05 | End: 2024-11-15 | Stop reason: WASHOUT

## 2024-11-05 RX ORDER — LANOLIN ALCOHOL/MO/W.PET/CERES
400 CREAM (GRAM) TOPICAL ONCE
Status: COMPLETED | OUTPATIENT
Start: 2024-11-05 | End: 2024-11-05

## 2024-11-05 RX ADMIN — WARFARIN SODIUM 2 MG: 2 TABLET ORAL at 17:08

## 2024-11-05 RX ADMIN — DILTIAZEM HYDROCHLORIDE 180 MG: 180 CAPSULE, COATED, EXTENDED RELEASE ORAL at 22:06

## 2024-11-05 RX ADMIN — Medication 500 MG: at 06:38

## 2024-11-05 RX ADMIN — INSULIN LISPRO 2 UNITS: 100 INJECTION, SOLUTION INTRAVENOUS; SUBCUTANEOUS at 09:07

## 2024-11-05 RX ADMIN — LEVOTHYROXINE SODIUM 100 MCG: 0.1 TABLET ORAL at 06:38

## 2024-11-05 RX ADMIN — CEFTRIAXONE 2 G: 2 INJECTION, POWDER, FOR SOLUTION INTRAMUSCULAR; INTRAVENOUS at 11:35

## 2024-11-05 RX ADMIN — METOPROLOL TARTRATE 100 MG: 100 TABLET, FILM COATED ORAL at 09:06

## 2024-11-05 RX ADMIN — MORPHINE SULFATE 2 MG: 2 INJECTION, SOLUTION INTRAMUSCULAR; INTRAVENOUS at 10:02

## 2024-11-05 RX ADMIN — Medication 400 MG: at 10:02

## 2024-11-05 RX ADMIN — LISINOPRIL 10 MG: 10 TABLET ORAL at 09:06

## 2024-11-05 RX ADMIN — CHOLECALCIFEROL TAB 125 MCG (5000 UNIT) 5000 UNITS: 125 TAB at 09:06

## 2024-11-05 RX ADMIN — Medication 200 ML: at 17:14

## 2024-11-05 RX ADMIN — HALOPERIDOL LACTATE 1 MG: 5 INJECTION, SOLUTION INTRAMUSCULAR at 16:36

## 2024-11-05 RX ADMIN — INSULIN LISPRO 2 UNITS: 100 INJECTION, SOLUTION INTRAVENOUS; SUBCUTANEOUS at 22:06

## 2024-11-05 RX ADMIN — ASPIRIN 81 MG: 81 TABLET, COATED ORAL at 09:06

## 2024-11-05 RX ADMIN — ATORVASTATIN CALCIUM 80 MG: 80 TABLET, FILM COATED ORAL at 22:06

## 2024-11-05 RX ADMIN — Medication 200 ML: at 14:29

## 2024-11-05 RX ADMIN — BUPROPION HYDROCHLORIDE 300 MG: 150 TABLET, EXTENDED RELEASE ORAL at 09:06

## 2024-11-05 RX ADMIN — INSULIN LISPRO 2 UNITS: 100 INJECTION, SOLUTION INTRAVENOUS; SUBCUTANEOUS at 11:44

## 2024-11-05 RX ADMIN — Medication 200 ML: at 10:37

## 2024-11-05 RX ADMIN — AZATHIOPRINE 100 MG: 50 TABLET ORAL at 09:06

## 2024-11-05 RX ADMIN — HALOPERIDOL LACTATE 1 MG: 5 INJECTION, SOLUTION INTRAMUSCULAR at 15:20

## 2024-11-05 RX ADMIN — LORAZEPAM 0.5 MG: 0.5 TABLET ORAL at 13:02

## 2024-11-05 RX ADMIN — METOPROLOL TARTRATE 100 MG: 100 TABLET, FILM COATED ORAL at 22:05

## 2024-11-05 ASSESSMENT — COGNITIVE AND FUNCTIONAL STATUS - GENERAL
DAILY ACTIVITIY SCORE: 16
DRESSING REGULAR UPPER BODY CLOTHING: A LITTLE
DRESSING REGULAR LOWER BODY CLOTHING: A LOT
TOILETING: A LITTLE
HELP NEEDED FOR BATHING: A LOT
PERSONAL GROOMING: A LITTLE
EATING MEALS: A LITTLE

## 2024-11-05 ASSESSMENT — ACTIVITIES OF DAILY LIVING (ADL)
BATHING_ASSISTANCE: MODERATE
HOME_MANAGEMENT_TIME_ENTRY: 10

## 2024-11-05 ASSESSMENT — PAIN - FUNCTIONAL ASSESSMENT
PAIN_FUNCTIONAL_ASSESSMENT: 0-10

## 2024-11-05 ASSESSMENT — PAIN SCALES - GENERAL
PAINLEVEL_OUTOF10: 4
PAINLEVEL_OUTOF10: 0 - NO PAIN
PAINLEVEL_OUTOF10: 0 - NO PAIN
PAINLEVEL_OUTOF10: 7

## 2024-11-05 ASSESSMENT — PAIN DESCRIPTION - LOCATION: LOCATION: ABDOMEN

## 2024-11-05 NOTE — PROGRESS NOTES
Occupational Therapy    Evaluation    Patient Name: Yen Montoya  MRN: [id number]  Department: [facility] 3 S  Room: [facility]311-A  Today's Date: 11/5/2024  Time Calculation  Start Time: 1406  Stop Time: 1424  Time Calculation (min): 18 min    Assessment  IP OT Assessment  End of Session Communication: PCT/NA/CTA  End of Session Patient Position: Bed, 3 rail up, Alarm on (all needs in reach)  Plan:  Treatment Interventions: ADL retraining, Functional transfer training, Endurance training, Equipment evaluation/education, Cognitive reorientation, Patient/family training, Compensatory technique education  OT Frequency: 4 times per week  OT Discharge Recommendations: High intensity level of continued care  OT - OK to Discharge: Yes    Subjective   Current Problem:  1. Hydronephrosis with urinary obstruction due to renal calculus        2. Ureteral calculus, right  Case Request Operating Room: Cystoscopy with Insertion Stent Ureter    Case Request Operating Room: Cystoscopy with Insertion Stent Ureter    Calculi (Stone) Analysis    Calculi (Stone) Analysis    Urine Culture    Urine Culture        General:  General  Reason for Referral: ADL  Referred By: Enoch  Past Medical History Relevant to Rehab: Includes: Past Medical History  She has a past medical history of Acute embolism and thrombosis of right popliteal vein (Multi) (04/24/2020), Anxiety, Atrial fib/flutter, transient (Multi), CAD (coronary artery disease), Calculus of ureter (04/29/2020), Chronic venous hypertension (idiopathic) with inflammation of left lower extremity, Chronic venous hypertension (idiopathic) with inflammation of right lower extremity, Chronic venous hypertension (idiopathic) with ulcer of unspecified lower extremity (CODE), Complete heart block, Hypertension, Hypothyroidism, Long term (current) use of anticoagulants (01/20/2022), SAMAN (obstructive sleep apnea), Osteoporosis, Other bursitis of hip, right hip, Other mechanical complication of other  internal joint prosthesis, initial encounter (CMS-HCC) (2020), Other postprocedural complications and disorders of the circulatory system, not elsewhere classified (10/08/2020), Other specified joint disorders, right shoulder, Personal history of diseases of the blood and blood-forming organs and certain disorders involving the immune mechanism, Pulmonary embolism, PVD (peripheral vascular disease) (CMS-HCC), RA (rheumatoid arthritis), Renal calculi, Sick sinus syndrome (Multi), Toxic effect of unspecified metal, accidental (unintentional), initial encounter (2020), Unspecified fracture of lower end of unspecified femur, initial encounter for closed fracture (Multi) (2021), and UTI (urinary tract infection).     Surgical History  She has a past surgical history that includes Other surgical history (2021); XR chest pacemaker w fluoro (10/17/2021); Cardiac catheterization; Cholecystectomy;  section, low transverse; Tonsillectomy; Total hip arthroplasty (Bilateral); Total shoulder arthroplasty (Left); Total knee arthroplasty (Bilateral); Colonoscopy; pacemaker placement; Femur fracture surgery; Wrist surgery; Cardiac electrophysiology mapping and ablation; and Coronary stent placement.  Family/Caregiver Present: No  Prior to Session Communication: Bedside nurse  Patient Position Received: Bed, 3 rail up, Alarm on (Pt. restless in bed, stating that she needs to use the restroom.  OT assisted pt. up to BSC.)  General Comment: To ED 11/3 with fall, abdominal pain, nausea and vomiting x 3-4 days.  Admitted with hydronephrosis, acute cystitis, UTI, SABRINA. Cystoscopy with insertion of stent, R ureter on 11/3.  Precautions:  Medical Precautions: Fall precautions    Pain:  Pain Assessment  Pain Assessment: 0-10  0-10 (Numeric) Pain Score: 0 - No pain    Objective   Cognition:  Overall Cognitive Status: Impaired (confused, but pleasant and cooperative)  Orientation Level: Disoriented to place,  Disoriented to time, Disoriented to situation           Home Living:  Home Living Comments: Per PT evaluation: Pt lives with  in house with 2 levels, but states they only use lower level.  4 LASHAY with B rails.  1 floor set up.  Has tub shower with transfer bench and grab bars.  Indep with ADLs at baseline but states things have become increasingly difficult recently and has almost fallen getting in/out of tub.  Pt states she either furniture walks or uses FWW rollator for mobility.  Pt is able to do housekeeping with rest breaks.  Pt. and spouse rely on family for transportation.  Pt denies any falls in the last 3 months aside from most recent slide off of edge of bed.      ADL:  Eating Deficit: Supervision/safety  Grooming Assistance: Minimal  Bathing Assistance: Moderate  UE Dressing Deficit: Supervision/safety  LE Dressing Assistance: Moderate  Toileting Assistance with Device: Minimal  Functional Assistance: Moderate     Bed Mobility/Transfers: Bed Mobility  Bed Mobility:  (CGA and verbal cues sup to sit and sup/verbal cues sit to sup)    Transfers  Transfer: Yes (Min assist sit<>stand EOB and sit<>stand at BSC.  Mod assist bed<>BSC stand/step transfer with FWW.  Assist for balance and cues for safe technique.)    Functional Mobility:  Functional Mobility  Functional Mobility Performed:  (5 steps each way bed to BSC and BSC to bed with FWW and gait belt with min to mod assist. Impaired problem solving and insight, cues needed for safety and assistance for balance.)  Sitting Balance:  Static Sitting Balance  Static Sitting-Comment/Number of Minutes: Good  Dynamic Sitting Balance  Dynamic Sitting-Comments: Good (-)  Standing Balance:  Static Standing Balance  Static Standing-Comment/Number of Minutes: Fair (-)  Dynamic Standing Balance  Dynamic Standing-Comments: Fair (-)/poor (+)    Strength:  Strength Comments: UEs WFL       Extremities: RUE   RUE :  (AROM grossly WFL) and LUE   LUE:  (AROM grossly  WFL)    Outcome Measures: [facility] Daily Activity  Putting on and taking off regular lower body clothing: A lot  Bathing (including washing, rinsing, drying): A lot  Putting on and taking off regular upper body clothing: A little  Toileting, which includes using toilet, bedpan or urinal: A little  Taking care of personal grooming such as brushing teeth: A little  Eating Meals: A little  Daily Activity - Total Score: 16      Education Documentation  Body Mechanics, taught by Teena Armenta OT at 11/5/2024  2:48 PM.  Learner: Patient  Readiness: Acceptance  Method: Explanation  Response: Verbalizes Understanding, Needs Reinforcement    Precautions, taught by Teena Armenta OT at 11/5/2024  2:48 PM.  Learner: Patient  Readiness: Acceptance  Method: Explanation  Response: Verbalizes Understanding, Needs Reinforcement    ADL Training, taught by Teena Armenta OT at 11/5/2024  2:48 PM.  Learner: Patient  Readiness: Acceptance  Method: Explanation  Response: Verbalizes Understanding, Needs Reinforcement    Education Comments  No comments found.      Goals:   Encounter Problems       Encounter Problems (Active)       OT Goals       Sup for ADL functional mobility with FWW.        Start:  11/05/24    Expected End:  11/19/24            Sup for sit/stand, bed/chair/commode with FWW.        Start:  11/05/24    Expected End:  11/19/24            Good (-) dynamic standing balance for ADL.        Start:  11/05/24    Expected End:  11/19/24            Tolerate 10 mins light functional activity.        Start:  11/05/24    Expected End:  11/19/24            Normal dynamic sitting balance for ADL.        Start:  11/05/24    Expected End:  11/19/24

## 2024-11-05 NOTE — PROGRESS NOTES
11/05/24 1323   Discharge Planning   Home or Post Acute Services Post acute facilities (Rehab/SNF/etc)   Type of Post Acute Facility Services Rehab   Expected Discharge Disposition Rehab   Does the patient need discharge transport arranged? Yes   RoundTrip coordination needed? Yes   Has discharge transport been arranged? No     [facility] can accept patient once WBC is under 14. Facility will also need OT notes once available.     1455: OT note sent via careport to [facility].

## 2024-11-05 NOTE — CARE PLAN
Problem: Skin  Goal: Participates in plan/prevention/treatment measures  Outcome: Progressing  Goal: Prevent/manage excess moisture  Outcome: Progressing  Goal: Prevent/minimize sheer/friction injuries  Outcome: Progressing  Goal: Promote/optimize nutrition  Outcome: Progressing  Goal: Promote skin healing  Outcome: Progressing     Problem: Fall/Injury  Goal: Not fall by end of shift  Outcome: Progressing  Goal: Be free from injury by end of the shift  Outcome: Progressing  Goal: Verbalize understanding of personal risk factors for fall in the hospital  Outcome: Progressing  Goal: Verbalize understanding of risk factor reduction measures to prevent injury from fall in the home  Outcome: Progressing  Goal: Use assistive devices by end of the shift  Outcome: Progressing  Goal: Pace activities to prevent fatigue by end of the shift  Outcome: Progressing     Problem: Infection related to problem list condition  Goal: Infection will resolve through treatment  Outcome: Progressing   The patient's goals for the shift include      The clinical goals for the shift include Pt will remain free from fall injury

## 2024-11-05 NOTE — PROGRESS NOTES
Yen Montoya  11/04/2024  [id number]  Kelsey Plummer MD  This is a patient with a past medical history as detailed below admitted to the [facility] ED with chief complaint of 74 y.o. female presenting to [facility] on 11/3/2024 with pertinent medical history of atrial fibrillation on Coumadin, sick sinus syndrome status post pacemaker insertion, Former tobacco use, RA, obesity, systolic heart failure (3/2024 Echo: EF 40-45%), adrenal mass, CAD status post 8 PCI, HTN, HLD, PVD, hypothyroidism, peripheral neuropathy, anxiety, SAMAN on CPAP, type 2 diabetes, pulmonary embolism, DVT RLE and nephrolithiasis. Presented to an outside emergency room for complaint of nausea and vomiting x 4 days with right lower quadrant pain and dysuria.  Patient was taken to the OR by Dr. Stewart      Assessment and plan   1-hydronephrosis admitted to the hospital pain control CT abdomen pelvis monitor electrolytes CBC lactic acid consulted urology taken to the OR for stent placement need 2 weeks to plan defintiive tx of possible stone as no way to know if stone removed from bladder was ureteral stone or a prior passed stone   2-SABRINA hydrate follow-up CMP monitor urine output  3-hypomagnesemia replace  4-urinary tract infection leukocytosis follow-up CBC follow-up culture started on IV ceftriaxone .  5 history of coronary artery disease    6-  A-fib follow-up PT/INR  Discussed with the  consultants.      Review of other systems negative other than HPI  Past medical history    Epic& consultants notes reviewed  Most recent images Reviewed  Last EKG/Rhythm reviewed      Vital signs has been reviewed     SKIN:  No rashes .   ENT mucosa,  Normal/nose normal   NECK: no jugulovenous distention  NO lymphadenopathy   LUNGS:No wheezing,no ronchi  no rales.  CARDIAC:  S1 and S2  ABDOMEN: Abdomen soft, mild-tender.    EXTREMITIES: Extremities normal.   NEURO: non focal    PULSES: + pedal / radial   No edema  No goiter   No carotid bruits.                   Past  Medical History  She has a past medical history of Acute embolism and thrombosis of right popliteal vein (Multi) (2020), Anxiety, Atrial fib/flutter, transient (Multi), CAD (coronary artery disease), Calculus of ureter (2020), Chronic venous hypertension (idiopathic) with inflammation of left lower extremity, Chronic venous hypertension (idiopathic) with inflammation of right lower extremity, Chronic venous hypertension (idiopathic) with ulcer of unspecified lower extremity (CODE), Complete heart block, Hypertension, Hypothyroidism, Long term (current) use of anticoagulants (2022), SAMAN (obstructive sleep apnea), Osteoporosis, Other bursitis of hip, right hip, Other mechanical complication of other internal joint prosthesis, initial encounter (CMS-HCC) (2020), Other postprocedural complications and disorders of the circulatory system, not elsewhere classified (10/08/2020), Other specified joint disorders, right shoulder, Personal history of diseases of the blood and blood-forming organs and certain disorders involving the immune mechanism, Pulmonary embolism, PVD (peripheral vascular disease) (CMS-HCC), RA (rheumatoid arthritis), Renal calculi, Sick sinus syndrome (Multi), Toxic effect of unspecified metal, accidental (unintentional), initial encounter (2020), Unspecified fracture of lower end of unspecified femur, initial encounter for closed fracture (Multi) (2021), and UTI (urinary tract infection).    Surgical History  She has a past surgical history that includes Other surgical history (2021); XR chest pacemaker w fluoro (10/17/2021); Cardiac catheterization; Cholecystectomy;  section, low transverse; Tonsillectomy; Total hip arthroplasty (Bilateral); Total shoulder arthroplasty (Left); Total knee arthroplasty (Bilateral); Colonoscopy; pacemaker placement; Femur fracture surgery; Wrist surgery; Cardiac electrophysiology mapping and ablation; and Coronary stent  placement.     Social History  She reports that she quit smoking about 36 years ago. Her smoking use included cigarettes. She has never used smokeless tobacco. She reports current alcohol use. She reports that she does not use drugs.    Family History  Family History   Problem Relation Name Age of Onset    Other (ptca) Mother      Heart failure Father      Coronary artery disease Father      Breast cancer Sister      Breast cancer Sister          Allergies  Adhesive tape-silicones, Latex, and Valdecoxib          Last Recorded Vitals  /59 (BP Location: Left arm, Patient Position: Lying)   Pulse 72   Temp 35.8 °C (96.4 °F)   Resp 16   Wt 104 kg (228 lb 13.4 oz)   SpO2 96%          Kelsey Plummer MD

## 2024-11-05 NOTE — PROGRESS NOTES
INPATIENT PROGRESS NOTES    PATIENT NAME: Yen Montoya    MRN: [id number]  SERVICE DATE:  11/5/2024   SERVICE TIME:  10:51 AM    SIGNATURE: Ivone Blandon MD      SUBJECTIVE  Afebrile  No events overnight       OBJECTIVE  PHYSICAL EXAM:   Patient Vitals for the past 24 hrs:   BP Temp Temp src Pulse Resp SpO2   11/05/24 1002 (!) 164/93 -- -- 70 -- --   11/05/24 1001 (!) 175/93 -- -- 70 -- --   11/05/24 1000 167/90 -- -- 72 -- --   11/05/24 0930 (!) 193/93 35.6 °C (96.1 °F) Oral 70 16 97 %   11/05/24 0347 159/76 35.5 °C (95.9 °F) -- 70 18 97 %   11/04/24 2347 172/90 35.8 °C (96.4 °F) -- 70 18 97 %   11/04/24 1956 (!) 165/91 35.7 °C (96.3 °F) -- 70 20 97 %   11/04/24 1600 164/85 35.8 °C (96.4 °F) Oral 70 17 98 %   11/04/24 1200 150/68 35.7 °C (96.3 °F) Oral 70 18 98 %   11/04/24 1108 -- -- -- -- -- 96 %         Gen: NAD  Neck: symmetric, no mass  Cardiovascular: RRR  Respiratory: No distress    external catheter in place    Labs:  Lab Results   Component Value Date    WBC 19.5 (H) 11/05/2024    HGB 11.8 (L) 11/05/2024    HCT 37.7 11/05/2024     (H) 11/05/2024     11/05/2024     Lab Results   Component Value Date    GLUCOSE 173 (H) 11/05/2024    CALCIUM 10.4 (H) 11/05/2024     11/05/2024    K 4.3 11/05/2024    CO2 24 11/05/2024     11/05/2024    BUN 29 (H) 11/05/2024    CREATININE 1.08 (H) 11/05/2024   ESR: --  Lab Results   Component Value Date    SEDRATE 13 01/06/2021     Lab Results   Component Value Date    CRP 4.56 (A) 10/29/2019     Lab Results   Component Value Date    ALT 9 11/03/2024    AST 18 11/03/2024    ALKPHOS 49 11/03/2024    BILITOT 0.9 11/03/2024         DATA:   Diagnostic tests reviewed for today's visit:    Labs this admission reviewed  Imagings this admission reviewed  Cultures: Reviewed      ASSESSMENT :   -Sepsis secondary to right pyelonephritis  -Klebsiella pneumoniae bacteremia  -Right hydronephrosis secondary to right UVJ obstructing stone  -Status post  "cystoscopy with right stent placement on 11/3  -Leukocytosis  -Renal failure     PLAN:  -Continue ceftriaxone 2 g IV every 24 hours  -Closely monitor for antibiotics side effects including rash, Diarrhea/CDI, thrombocytopenia, SABRINA, etc.  -Repeat blood culture no growth  -Labs reviewed, worsening leukocytosis, if diarrhea please send C. difficile PCR    Ivone Blandon MD.   Infectious Disease Attending            This note was partially created using voice recognition software and is inherently subject to errors including those of syntax and \"sound-alike\" substitutions which may escape proofreading. In such instances, original meaning may be extrapolated by contextual derivation       "

## 2024-11-06 LAB
ANION GAP SERPL CALC-SCNC: 14 MMOL/L (ref 10–20)
APPEARANCE STONE: NORMAL
BUN SERPL-MCNC: 28 MG/DL (ref 6–23)
CALCIUM SERPL-MCNC: 10 MG/DL (ref 8.6–10.3)
CHLORIDE SERPL-SCNC: 105 MMOL/L (ref 98–107)
CO2 SERPL-SCNC: 22 MMOL/L (ref 21–32)
COMPN STONE: NORMAL
CREAT SERPL-MCNC: 0.92 MG/DL (ref 0.5–1.05)
EGFRCR SERPLBLD CKD-EPI 2021: 66 ML/MIN/1.73M*2
ERYTHROCYTE [DISTWIDTH] IN BLOOD BY AUTOMATED COUNT: 13.4 % (ref 11.5–14.5)
GLUCOSE BLD MANUAL STRIP-MCNC: 128 MG/DL (ref 74–99)
GLUCOSE BLD MANUAL STRIP-MCNC: 159 MG/DL (ref 74–99)
GLUCOSE BLD MANUAL STRIP-MCNC: 167 MG/DL (ref 74–99)
GLUCOSE BLD MANUAL STRIP-MCNC: 184 MG/DL (ref 74–99)
GLUCOSE SERPL-MCNC: 126 MG/DL (ref 74–99)
HCT VFR BLD AUTO: 37.5 % (ref 36–46)
HGB BLD-MCNC: 11.7 G/DL (ref 12–16)
INR PPP: 1.5 (ref 0.9–1.1)
MAGNESIUM SERPL-MCNC: 1.67 MG/DL (ref 1.6–2.4)
MCH RBC QN AUTO: 32 PG (ref 26–34)
MCHC RBC AUTO-ENTMCNC: 31.2 G/DL (ref 32–36)
MCV RBC AUTO: 103 FL (ref 80–100)
NRBC BLD-RTO: 0 /100 WBCS (ref 0–0)
PLATELET # BLD AUTO: 219 X10*3/UL (ref 150–450)
POTASSIUM SERPL-SCNC: 4.1 MMOL/L (ref 3.5–5.3)
PROTHROMBIN TIME: 17.5 SECONDS (ref 9.8–12.8)
RBC # BLD AUTO: 3.66 X10*6/UL (ref 4–5.2)
SODIUM SERPL-SCNC: 137 MMOL/L (ref 136–145)
SPECIMEN WT: 87 MG
WBC # BLD AUTO: 10.9 X10*3/UL (ref 4.4–11.3)

## 2024-11-06 PROCEDURE — 82374 ASSAY BLOOD CARBON DIOXIDE: CPT | Performed by: NURSE PRACTITIONER

## 2024-11-06 PROCEDURE — 85610 PROTHROMBIN TIME: CPT | Performed by: NURSE PRACTITIONER

## 2024-11-06 PROCEDURE — 97530 THERAPEUTIC ACTIVITIES: CPT | Mod: GP,CQ

## 2024-11-06 PROCEDURE — 97116 GAIT TRAINING THERAPY: CPT | Mod: GP,CQ

## 2024-11-06 PROCEDURE — 2500000001 HC RX 250 WO HCPCS SELF ADMINISTERED DRUGS (ALT 637 FOR MEDICARE OP): Performed by: NURSE PRACTITIONER

## 2024-11-06 PROCEDURE — 1200000002 HC GENERAL ROOM WITH TELEMETRY DAILY

## 2024-11-06 PROCEDURE — 2500000002 HC RX 250 W HCPCS SELF ADMINISTERED DRUGS (ALT 637 FOR MEDICARE OP, ALT 636 FOR OP/ED): Performed by: NURSE PRACTITIONER

## 2024-11-06 PROCEDURE — 83735 ASSAY OF MAGNESIUM: CPT | Performed by: NURSE PRACTITIONER

## 2024-11-06 PROCEDURE — 97535 SELF CARE MNGMENT TRAINING: CPT | Mod: GO,CO

## 2024-11-06 PROCEDURE — 85027 COMPLETE CBC AUTOMATED: CPT | Performed by: NURSE PRACTITIONER

## 2024-11-06 PROCEDURE — 99222 1ST HOSP IP/OBS MODERATE 55: CPT | Performed by: PSYCHIATRY & NEUROLOGY

## 2024-11-06 PROCEDURE — 2500000004 HC RX 250 GENERAL PHARMACY W/ HCPCS (ALT 636 FOR OP/ED): Performed by: NURSE PRACTITIONER

## 2024-11-06 PROCEDURE — 2500000005 HC RX 250 GENERAL PHARMACY W/O HCPCS: Performed by: NURSE PRACTITIONER

## 2024-11-06 PROCEDURE — 36415 COLL VENOUS BLD VENIPUNCTURE: CPT | Performed by: NURSE PRACTITIONER

## 2024-11-06 PROCEDURE — 82947 ASSAY GLUCOSE BLOOD QUANT: CPT

## 2024-11-06 RX ORDER — AMOXICILLIN AND CLAVULANATE POTASSIUM 875; 125 MG/1; MG/1
1 TABLET, FILM COATED ORAL EVERY 12 HOURS SCHEDULED
Start: 2024-11-06 | End: 2024-11-14

## 2024-11-06 RX ORDER — AMOXICILLIN AND CLAVULANATE POTASSIUM 875; 125 MG/1; MG/1
1 TABLET, FILM COATED ORAL EVERY 12 HOURS SCHEDULED
Status: DISCONTINUED | OUTPATIENT
Start: 2024-11-06 | End: 2024-11-06

## 2024-11-06 RX ORDER — HYDROXYZINE HYDROCHLORIDE 25 MG/1
25 TABLET, FILM COATED ORAL EVERY 6 HOURS PRN
Status: DISCONTINUED | OUTPATIENT
Start: 2024-11-06 | End: 2024-11-18 | Stop reason: HOSPADM

## 2024-11-06 RX ORDER — HYDROXYZINE HYDROCHLORIDE 25 MG/1
25 TABLET, FILM COATED ORAL EVERY 6 HOURS PRN
Start: 2024-11-06

## 2024-11-06 RX ORDER — LISINOPRIL 10 MG/1
10 TABLET ORAL ONCE
Status: COMPLETED | OUTPATIENT
Start: 2024-11-06 | End: 2024-11-06

## 2024-11-06 RX ORDER — AMOXICILLIN AND CLAVULANATE POTASSIUM 875; 125 MG/1; MG/1
1 TABLET, FILM COATED ORAL EVERY 12 HOURS SCHEDULED
Status: COMPLETED | OUTPATIENT
Start: 2024-11-06 | End: 2024-11-13

## 2024-11-06 RX ORDER — LISINOPRIL 20 MG/1
20 TABLET ORAL DAILY
Status: DISCONTINUED | OUTPATIENT
Start: 2024-11-07 | End: 2024-11-18 | Stop reason: HOSPADM

## 2024-11-06 RX ORDER — LISINOPRIL 20 MG/1
20 TABLET ORAL DAILY
Start: 2024-11-07

## 2024-11-06 RX ADMIN — AMOXICILLIN AND CLAVULANATE POTASSIUM 1 TABLET: 875; 125 TABLET, FILM COATED ORAL at 21:02

## 2024-11-06 RX ADMIN — LEVOTHYROXINE SODIUM 100 MCG: 0.1 TABLET ORAL at 06:14

## 2024-11-06 RX ADMIN — METOPROLOL TARTRATE 100 MG: 100 TABLET, FILM COATED ORAL at 08:19

## 2024-11-06 RX ADMIN — INSULIN LISPRO 2 UNITS: 100 INJECTION, SOLUTION INTRAVENOUS; SUBCUTANEOUS at 14:27

## 2024-11-06 RX ADMIN — METOPROLOL TARTRATE 100 MG: 100 TABLET, FILM COATED ORAL at 21:02

## 2024-11-06 RX ADMIN — Medication 3 MG: at 21:07

## 2024-11-06 RX ADMIN — CHOLECALCIFEROL TAB 125 MCG (5000 UNIT) 5000 UNITS: 125 TAB at 08:19

## 2024-11-06 RX ADMIN — WARFARIN SODIUM 4 MG: 4 TABLET ORAL at 18:51

## 2024-11-06 RX ADMIN — BUPROPION HYDROCHLORIDE 300 MG: 150 TABLET, EXTENDED RELEASE ORAL at 08:19

## 2024-11-06 RX ADMIN — AMOXICILLIN AND CLAVULANATE POTASSIUM 1 TABLET: 875; 125 TABLET, FILM COATED ORAL at 14:27

## 2024-11-06 RX ADMIN — ATORVASTATIN CALCIUM 80 MG: 80 TABLET, FILM COATED ORAL at 21:02

## 2024-11-06 RX ADMIN — AZATHIOPRINE 100 MG: 50 TABLET ORAL at 08:19

## 2024-11-06 RX ADMIN — LISINOPRIL 10 MG: 10 TABLET ORAL at 10:48

## 2024-11-06 RX ADMIN — Medication 3 MG: at 00:55

## 2024-11-06 RX ADMIN — LISINOPRIL 10 MG: 10 TABLET ORAL at 08:19

## 2024-11-06 RX ADMIN — DILTIAZEM HYDROCHLORIDE 180 MG: 180 CAPSULE, COATED, EXTENDED RELEASE ORAL at 21:02

## 2024-11-06 RX ADMIN — INSULIN LISPRO 2 UNITS: 100 INJECTION, SOLUTION INTRAVENOUS; SUBCUTANEOUS at 21:02

## 2024-11-06 RX ADMIN — ASPIRIN 81 MG: 81 TABLET, COATED ORAL at 08:19

## 2024-11-06 ASSESSMENT — COGNITIVE AND FUNCTIONAL STATUS - GENERAL
MOBILITY SCORE: 17
DRESSING REGULAR UPPER BODY CLOTHING: A LITTLE
MOVING FROM LYING ON BACK TO SITTING ON SIDE OF FLAT BED WITH BEDRAILS: A LITTLE
MOVING TO AND FROM BED TO CHAIR: A LITTLE
STANDING UP FROM CHAIR USING ARMS: A LITTLE
MOVING FROM LYING ON BACK TO SITTING ON SIDE OF FLAT BED WITH BEDRAILS: A LITTLE
CLIMB 3 TO 5 STEPS WITH RAILING: A LOT
DRESSING REGULAR LOWER BODY CLOTHING: A LOT
HELP NEEDED FOR BATHING: A LITTLE
WALKING IN HOSPITAL ROOM: A LITTLE
CLIMB 3 TO 5 STEPS WITH RAILING: A LOT
MOBILITY SCORE: 17
CLIMB 3 TO 5 STEPS WITH RAILING: A LOT
DRESSING REGULAR UPPER BODY CLOTHING: A LITTLE
TURNING FROM BACK TO SIDE WHILE IN FLAT BAD: A LITTLE
DRESSING REGULAR LOWER BODY CLOTHING: A LITTLE
DAILY ACTIVITIY SCORE: 20
STANDING UP FROM CHAIR USING ARMS: A LOT
MOVING FROM LYING ON BACK TO SITTING ON SIDE OF FLAT BED WITH BEDRAILS: A LITTLE
TOILETING: A LITTLE
STANDING UP FROM CHAIR USING ARMS: A LITTLE
HELP NEEDED FOR BATHING: A LITTLE
MOVING TO AND FROM BED TO CHAIR: A LITTLE
DAILY ACTIVITIY SCORE: 20
DAILY ACTIVITIY SCORE: 16
WALKING IN HOSPITAL ROOM: A LITTLE
TOILETING: A LITTLE
PERSONAL GROOMING: A LITTLE
WALKING IN HOSPITAL ROOM: A LOT
TURNING FROM BACK TO SIDE WHILE IN FLAT BAD: A LITTLE
TOILETING: A LOT
MOVING TO AND FROM BED TO CHAIR: A LOT
HELP NEEDED FOR BATHING: A LOT
DRESSING REGULAR UPPER BODY CLOTHING: A LITTLE
MOBILITY SCORE: 14
TURNING FROM BACK TO SIDE WHILE IN FLAT BAD: A LITTLE
DRESSING REGULAR LOWER BODY CLOTHING: A LITTLE

## 2024-11-06 ASSESSMENT — ACTIVITIES OF DAILY LIVING (ADL)
EFFECT OF PAIN ON DAILY ACTIVITIES: .
HOME_MANAGEMENT_TIME_ENTRY: 39

## 2024-11-06 ASSESSMENT — PAIN - FUNCTIONAL ASSESSMENT
PAIN_FUNCTIONAL_ASSESSMENT: 0-10
PAIN_FUNCTIONAL_ASSESSMENT: 0-10

## 2024-11-06 ASSESSMENT — PAIN SCALES - GENERAL
PAINLEVEL_OUTOF10: 0 - NO PAIN

## 2024-11-06 NOTE — PROGRESS NOTES
INPATIENT PROGRESS NOTES    PATIENT NAME: Yen Montoya    MRN: [id number]  SERVICE DATE:  11/6/2024   SERVICE TIME:  10:23 AM    SIGNATURE: Ivone Blandon MD      SUBJECTIVE  Afebrile  No events overnight       OBJECTIVE  PHYSICAL EXAM:   Patient Vitals for the past 24 hrs:   BP Temp Temp src Pulse Resp SpO2   11/06/24 0800 (!) 198/93 35 °C (95 °F) Temporal 70 18 99 %   11/05/24 2000 (!) 184/87 35.7 °C (96.3 °F) Temporal 70 18 96 %   11/05/24 1601 175/79 36 °C (96.8 °F) Temporal 70 20 98 %   11/05/24 1200 173/83 36 °C (96.8 °F) -- 70 14 96 %         Gen: NAD  Neck: symmetric, no mass  Cardiovascular: RRR  Respiratory: No distress       Labs:  Lab Results   Component Value Date    WBC 10.9 11/06/2024    HGB 11.7 (L) 11/06/2024    HCT 37.5 11/06/2024     (H) 11/06/2024     11/06/2024     Lab Results   Component Value Date    GLUCOSE 126 (H) 11/06/2024    CALCIUM 10.0 11/06/2024     11/06/2024    K 4.1 11/06/2024    CO2 22 11/06/2024     11/06/2024    BUN 28 (H) 11/06/2024    CREATININE 0.92 11/06/2024   ESR: --  Lab Results   Component Value Date    SEDRATE 13 01/06/2021     Lab Results   Component Value Date    CRP 4.56 (A) 10/29/2019     Lab Results   Component Value Date    ALT 9 11/03/2024    AST 18 11/03/2024    ALKPHOS 49 11/03/2024    BILITOT 0.9 11/03/2024         DATA:   Diagnostic tests reviewed for today's visit:    Labs this admission reviewed  Imagings this admission reviewed  Cultures: Reviewed      ASSESSMENT :   -Sepsis secondary to right pyelonephritis  -Klebsiella pneumoniae bacteremia  -Right hydronephrosis secondary to right UVJ obstructing stone  -Status post cystoscopy with right stent placement on 11/3  -Leukocytosis resolved  -Renal failure resolved     PLAN:  -On ceftriaxone   -Closely monitor for antibiotics side effects including rash, Diarrhea/CDI, thrombocytopenia, SABRINA, etc.  -Repeat blood culture no growth  -Leukocytosis resolved  -Can be discharged on  "Augmentin 875/125 mg p.o. twice daily through 11/13  -Follow-up with urology outpatient    ID team will sign off please call back for any questions    Ivone Blandon MD.   Infectious Disease Attending            This note was partially created using voice recognition software and is inherently subject to errors including those of syntax and \"sound-alike\" substitutions which may escape proofreading. In such instances, original meaning may be extrapolated by contextual derivation       "

## 2024-11-06 NOTE — CARE PLAN
Problem: Skin  Goal: Participates in plan/prevention/treatment measures  Outcome: Progressing  Goal: Prevent/manage excess moisture  Outcome: Progressing  Goal: Prevent/minimize sheer/friction injuries  Outcome: Progressing  Goal: Promote/optimize nutrition  Outcome: Progressing  Goal: Promote skin healing  Outcome: Progressing     Problem: Fall/Injury  Goal: Not fall by end of shift  Outcome: Progressing  Goal: Be free from injury by end of the shift  Outcome: Progressing  Goal: Verbalize understanding of personal risk factors for fall in the hospital  Outcome: Progressing  Goal: Verbalize understanding of risk factor reduction measures to prevent injury from fall in the home  Outcome: Progressing  Goal: Use assistive devices by end of the shift  Outcome: Progressing  Goal: Pace activities to prevent fatigue by end of the shift  Outcome: Progressing     Problem: Infection related to problem list condition  Goal: Infection will resolve through treatment  Outcome: Progressing   The patient's goals for the shift include      The clinical goals for the shift include pt will remain free from fall/injury

## 2024-11-06 NOTE — CONSULTS
Inpatient consult to Psychiatry  Consult performed by: Sami Fontanez MD  Consult ordered by: Mary Lunsford, APRN-CNP  Reason for consult: Anxiety agitation  Assessment/Recommendations: Generalized anxiety disorder    Plan:    1.  Continue Wellbutrin as ordered  2.  As needed Vistaril 25 mg every 6 hours for anxiety  3.  Is not at imminent risk of suicide, can be safely discharged when medically stable  4.  Please call me if needed      Reason for consult:  Anxiety    History Of Present Illness  Yen Montoya is a 74 y.o. female presenting with anxiety attack.  Patient is currently being treated for hydronephrosis.  Patient is alert and oriented x 3.  Patient has been living with her .  She has been feeling anxious about her being in the hospital with her medical condition with poor sleep.  Moreover she said that she has been losing the eyesight which has been bothersome for her.  She denies feeling depressed or suicidal.  She denies any hopeless or worthless feeling.  She is eager to get back to her house to stay with her .  Patient denies any audiovisual hallucinations or paranoid thoughts    Patient has been taking Wellbutrin 300 mg.  Daily.  She has been taking this medication for many years for the treatment of depression and anxiety    Patient denies any alcohol or drug related issues    No past psychiatric admission to the hospital  No past suicidal attempt     Past Medical History  She has a past medical history of Acute embolism and thrombosis of right popliteal vein (Multi) (04/24/2020), Anxiety, Atrial fib/flutter, transient (Multi), CAD (coronary artery disease), Calculus of ureter (04/29/2020), Chronic venous hypertension (idiopathic) with inflammation of left lower extremity, Chronic venous hypertension (idiopathic) with inflammation of right lower extremity, Chronic venous hypertension (idiopathic) with ulcer of unspecified lower extremity (CODE), Complete heart block,  Hypertension, Hypothyroidism, Long term (current) use of anticoagulants (2022), SAMAN (obstructive sleep apnea), Osteoporosis, Other bursitis of hip, right hip, Other mechanical complication of other internal joint prosthesis, initial encounter (CMS-HCC) (2020), Other postprocedural complications and disorders of the circulatory system, not elsewhere classified (10/08/2020), Other specified joint disorders, right shoulder, Personal history of diseases of the blood and blood-forming organs and certain disorders involving the immune mechanism, Pulmonary embolism, PVD (peripheral vascular disease) (CMS-HCC), RA (rheumatoid arthritis), Renal calculi, Sick sinus syndrome (Multi), Toxic effect of unspecified metal, accidental (unintentional), initial encounter (2020), Unspecified fracture of lower end of unspecified femur, initial encounter for closed fracture (Multi) (2021), and UTI (urinary tract infection).    Surgical History  She has a past surgical history that includes Other surgical history (2021); XR chest pacemaker w fluoro (10/17/2021); Cardiac catheterization; Cholecystectomy;  section, low transverse; Tonsillectomy; Total hip arthroplasty (Bilateral); Total shoulder arthroplasty (Left); Total knee arthroplasty (Bilateral); Colonoscopy; pacemaker placement; Femur fracture surgery; Wrist surgery; Cardiac electrophysiology mapping and ablation; and Coronary stent placement.     Social History  She reports that she quit smoking about 36 years ago. Her smoking use included cigarettes. She has never used smokeless tobacco. She reports current alcohol use. She reports that she does not use drugs.     Allergies  Adhesive tape-silicones, Latex, and Valdecoxib      Mental Status Exam  Patient is alert and oriented x 3 cooperative maintain eye contact during the interview decree psychomotor activity.  Speech is decreased in rate and rhythm coherent.  Patient described his mood to be  "anxious but not depressed.  Patient denies any audiovisual hallucinations or paranoid thoughts.  Judgment and insight is intact.      Last Recorded Vitals  Blood pressure 166/84, pulse 70, temperature 35.2 °C (95.3 °F), temperature source Temporal, resp. rate 19, height 1.753 m (5' 9.02\"), weight 104 kg (228 lb 13.4 oz), SpO2 100%.    Relevant Results  Results for orders placed or performed during the hospital encounter of 11/03/24 (from the past 96 hours)   Lactate   Result Value Ref Range    Lactate 2.0 0.4 - 2.0 mmol/L   POCT GLUCOSE   Result Value Ref Range    POCT Glucose 147 (H) 74 - 99 mg/dL   Magnesium   Result Value Ref Range    Magnesium 1.30 (L) 1.60 - 2.40 mg/dL   Phosphorus   Result Value Ref Range    Phosphorus 3.4 2.5 - 4.9 mg/dL   Troponin I, High Sensitivity   Result Value Ref Range    Troponin I, High Sensitivity 17 (H) 0 - 13 ng/L   Protime-INR   Result Value Ref Range    Protime 16.9 (H) 9.8 - 12.8 seconds    INR 1.5 (H) 0.9 - 1.1   Comprehensive Metabolic Panel   Result Value Ref Range    Glucose 144 (H) 74 - 99 mg/dL    Sodium 136 136 - 145 mmol/L    Potassium 4.2 3.5 - 5.3 mmol/L    Chloride 103 98 - 107 mmol/L    Bicarbonate 22 21 - 32 mmol/L    Anion Gap 15 10 - 20 mmol/L    Urea Nitrogen 18 6 - 23 mg/dL    Creatinine 1.30 (H) 0.50 - 1.05 mg/dL    eGFR 44 (L) >60 mL/min/1.73m*2    Calcium 9.3 8.6 - 10.3 mg/dL    Albumin 3.4 3.4 - 5.0 g/dL    Alkaline Phosphatase 49 33 - 136 U/L    Total Protein 5.8 (L) 6.4 - 8.2 g/dL    AST 18 9 - 39 U/L    Bilirubin, Total 0.9 0.0 - 1.2 mg/dL    ALT 9 7 - 45 U/L   POCT GLUCOSE   Result Value Ref Range    POCT Glucose 142 (H) 74 - 99 mg/dL   Troponin I, High Sensitivity   Result Value Ref Range    Troponin I, High Sensitivity 17 (H) 0 - 13 ng/L   Green Top   Result Value Ref Range    Extra Tube Hold for add-ons.    CBC and Auto Differential   Result Value Ref Range    WBC 20.2 (H) 4.4 - 11.3 x10*3/uL    nRBC 0.0 0.0 - 0.0 /100 WBCs    RBC 3.83 (L) 4.00 - " 5.20 x10*6/uL    Hemoglobin 12.7 12.0 - 16.0 g/dL    Hematocrit 39.1 36.0 - 46.0 %     (H) 80 - 100 fL    MCH 33.2 26.0 - 34.0 pg    MCHC 32.5 32.0 - 36.0 g/dL    RDW 13.9 11.5 - 14.5 %    Platelets 173 150 - 450 x10*3/uL    Neutrophils % 85.0 40.0 - 80.0 %    Immature Granulocytes %, Automated 1.9 (H) 0.0 - 0.9 %    Lymphocytes % 4.6 13.0 - 44.0 %    Monocytes % 8.0 2.0 - 10.0 %    Eosinophils % 0.1 0.0 - 6.0 %    Basophils % 0.4 0.0 - 2.0 %    Neutrophils Absolute 17.15 (H) 1.60 - 5.50 x10*3/uL    Immature Granulocytes Absolute, Automated 0.39 0.00 - 0.50 x10*3/uL    Lymphocytes Absolute 0.92 0.80 - 3.00 x10*3/uL    Monocytes Absolute 1.61 (H) 0.05 - 0.80 x10*3/uL    Eosinophils Absolute 0.03 0.00 - 0.40 x10*3/uL    Basophils Absolute 0.08 0.00 - 0.10 x10*3/uL   ECG 12 Lead   Result Value Ref Range    Ventricular Rate 70 BPM    QRS Duration 158 ms    QT Interval 450 ms    QTC Calculation(Bazett) 486 ms    R Axis 113 degrees    T Axis 81 degrees    QRS Count 12 beats    Q Onset 190 ms    T Offset 415 ms    QTC Fredericia 474 ms   Red Top   Result Value Ref Range    Extra Tube Hold for add-ons.    Urine Culture    Specimen: Cystoscopic; Fluid   Result Value Ref Range    Urine Culture No significant growth    Calculi (Stone) Analysis   Result Value Ref Range    Calculi Mass 87 mg    Calculi Description See Note     Calculi Composition See Note    POCT GLUCOSE   Result Value Ref Range    POCT Glucose 127 (H) 74 - 99 mg/dL   POCT GLUCOSE   Result Value Ref Range    POCT Glucose 146 (H) 74 - 99 mg/dL   Blood Culture    Specimen: Peripheral Venipuncture; Blood culture   Result Value Ref Range    Blood Culture No growth at 2 days    Blood Culture    Specimen: Peripheral Venipuncture; Blood culture   Result Value Ref Range    Blood Culture No growth at 2 days    POCT GLUCOSE   Result Value Ref Range    POCT Glucose 196 (H) 74 - 99 mg/dL   CBC   Result Value Ref Range    WBC 16.4 (H) 4.4 - 11.3 x10*3/uL    nRBC 0.0  0.0 - 0.0 /100 WBCs    RBC 3.37 (L) 4.00 - 5.20 x10*6/uL    Hemoglobin 11.0 (L) 12.0 - 16.0 g/dL    Hematocrit 35.3 (L) 36.0 - 46.0 %     (H) 80 - 100 fL    MCH 32.6 26.0 - 34.0 pg    MCHC 31.2 (L) 32.0 - 36.0 g/dL    RDW 14.0 11.5 - 14.5 %    Platelets 176 150 - 450 x10*3/uL   Basic Metabolic Panel   Result Value Ref Range    Glucose 155 (H) 74 - 99 mg/dL    Sodium 138 136 - 145 mmol/L    Potassium 4.2 3.5 - 5.3 mmol/L    Chloride 108 (H) 98 - 107 mmol/L    Bicarbonate 23 21 - 32 mmol/L    Anion Gap 11 10 - 20 mmol/L    Urea Nitrogen 24 (H) 6 - 23 mg/dL    Creatinine 1.08 (H) 0.50 - 1.05 mg/dL    eGFR 54 (L) >60 mL/min/1.73m*2    Calcium 9.8 8.6 - 10.3 mg/dL   Magnesium   Result Value Ref Range    Magnesium 1.71 1.60 - 2.40 mg/dL   Phosphorus   Result Value Ref Range    Phosphorus 2.3 (L) 2.5 - 4.9 mg/dL   Protime-INR   Result Value Ref Range    Protime 17.8 (H) 9.8 - 12.8 seconds    INR 1.6 (H) 0.9 - 1.1   POCT GLUCOSE   Result Value Ref Range    POCT Glucose 168 (H) 74 - 99 mg/dL   POCT GLUCOSE   Result Value Ref Range    POCT Glucose 199 (H) 74 - 99 mg/dL   POCT GLUCOSE   Result Value Ref Range    POCT Glucose 149 (H) 74 - 99 mg/dL   POCT GLUCOSE   Result Value Ref Range    POCT Glucose 212 (H) 74 - 99 mg/dL   POCT GLUCOSE   Result Value Ref Range    POCT Glucose 179 (H) 74 - 99 mg/dL   POCT GLUCOSE   Result Value Ref Range    POCT Glucose 167 (H) 74 - 99 mg/dL   CBC   Result Value Ref Range    WBC 19.5 (H) 4.4 - 11.3 x10*3/uL    nRBC 0.1 (H) 0.0 - 0.0 /100 WBCs    RBC 3.61 (L) 4.00 - 5.20 x10*6/uL    Hemoglobin 11.8 (L) 12.0 - 16.0 g/dL    Hematocrit 37.7 36.0 - 46.0 %     (H) 80 - 100 fL    MCH 32.7 26.0 - 34.0 pg    MCHC 31.3 (L) 32.0 - 36.0 g/dL    RDW 13.8 11.5 - 14.5 %    Platelets 222 150 - 450 x10*3/uL   Basic Metabolic Panel   Result Value Ref Range    Glucose 173 (H) 74 - 99 mg/dL    Sodium 136 136 - 145 mmol/L    Potassium 4.3 3.5 - 5.3 mmol/L    Chloride 105 98 - 107 mmol/L     Bicarbonate 24 21 - 32 mmol/L    Anion Gap 11 10 - 20 mmol/L    Urea Nitrogen 29 (H) 6 - 23 mg/dL    Creatinine 1.08 (H) 0.50 - 1.05 mg/dL    eGFR 54 (L) >60 mL/min/1.73m*2    Calcium 10.4 (H) 8.6 - 10.3 mg/dL   Magnesium   Result Value Ref Range    Magnesium 1.74 1.60 - 2.40 mg/dL   Protime-INR   Result Value Ref Range    Protime 14.4 (H) 9.8 - 12.8 seconds    INR 1.3 (H) 0.9 - 1.1   POCT GLUCOSE   Result Value Ref Range    POCT Glucose 195 (H) 74 - 99 mg/dL   SST TOP   Result Value Ref Range    Extra Tube Hold for add-ons.    PST Top   Result Value Ref Range    Extra Tube Hold for add-ons.    POCT GLUCOSE   Result Value Ref Range    POCT Glucose 182 (H) 74 - 99 mg/dL   Blood Culture    Specimen: Peripheral Arterial Puncture; Blood culture   Result Value Ref Range    Blood Culture Loaded on Instrument - Culture in progress    Blood Culture    Specimen: Peripheral Venipuncture; Blood culture   Result Value Ref Range    Blood Culture Loaded on Instrument - Culture in progress    Lactate   Result Value Ref Range    Lactate 1.7 0.4 - 2.0 mmol/L   POCT GLUCOSE   Result Value Ref Range    POCT Glucose 172 (H) 74 - 99 mg/dL   POCT GLUCOSE   Result Value Ref Range    POCT Glucose 137 (H) 74 - 99 mg/dL   POCT GLUCOSE   Result Value Ref Range    POCT Glucose 181 (H) 74 - 99 mg/dL   Blood Gas Arterial   Result Value Ref Range    POCT pH, Arterial 7.40 7.38 - 7.42 pH    POCT pCO2, Arterial 38 38 - 42 mm Hg    POCT pO2, Arterial 74 (L) 85 - 95 mm Hg    POCT SO2, Arterial 96 94 - 100 %    POCT Oxy Hemoglobin, Arterial 93.2 (L) 94.0 - 98.0 %    POCT Base Excess, Arterial -1.1 -2.0 - 3.0 mmol/L    POCT HCO3 Calculated, Arterial 23.5 22.0 - 26.0 mmol/L    Patient Temperature      FiO2 21 %   CBC   Result Value Ref Range    WBC 10.9 4.4 - 11.3 x10*3/uL    nRBC 0.0 0.0 - 0.0 /100 WBCs    RBC 3.66 (L) 4.00 - 5.20 x10*6/uL    Hemoglobin 11.7 (L) 12.0 - 16.0 g/dL    Hematocrit 37.5 36.0 - 46.0 %     (H) 80 - 100 fL    MCH 32.0  26.0 - 34.0 pg    MCHC 31.2 (L) 32.0 - 36.0 g/dL    RDW 13.4 11.5 - 14.5 %    Platelets 219 150 - 450 x10*3/uL   Basic Metabolic Panel   Result Value Ref Range    Glucose 126 (H) 74 - 99 mg/dL    Sodium 137 136 - 145 mmol/L    Potassium 4.1 3.5 - 5.3 mmol/L    Chloride 105 98 - 107 mmol/L    Bicarbonate 22 21 - 32 mmol/L    Anion Gap 14 10 - 20 mmol/L    Urea Nitrogen 28 (H) 6 - 23 mg/dL    Creatinine 0.92 0.50 - 1.05 mg/dL    eGFR 66 >60 mL/min/1.73m*2    Calcium 10.0 8.6 - 10.3 mg/dL   Magnesium   Result Value Ref Range    Magnesium 1.67 1.60 - 2.40 mg/dL   Protime-INR   Result Value Ref Range    Protime 17.5 (H) 9.8 - 12.8 seconds    INR 1.5 (H) 0.9 - 1.1   POCT GLUCOSE   Result Value Ref Range    POCT Glucose 128 (H) 74 - 99 mg/dL   POCT GLUCOSE   Result Value Ref Range    POCT Glucose 167 (H) 74 - 99 mg/dL     *Note: Due to a large number of results and/or encounters for the requested time period, some results have not been displayed. A complete set of results can be found in Results Review.     XR abdomen 1 view    Result Date: 11/5/2024  Interpreted By:  Adolfo Schulz, STUDY: XR ABDOMEN 1 VIEW;  11/5/2024 11:28 am   INDICATION: Signs/Symptoms:severe abd pain.   COMPARISON: None.   ACCESSION NUMBER(S): [id number]   ORDERING CLINICIAN: CEASAR CARTAGENA   FINDINGS: A right ureteral stent is present. Multiple calculi project over both kidneys measuring up to 7 mm on the right and 13 mm on the left. Nonspecific bowel gas pattern without definite findings of obstruction. Cholecystectomy clips. No definite organomegaly.   Severe lumbar degenerative changes. Partially imaged bilateral hip arthroplasties.         Right ureteral stent in place. Bilateral renal calculi. No findings of bowel obstruction.   MACRO: None   Signed by: Adolfo Schulz 11/5/2024 11:35 AM Dictation workstation:   [id number]    ECG 12 Lead    Result Date: 11/4/2024  Electronic ventricular pacemaker Underlying Atrial fibrillation Abnormal ECG When  compared with ECG of 23-AUG-2023 13:09, Vent. rate has decreased BY  20 BPM Confirmed by Adrian Nguyen ([id number]) on 11/4/2024 5:00:03 PM    FL fluoro images no charge    Result Date: 11/3/2024  These images are not reportable by radiology and will not be interpreted by  Radiologists.    CT abdomen pelvis w IV contrast    Result Date: 11/2/2024  EXAMINATION: CT OF THE ABDOMEN AND PELVIS WITH CONTRAST 11/2/2024 8:54 pm TECHNIQUE: CT of the abdomen and pelvis was performed with the administration of intravenous contrast. Multiplanar reformatted images are provided for review. Automated exposure control, iterative reconstruction, and/or weight based adjustment of the mA/kV was utilized to reduce the radiation dose to as low as reasonably achievable. COMPARISON: CT abdomen 03/12/2024 and CT abdomen and pelvis 06/04/2023. HISTORY: ORDERING SYSTEM PROVIDED HISTORY: left lower abd pain TECHNOLOGIST PROVIDED HISTORY: Additional Contrast?->None Reason for exam:->left lower abd pain Decision Support Exception - unselect if not a suspected or confirmed emergency medical condition->Emergency Medical Condition (MA) What reading provider will be dictating this exam?->CRC FINDINGS: Lower Chest: No acute findings. Organs: Scattered hypodense lesions in the liver compatible with cysts or hemangiomas.  Gallbladder surgically absent. No acute findings of the spleen, or pancreas.  1.8 cm right adrenal lesion unchanged dating back to CT 06/04/2023, with findings compatible with adenoma on comparison CT. Right-sided hydroureteronephrosis with a delayed nephrogram and 4 mm urinary tract stone at the right ureterovesicular junction. Additional nonobstructive bilateral nephrolithiasis. GI/Bowel: No evidence of a bowel obstruction.  Diffuse distal colonic diverticulosis.  Appendix is normal. Pelvis: Evaluation significantly limited by beam hardening artifact from bilateral total hip arthroplasties. Peritoneum/Retroperitoneum: No free  "intraperitoneal air or encapsulated fluid. Bones/Soft Tissues: Osteoporosis lymph attic cute osseous abnormality. Patient is status post bilateral total hip arthroplasties without evidence of hardware complication or periprosthetic fracture.    1.  Obstructing 4 mm urinary tract stone at the RIGHT ureterovesicular junction with mild ipsilateral hydroureteronephrosis. 2.  Bilateral nonobstructive nephrolithiasis. 3.  Hypoenhancement of the right kidney with adjacent free fluid likely relates to urinary tract obstruction.  An infected stone is not excluded. Recommend correlation with urinalysis. 4.  Colonic diverticulosis, right adrenal adenoma, and other chronic findings as above.    CT head wo IV contrast    Result Date: 11/2/2024  EXAMINATION: CT OF THE HEAD WITHOUT CONTRAST  11/2/2024 8:54 pm TECHNIQUE: CT of the head was performed without the administration of intravenous contrast. Automated exposure control, iterative reconstruction, and/or weight based adjustment of the mA/kV was utilized to reduce the radiation dose to as low as reasonably achievable. COMPARISON: CT head 03/23/2024. HISTORY: ORDERING SYSTEM PROVIDED HISTORY: fall +bt, cva/bleed/trauma TECHNOLOGIST PROVIDED HISTORY: Has a \"code stroke\" or \"stroke alert\" been called?->No Reason for exam:->fall +bt Decision Support Exception - unselect if not a suspected or confirmed emergency medical condition->Emergency Medical Condition (MA) What reading provider will be dictating this exam?->CRC FINDINGS: BRAIN/VENTRICLES: There is no acute intracranial hemorrhage, mass effect or midline shift.  No abnormal extra-axial fluid collection.  The gray-white differentiation is maintained without evidence of an acute infarct.  There is no evidence of hydrocephalus.   Unchanged periventricular white matter hypodensities that most commonly relate to microvascular ischemic disease. ORBITS: The visualized portion of the orbits demonstrate no acute abnormality. SINUSES: " The visualized paranasal sinuses and mastoid air cells demonstrate no acute abnormality. SOFT TISSUES/SKULL:  No acute abnormality of the visualized skull or soft tissues.  Retro odontoid calcifications contribute to severe narrowing at the craniocervical junction.    1.  No acute intracranial abnormality. 2.  Chronic retro-odontoid calcifications contribute to severe narrowing at the craniocervical junction.      Medication Consent  Medication Consent: risks, benefits, side effects reviewed for all ordered meds    Sami Fontanez MD

## 2024-11-06 NOTE — PROGRESS NOTES
Physical Therapy    Physical Therapy    Physical Therapy Treatment    Patient Name: Yen Montoya  MRN: [id number]  Today's Date: 11/6/2024  Time Calculation  Start Time: 1246  Stop Time: 1325  Time Calculation (min): 39 min     3115/3115-A     11/06/24 1246   PT  Visit   PT Received On 11/06/24   Response to Previous Treatment Patient with no complaints from previous session.   General   Reason for Referral ADL   Referred By Enoch   Past Medical History Relevant to Rehab Includes: Past Medical History  She has a past medical history of Acute embolism and thrombosis of right popliteal vein (Multi) (04/24/2020), Anxiety, Atrial fib/flutter, transient (Multi), CAD (coronary artery disease), Calculus of ureter (04/29/2020), Chronic venous hypertension (idiopathic) with inflammation of left lower extremity, Chronic venous hypertension (idiopathic) with inflammation of right lower extremity, Chronic venous hypertension (idiopathic) with ulcer of unspecified lower extremity (CODE), Complete heart block, Hypertension, Hypothyroidism, Long term (current) use of anticoagulants (01/20/2022), SAMAN (obstructive sleep apnea), Osteoporosis, Other bursitis of hip, right hip, Other mechanical complication of other internal joint prosthesis, initial encounter (CMS-HCC) (05/22/2020), Other postprocedural complications and disorders of the circulatory system, not elsewhere classified (10/08/2020), Other specified joint disorders, right shoulder, Personal history of diseases of the blood and blood-forming organs and certain disorders involving the immune mechanism, Pulmonary embolism, PVD (peripheral vascular disease) (CMS-HCC), RA (rheumatoid arthritis), Renal calculi, Sick sinus syndrome (Multi), Toxic effect of unspecified metal, accidental (unintentional), initial encounter (12/11/2020), Unspecified fracture of lower end of unspecified femur, initial encounter for closed fracture (Multi) (11/05/2021), and UTI (urinary tract  infection).     Surgical History  She has a past surgical history that includes Other surgical history (2021); XR chest pacemaker w fluoro (10/17/2021); Cardiac catheterization; Cholecystectomy;  section, low transverse; Tonsillectomy; Total hip arthroplasty (Bilateral); Total shoulder arthroplasty (Left); Total knee arthroplasty (Bilateral); Colonoscopy; pacemaker placement; Femur fracture surgery; Wrist surgery; Cardiac electrophysiology mapping and ablation; and Coronary stent placement.   Prior to Session Communication Bedside nurse   Patient Position Received Alarm on;Bed, 2 rail up   General Comment Pt agreeable to therapy and cleared for participation   Precautions   Medical Precautions Fall precautions   Pain Assessment   Pain Assessment 0-10   0-10 (Numeric) Pain Score 0 - No pain   Effect of Pain on Daily Activities .   Cognition   Overall Cognitive Status WFL   Therapeutic Activity   Therapeutic Activity Performed Yes   Therapeutic Activity 1 Multiple standing activities performed including reaching outside AMIRA using unilateral UE support. Pt has increased forward flexion and is unsteady requiring minAx2 with chair behind pt for increased safety. Cues to facilitate upright stance, poor follow through. 2 seated rest breaks to complete standinig activities   Bed Mobility   Bed Mobility Yes   Bed Mobility 1   Bed Mobility 1 Supine to sitting   Level of Assistance 1 Minimum assistance;Minimal verbal cues;+2   Bed Mobility Comments 1 cues for sequenciing and to scoot hips toward EOB to complete transfer. Increased time and effort to complete.   Ambulation/Gait Training   Ambulation/Gait Training Performed Yes   Ambulation/Gait Training 1   Surface 1 Level tile   Device 1 Rolling walker   Gait Support Devices Gait belt   Assistance 1 Minimum assistance;Moderate verbal cues  (x2)   Quality of Gait 1 NBOS;Diminished heel strike;Decreased step length;Shuffling gait   Comments/Distance (ft) 1 10',  10', 10' Multiple short ambulation trials performed. Shuffling, reciprocal gait with decreased heel strike/toe off pattern. Flexed posture with cues to facilitate upright stance, poor>fair follow through. Chair follow for increased safety. Fatigues easily.   Transfers   Transfer Yes   Transfer 1   Transfer From 1 Bed to   Transfer to 1 Stand;Sit;Chair with arms   Technique 1 Sit to stand;Stand to sit   Transfer Device 1 Walker;Gait belt   Transfer Level of Assistance 1 Minimum assistance;Minimal verbal cues;+2   Trials/Comments 1 Multiple functional STS transfers for increased strength and stability. Minn cues for safe UE placement and sequencing qith good follow through. Pt is flexed and mildly unsteady upon initial stands. Cues for upright stance with wider AMIRA. Good follow through.   Activity Tolerance   Endurance Tolerates 10 - 20 min exercise with multiple rests   PT Assessment   PT Assessment Results Decreased strength;Decreased endurance;Impaired balance;Decreased mobility   Rehab Prognosis Good   End of Session Communication Bedside nurse   Assessment Comment Pt puts forth good effort, however, is limited by decreased activity tolerance. Pt fatigues qickly requiring multiple seated rest breaks to complete activities. Mildly unsteady requiring minx2 and chair follow for increased safety.   End of Session Patient Position Up in chair;Alarm on       Outcome Measures:  [facility] Basic Mobility  Turning from your back to your side while in a flat bed without using bedrails: A little  Moving from lying on your back to sitting on the side of a flat bed without using bedrails: A little  Moving to and from bed to chair (including a wheelchair): A lot  Standing up from a chair using your arms (e.g. wheelchair or bedside chair): A lot  To walk in hospital room: A lot  Climbing 3-5 steps with railing: A lot  Basic Mobility - Total Score: 14                             EDUCATION:  Outpatient Education  Individual(s)  Educated: Patient  Education Provided: Fall Risk  Education Documentation  Body Mechanics, taught by Mary Clements PTA at 11/6/2024  2:19 PM.  Learner: Patient  Readiness: Acceptance  Method: Explanation, Demonstration  Response: Verbalizes Understanding, Demonstrated Understanding, Needs Reinforcement    Home Exercise Program, taught by Mary Clements PTA at 11/6/2024  2:19 PM.  Learner: Patient  Readiness: Acceptance  Method: Explanation, Demonstration  Response: Verbalizes Understanding, Demonstrated Understanding, Needs Reinforcement    Mobility Training, taught by Mary Clements PTA at 11/6/2024  2:19 PM.  Learner: Patient  Readiness: Acceptance  Method: Explanation, Demonstration  Response: Verbalizes Understanding, Demonstrated Understanding, Needs Reinforcement    Education Comments  No comments found.        GOALS:  Encounter Problems       Encounter Problems (Active)       PT Problem       Pt will be able to perform all bed mobility tasks with Mod I.  (Progressing)       Start:  11/04/24    Expected End:  11/18/24            Pt will perform all transfers with Mod I and FWW with proper safety mechanics.   (Progressing)       Start:  11/04/24    Expected End:  11/18/24            Pt will ambulate 100 ft with Mod I using FWW for improved functional independence.  (Progressing)       Start:  11/04/24    Expected End:  11/18/24            Pt will be able to negotiate 4 steps with 1 HR with SBA.  (Progressing)       Start:  11/04/24    Expected End:  11/18/24            Pt will be able to ambulate at least 100 ft with < or equal to 1 rest break while maintaining SpO2 > 90%.  (Progressing)       Start:  11/04/24    Expected End:  11/18/24               Pain - Adult

## 2024-11-06 NOTE — PROGRESS NOTES
Yen Montoya  11/05/24  [id number]  Kelsey Plummer MD  This is a patient with a past medical history as detailed below admitted to the [facility] ED with chief complaint of 74 y.o. female presenting to [facility] on 11/3/2024 with pertinent medical history of atrial fibrillation on Coumadin, sick sinus syndrome status post pacemaker insertion, Former tobacco use, RA, obesity, systolic heart failure (3/2024 Echo: EF 40-45%), adrenal mass, CAD status post 8 PCI, HTN, HLD, PVD, hypothyroidism, peripheral neuropathy, anxiety, SAMAN on CPAP, type 2 diabetes, pulmonary embolism, DVT RLE and nephrolithiasis. Presented to an outside emergency room for complaint of nausea and vomiting x 4 days with right lower quadrant pain and dysuria.  Today the patient seen and examined she is status post stent placement she is reporting worsening pain after Tony catheter has been discontinued the pain is sharp moderate radiate to the groin area constant the patient denies any chest pain tightness or pressure no shortness of breath however she noted to be lightheaded when she stood up and sat up no loss of consciousness      Assessment and plan   1-hydronephrosis admitted to the hospital pain control CT abdomen pelvis monitor electrolytes CBC lactic acid consulted urology status post stent placement Tony catheter has been discontinued the pain is worsening after the Tony catheter has been discontinued urology was reconsulted consider if pain persist repeat images Continue ceftriaxone but increase dose to 2 g IV every 24 hours   2-SABRINA hydrate follow-up CMP monitor urine output  3-hypomagnesemia replace follow-up on the level  4-urinary tract infection leukocytosis follow-up CBC follow-up culture on IV antibiotics  5 history of coronary artery disease currently has no chest pain   6 hyperlipidemia hypertension resume home medication  Discussed with the consultants.  7-?  Orthostatic hypotension will obtain orthostatic blood pressure consider IV  fluids      Review of other systems negative other than HPI  Past medical history    Epic& consultants notes reviewed  Most recent images Reviewed  Last EKG/Rhythm reviewed      Vital signs has been reviewed     SKIN:  No rashes .   ENT mucosa,  Normal/nose normal   NECK: no jugulovenous distention  NO lymphadenopathy   LUNGS:No wheezing,no ronchi  no rales.  CARDIAC:  S1 and S2  ABDOMEN: Abdomen soft, mild-tender.    EXTREMITIES: Extremities normal.   NEURO: non focal    PULSES: + pedal / radial   No edema  No goiter   No carotid bruits.                   Past Medical History  She has a past medical history of Acute embolism and thrombosis of right popliteal vein (Multi) (04/24/2020), Anxiety, Atrial fib/flutter, transient (Multi), CAD (coronary artery disease), Calculus of ureter (04/29/2020), Chronic venous hypertension (idiopathic) with inflammation of left lower extremity, Chronic venous hypertension (idiopathic) with inflammation of right lower extremity, Chronic venous hypertension (idiopathic) with ulcer of unspecified lower extremity (CODE), Complete heart block, Hypertension, Hypothyroidism, Long term (current) use of anticoagulants (01/20/2022), SAMAN (obstructive sleep apnea), Osteoporosis, Other bursitis of hip, right hip, Other mechanical complication of other internal joint prosthesis, initial encounter (CMS-HCC) (05/22/2020), Other postprocedural complications and disorders of the circulatory system, not elsewhere classified (10/08/2020), Other specified joint disorders, right shoulder, Personal history of diseases of the blood and blood-forming organs and certain disorders involving the immune mechanism, Pulmonary embolism, PVD (peripheral vascular disease) (CMS-HCC), RA (rheumatoid arthritis), Renal calculi, Sick sinus syndrome (Multi), Toxic effect of unspecified metal, accidental (unintentional), initial encounter (12/11/2020), Unspecified fracture of lower end of unspecified femur, initial  encounter for closed fracture (Multi) (2021), and UTI (urinary tract infection).    Surgical History  She has a past surgical history that includes Other surgical history (2021); XR chest pacemaker w fluoro (10/17/2021); Cardiac catheterization; Cholecystectomy;  section, low transverse; Tonsillectomy; Total hip arthroplasty (Bilateral); Total shoulder arthroplasty (Left); Total knee arthroplasty (Bilateral); Colonoscopy; pacemaker placement; Femur fracture surgery; Wrist surgery; Cardiac electrophysiology mapping and ablation; and Coronary stent placement.     Social History  She reports that she quit smoking about 36 years ago. Her smoking use included cigarettes. She has never used smokeless tobacco. She reports current alcohol use. She reports that she does not use drugs.    Family History  Family History   Problem Relation Name Age of Onset    Other (ptca) Mother      Heart failure Father      Coronary artery disease Father      Breast cancer Sister      Breast cancer Sister          Allergies  Adhesive tape-silicones, Latex, and Valdecoxib          Last Recorded Vitals  BP (!) 184/87 (BP Location: Left arm, Patient Position: Lying)   Pulse 70   Temp 35.7 °C (96.3 °F) (Temporal)   Resp 18   Wt 104 kg (228 lb 13.4 oz)   SpO2 96%          Kelsey Plummer MD

## 2024-11-06 NOTE — PROGRESS NOTES
Occupational Therapy    OT Treatment    Patient Name: Yen Montoya  MRN: [id number]  Today's Date: 11/6/2024  Time Calculation  Start Time: 1245  Stop Time: 1324  Time Calculation (min): 39 min       3115/3115-A    Assessment:  End of Session Communication: Bedside nurse  End of Session Patient Position: Up in chair, Alarm on (sitter present)       Plan:  OT Frequency: 4 times per week  OT Discharge Recommendations: High intensity level of continued care     Subjective        11/06/24 1245   OT Last Visit   OT Received On 11/06/24   General   Prior to Session Communication Bedside nurse   Patient Position Received Bed, 3 rail up;Alarm on   Preferred Learning Style verbal;visual   General Comment Pt pleasant and cooperative, RN cleared for therapy.   Pain Assessment   Pain Assessment 0-10   0-10 (Numeric) Pain Score 0 - No pain   Cognition   Overall Cognitive Status WFL   Grooming   Grooming Level of Assistance Minimum assistance   Grooming Where Assessed Standing sinkside;Chair   Grooming Comments Increased time at sink, multiple rest breaks to chair and Min A for hair management.    Bed Mobility   Bed Mobility Yes   Bed Mobility 1   Bed Mobility 1 Supine to sitting   Level of Assistance 1 Minimum assistance   Bed Mobility Comments 1 cues to scoot out to EOB   Transfers   Transfer Yes   Transfer 1   Transfer From 1 Sit to   Transfer to 1 Stand   Technique 1 Sit to stand;Stand to sit   Transfer Device 1 Gait belt;Walker   Transfer Level of Assistance 1 Minimum assistance   Trials/Comments 1 Multiple sit<>stands Min A, FM performed within household distances Min Ax2, chair follow for safety and rest breaks.   IP OT Assessment   End of Session Communication Bedside nurse   End of Session Patient Position Up in chair;Alarm on  (sitter present)   Inpatient Plan   OT Frequency 4 times per week   OT Discharge Recommendations High intensity level of continued care       Outcome Measures:[facility] Daily Activity  Putting on  and taking off regular lower body clothing: A lot  Bathing (including washing, rinsing, drying): A lot  Putting on and taking off regular upper body clothing: A little  Toileting, which includes using toilet, bedpan or urinal: A lot  Taking care of personal grooming such as brushing teeth: A little  Eating Meals: None  Daily Activity - Total Score: 16  Education Documentation  Body Mechanics, taught by TREVOR Garza at 11/6/2024  4:01 PM.  Learner: Patient  Readiness: Acceptance  Method: Explanation, Demonstration  Response: Verbalizes Understanding, Demonstrated Understanding, Needs Reinforcement    Precautions, taught by TREVOR Garza at 11/6/2024  4:01 PM.  Learner: Patient  Readiness: Acceptance  Method: Explanation, Demonstration  Response: Verbalizes Understanding, Demonstrated Understanding, Needs Reinforcement    ADL Training, taught by TREVOR Garza at 11/6/2024  4:01 PM.  Learner: Patient  Readiness: Acceptance  Method: Explanation, Demonstration  Response: Verbalizes Understanding, Demonstrated Understanding, Needs Reinforcement    Education Comments  No comments found.            Goals:  Encounter Problems       Encounter Problems (Active)       OT Goals       Sup for ADL functional mobility with FWW.        Start:  11/05/24    Expected End:  11/19/24            Sup for sit/stand, bed/chair/commode with FWW.        Start:  11/05/24    Expected End:  11/19/24            Good (-) dynamic standing balance for ADL.        Start:  11/05/24    Expected End:  11/19/24            Tolerate 10 mins light functional activity.        Start:  11/05/24    Expected End:  11/19/24            Normal dynamic sitting balance for ADL.        Start:  11/05/24    Expected End:  11/19/24

## 2024-11-06 NOTE — PROGRESS NOTES
11/06/24 1432   Discharge Planning   Home or Post Acute Services Post acute facilities (Rehab/SNF/etc)   Type of Post Acute Facility Services Rehab   Expected Discharge Disposition Rehab   Does the patient need discharge transport arranged? Yes   RoundTrip coordination needed? Yes   Has discharge transport been arranged? No     Peer to peer being offered for [facility] CELESTIN, which is being handled by facility per [facility].  Status in portal case is in  review.

## 2024-11-07 LAB
ANION GAP SERPL CALC-SCNC: 12 MMOL/L (ref 10–20)
BUN SERPL-MCNC: 26 MG/DL (ref 6–23)
CALCIUM SERPL-MCNC: 10.3 MG/DL (ref 8.6–10.3)
CHLORIDE SERPL-SCNC: 101 MMOL/L (ref 98–107)
CO2 SERPL-SCNC: 29 MMOL/L (ref 21–32)
CREAT SERPL-MCNC: 0.84 MG/DL (ref 0.5–1.05)
EGFRCR SERPLBLD CKD-EPI 2021: 73 ML/MIN/1.73M*2
ERYTHROCYTE [DISTWIDTH] IN BLOOD BY AUTOMATED COUNT: 13.2 % (ref 11.5–14.5)
GLUCOSE BLD MANUAL STRIP-MCNC: 131 MG/DL (ref 74–99)
GLUCOSE BLD MANUAL STRIP-MCNC: 133 MG/DL (ref 74–99)
GLUCOSE BLD MANUAL STRIP-MCNC: 143 MG/DL (ref 74–99)
GLUCOSE BLD MANUAL STRIP-MCNC: 155 MG/DL (ref 74–99)
GLUCOSE BLD MANUAL STRIP-MCNC: 168 MG/DL (ref 74–99)
GLUCOSE SERPL-MCNC: 127 MG/DL (ref 74–99)
HCT VFR BLD AUTO: 40.6 % (ref 36–46)
HGB BLD-MCNC: 12.8 G/DL (ref 12–16)
INR PPP: 1.9 (ref 0.9–1.1)
MCH RBC QN AUTO: 32.2 PG (ref 26–34)
MCHC RBC AUTO-ENTMCNC: 31.5 G/DL (ref 32–36)
MCV RBC AUTO: 102 FL (ref 80–100)
NRBC BLD-RTO: 0 /100 WBCS (ref 0–0)
PLATELET # BLD AUTO: 262 X10*3/UL (ref 150–450)
POTASSIUM SERPL-SCNC: 4.1 MMOL/L (ref 3.5–5.3)
PROTHROMBIN TIME: 22.1 SECONDS (ref 9.8–12.8)
RBC # BLD AUTO: 3.98 X10*6/UL (ref 4–5.2)
SODIUM SERPL-SCNC: 138 MMOL/L (ref 136–145)
WBC # BLD AUTO: 12.8 X10*3/UL (ref 4.4–11.3)

## 2024-11-07 PROCEDURE — 2500000001 HC RX 250 WO HCPCS SELF ADMINISTERED DRUGS (ALT 637 FOR MEDICARE OP): Performed by: NURSE PRACTITIONER

## 2024-11-07 PROCEDURE — 85027 COMPLETE CBC AUTOMATED: CPT | Performed by: NURSE PRACTITIONER

## 2024-11-07 PROCEDURE — 2500000004 HC RX 250 GENERAL PHARMACY W/ HCPCS (ALT 636 FOR OP/ED): Performed by: NURSE PRACTITIONER

## 2024-11-07 PROCEDURE — 80048 BASIC METABOLIC PNL TOTAL CA: CPT | Performed by: NURSE PRACTITIONER

## 2024-11-07 PROCEDURE — 1200000002 HC GENERAL ROOM WITH TELEMETRY DAILY

## 2024-11-07 PROCEDURE — 99232 SBSQ HOSP IP/OBS MODERATE 35: CPT | Performed by: PSYCHIATRY & NEUROLOGY

## 2024-11-07 PROCEDURE — 85610 PROTHROMBIN TIME: CPT | Performed by: NURSE PRACTITIONER

## 2024-11-07 PROCEDURE — 36415 COLL VENOUS BLD VENIPUNCTURE: CPT | Performed by: NURSE PRACTITIONER

## 2024-11-07 PROCEDURE — 97530 THERAPEUTIC ACTIVITIES: CPT | Mod: GP,CQ

## 2024-11-07 PROCEDURE — 2500000002 HC RX 250 W HCPCS SELF ADMINISTERED DRUGS (ALT 637 FOR MEDICARE OP, ALT 636 FOR OP/ED): Performed by: NURSE PRACTITIONER

## 2024-11-07 PROCEDURE — 97535 SELF CARE MNGMENT TRAINING: CPT | Mod: GO,CO

## 2024-11-07 PROCEDURE — 82947 ASSAY GLUCOSE BLOOD QUANT: CPT

## 2024-11-07 PROCEDURE — 2500000001 HC RX 250 WO HCPCS SELF ADMINISTERED DRUGS (ALT 637 FOR MEDICARE OP): Performed by: PSYCHIATRY & NEUROLOGY

## 2024-11-07 RX ORDER — HYDRALAZINE HYDROCHLORIDE 25 MG/1
25 TABLET, FILM COATED ORAL EVERY 8 HOURS PRN
Status: DISCONTINUED | OUTPATIENT
Start: 2024-11-07 | End: 2024-11-08

## 2024-11-07 RX ORDER — HYDRALAZINE HYDROCHLORIDE 25 MG/1
25 TABLET, FILM COATED ORAL EVERY 8 HOURS PRN
Start: 2024-11-07 | End: 2024-11-08 | Stop reason: HOSPADM

## 2024-11-07 RX ORDER — ONDANSETRON HYDROCHLORIDE 2 MG/ML
4 INJECTION, SOLUTION INTRAVENOUS ONCE
Status: COMPLETED | OUTPATIENT
Start: 2024-11-08 | End: 2024-11-07

## 2024-11-07 RX ADMIN — BUPROPION HYDROCHLORIDE 300 MG: 150 TABLET, EXTENDED RELEASE ORAL at 08:49

## 2024-11-07 RX ADMIN — HYDRALAZINE HYDROCHLORIDE 25 MG: 25 TABLET ORAL at 23:56

## 2024-11-07 RX ADMIN — DILTIAZEM HYDROCHLORIDE 180 MG: 180 CAPSULE, COATED, EXTENDED RELEASE ORAL at 20:30

## 2024-11-07 RX ADMIN — AZATHIOPRINE 100 MG: 50 TABLET ORAL at 08:49

## 2024-11-07 RX ADMIN — HYDROXYZINE HYDROCHLORIDE 25 MG: 25 TABLET ORAL at 08:49

## 2024-11-07 RX ADMIN — HYDRALAZINE HYDROCHLORIDE 25 MG: 25 TABLET ORAL at 14:03

## 2024-11-07 RX ADMIN — ATORVASTATIN CALCIUM 80 MG: 80 TABLET, FILM COATED ORAL at 20:33

## 2024-11-07 RX ADMIN — HYDROXYZINE HYDROCHLORIDE 25 MG: 25 TABLET ORAL at 23:41

## 2024-11-07 RX ADMIN — AMOXICILLIN AND CLAVULANATE POTASSIUM 1 TABLET: 875; 125 TABLET, FILM COATED ORAL at 20:30

## 2024-11-07 RX ADMIN — ASPIRIN 81 MG: 81 TABLET, COATED ORAL at 08:49

## 2024-11-07 RX ADMIN — METOPROLOL TARTRATE 100 MG: 100 TABLET, FILM COATED ORAL at 20:30

## 2024-11-07 RX ADMIN — CHOLECALCIFEROL TAB 125 MCG (5000 UNIT) 5000 UNITS: 125 TAB at 08:49

## 2024-11-07 RX ADMIN — ONDANSETRON 4 MG: 2 INJECTION INTRAMUSCULAR; INTRAVENOUS at 23:38

## 2024-11-07 RX ADMIN — WARFARIN SODIUM 2 MG: 2 TABLET ORAL at 18:15

## 2024-11-07 RX ADMIN — INSULIN LISPRO 2 UNITS: 100 INJECTION, SOLUTION INTRAVENOUS; SUBCUTANEOUS at 14:03

## 2024-11-07 RX ADMIN — LEVOTHYROXINE SODIUM 100 MCG: 0.1 TABLET ORAL at 05:46

## 2024-11-07 RX ADMIN — AMOXICILLIN AND CLAVULANATE POTASSIUM 1 TABLET: 875; 125 TABLET, FILM COATED ORAL at 08:49

## 2024-11-07 RX ADMIN — METOPROLOL TARTRATE 100 MG: 100 TABLET, FILM COATED ORAL at 06:04

## 2024-11-07 RX ADMIN — INSULIN LISPRO 2 UNITS: 100 INJECTION, SOLUTION INTRAVENOUS; SUBCUTANEOUS at 20:38

## 2024-11-07 RX ADMIN — LISINOPRIL 20 MG: 20 TABLET ORAL at 08:49

## 2024-11-07 ASSESSMENT — PAIN - FUNCTIONAL ASSESSMENT
PAIN_FUNCTIONAL_ASSESSMENT: 0-10
PAIN_FUNCTIONAL_ASSESSMENT: 0-10

## 2024-11-07 ASSESSMENT — COGNITIVE AND FUNCTIONAL STATUS - GENERAL
TURNING FROM BACK TO SIDE WHILE IN FLAT BAD: A LITTLE
WALKING IN HOSPITAL ROOM: A LITTLE
DRESSING REGULAR UPPER BODY CLOTHING: A LITTLE
PERSONAL GROOMING: A LITTLE
PERSONAL GROOMING: A LITTLE
TOILETING: A LITTLE
DAILY ACTIVITIY SCORE: 18
MOVING FROM LYING ON BACK TO SITTING ON SIDE OF FLAT BED WITH BEDRAILS: A LITTLE
EATING MEALS: A LITTLE
DRESSING REGULAR LOWER BODY CLOTHING: A LITTLE
DAILY ACTIVITIY SCORE: 20
STANDING UP FROM CHAIR USING ARMS: A LITTLE
MOVING TO AND FROM BED TO CHAIR: A LITTLE
STANDING UP FROM CHAIR USING ARMS: A LITTLE
DRESSING REGULAR UPPER BODY CLOTHING: A LITTLE
TOILETING: A LITTLE
MOBILITY SCORE: 17
MOBILITY SCORE: 17
MOVING TO AND FROM BED TO CHAIR: A LITTLE
TURNING FROM BACK TO SIDE WHILE IN FLAT BAD: A LITTLE
MOVING TO AND FROM BED TO CHAIR: A LITTLE
WALKING IN HOSPITAL ROOM: A LOT
HELP NEEDED FOR BATHING: A LOT
DRESSING REGULAR LOWER BODY CLOTHING: A LOT
HELP NEEDED FOR BATHING: A LITTLE
MOVING FROM LYING ON BACK TO SITTING ON SIDE OF FLAT BED WITH BEDRAILS: A LITTLE
DAILY ACTIVITIY SCORE: 17
CLIMB 3 TO 5 STEPS WITH RAILING: A LOT
CLIMB 3 TO 5 STEPS WITH RAILING: A LOT
DRESSING REGULAR LOWER BODY CLOTHING: A LITTLE
TURNING FROM BACK TO SIDE WHILE IN FLAT BAD: A LITTLE
CLIMB 3 TO 5 STEPS WITH RAILING: A LOT
STANDING UP FROM CHAIR USING ARMS: A LITTLE
HELP NEEDED FOR BATHING: A LITTLE
TOILETING: A LITTLE
WALKING IN HOSPITAL ROOM: A LITTLE
MOBILITY SCORE: 16
DRESSING REGULAR UPPER BODY CLOTHING: A LITTLE
MOVING FROM LYING ON BACK TO SITTING ON SIDE OF FLAT BED WITH BEDRAILS: A LITTLE

## 2024-11-07 ASSESSMENT — PAIN SCALES - GENERAL
PAINLEVEL_OUTOF10: 0 - NO PAIN

## 2024-11-07 ASSESSMENT — ACTIVITIES OF DAILY LIVING (ADL)
EFFECT OF PAIN ON DAILY ACTIVITIES: .
HOME_MANAGEMENT_TIME_ENTRY: 34

## 2024-11-07 NOTE — PROGRESS NOTES
11/07/24 1113   Discharge Planning   Home or Post Acute Services Post acute facilities (Rehab/SNF/etc)   Type of Post Acute Facility Services Rehab   Expected Discharge Disposition Rehab   Does the patient need discharge transport arranged? Yes   RoundTrip coordination needed? Yes   Has discharge transport been arranged? No     Per facility, P2P is still in review at this time.

## 2024-11-07 NOTE — PROGRESS NOTES
"Yen Montoya is a 74 y.o. female on day 4 of admission presenting with Hydronephrosis with urinary obstruction due to renal calculus.    SUBJECTIVE:  Pt report that she is feeling less anxious  Mood better  No further panic attacks  Has been tolerating medication  No SI or HI  Appear to be future oriented      Exam:  Vital Signs: BP (!) 178/95 (BP Location: Left arm, Patient Position: Lying)   Pulse 70   Temp 35.7 °C (96.3 °F) (Tympanic)   Resp 20   Ht 1.753 m (5' 9.02\")   Wt 104 kg (228 lb 13.4 oz)   SpO2 96%   BMI 33.78 kg/m²   Musculoskeletal: Muscle tone: WNL  Gait/Station: in bed      Mental Status Exam:  General Appearance: {BH Psych Appearance:[id number]  Speech: soft  Mood: anxious  Affect: appropriate  Thought Process: Linear, goal directed  Thought Associations: No loosening of associations  Thought Content: normal  Perception: No perceptual abnormalities noted  Level of Consciousness: Alert  Orientation: Alert and oriented to person, place, time and situation  Attention and Concentration: Intact  Cognition:  Insight: fair  Judgment: fair     Results for orders placed or performed during the hospital encounter of 11/03/24 (from the past 96 hours)   POCT GLUCOSE   Result Value Ref Range    POCT Glucose 146 (H) 74 - 99 mg/dL   Blood Culture    Specimen: Peripheral Venipuncture; Blood culture   Result Value Ref Range    Blood Culture No growth at 3 days    Blood Culture    Specimen: Peripheral Venipuncture; Blood culture   Result Value Ref Range    Blood Culture No growth at 3 days    POCT GLUCOSE   Result Value Ref Range    POCT Glucose 196 (H) 74 - 99 mg/dL   CBC   Result Value Ref Range    WBC 16.4 (H) 4.4 - 11.3 x10*3/uL    nRBC 0.0 0.0 - 0.0 /100 WBCs    RBC 3.37 (L) 4.00 - 5.20 x10*6/uL    Hemoglobin 11.0 (L) 12.0 - 16.0 g/dL    Hematocrit 35.3 (L) 36.0 - 46.0 %     (H) 80 - 100 fL    MCH 32.6 26.0 - 34.0 pg    MCHC 31.2 (L) 32.0 - 36.0 g/dL    RDW 14.0 11.5 - 14.5 %    Platelets 176 " 150 - 450 x10*3/uL   Basic Metabolic Panel   Result Value Ref Range    Glucose 155 (H) 74 - 99 mg/dL    Sodium 138 136 - 145 mmol/L    Potassium 4.2 3.5 - 5.3 mmol/L    Chloride 108 (H) 98 - 107 mmol/L    Bicarbonate 23 21 - 32 mmol/L    Anion Gap 11 10 - 20 mmol/L    Urea Nitrogen 24 (H) 6 - 23 mg/dL    Creatinine 1.08 (H) 0.50 - 1.05 mg/dL    eGFR 54 (L) >60 mL/min/1.73m*2    Calcium 9.8 8.6 - 10.3 mg/dL   Magnesium   Result Value Ref Range    Magnesium 1.71 1.60 - 2.40 mg/dL   Phosphorus   Result Value Ref Range    Phosphorus 2.3 (L) 2.5 - 4.9 mg/dL   Protime-INR   Result Value Ref Range    Protime 17.8 (H) 9.8 - 12.8 seconds    INR 1.6 (H) 0.9 - 1.1   POCT GLUCOSE   Result Value Ref Range    POCT Glucose 168 (H) 74 - 99 mg/dL   POCT GLUCOSE   Result Value Ref Range    POCT Glucose 199 (H) 74 - 99 mg/dL   POCT GLUCOSE   Result Value Ref Range    POCT Glucose 149 (H) 74 - 99 mg/dL   POCT GLUCOSE   Result Value Ref Range    POCT Glucose 212 (H) 74 - 99 mg/dL   POCT GLUCOSE   Result Value Ref Range    POCT Glucose 179 (H) 74 - 99 mg/dL   POCT GLUCOSE   Result Value Ref Range    POCT Glucose 167 (H) 74 - 99 mg/dL   CBC   Result Value Ref Range    WBC 19.5 (H) 4.4 - 11.3 x10*3/uL    nRBC 0.1 (H) 0.0 - 0.0 /100 WBCs    RBC 3.61 (L) 4.00 - 5.20 x10*6/uL    Hemoglobin 11.8 (L) 12.0 - 16.0 g/dL    Hematocrit 37.7 36.0 - 46.0 %     (H) 80 - 100 fL    MCH 32.7 26.0 - 34.0 pg    MCHC 31.3 (L) 32.0 - 36.0 g/dL    RDW 13.8 11.5 - 14.5 %    Platelets 222 150 - 450 x10*3/uL   Basic Metabolic Panel   Result Value Ref Range    Glucose 173 (H) 74 - 99 mg/dL    Sodium 136 136 - 145 mmol/L    Potassium 4.3 3.5 - 5.3 mmol/L    Chloride 105 98 - 107 mmol/L    Bicarbonate 24 21 - 32 mmol/L    Anion Gap 11 10 - 20 mmol/L    Urea Nitrogen 29 (H) 6 - 23 mg/dL    Creatinine 1.08 (H) 0.50 - 1.05 mg/dL    eGFR 54 (L) >60 mL/min/1.73m*2    Calcium 10.4 (H) 8.6 - 10.3 mg/dL   Magnesium   Result Value Ref Range    Magnesium 1.74 1.60 -  2.40 mg/dL   Protime-INR   Result Value Ref Range    Protime 14.4 (H) 9.8 - 12.8 seconds    INR 1.3 (H) 0.9 - 1.1   POCT GLUCOSE   Result Value Ref Range    POCT Glucose 195 (H) 74 - 99 mg/dL   SST TOP   Result Value Ref Range    Extra Tube Hold for add-ons.    PST Top   Result Value Ref Range    Extra Tube Hold for add-ons.    POCT GLUCOSE   Result Value Ref Range    POCT Glucose 182 (H) 74 - 99 mg/dL   Blood Culture    Specimen: Peripheral Arterial Puncture; Blood culture   Result Value Ref Range    Blood Culture No growth at 1 day    Blood Culture    Specimen: Peripheral Venipuncture; Blood culture   Result Value Ref Range    Blood Culture No growth at 1 day    Lactate   Result Value Ref Range    Lactate 1.7 0.4 - 2.0 mmol/L   POCT GLUCOSE   Result Value Ref Range    POCT Glucose 172 (H) 74 - 99 mg/dL   POCT GLUCOSE   Result Value Ref Range    POCT Glucose 137 (H) 74 - 99 mg/dL   POCT GLUCOSE   Result Value Ref Range    POCT Glucose 181 (H) 74 - 99 mg/dL   Blood Gas Arterial   Result Value Ref Range    POCT pH, Arterial 7.40 7.38 - 7.42 pH    POCT pCO2, Arterial 38 38 - 42 mm Hg    POCT pO2, Arterial 74 (L) 85 - 95 mm Hg    POCT SO2, Arterial 96 94 - 100 %    POCT Oxy Hemoglobin, Arterial 93.2 (L) 94.0 - 98.0 %    POCT Base Excess, Arterial -1.1 -2.0 - 3.0 mmol/L    POCT HCO3 Calculated, Arterial 23.5 22.0 - 26.0 mmol/L    Patient Temperature      FiO2 21 %   CBC   Result Value Ref Range    WBC 10.9 4.4 - 11.3 x10*3/uL    nRBC 0.0 0.0 - 0.0 /100 WBCs    RBC 3.66 (L) 4.00 - 5.20 x10*6/uL    Hemoglobin 11.7 (L) 12.0 - 16.0 g/dL    Hematocrit 37.5 36.0 - 46.0 %     (H) 80 - 100 fL    MCH 32.0 26.0 - 34.0 pg    MCHC 31.2 (L) 32.0 - 36.0 g/dL    RDW 13.4 11.5 - 14.5 %    Platelets 219 150 - 450 x10*3/uL   Basic Metabolic Panel   Result Value Ref Range    Glucose 126 (H) 74 - 99 mg/dL    Sodium 137 136 - 145 mmol/L    Potassium 4.1 3.5 - 5.3 mmol/L    Chloride 105 98 - 107 mmol/L    Bicarbonate 22 21 - 32 mmol/L     Anion Gap 14 10 - 20 mmol/L    Urea Nitrogen 28 (H) 6 - 23 mg/dL    Creatinine 0.92 0.50 - 1.05 mg/dL    eGFR 66 >60 mL/min/1.73m*2    Calcium 10.0 8.6 - 10.3 mg/dL   Magnesium   Result Value Ref Range    Magnesium 1.67 1.60 - 2.40 mg/dL   Protime-INR   Result Value Ref Range    Protime 17.5 (H) 9.8 - 12.8 seconds    INR 1.5 (H) 0.9 - 1.1   POCT GLUCOSE   Result Value Ref Range    POCT Glucose 128 (H) 74 - 99 mg/dL   POCT GLUCOSE   Result Value Ref Range    POCT Glucose 167 (H) 74 - 99 mg/dL   POCT GLUCOSE   Result Value Ref Range    POCT Glucose 159 (H) 74 - 99 mg/dL   POCT GLUCOSE   Result Value Ref Range    POCT Glucose 184 (H) 74 - 99 mg/dL   CBC   Result Value Ref Range    WBC 12.8 (H) 4.4 - 11.3 x10*3/uL    nRBC 0.0 0.0 - 0.0 /100 WBCs    RBC 3.98 (L) 4.00 - 5.20 x10*6/uL    Hemoglobin 12.8 12.0 - 16.0 g/dL    Hematocrit 40.6 36.0 - 46.0 %     (H) 80 - 100 fL    MCH 32.2 26.0 - 34.0 pg    MCHC 31.5 (L) 32.0 - 36.0 g/dL    RDW 13.2 11.5 - 14.5 %    Platelets 262 150 - 450 x10*3/uL   Basic Metabolic Panel   Result Value Ref Range    Glucose 127 (H) 74 - 99 mg/dL    Sodium 138 136 - 145 mmol/L    Potassium 4.1 3.5 - 5.3 mmol/L    Chloride 101 98 - 107 mmol/L    Bicarbonate 29 21 - 32 mmol/L    Anion Gap 12 10 - 20 mmol/L    Urea Nitrogen 26 (H) 6 - 23 mg/dL    Creatinine 0.84 0.50 - 1.05 mg/dL    eGFR 73 >60 mL/min/1.73m*2    Calcium 10.3 8.6 - 10.3 mg/dL   Protime-INR   Result Value Ref Range    Protime 22.1 (H) 9.8 - 12.8 seconds    INR 1.9 (H) 0.9 - 1.1   POCT GLUCOSE   Result Value Ref Range    POCT Glucose 131 (H) 74 - 99 mg/dL   POCT GLUCOSE   Result Value Ref Range    POCT Glucose 133 (H) 74 - 99 mg/dL   POCT GLUCOSE   Result Value Ref Range    POCT Glucose 155 (H) 74 - 99 mg/dL     *Note: Due to a large number of results and/or encounters for the requested time period, some results have not been displayed. A complete set of results can be found in Results Review.       Impression:    OMI    Recommendations:    Continue current medication as ordered  Can be discharged from psych standpoint when medically stable  Psych signing off  Please call me if needed      Thank you for allowing us to participate in the care of this patient.       Sami Fontanez MD  11/7/2024  3:32 PM

## 2024-11-07 NOTE — CARE PLAN
The patient's goals for the shift include      The clinical goals for the shift include Patient will remain afebrile througout shift.      Problem: Skin  Goal: Participates in plan/prevention/treatment measures  Outcome: Progressing  Goal: Prevent/manage excess moisture  Outcome: Progressing  Goal: Prevent/minimize sheer/friction injuries  Outcome: Progressing  Goal: Promote/optimize nutrition  Outcome: Progressing  Goal: Promote skin healing  Outcome: Progressing     Problem: Fall/Injury  Goal: Not fall by end of shift  Outcome: Progressing  Goal: Be free from injury by end of the shift  Outcome: Progressing  Goal: Verbalize understanding of personal risk factors for fall in the hospital  Outcome: Progressing  Goal: Verbalize understanding of risk factor reduction measures to prevent injury from fall in the home  Outcome: Progressing  Goal: Use assistive devices by end of the shift  Outcome: Progressing  Goal: Pace activities to prevent fatigue by end of the shift  Outcome: Progressing     Problem: Safety - Adult  Goal: Free from fall injury  Outcome: Progressing

## 2024-11-07 NOTE — PROGRESS NOTES
Physical Therapy    Physical Therapy    Physical Therapy Treatment    Patient Name: Yen Montoya  MRN: [id number]  Today's Date: 11/7/2024  Time Calculation  Start Time: 1036  Stop Time: 1114  Time Calculation (min): 38 min     3115/3115-A       11/07/24 1036   PT  Visit   PT Received On 11/07/24   Response to Previous Treatment Patient with no complaints from previous session.   General   Reason for Referral ADL   Referred By Enoch   Past Medical History Relevant to Rehab Includes: Past Medical History  She has a past medical history of Acute embolism and thrombosis of right popliteal vein (Multi) (04/24/2020), Anxiety, Atrial fib/flutter, transient (Multi), CAD (coronary artery disease), Calculus of ureter (04/29/2020), Chronic venous hypertension (idiopathic) with inflammation of left lower extremity, Chronic venous hypertension (idiopathic) with inflammation of right lower extremity, Chronic venous hypertension (idiopathic) with ulcer of unspecified lower extremity (CODE), Complete heart block, Hypertension, Hypothyroidism, Long term (current) use of anticoagulants (01/20/2022), SAMAN (obstructive sleep apnea), Osteoporosis, Other bursitis of hip, right hip, Other mechanical complication of other internal joint prosthesis, initial encounter (CMS-HCC) (05/22/2020), Other postprocedural complications and disorders of the circulatory system, not elsewhere classified (10/08/2020), Other specified joint disorders, right shoulder, Personal history of diseases of the blood and blood-forming organs and certain disorders involving the immune mechanism, Pulmonary embolism, PVD (peripheral vascular disease) (CMS-HCC), RA (rheumatoid arthritis), Renal calculi, Sick sinus syndrome (Multi), Toxic effect of unspecified metal, accidental (unintentional), initial encounter (12/11/2020), Unspecified fracture of lower end of unspecified femur, initial encounter for closed fracture (Multi) (11/05/2021), and UTI (urinary tract  infection).     Surgical History  She has a past surgical history that includes Other surgical history (2021); XR chest pacemaker w fluoro (10/17/2021); Cardiac catheterization; Cholecystectomy;  section, low transverse; Tonsillectomy; Total hip arthroplasty (Bilateral); Total shoulder arthroplasty (Left); Total knee arthroplasty (Bilateral); Colonoscopy; pacemaker placement; Femur fracture surgery; Wrist surgery; Cardiac electrophysiology mapping and ablation; and Coronary stent placement.   Prior to Session Communication Bedside nurse   Patient Position Received Bed, 3 rail up;Alarm on   General Comment Pt agreeable to therapy and cleared for participation   Precautions   Medical Precautions Fall precautions   Pain Assessment   Pain Assessment 0-10   0-10 (Numeric) Pain Score 0 - No pain   Effect of Pain on Daily Activities .   Cognition   Overall Cognitive Status WFL   Therapeutic Activity   Therapeutic Activity Performed Yes   Therapeutic Activity 1 standing activity at sink reaching outside AMIRA using unilateral UE support. Eugenia with chair behind pt for increased safety.   Balance/Neuromuscular Re-Education   Balance/Neuromuscular Re-Education Activity Performed Yes   Bed Mobility   Bed Mobility Yes   Bed Mobility 1   Bed Mobility 1 Rolling right;Rolling left   Level of Assistance 1 Minimum assistance;Minimal verbal cues   Bed Mobility Comments 1 Rolling L<>R for niru care d/t incontinence of BM. Increased time for clean up.   Bed Mobility 2   Bed Mobility  2 Sitting to supine   Level of Assistance 2 Minimum assistance   Bed Mobility Comments 2 Cues for sequencing and to assist trunk off EOB.Increased time and effort to complete   Ambulation/Gait Training   Ambulation/Gait Training Performed Yes   Ambulation/Gait Training 1   Surface 1 Level tile   Device 1 Rolling walker   Gait Support Devices Gait belt   Assistance 1 Minimum assistance;Minimal verbal cues   Quality of Gait 1 NBOS;Shuffling gait    Comments/Distance (ft) 1 10', 8', Short shuffling steps with chair follow for increased safety. Pt fatigues quickly requiring seated rest breaks.   Transfers   Transfer Yes   Transfer 1   Transfer From 1 Bed to   Transfer to 1 Commode-drop arm   Technique 1 Sit to stand;Stand to sit   Transfer Device 1 Walker;Gait belt   Transfer Level of Assistance 1 Minimum assistance;Minimal verbal cues   Trials/Comments 1 Cues for sequencing and safety with good follow through.   Transfers 2   Transfer From 2 Commode-drop arm to   Transfer to 2 Stand;Chair with arms   Technique 2 Sit to stand;Stand to sit   Transfer Device 2 Walker;Gait belt   Transfer Level of Assistance 2 Minimum assistance;Minimal verbal cues   Trials/Comments 2 Cues for sequencing and safety for all transfers.   Activity Tolerance   Endurance Tolerates 10 - 20 min exercise with multiple rests   PT Assessment   PT Assessment Results Decreased strength;Decreased endurance;Impaired balance;Decreased mobility   Rehab Prognosis Good   End of Session Communication Bedside nurse   Assessment Comment Pt puts forth good effort, however, is limited by decreased activity tolerance. Pt fatigues quickly requiring multiple seated rest breaks to complete activities.Pt required decreased assist this session from minAx2>minAx1 with increased steadiness. Increased time and effort for all activities.   End of Session Patient Position Up in chair;Alarm on     Outcome Measures:  [facility] Basic Mobility  Turning from your back to your side while in a flat bed without using bedrails: A little  Moving from lying on your back to sitting on the side of a flat bed without using bedrails: A little  Moving to and from bed to chair (including a wheelchair): A little  Standing up from a chair using your arms (e.g. wheelchair or bedside chair): A little  To walk in hospital room: A lot  Climbing 3-5 steps with railing: A lot  Basic Mobility - Total Score: 16                              EDUCATION:  Outpatient Education  Individual(s) Educated: Patient  Education Provided: Fall Risk  Education Documentation  Body Mechanics, taught by Mary Clements PTA at 11/7/2024  1:01 PM.  Learner: Patient  Readiness: Acceptance  Method: Explanation  Response: Verbalizes Understanding, Demonstrated Understanding, Needs Reinforcement    Precautions, taught by Mary Clements PTA at 11/7/2024  1:01 PM.  Learner: Patient  Readiness: Acceptance  Method: Explanation  Response: Verbalizes Understanding, Demonstrated Understanding, Needs Reinforcement    ADL Training, taught by Mary Clements PTA at 11/7/2024  1:01 PM.  Learner: Patient  Readiness: Acceptance  Method: Explanation  Response: Verbalizes Understanding, Demonstrated Understanding, Needs Reinforcement    Body Mechanics, taught by Mary Clements PTA at 11/7/2024  1:01 PM.  Learner: Patient  Readiness: Acceptance  Method: Explanation  Response: Verbalizes Understanding, Demonstrated Understanding, Needs Reinforcement    Home Exercise Program, taught by Mary Clements PTA at 11/7/2024  1:01 PM.  Learner: Patient  Readiness: Acceptance  Method: Explanation  Response: Verbalizes Understanding, Demonstrated Understanding, Needs Reinforcement    Mobility Training, taught by Mary Clements PTA at 11/7/2024  1:01 PM.  Learner: Patient  Readiness: Acceptance  Method: Explanation  Response: Verbalizes Understanding, Demonstrated Understanding, Needs Reinforcement    Body Mechanics, taught by Mary Clements PTA at 11/6/2024  2:19 PM.  Learner: Patient  Readiness: Acceptance  Method: Explanation, Demonstration  Response: Verbalizes Understanding, Demonstrated Understanding, Needs Reinforcement    Home Exercise Program, taught by Mary Clements PTA at 11/6/2024  2:19 PM.  Learner: Patient  Readiness: Acceptance  Method: Explanation, Demonstration  Response: Verbalizes Understanding, Demonstrated Understanding, Needs Reinforcement    Mobility Training, taught by  Mary Clements, PTA at 11/6/2024  2:19 PM.  Learner: Patient  Readiness: Acceptance  Method: Explanation, Demonstration  Response: Verbalizes Understanding, Demonstrated Understanding, Needs Reinforcement    Education Comments  No comments found.        GOALS:  Encounter Problems       Encounter Problems (Active)       PT Problem       Pt will be able to perform all bed mobility tasks with Mod I.  (Progressing)       Start:  11/04/24    Expected End:  11/18/24            Pt will perform all transfers with Mod I and FWW with proper safety mechanics.   (Progressing)       Start:  11/04/24    Expected End:  11/18/24            Pt will ambulate 100 ft with Mod I using FWW for improved functional independence.  (Progressing)       Start:  11/04/24    Expected End:  11/18/24            Pt will be able to negotiate 4 steps with 1 HR with SBA.  (Progressing)       Start:  11/04/24    Expected End:  11/18/24            Pt will be able to ambulate at least 100 ft with < or equal to 1 rest break while maintaining SpO2 > 90%.  (Progressing)       Start:  11/04/24    Expected End:  11/18/24               Pain - Adult

## 2024-11-07 NOTE — CARE PLAN
"The patient's goals for the shift include \"to feel better.\"    The clinical goals for the shift include Patients BP will remain below 150 systolic through end of shift.    "

## 2024-11-07 NOTE — PROGRESS NOTES
Occupational Therapy    OT Treatment    Patient Name: Yen Montoya  MRN: [id number]  Today's Date: 11/7/2024  Time Calculation  Start Time: 1037  Stop Time: 1111  Time Calculation (min): 34 min       3115/3115-A    Assessment:  End of Session Communication: Bedside nurse  End of Session Patient Position: Up in chair, Alarm on       Plan:  OT Frequency: 4 times per week  OT Discharge Recommendations: High intensity level of continued care     Subjective      11/07/24 1037   OT Last Visit   OT Received On 11/07/24   General   Reason for Referral ADL   Referred By Enoch   Prior to Session Communication Bedside nurse   Patient Position Received Bed, 3 rail up;Alarm on   General Comment Pt agreeable to therapy and cleared for participation; pt is pleasant, cooperative, fatigues easily with activity, rest breaks as needed.   Precautions   Medical Precautions Fall precautions   Pain Assessment   0-10 (Numeric) Pain Score 0 - No pain   Cognition   Overall Cognitive Status WFL   Orientation Level   (pt answered all questions appropriately)   Grooming   Grooming Level of Assistance Contact guard;Minimum assistance   Grooming Where Assessed Standing sinkside   Grooming Comments Pt completed hand washing task with cues for AD placement at sink, CGA to Min A for standing balance safety, noted trunk support on counter during B UE use.   UE Dressing   UE Dressing Level of Assistance Minimum assistance   UE Dressing Where Assessed Bed level   UE Dressing Comments doff/don clean gown due to being soiled   Toileting   Toileting Level of Assistance Contact guard   Where Assessed Bedside commode   Toileting Comments Pt reported just recently incontinent of stool in bed, covered in stool down to ankles- therefore pt Dep for hygiene and LB bathing at this time from bed level. Pt then utilized BSC, completed niru care and hyiene from seated level on commode with setup of wipes, anticipate CGA>Min A for garment management.   Bed Mobility  1   Bed Mobility 1 Rolling right;Rolling left   Level of Assistance 1 Minimum assistance   Bed Mobility Comments 1 rolling L<>R several times during hygiene tasks with Min A overall, cue for technique with good follow through and effort   Bed Mobility 2   Bed Mobility  2 Supine to sitting   Level of Assistance 2 Minimum assistance   Bed Mobility Comments 2 cues for technique, most assist to elevate trunk   Functional Mobility   Functional Mobility Performed Pt ambulated short distances within room, Min A overall, chair follow for safety, easily fatigued.   Transfer 1   Technique 1 Sit to stand;Stand to sit   Transfer Device 1 Walker;Gait belt   Transfer Level of Assistance 1 Minimum assistance;Minimal verbal cues   Trials/Comments 1 STS from EOB, BSC and recliner, occasional cues for proper technique with follow through.   Transfers 2   Transfer From 2 Bed to   Transfer to 2 Commode-drop arm   Technique 2 Stand pivot   Transfer Device 2 Gait belt;Walker   Transfer Level of Assistance 2 Minimum assistance;Minimal verbal cues   Trials/Comments 2 stand pivot/step transfer to BSC with cues for properly lining up with seated surface with fair follow through   IP OT Assessment   End of Session Communication Bedside nurse   End of Session Patient Position Up in chair;Alarm on   Inpatient Plan   OT Frequency 4 times per week   OT Discharge Recommendations High intensity level of continued care     Outcome Measures:[facility] Daily Activity  Putting on and taking off regular lower body clothing: A lot  Bathing (including washing, rinsing, drying): A lot  Putting on and taking off regular upper body clothing: A little  Toileting, which includes using toilet, bedpan or urinal: A little  Taking care of personal grooming such as brushing teeth: A little  Eating Meals: None  Daily Activity - Total Score: 17  Education Documentation  No documentation found.  Education Comments  No comments found.            Goals:  Encounter Problems        Encounter Problems (Active)       OT Goals       Sup for ADL functional mobility with FWW.        Start:  11/05/24    Expected End:  11/19/24            Sup for sit/stand, bed/chair/commode with FWW.        Start:  11/05/24    Expected End:  11/19/24            Good (-) dynamic standing balance for ADL.        Start:  11/05/24    Expected End:  11/19/24            Tolerate 10 mins light functional activity.        Start:  11/05/24    Expected End:  11/19/24            Normal dynamic sitting balance for ADL.        Start:  11/05/24    Expected End:  11/19/24

## 2024-11-07 NOTE — PROGRESS NOTES
Yen Montoya  11/6/24  [id number]  Kelsey Plummer MD  This is a patient with a past medical history   atrial fibrillation on Coumadin, sick sinus syndrome status post pacemaker insertion, Former tobacco use, RA, obesity, systolic heart failure (3/2024 Echo: EF 40-45%), adrenal mass, CAD status post 8 PCI, HTN, HLD, PVD, hypothyroidism, peripheral neuropathy, anxiety, SAMAN on CPAP, type 2 diabetes, pulmonary embolism, DVT RLE and nephrolithiasis. Presented to an outside emergency room for complaint of nausea and vomiting x 4 days with right lower quadrant pain and dysuria.    Patient seen and examined today she denies any chest pain tightness or pressure shortness of breath content reports pelvic pain lower radiated to the groin area moderate sharp      Assessment and plan   1-hydronephrosis admitted to the hospital pain control CT abdomen pelvis monitor electrolytes CBC lactic acid consulted urology status post stent placement pain control urology follow-up  2-SABRINA hydrate follow-up CMP monitor urine output  3-hypomagnesemia replace follow-up potassium and magnesium level  4-urinary tract infection leukocytosis follow-up CBC follow-up culture started on IV ceftriaxone follow-up culture and sensitivity.  5 history of coronary artery disease currently has no chest pain    6-history of A-fib flutter follow-up PT/INR   Discussed with the  consultants.      Review of other systems negative other than HPI  Past medical history    Epic& consultants notes reviewed  Most recent images Reviewed  Last EKG/Rhythm reviewed      Vital signs has been reviewed  GENERAL: o x 3 in distress.   SKIN:  No rashes .   ENT mucosa,  Normal/nose normal   NECK: no jugulovenous distention  NO lymphadenopathy   LUNGS:No wheezing,no ronchi  no rales.  CARDIAC:  S1 and S2  ABDOMEN: Abdomen soft, mild-tender.    EXTREMITIES: Extremities normal.   NEURO: non focal    PULSES: + pedal / radial   No edema  No goiter   No carotid bruits.                    Past Medical History  She has a past medical history of Acute embolism and thrombosis of right popliteal vein (Multi) (2020), Anxiety, Atrial fib/flutter, transient (Multi), CAD (coronary artery disease), Calculus of ureter (2020), Chronic venous hypertension (idiopathic) with inflammation of left lower extremity, Chronic venous hypertension (idiopathic) with inflammation of right lower extremity, Chronic venous hypertension (idiopathic) with ulcer of unspecified lower extremity (CODE), Complete heart block, Hypertension, Hypothyroidism, Long term (current) use of anticoagulants (2022), SAMAN (obstructive sleep apnea), Osteoporosis, Other bursitis of hip, right hip, Other mechanical complication of other internal joint prosthesis, initial encounter (CMS-HCC) (2020), Other postprocedural complications and disorders of the circulatory system, not elsewhere classified (10/08/2020), Other specified joint disorders, right shoulder, Personal history of diseases of the blood and blood-forming organs and certain disorders involving the immune mechanism, Pulmonary embolism, PVD (peripheral vascular disease) (CMS-HCC), RA (rheumatoid arthritis), Renal calculi, Sick sinus syndrome (Multi), Toxic effect of unspecified metal, accidental (unintentional), initial encounter (2020), Unspecified fracture of lower end of unspecified femur, initial encounter for closed fracture (Multi) (2021), and UTI (urinary tract infection).    Surgical History  She has a past surgical history that includes Other surgical history (2021); XR chest pacemaker w fluoro (10/17/2021); Cardiac catheterization; Cholecystectomy;  section, low transverse; Tonsillectomy; Total hip arthroplasty (Bilateral); Total shoulder arthroplasty (Left); Total knee arthroplasty (Bilateral); Colonoscopy; pacemaker placement; Femur fracture surgery; Wrist surgery; Cardiac electrophysiology mapping and ablation; and  Coronary stent placement.     Social History  She reports that she quit smoking about 36 years ago. Her smoking use included cigarettes. She has never used smokeless tobacco. She reports current alcohol use. She reports that she does not use drugs.    Family History  Family History   Problem Relation Name Age of Onset    Other (ptca) Mother      Heart failure Father      Coronary artery disease Father      Breast cancer Sister      Breast cancer Sister          Allergies  Adhesive tape-silicones, Latex, and Valdecoxib          Last Recorded Vitals  BP (!) 177/92 (BP Location: Right arm, Patient Position: Sitting)   Pulse 72   Temp 35.5 °C (95.9 °F) (Temporal)   Resp 20   Wt 104 kg (228 lb 13.4 oz)   SpO2 99%          Kelsey Plummer MD

## 2024-11-08 LAB
ANION GAP SERPL CALC-SCNC: 11 MMOL/L (ref 10–20)
BACTERIA BLD CULT: NORMAL
BACTERIA BLD CULT: NORMAL
BUN SERPL-MCNC: 22 MG/DL (ref 6–23)
CALCIUM SERPL-MCNC: 9.6 MG/DL (ref 8.6–10.3)
CHLORIDE SERPL-SCNC: 104 MMOL/L (ref 98–107)
CO2 SERPL-SCNC: 27 MMOL/L (ref 21–32)
CREAT SERPL-MCNC: 0.82 MG/DL (ref 0.5–1.05)
EGFRCR SERPLBLD CKD-EPI 2021: 76 ML/MIN/1.73M*2
ERYTHROCYTE [DISTWIDTH] IN BLOOD BY AUTOMATED COUNT: 13.3 % (ref 11.5–14.5)
GLUCOSE BLD MANUAL STRIP-MCNC: 118 MG/DL (ref 74–99)
GLUCOSE BLD MANUAL STRIP-MCNC: 130 MG/DL (ref 74–99)
GLUCOSE BLD MANUAL STRIP-MCNC: 167 MG/DL (ref 74–99)
GLUCOSE BLD MANUAL STRIP-MCNC: 185 MG/DL (ref 74–99)
GLUCOSE SERPL-MCNC: 111 MG/DL (ref 74–99)
HCT VFR BLD AUTO: 38.2 % (ref 36–46)
HGB BLD-MCNC: 12.5 G/DL (ref 12–16)
INR PPP: 2.5 (ref 0.9–1.1)
MAGNESIUM SERPL-MCNC: 1.42 MG/DL (ref 1.6–2.4)
MCH RBC QN AUTO: 32.3 PG (ref 26–34)
MCHC RBC AUTO-ENTMCNC: 32.7 G/DL (ref 32–36)
MCV RBC AUTO: 99 FL (ref 80–100)
NRBC BLD-RTO: 0 /100 WBCS (ref 0–0)
PLATELET # BLD AUTO: 253 X10*3/UL (ref 150–450)
POTASSIUM SERPL-SCNC: 3.2 MMOL/L (ref 3.5–5.3)
PROTHROMBIN TIME: 28.3 SECONDS (ref 9.8–12.8)
RBC # BLD AUTO: 3.87 X10*6/UL (ref 4–5.2)
SODIUM SERPL-SCNC: 139 MMOL/L (ref 136–145)
WBC # BLD AUTO: 9.1 X10*3/UL (ref 4.4–11.3)

## 2024-11-08 PROCEDURE — 83735 ASSAY OF MAGNESIUM: CPT | Performed by: NURSE PRACTITIONER

## 2024-11-08 PROCEDURE — 2500000001 HC RX 250 WO HCPCS SELF ADMINISTERED DRUGS (ALT 637 FOR MEDICARE OP): Performed by: NURSE PRACTITIONER

## 2024-11-08 PROCEDURE — 2500000002 HC RX 250 W HCPCS SELF ADMINISTERED DRUGS (ALT 637 FOR MEDICARE OP, ALT 636 FOR OP/ED): Performed by: NURSE PRACTITIONER

## 2024-11-08 PROCEDURE — 2500000004 HC RX 250 GENERAL PHARMACY W/ HCPCS (ALT 636 FOR OP/ED): Performed by: NURSE PRACTITIONER

## 2024-11-08 PROCEDURE — 1200000002 HC GENERAL ROOM WITH TELEMETRY DAILY

## 2024-11-08 PROCEDURE — 36415 COLL VENOUS BLD VENIPUNCTURE: CPT | Performed by: NURSE PRACTITIONER

## 2024-11-08 PROCEDURE — 82947 ASSAY GLUCOSE BLOOD QUANT: CPT

## 2024-11-08 PROCEDURE — 97110 THERAPEUTIC EXERCISES: CPT | Mod: GP,CQ

## 2024-11-08 PROCEDURE — 85610 PROTHROMBIN TIME: CPT | Performed by: NURSE PRACTITIONER

## 2024-11-08 PROCEDURE — 2500000004 HC RX 250 GENERAL PHARMACY W/ HCPCS (ALT 636 FOR OP/ED)

## 2024-11-08 PROCEDURE — 87493 C DIFF AMPLIFIED PROBE: CPT | Mod: STJLAB | Performed by: NURSE PRACTITIONER

## 2024-11-08 PROCEDURE — 85027 COMPLETE CBC AUTOMATED: CPT | Performed by: NURSE PRACTITIONER

## 2024-11-08 PROCEDURE — 80048 BASIC METABOLIC PNL TOTAL CA: CPT | Performed by: NURSE PRACTITIONER

## 2024-11-08 PROCEDURE — 97530 THERAPEUTIC ACTIVITIES: CPT | Mod: GP,CQ

## 2024-11-08 RX ORDER — DILTIAZEM HYDROCHLORIDE 60 MG/1
60 TABLET, FILM COATED ORAL EVERY 8 HOURS
Status: DISCONTINUED | OUTPATIENT
Start: 2024-11-08 | End: 2024-11-18 | Stop reason: HOSPADM

## 2024-11-08 RX ORDER — HYDRALAZINE HYDROCHLORIDE 25 MG/1
25 TABLET, FILM COATED ORAL EVERY 8 HOURS
Status: DISCONTINUED | OUTPATIENT
Start: 2024-11-08 | End: 2024-11-08

## 2024-11-08 RX ORDER — HYDRALAZINE HYDROCHLORIDE 25 MG/1
25 TABLET, FILM COATED ORAL EVERY 8 HOURS PRN
Start: 2024-11-08 | End: 2024-12-08

## 2024-11-08 RX ORDER — HYDRALAZINE HYDROCHLORIDE 25 MG/1
25 TABLET, FILM COATED ORAL EVERY 8 HOURS PRN
Status: DISCONTINUED | OUTPATIENT
Start: 2024-11-08 | End: 2024-11-18 | Stop reason: HOSPADM

## 2024-11-08 RX ORDER — LABETALOL HYDROCHLORIDE 5 MG/ML
10 INJECTION, SOLUTION INTRAVENOUS ONCE
Status: COMPLETED | OUTPATIENT
Start: 2024-11-08 | End: 2024-11-08

## 2024-11-08 RX ORDER — MAGNESIUM SULFATE HEPTAHYDRATE 40 MG/ML
2 INJECTION, SOLUTION INTRAVENOUS ONCE
Status: COMPLETED | OUTPATIENT
Start: 2024-11-08 | End: 2024-11-08

## 2024-11-08 RX ORDER — POTASSIUM CHLORIDE 20 MEQ/1
40 TABLET, EXTENDED RELEASE ORAL ONCE
Status: COMPLETED | OUTPATIENT
Start: 2024-11-08 | End: 2024-11-08

## 2024-11-08 RX ORDER — FUROSEMIDE 20 MG/1
20 TABLET ORAL DAILY PRN
Status: DISCONTINUED | OUTPATIENT
Start: 2024-11-08 | End: 2024-11-18 | Stop reason: HOSPADM

## 2024-11-08 RX ORDER — HYDRALAZINE HYDROCHLORIDE 25 MG/1
25 TABLET, FILM COATED ORAL EVERY 8 HOURS
Start: 2024-11-08 | End: 2024-12-08

## 2024-11-08 RX ORDER — BUTYROSPERMUM PARKII(SHEA BUTTER), SIMMONDSIA CHINENSIS (JOJOBA) SEED OIL, ALOE BARBADENSIS LEAF EXTRACT .01; 1; 3.5 G/100G; G/100G; G/100G
250 LIQUID TOPICAL 2 TIMES DAILY
Status: DISCONTINUED | OUTPATIENT
Start: 2024-11-08 | End: 2024-11-08

## 2024-11-08 RX ADMIN — BUPROPION HYDROCHLORIDE 300 MG: 150 TABLET, EXTENDED RELEASE ORAL at 09:21

## 2024-11-08 RX ADMIN — METOPROLOL TARTRATE 100 MG: 100 TABLET, FILM COATED ORAL at 09:22

## 2024-11-08 RX ADMIN — DILTIAZEM HYDROCHLORIDE 60 MG: 60 TABLET, FILM COATED ORAL at 20:22

## 2024-11-08 RX ADMIN — LABETALOL HYDROCHLORIDE 10 MG: 5 INJECTION, SOLUTION INTRAVENOUS at 00:56

## 2024-11-08 RX ADMIN — HYDRALAZINE HYDROCHLORIDE 25 MG: 25 TABLET ORAL at 12:32

## 2024-11-08 RX ADMIN — FUROSEMIDE 20 MG: 20 TABLET ORAL at 12:56

## 2024-11-08 RX ADMIN — ASPIRIN 81 MG: 81 TABLET, COATED ORAL at 09:22

## 2024-11-08 RX ADMIN — INSULIN LISPRO 2 UNITS: 100 INJECTION, SOLUTION INTRAVENOUS; SUBCUTANEOUS at 12:56

## 2024-11-08 RX ADMIN — ATORVASTATIN CALCIUM 80 MG: 80 TABLET, FILM COATED ORAL at 20:22

## 2024-11-08 RX ADMIN — LISINOPRIL 20 MG: 20 TABLET ORAL at 09:22

## 2024-11-08 RX ADMIN — MAGNESIUM SULFATE HEPTAHYDRATE 2 G: 40 INJECTION, SOLUTION INTRAVENOUS at 09:29

## 2024-11-08 RX ADMIN — DILTIAZEM HYDROCHLORIDE 60 MG: 60 TABLET, FILM COATED ORAL at 12:56

## 2024-11-08 RX ADMIN — METOPROLOL TARTRATE 100 MG: 100 TABLET, FILM COATED ORAL at 20:22

## 2024-11-08 RX ADMIN — AMOXICILLIN AND CLAVULANATE POTASSIUM 1 TABLET: 875; 125 TABLET, FILM COATED ORAL at 20:22

## 2024-11-08 RX ADMIN — AZATHIOPRINE 100 MG: 50 TABLET ORAL at 09:22

## 2024-11-08 RX ADMIN — POTASSIUM CHLORIDE 40 MEQ: 1500 TABLET, EXTENDED RELEASE ORAL at 09:21

## 2024-11-08 RX ADMIN — WARFARIN SODIUM 2 MG: 2 TABLET ORAL at 18:20

## 2024-11-08 RX ADMIN — CHOLECALCIFEROL TAB 125 MCG (5000 UNIT) 5000 UNITS: 125 TAB at 09:22

## 2024-11-08 RX ADMIN — LEVOTHYROXINE SODIUM 100 MCG: 0.1 TABLET ORAL at 09:22

## 2024-11-08 RX ADMIN — AMOXICILLIN AND CLAVULANATE POTASSIUM 1 TABLET: 875; 125 TABLET, FILM COATED ORAL at 09:22

## 2024-11-08 ASSESSMENT — COGNITIVE AND FUNCTIONAL STATUS - GENERAL
HELP NEEDED FOR BATHING: A LITTLE
CLIMB 3 TO 5 STEPS WITH RAILING: A LOT
DRESSING REGULAR UPPER BODY CLOTHING: A LITTLE
DAILY ACTIVITIY SCORE: 18
EATING MEALS: A LITTLE
PERSONAL GROOMING: A LITTLE
EATING MEALS: A LITTLE
STANDING UP FROM CHAIR USING ARMS: A LITTLE
MOBILITY SCORE: 17
TOILETING: A LITTLE
STANDING UP FROM CHAIR USING ARMS: A LITTLE
MOVING TO AND FROM BED TO CHAIR: A LITTLE
MOVING TO AND FROM BED TO CHAIR: A LITTLE
DRESSING REGULAR UPPER BODY CLOTHING: A LITTLE
MOVING FROM LYING ON BACK TO SITTING ON SIDE OF FLAT BED WITH BEDRAILS: A LITTLE
TURNING FROM BACK TO SIDE WHILE IN FLAT BAD: A LITTLE
TURNING FROM BACK TO SIDE WHILE IN FLAT BAD: A LITTLE
DRESSING REGULAR LOWER BODY CLOTHING: A LITTLE
DRESSING REGULAR LOWER BODY CLOTHING: A LITTLE
WALKING IN HOSPITAL ROOM: A LITTLE
TOILETING: A LITTLE
MOVING TO AND FROM BED TO CHAIR: A LITTLE
MOBILITY SCORE: 16
MOVING FROM LYING ON BACK TO SITTING ON SIDE OF FLAT BED WITH BEDRAILS: A LITTLE
TURNING FROM BACK TO SIDE WHILE IN FLAT BAD: A LITTLE
DAILY ACTIVITIY SCORE: 18
WALKING IN HOSPITAL ROOM: A LITTLE
MOBILITY SCORE: 17
STANDING UP FROM CHAIR USING ARMS: A LITTLE
HELP NEEDED FOR BATHING: A LITTLE
WALKING IN HOSPITAL ROOM: A LOT
MOVING FROM LYING ON BACK TO SITTING ON SIDE OF FLAT BED WITH BEDRAILS: A LITTLE
CLIMB 3 TO 5 STEPS WITH RAILING: A LOT
PERSONAL GROOMING: A LITTLE
CLIMB 3 TO 5 STEPS WITH RAILING: A LOT

## 2024-11-08 ASSESSMENT — PAIN - FUNCTIONAL ASSESSMENT
PAIN_FUNCTIONAL_ASSESSMENT: 0-10
PAIN_FUNCTIONAL_ASSESSMENT: 0-10

## 2024-11-08 ASSESSMENT — PAIN SCALES - GENERAL
PAINLEVEL_OUTOF10: 0 - NO PAIN
PAINLEVEL_OUTOF10: 0 - NO PAIN

## 2024-11-08 ASSESSMENT — ACTIVITIES OF DAILY LIVING (ADL): EFFECT OF PAIN ON DAILY ACTIVITIES: .

## 2024-11-08 NOTE — CARE PLAN
The patient's goals for the shift include      The clinical goals for the shift include Patients BP will remain below 150 systolic through end of shift.      Problem: Skin  Goal: Participates in plan/prevention/treatment measures  Outcome: Progressing  Flowsheets (Taken 11/7/2024 2204)  Participates in plan/prevention/treatment measures:   Discuss with provider PT/OT consult   Elevate heels   Increase activity/out of bed for meals     Problem: Fall/Injury  Goal: Be free from injury by end of the shift  Outcome: Progressing

## 2024-11-08 NOTE — PROGRESS NOTES
Yen Montoya  11/07/24  [id number]  Kelsey Plummer MD  This is a patient with a past medical history as detailed below admitted to the [facility] ED with chief complaint of 74 y.o. female presenting to [facility] on 11/3/2024 with pertinent medical history of atrial fibrillation on Coumadin, sick sinus syndrome status post pacemaker insertion, Former tobacco use, RA, obesity, systolic heart failure (3/2024 Echo: EF 40-45%), adrenal mass, CAD status post 8 PCI, HTN, HLD, PVD, hypothyroidism, peripheral neuropathy, anxiety, SAMAN on CPAP, type 2 diabetes, pulmonary embolism, DVT RLE and nephrolithiasis. Presented to an outside emergency room for complaint of nausea and vomiting x 4 days with right lower quadrant pain and dysuria. In addition, the patient complaints of intermittent fevers x1 day, dizziness, chest pain, and shortness of breath.     Seen and examined has no chest pain no shortness of breath no abdominal dizziness has received feels weak has no energy..    Assessment and plan   1-hydronephrosis UTI admitted to the hospital pain control CT abdomen pelvis monitor electrolytes CBC lactic acid consulted urology taken to the OR for stent use placement   On ceftriaxone   - monitor for antibiotics side effects I   -Repeat blood culture no growth  2-SABRINA hydrate follow-up CMP monitor urine output  3-hypomagnesemia replace  4-  history of coronary artery disease currently has no chest pain   5-hyperlipidemia hypertension resume home medication  6- anxiety. C/s psy  Discussed with the  consultants.      Review of other systems negative other than HPI  Past medical history    Epic& consultants notes reviewed  Most recent images Reviewed  Last EKG/Rhythm reviewed      Vital signs has been reviewed     SKIN:  No rashes .   ENT mucosa,  Normal/nose normal   NECK: no jugulovenous distention  NO lymphadenopathy   LUNGS:No wheezing,no ronchi  no rales.  CARDIAC:  S1 and S2  ABDOMEN: Abdomen soft, mild-tender.    EXTREMITIES:  Extremities normal.   NEURO: non focal    PULSES: + pedal / radial   No edema  No goiter   No carotid bruits.                   Past Medical History  She has a past medical history of Acute embolism and thrombosis of right popliteal vein (Multi) (2020), Anxiety, Atrial fib/flutter, transient (Multi), CAD (coronary artery disease), Calculus of ureter (2020), Chronic venous hypertension (idiopathic) with inflammation of left lower extremity, Chronic venous hypertension (idiopathic) with inflammation of right lower extremity, Chronic venous hypertension (idiopathic) with ulcer of unspecified lower extremity (CODE), Complete heart block, Hypertension, Hypothyroidism, Long term (current) use of anticoagulants (2022), SAMAN (obstructive sleep apnea), Osteoporosis, Other bursitis of hip, right hip, Other mechanical complication of other internal joint prosthesis, initial encounter (CMS-HCC) (2020), Other postprocedural complications and disorders of the circulatory system, not elsewhere classified (10/08/2020), Other specified joint disorders, right shoulder, Personal history of diseases of the blood and blood-forming organs and certain disorders involving the immune mechanism, Pulmonary embolism, PVD (peripheral vascular disease) (CMS-HCC), RA (rheumatoid arthritis), Renal calculi, Sick sinus syndrome (Multi), Toxic effect of unspecified metal, accidental (unintentional), initial encounter (2020), Unspecified fracture of lower end of unspecified femur, initial encounter for closed fracture (Multi) (2021), and UTI (urinary tract infection).    Surgical History  She has a past surgical history that includes Other surgical history (2021); XR chest pacemaker w fluoro (10/17/2021); Cardiac catheterization; Cholecystectomy;  section, low transverse; Tonsillectomy; Total hip arthroplasty (Bilateral); Total shoulder arthroplasty (Left); Total knee arthroplasty (Bilateral);  Colonoscopy; pacemaker placement; Femur fracture surgery; Wrist surgery; Cardiac electrophysiology mapping and ablation; and Coronary stent placement.     Social History  She reports that she quit smoking about 36 years ago. Her smoking use included cigarettes. She has never used smokeless tobacco. She reports current alcohol use. She reports that she does not use drugs.    Family History  Family History   Problem Relation Name Age of Onset    Other (ptca) Mother      Heart failure Father      Coronary artery disease Father      Breast cancer Sister      Breast cancer Sister          Allergies  Adhesive tape-silicones, Latex, and Valdecoxib          Last Recorded Vitals  /70 (BP Location: Left arm, Patient Position: Lying)   Pulse 70   Temp 35 °C (95 °F) (Temporal)   Resp 16   Wt 104 kg (228 lb 13.4 oz)   SpO2 96%          Kelsey Plummer MD

## 2024-11-08 NOTE — PROGRESS NOTES
Physical Therapy    Physical Therapy    Physical Therapy Treatment    Patient Name: Yen Montoya  MRN: [id number]  Today's Date: 11/8/2024  Time Calculation  Start Time: 1008  Stop Time: 1032  Time Calculation (min): 24 min     3115/3115-A     11/08/24 1008   PT  Visit   PT Received On 11/08/24   Response to Previous Treatment Patient with no complaints from previous session.   General   Reason for Referral ADL   Referred By Enoch   Past Medical History Relevant to Rehab Includes: Past Medical History  She has a past medical history of Acute embolism and thrombosis of right popliteal vein (Multi) (04/24/2020), Anxiety, Atrial fib/flutter, transient (Multi), CAD (coronary artery disease), Calculus of ureter (04/29/2020), Chronic venous hypertension (idiopathic) with inflammation of left lower extremity, Chronic venous hypertension (idiopathic) with inflammation of right lower extremity, Chronic venous hypertension (idiopathic) with ulcer of unspecified lower extremity (CODE), Complete heart block, Hypertension, Hypothyroidism, Long term (current) use of anticoagulants (01/20/2022), SAMAN (obstructive sleep apnea), Osteoporosis, Other bursitis of hip, right hip, Other mechanical complication of other internal joint prosthesis, initial encounter (CMS-HCC) (05/22/2020), Other postprocedural complications and disorders of the circulatory system, not elsewhere classified (10/08/2020), Other specified joint disorders, right shoulder, Personal history of diseases of the blood and blood-forming organs and certain disorders involving the immune mechanism, Pulmonary embolism, PVD (peripheral vascular disease) (CMS-HCC), RA (rheumatoid arthritis), Renal calculi, Sick sinus syndrome (Multi), Toxic effect of unspecified metal, accidental (unintentional), initial encounter (12/11/2020), Unspecified fracture of lower end of unspecified femur, initial encounter for closed fracture (Multi) (11/05/2021), and UTI (urinary tract  "infection).     Surgical History  She has a past surgical history that includes Other surgical history (2021); XR chest pacemaker w fluoro (10/17/2021); Cardiac catheterization; Cholecystectomy;  section, low transverse; Tonsillectomy; Total hip arthroplasty (Bilateral); Total shoulder arthroplasty (Left); Total knee arthroplasty (Bilateral); Colonoscopy; pacemaker placement; Femur fracture surgery; Wrist surgery; Cardiac electrophysiology mapping and ablation; and Coronary stent placement.   Missed Visit Yes   Prior to Session Communication Bedside nurse   Patient Position Received Up in chair;Alarm on   General Comment Pt agreeable to therapy and cleared for participation   Precautions   Medical Precautions Fall precautions   Pain Assessment   Pain Assessment 0-10   0-10 (Numeric) Pain Score 0 - No pain   Effect of Pain on Daily Activities .   Cognition   Overall Cognitive Status WFL   Orientation Level Disoriented to time   Therapeutic Exercise   Therapeutic Exercise Performed Yes   Therapeutic Exercise Activity 1 AP, LAQ, marching, abd, add, standing marching 2x10   Therapeutic Activity   Therapeutic Activity Performed Yes   Therapeutic Activity 1 Multiple STS for increased strength and stability with functional transfers. Eugenia with min cues for sequencng, good follow through.   Balance/Neuromuscular Re-Education   Balance/Neuromuscular Re-Education Activity Performed Yes   Balance/Neuromuscular Re-Education Activity 1 static and dynamic balance activities using B UE support, CGA/Eugenia d/t mildly unsteady and \"woozy\" Chair behind pt for safety.   Ambulation/Gait Training   Ambulation/Gait Training Performed No   Ambulation/Gait Training 1   Comments/Distance (ft) 1 Unable to ambulate d/t  feeling \"dizzy and woozy throughout\"   Transfers   Transfer Yes   Transfer 1   Transfer From 1 Chair with arms to   Transfer to 1 Stand;Sit   Technique 1 Stand to sit;Sit to stand   Transfer Device 1 Walker;Gait " belt   Transfer Level of Assistance 1 Minimum assistance;Minimal verbal cues   Activity Tolerance   Endurance Tolerates 10 - 20 min exercise with multiple rests   PT Assessment   PT Assessment Results Decreased strength;Decreased endurance;Impaired balance;Decreased mobility   Rehab Prognosis Good   End of Session Communication Bedside nurse   Assessment Comment Pt puts forth good effort, however, is limited by decreased activity tolerance and dizziness this session. Pt fatigues quickly requiring multiple seated rest breaks to complete activities. Increased time and effort for all activities.   End of Session Patient Position Up in chair;Alarm on     Outcome Measures:  [facility] Basic Mobility  Turning from your back to your side while in a flat bed without using bedrails: A little  Moving from lying on your back to sitting on the side of a flat bed without using bedrails: A little  Moving to and from bed to chair (including a wheelchair): A little  Standing up from a chair using your arms (e.g. wheelchair or bedside chair): A little  To walk in hospital room: A lot  Climbing 3-5 steps with railing: A lot  Basic Mobility - Total Score: 16                             EDUCATION:  Outpatient Education  Individual(s) Educated: Patient  Education Provided: Fall Risk  Education Documentation  Body Mechanics, taught by Mary Clements PTA at 11/8/2024 10:42 AM.  Learner: Patient  Readiness: Acceptance  Method: Explanation, Demonstration  Response: Verbalizes Understanding, Demonstrated Understanding, Needs Reinforcement    Precautions, taught by Mary Clements PTA at 11/8/2024 10:42 AM.  Learner: Patient  Readiness: Acceptance  Method: Explanation, Demonstration  Response: Verbalizes Understanding, Demonstrated Understanding, Needs Reinforcement    ADL Training, taught by Mary Clements PTA at 11/8/2024 10:42 AM.  Learner: Patient  Readiness: Acceptance  Method: Explanation, Demonstration  Response: Verbalizes Understanding,  Demonstrated Understanding, Needs Reinforcement    Body Mechanics, taught by Mary Clements PTA at 11/8/2024 10:42 AM.  Learner: Patient  Readiness: Acceptance  Method: Explanation, Demonstration  Response: Verbalizes Understanding, Demonstrated Understanding, Needs Reinforcement    Home Exercise Program, taught by Mary Clements PTA at 11/8/2024 10:42 AM.  Learner: Patient  Readiness: Acceptance  Method: Explanation, Demonstration  Response: Verbalizes Understanding, Demonstrated Understanding, Needs Reinforcement    Mobility Training, taught by Mary Clements PTA at 11/8/2024 10:42 AM.  Learner: Patient  Readiness: Acceptance  Method: Explanation, Demonstration  Response: Verbalizes Understanding, Demonstrated Understanding, Needs Reinforcement    Body Mechanics, taught by Mary Clements PTA at 11/7/2024  1:01 PM.  Learner: Patient  Readiness: Acceptance  Method: Explanation  Response: Verbalizes Understanding, Demonstrated Understanding, Needs Reinforcement    Precautions, taught by Mary Clements PTA at 11/7/2024  1:01 PM.  Learner: Patient  Readiness: Acceptance  Method: Explanation  Response: Verbalizes Understanding, Demonstrated Understanding, Needs Reinforcement    ADL Training, taught by Mary Clements PTA at 11/7/2024  1:01 PM.  Learner: Patient  Readiness: Acceptance  Method: Explanation  Response: Verbalizes Understanding, Demonstrated Understanding, Needs Reinforcement    Body Mechanics, taught by Mary Clements PTA at 11/7/2024  1:01 PM.  Learner: Patient  Readiness: Acceptance  Method: Explanation  Response: Verbalizes Understanding, Demonstrated Understanding, Needs Reinforcement    Home Exercise Program, taught by Mary Clements PTA at 11/7/2024  1:01 PM.  Learner: Patient  Readiness: Acceptance  Method: Explanation  Response: Verbalizes Understanding, Demonstrated Understanding, Needs Reinforcement    Mobility Training, taught by Mary Clements PTA at 11/7/2024  1:01 PM.  Learner:  Patient  Readiness: Acceptance  Method: Explanation  Response: Verbalizes Understanding, Demonstrated Understanding, Needs Reinforcement    Education Comments  No comments found.        GOALS:  Encounter Problems       Encounter Problems (Active)       PT Problem       Pt will be able to perform all bed mobility tasks with Mod I.  (Progressing)       Start:  11/04/24    Expected End:  11/18/24            Pt will perform all transfers with Mod I and FWW with proper safety mechanics.   (Progressing)       Start:  11/04/24    Expected End:  11/18/24            Pt will ambulate 100 ft with Mod I using FWW for improved functional independence.  (Progressing)       Start:  11/04/24    Expected End:  11/18/24            Pt will be able to negotiate 4 steps with 1 HR with SBA.  (Progressing)       Start:  11/04/24    Expected End:  11/18/24            Pt will be able to ambulate at least 100 ft with < or equal to 1 rest break while maintaining SpO2 > 90%.  (Progressing)       Start:  11/04/24    Expected End:  11/18/24               Pain - Adult

## 2024-11-08 NOTE — CARE PLAN
The clinical goals for the shift include remain free from pain this shfit      Problem: Skin  Goal: Participates in plan/prevention/treatment measures  Outcome: Progressing  Flowsheets (Taken 11/8/2024 1455)  Participates in plan/prevention/treatment measures:   Discuss with provider PT/OT consult   Elevate heels   Increase activity/out of bed for meals  Goal: Prevent/manage excess moisture  Outcome: Progressing  Flowsheets (Taken 11/8/2024 1455)  Prevent/manage excess moisture:   Cleanse incontinence/protect with barrier cream   Moisturize dry skin   Follow provider orders for dressing changes   Monitor for/manage infection if present  Goal: Prevent/minimize sheer/friction injuries  Outcome: Progressing  Flowsheets (Taken 11/8/2024 1455)  Prevent/minimize sheer/friction injuries:   Complete micro-shifts as needed if patient unable. Adjust patient position to relieve pressure points, not a full turn   Increase activity/out of bed for meals   Use pull sheet   HOB 30 degrees or less   Turn/reposition every 2 hours/use positioning/transfer devices  Goal: Promote/optimize nutrition  Outcome: Progressing  Flowsheets (Taken 11/8/2024 1455)  Promote/optimize nutrition:   Assist with feeding   Monitor/record intake including meals   Consume > 50% meals/supplements   Offer water/supplements/favorite foods   Discuss with provider if NPO > 2 days   Reassess MST if dietician not consulted  Goal: Promote skin healing  Outcome: Progressing  Flowsheets (Taken 11/8/2024 1455)  Promote skin healing:   Assess skin/pad under line(s)/device(s)   Protective dressings over bony prominences   Turn/reposition every 2 hours/use positioning/transfer devices     Problem: Fall/Injury  Goal: Not fall by end of shift  Outcome: Progressing  Goal: Be free from injury by end of the shift  Outcome: Progressing  Goal: Verbalize understanding of personal risk factors for fall in the hospital  Outcome: Progressing  Goal: Verbalize understanding of  risk factor reduction measures to prevent injury from fall in the home  Outcome: Progressing  Goal: Use assistive devices by end of the shift  Outcome: Progressing  Goal: Pace activities to prevent fatigue by end of the shift  Outcome: Progressing     Problem: Infection related to problem list condition  Goal: Infection will resolve through treatment  Outcome: Progressing  Flowsheets (Taken 11/8/2024 1455)  Infection will resolve through treatment:   Observe for and document signs and symptoms of infection   Obtain labs/cultures as ordered by provider   Nurse will administer medications as ordered by provider   Administer treatments as ordered by provider     Problem: Pain - Adult  Goal: Verbalizes/displays adequate comfort level or baseline comfort level  Outcome: Progressing  Flowsheets (Taken 11/8/2024 1455)  Verbalizes/displays adequate comfort level or baseline comfort level:   Encourage patient to monitor pain and request assistance   Assess pain using appropriate pain scale   Administer analgesics based on type and severity of pain and evaluate response   Implement non-pharmacological measures as appropriate and evaluate response   Consider cultural and social influences on pain and pain management   Notify Licensed Independent Practitioner if interventions unsuccessful or patient reports new pain     Problem: Safety - Adult  Goal: Free from fall injury  Outcome: Progressing  Flowsheets (Taken 11/8/2024 1455)  Free from fall injury:   Instruct family/caregiver on patient safety   Based on caregiver fall risk screen, instruct family/caregiver to ask for assistance with transferring infant if caregiver noted to have fall risk factors     Problem: Discharge Planning  Goal: Discharge to home or other facility with appropriate resources  Outcome: Progressing  Flowsheets (Taken 11/8/2024 1455)  Discharge to home or other facility with appropriate resources:   Identify barriers to discharge with patient and  caregiver   Arrange for needed discharge resources and transportation as appropriate   Identify discharge learning needs (meds, wound care, etc)   Arrange for interpreters to assist at discharge as needed   Refer to discharge planning if patient needs post-hospital services based on physician order or complex needs related to functional status, cognitive ability or social support system     Problem: Chronic Conditions and Co-morbidities  Goal: Patient's chronic conditions and co-morbidity symptoms are monitored and maintained or improved  Outcome: Progressing  Flowsheets (Taken 11/8/2024 0069)  Care Plan - Patient's Chronic Conditions and Co-Morbidity Symptoms are Monitored and Maintained or Improved:   Monitor and assess patient's chronic conditions and comorbid symptoms for stability, deterioration, or improvement   Update acute care plan with appropriate goals if chronic or comorbid symptoms are exacerbated and prevent overall improvement and discharge   Collaborate with multidisciplinary team to address chronic and comorbid conditions and prevent exacerbation or deterioration

## 2024-11-08 NOTE — PROGRESS NOTES
11/08/24 1036   Discharge Planning   Home or Post Acute Services Post acute facilities (Rehab/SNF/etc)   Type of Post Acute Facility Services Rehab   Expected Discharge Disposition Rehab     Message sent to [facility] ivone CELESITN to inquire on updates regarding P2P. Awaiting response.    1115: Received message from [facility] CELESTIN that P2P was denied and they will start the appeal.

## 2024-11-09 LAB
ANION GAP SERPL CALC-SCNC: 10 MMOL/L (ref 10–20)
BACTERIA BLD CULT: NORMAL
BACTERIA BLD CULT: NORMAL
BUN SERPL-MCNC: 20 MG/DL (ref 6–23)
C DIF TOX TCDA+TCDB STL QL NAA+PROBE: NOT DETECTED
CALCIUM SERPL-MCNC: 9.7 MG/DL (ref 8.6–10.3)
CHLORIDE SERPL-SCNC: 103 MMOL/L (ref 98–107)
CO2 SERPL-SCNC: 28 MMOL/L (ref 21–32)
CREAT SERPL-MCNC: 0.92 MG/DL (ref 0.5–1.05)
EGFRCR SERPLBLD CKD-EPI 2021: 66 ML/MIN/1.73M*2
ERYTHROCYTE [DISTWIDTH] IN BLOOD BY AUTOMATED COUNT: 13.6 % (ref 11.5–14.5)
GLUCOSE BLD MANUAL STRIP-MCNC: 137 MG/DL (ref 74–99)
GLUCOSE BLD MANUAL STRIP-MCNC: 158 MG/DL (ref 74–99)
GLUCOSE BLD MANUAL STRIP-MCNC: 222 MG/DL (ref 74–99)
GLUCOSE BLD MANUAL STRIP-MCNC: 76 MG/DL (ref 74–99)
GLUCOSE SERPL-MCNC: 118 MG/DL (ref 74–99)
HCT VFR BLD AUTO: 38.2 % (ref 36–46)
HGB BLD-MCNC: 12.4 G/DL (ref 12–16)
INR PPP: 2.5 (ref 0.9–1.1)
MAGNESIUM SERPL-MCNC: 1.63 MG/DL (ref 1.6–2.4)
MCH RBC QN AUTO: 32.5 PG (ref 26–34)
MCHC RBC AUTO-ENTMCNC: 32.5 G/DL (ref 32–36)
MCV RBC AUTO: 100 FL (ref 80–100)
NRBC BLD-RTO: 0 /100 WBCS (ref 0–0)
PLATELET # BLD AUTO: 280 X10*3/UL (ref 150–450)
POTASSIUM SERPL-SCNC: 3.3 MMOL/L (ref 3.5–5.3)
PROTHROMBIN TIME: 28.7 SECONDS (ref 9.8–12.8)
RBC # BLD AUTO: 3.81 X10*6/UL (ref 4–5.2)
SODIUM SERPL-SCNC: 138 MMOL/L (ref 136–145)
WBC # BLD AUTO: 8.7 X10*3/UL (ref 4.4–11.3)

## 2024-11-09 PROCEDURE — 2500000004 HC RX 250 GENERAL PHARMACY W/ HCPCS (ALT 636 FOR OP/ED): Performed by: NURSE PRACTITIONER

## 2024-11-09 PROCEDURE — 80048 BASIC METABOLIC PNL TOTAL CA: CPT | Performed by: NURSE PRACTITIONER

## 2024-11-09 PROCEDURE — 2500000002 HC RX 250 W HCPCS SELF ADMINISTERED DRUGS (ALT 637 FOR MEDICARE OP, ALT 636 FOR OP/ED): Performed by: NURSE PRACTITIONER

## 2024-11-09 PROCEDURE — 83735 ASSAY OF MAGNESIUM: CPT | Performed by: NURSE PRACTITIONER

## 2024-11-09 PROCEDURE — 2500000001 HC RX 250 WO HCPCS SELF ADMINISTERED DRUGS (ALT 637 FOR MEDICARE OP): Performed by: NURSE PRACTITIONER

## 2024-11-09 PROCEDURE — 85027 COMPLETE CBC AUTOMATED: CPT | Performed by: NURSE PRACTITIONER

## 2024-11-09 PROCEDURE — 82947 ASSAY GLUCOSE BLOOD QUANT: CPT

## 2024-11-09 PROCEDURE — 36415 COLL VENOUS BLD VENIPUNCTURE: CPT | Performed by: NURSE PRACTITIONER

## 2024-11-09 PROCEDURE — 1200000002 HC GENERAL ROOM WITH TELEMETRY DAILY

## 2024-11-09 PROCEDURE — 85610 PROTHROMBIN TIME: CPT | Performed by: NURSE PRACTITIONER

## 2024-11-09 RX ORDER — LANOLIN ALCOHOL/MO/W.PET/CERES
400 CREAM (GRAM) TOPICAL ONCE
Status: COMPLETED | OUTPATIENT
Start: 2024-11-09 | End: 2024-11-09

## 2024-11-09 RX ORDER — POTASSIUM CHLORIDE 20 MEQ/1
40 TABLET, EXTENDED RELEASE ORAL ONCE
Status: COMPLETED | OUTPATIENT
Start: 2024-11-09 | End: 2024-11-09

## 2024-11-09 RX ADMIN — DILTIAZEM HYDROCHLORIDE 60 MG: 60 TABLET, FILM COATED ORAL at 13:44

## 2024-11-09 RX ADMIN — LISINOPRIL 20 MG: 20 TABLET ORAL at 09:16

## 2024-11-09 RX ADMIN — WARFARIN SODIUM 2 MG: 2 TABLET ORAL at 17:55

## 2024-11-09 RX ADMIN — POTASSIUM CHLORIDE 40 MEQ: 1500 TABLET, EXTENDED RELEASE ORAL at 13:44

## 2024-11-09 RX ADMIN — DILTIAZEM HYDROCHLORIDE 60 MG: 60 TABLET, FILM COATED ORAL at 22:28

## 2024-11-09 RX ADMIN — ASPIRIN 81 MG: 81 TABLET, COATED ORAL at 09:16

## 2024-11-09 RX ADMIN — DILTIAZEM HYDROCHLORIDE 60 MG: 60 TABLET, FILM COATED ORAL at 05:39

## 2024-11-09 RX ADMIN — AZATHIOPRINE 100 MG: 50 TABLET ORAL at 09:15

## 2024-11-09 RX ADMIN — Medication 1 CAPSULE: at 09:15

## 2024-11-09 RX ADMIN — ATORVASTATIN CALCIUM 80 MG: 80 TABLET, FILM COATED ORAL at 20:37

## 2024-11-09 RX ADMIN — INSULIN LISPRO 4 UNITS: 100 INJECTION, SOLUTION INTRAVENOUS; SUBCUTANEOUS at 13:46

## 2024-11-09 RX ADMIN — AMOXICILLIN AND CLAVULANATE POTASSIUM 1 TABLET: 875; 125 TABLET, FILM COATED ORAL at 20:36

## 2024-11-09 RX ADMIN — METOPROLOL TARTRATE 100 MG: 100 TABLET, FILM COATED ORAL at 20:37

## 2024-11-09 RX ADMIN — BUPROPION HYDROCHLORIDE 300 MG: 150 TABLET, EXTENDED RELEASE ORAL at 09:16

## 2024-11-09 RX ADMIN — ACETAMINOPHEN 650 MG: 325 TABLET ORAL at 09:19

## 2024-11-09 RX ADMIN — METOPROLOL TARTRATE 100 MG: 100 TABLET, FILM COATED ORAL at 09:16

## 2024-11-09 RX ADMIN — LEVOTHYROXINE SODIUM 100 MCG: 0.1 TABLET ORAL at 06:21

## 2024-11-09 RX ADMIN — Medication 400 MG: at 13:44

## 2024-11-09 RX ADMIN — AMOXICILLIN AND CLAVULANATE POTASSIUM 1 TABLET: 875; 125 TABLET, FILM COATED ORAL at 09:16

## 2024-11-09 RX ADMIN — CHOLECALCIFEROL TAB 125 MCG (5000 UNIT) 5000 UNITS: 125 TAB at 09:15

## 2024-11-09 ASSESSMENT — COGNITIVE AND FUNCTIONAL STATUS - GENERAL
DAILY ACTIVITIY SCORE: 18
DRESSING REGULAR LOWER BODY CLOTHING: A LITTLE
MOBILITY SCORE: 17
DRESSING REGULAR UPPER BODY CLOTHING: A LITTLE
MOVING TO AND FROM BED TO CHAIR: A LITTLE
CLIMB 3 TO 5 STEPS WITH RAILING: A LOT
TURNING FROM BACK TO SIDE WHILE IN FLAT BAD: A LITTLE
EATING MEALS: A LITTLE
WALKING IN HOSPITAL ROOM: A LITTLE
MOVING FROM LYING ON BACK TO SITTING ON SIDE OF FLAT BED WITH BEDRAILS: A LITTLE
PERSONAL GROOMING: A LITTLE
TOILETING: A LITTLE
STANDING UP FROM CHAIR USING ARMS: A LITTLE
HELP NEEDED FOR BATHING: A LITTLE

## 2024-11-09 ASSESSMENT — PAIN SCALES - GENERAL
PAINLEVEL_OUTOF10: 4
PAINLEVEL_OUTOF10: 0 - NO PAIN

## 2024-11-09 ASSESSMENT — PAIN - FUNCTIONAL ASSESSMENT: PAIN_FUNCTIONAL_ASSESSMENT: 0-10

## 2024-11-09 ASSESSMENT — PAIN DESCRIPTION - LOCATION: LOCATION: HEAD

## 2024-11-09 NOTE — PROGRESS NOTES
Yen Montoya  11/08/24  [id number]  Kelsey Plummer MD  This is a patient with a past medical history as detailed below admitted to the [facility] ED with chief complaint of 74 y.o. female presenting to [facility] on 11/3/2024 with pertinent medical history of atrial fibrillation on Coumadin, sick sinus syndrome status post pacemaker insertion, Former tobacco use, RA, obesity, systolic heart failure (3/2024 Echo: EF 40-45%), adrenal mass, CAD status post 8 PCI, HTN, HLD, PVD, hypothyroidism, peripheral neuropathy, anxiety, SAMAN on CPAP, type 2 diabetes, pulmonary embolism, DVT RLE and nephrolithiasis. Presented to an outside emergency room for complaint of nausea and vomiting x 4 days with right lower quadrant pain and dysuria. In addition, the patient complaints of intermittent fevers x1 day, dizziness, chest pain, and shortness of breath.    Patient has improved she denies chest pain tightness pressure shortness of breath lightheadedness or dizziness feels fatigued has no energy wants to go to [place].      Assessment and plan   1-hydronephrosis admitted to the hospital pain control CT abdomen pelvis monitor electrolytes CBC lactic acid consulted urology status post stent placement pain subsided  2-SABRINA hydrate follow-up CMP monitor urine output  3-hypomagnesemia replace   4-urinary tract infection leukocytosis follow-up CBC follow-up culture started on IV ceftriaxone .  Antibiotics changed to Augmentin  5 history of coronary artery disease currently has no chest pain   6 hyperlipidemia hypertension resume home medication  7.  History of A-fib flutter follow-up PT/INR  Discussed with the  consultants.      Review of other systems negative other than HPI  Past medical history    Epic& consultants notes reviewed  Most recent images Reviewed  Last EKG/Rhythm reviewed      Vital signs has been reviewed  GENERAL: o x 3 in distress.   SKIN:  No rashes .   ENT mucosa,  Normal/nose normal   NECK: no jugulovenous distention  NO  lymphadenopathy   LUNGS:No wheezing,no ronchi  no rales.  CARDIAC:  S1 and S2  ABDOMEN: Abdomen soft, mild-tender.    EXTREMITIES: Extremities normal.   NEURO: non focal    PULSES: + pedal / radial   No edema                      Past Medical History  She has a past medical history of Acute embolism and thrombosis of right popliteal vein (Multi) (2020), Anxiety, Atrial fib/flutter, transient (Multi), CAD (coronary artery disease), Calculus of ureter (2020), Chronic venous hypertension (idiopathic) with inflammation of left lower extremity, Chronic venous hypertension (idiopathic) with inflammation of right lower extremity, Chronic venous hypertension (idiopathic) with ulcer of unspecified lower extremity (CODE), Complete heart block, Hypertension, Hypothyroidism, Long term (current) use of anticoagulants (2022), SAMAN (obstructive sleep apnea), Osteoporosis, Other bursitis of hip, right hip, Other mechanical complication of other internal joint prosthesis, initial encounter (CMS-HCC) (2020), Other postprocedural complications and disorders of the circulatory system, not elsewhere classified (10/08/2020), Other specified joint disorders, right shoulder, Personal history of diseases of the blood and blood-forming organs and certain disorders involving the immune mechanism, Pulmonary embolism, PVD (peripheral vascular disease) (CMS-HCC), RA (rheumatoid arthritis), Renal calculi, Sick sinus syndrome (Multi), Toxic effect of unspecified metal, accidental (unintentional), initial encounter (2020), Unspecified fracture of lower end of unspecified femur, initial encounter for closed fracture (Multi) (2021), and UTI (urinary tract infection).    Surgical History  She has a past surgical history that includes Other surgical history (2021); XR chest pacemaker w fluoro (10/17/2021); Cardiac catheterization; Cholecystectomy;  section, low transverse; Tonsillectomy; Total hip  arthroplasty (Bilateral); Total shoulder arthroplasty (Left); Total knee arthroplasty (Bilateral); Colonoscopy; pacemaker placement; Femur fracture surgery; Wrist surgery; Cardiac electrophysiology mapping and ablation; and Coronary stent placement.     Social History  She reports that she quit smoking about 36 years ago. Her smoking use included cigarettes. She has never used smokeless tobacco. She reports current alcohol use. She reports that she does not use drugs.    Family History  Family History   Problem Relation Name Age of Onset    Other (ptca) Mother      Heart failure Father      Coronary artery disease Father      Breast cancer Sister      Breast cancer Sister          Allergies  Adhesive tape-silicones, Latex, and Valdecoxib          Last Recorded Vitals  /61 (BP Location: Right arm, Patient Position: Lying)   Pulse 70   Temp 36.1 °C (97 °F) (Temporal)   Resp 18   Wt 104 kg (228 lb 13.4 oz)   SpO2 95%          Kelsey Plummer MD

## 2024-11-09 NOTE — CARE PLAN
The patient's goals for the shift include      The clinical goals for the shift include Patient will tolerate turns every two hours.

## 2024-11-10 ENCOUNTER — APPOINTMENT (OUTPATIENT)
Dept: CARDIOLOGY | Facility: HOSPITAL | Age: 74
DRG: 854 | End: 2024-11-10
Payer: COMMERCIAL

## 2024-11-10 VITALS
HEIGHT: 69 IN | HEART RATE: 70 BPM | OXYGEN SATURATION: 99 % | WEIGHT: 228.84 LBS | RESPIRATION RATE: 16 BRPM | TEMPERATURE: 96.1 F | DIASTOLIC BLOOD PRESSURE: 78 MMHG | SYSTOLIC BLOOD PRESSURE: 138 MMHG | BODY MASS INDEX: 33.89 KG/M2

## 2024-11-10 LAB
ANION GAP SERPL CALC-SCNC: 12 MMOL/L (ref 10–20)
BUN SERPL-MCNC: 16 MG/DL (ref 6–23)
CALCIUM SERPL-MCNC: 10.6 MG/DL (ref 8.6–10.3)
CARDIAC TROPONIN I PNL SERPL HS: 13 NG/L (ref 0–13)
CHLORIDE SERPL-SCNC: 104 MMOL/L (ref 98–107)
CO2 SERPL-SCNC: 27 MMOL/L (ref 21–32)
CREAT SERPL-MCNC: 1 MG/DL (ref 0.5–1.05)
EGFRCR SERPLBLD CKD-EPI 2021: 60 ML/MIN/1.73M*2
ERYTHROCYTE [DISTWIDTH] IN BLOOD BY AUTOMATED COUNT: 13.9 % (ref 11.5–14.5)
GLUCOSE BLD MANUAL STRIP-MCNC: 127 MG/DL (ref 74–99)
GLUCOSE BLD MANUAL STRIP-MCNC: 139 MG/DL (ref 74–99)
GLUCOSE BLD MANUAL STRIP-MCNC: 168 MG/DL (ref 74–99)
GLUCOSE BLD MANUAL STRIP-MCNC: 194 MG/DL (ref 74–99)
GLUCOSE SERPL-MCNC: 130 MG/DL (ref 74–99)
HCT VFR BLD AUTO: 42.2 % (ref 36–46)
HGB BLD-MCNC: 13.9 G/DL (ref 12–16)
INR PPP: 2.1 (ref 0.9–1.1)
MAGNESIUM SERPL-MCNC: 1.66 MG/DL (ref 1.6–2.4)
MCH RBC QN AUTO: 32.8 PG (ref 26–34)
MCHC RBC AUTO-ENTMCNC: 32.9 G/DL (ref 32–36)
MCV RBC AUTO: 100 FL (ref 80–100)
NRBC BLD-RTO: 0 /100 WBCS (ref 0–0)
PLATELET # BLD AUTO: 317 X10*3/UL (ref 150–450)
POTASSIUM SERPL-SCNC: 3.9 MMOL/L (ref 3.5–5.3)
PROTHROMBIN TIME: 24.4 SECONDS (ref 9.8–12.8)
RBC # BLD AUTO: 4.24 X10*6/UL (ref 4–5.2)
SODIUM SERPL-SCNC: 139 MMOL/L (ref 136–145)
WBC # BLD AUTO: 9.1 X10*3/UL (ref 4.4–11.3)

## 2024-11-10 PROCEDURE — 80048 BASIC METABOLIC PNL TOTAL CA: CPT | Performed by: NURSE PRACTITIONER

## 2024-11-10 PROCEDURE — 2500000001 HC RX 250 WO HCPCS SELF ADMINISTERED DRUGS (ALT 637 FOR MEDICARE OP): Performed by: NURSE PRACTITIONER

## 2024-11-10 PROCEDURE — 36415 COLL VENOUS BLD VENIPUNCTURE: CPT | Performed by: NURSE PRACTITIONER

## 2024-11-10 PROCEDURE — 82947 ASSAY GLUCOSE BLOOD QUANT: CPT

## 2024-11-10 PROCEDURE — 94640 AIRWAY INHALATION TREATMENT: CPT

## 2024-11-10 PROCEDURE — 2500000004 HC RX 250 GENERAL PHARMACY W/ HCPCS (ALT 636 FOR OP/ED): Performed by: NURSE PRACTITIONER

## 2024-11-10 PROCEDURE — 93005 ELECTROCARDIOGRAM TRACING: CPT

## 2024-11-10 PROCEDURE — 1200000002 HC GENERAL ROOM WITH TELEMETRY DAILY

## 2024-11-10 PROCEDURE — 85027 COMPLETE CBC AUTOMATED: CPT | Performed by: NURSE PRACTITIONER

## 2024-11-10 PROCEDURE — 2500000002 HC RX 250 W HCPCS SELF ADMINISTERED DRUGS (ALT 637 FOR MEDICARE OP, ALT 636 FOR OP/ED): Performed by: NURSE PRACTITIONER

## 2024-11-10 PROCEDURE — 83735 ASSAY OF MAGNESIUM: CPT | Performed by: NURSE PRACTITIONER

## 2024-11-10 PROCEDURE — 85610 PROTHROMBIN TIME: CPT | Performed by: NURSE PRACTITIONER

## 2024-11-10 PROCEDURE — 93010 ELECTROCARDIOGRAM REPORT: CPT | Performed by: INTERNAL MEDICINE

## 2024-11-10 PROCEDURE — 84484 ASSAY OF TROPONIN QUANT: CPT | Performed by: NURSE PRACTITIONER

## 2024-11-10 RX ORDER — LOPERAMIDE HYDROCHLORIDE 2 MG/1
2 CAPSULE ORAL ONCE
Status: COMPLETED | OUTPATIENT
Start: 2024-11-10 | End: 2024-11-10

## 2024-11-10 RX ORDER — IPRATROPIUM BROMIDE AND ALBUTEROL SULFATE 2.5; .5 MG/3ML; MG/3ML
3 SOLUTION RESPIRATORY (INHALATION) EVERY 6 HOURS PRN
Status: DISCONTINUED | OUTPATIENT
Start: 2024-11-10 | End: 2024-11-18 | Stop reason: HOSPADM

## 2024-11-10 RX ORDER — DICYCLOMINE HYDROCHLORIDE 10 MG/1
10 CAPSULE ORAL ONCE
Status: COMPLETED | OUTPATIENT
Start: 2024-11-10 | End: 2024-11-10

## 2024-11-10 RX ORDER — MAGNESIUM SULFATE HEPTAHYDRATE 40 MG/ML
2 INJECTION, SOLUTION INTRAVENOUS ONCE
Status: COMPLETED | OUTPATIENT
Start: 2024-11-10 | End: 2024-11-10

## 2024-11-10 RX ORDER — LANOLIN ALCOHOL/MO/W.PET/CERES
800 CREAM (GRAM) TOPICAL ONCE
Status: COMPLETED | OUTPATIENT
Start: 2024-11-10 | End: 2024-11-10

## 2024-11-10 RX ORDER — POTASSIUM CHLORIDE 750 MG/1
10 TABLET, FILM COATED, EXTENDED RELEASE ORAL ONCE
Status: COMPLETED | OUTPATIENT
Start: 2024-11-10 | End: 2024-11-10

## 2024-11-10 RX ADMIN — INSULIN LISPRO 2 UNITS: 100 INJECTION, SOLUTION INTRAVENOUS; SUBCUTANEOUS at 12:45

## 2024-11-10 RX ADMIN — CHOLECALCIFEROL TAB 125 MCG (5000 UNIT) 5000 UNITS: 125 TAB at 09:01

## 2024-11-10 RX ADMIN — Medication 800 MG: at 10:10

## 2024-11-10 RX ADMIN — LEVOTHYROXINE SODIUM 100 MCG: 0.1 TABLET ORAL at 06:27

## 2024-11-10 RX ADMIN — Medication 1 CAPSULE: at 09:01

## 2024-11-10 RX ADMIN — AZATHIOPRINE 100 MG: 50 TABLET ORAL at 09:02

## 2024-11-10 RX ADMIN — POTASSIUM CHLORIDE 10 MEQ: 750 TABLET, EXTENDED RELEASE ORAL at 10:10

## 2024-11-10 RX ADMIN — METOPROLOL TARTRATE 100 MG: 100 TABLET, FILM COATED ORAL at 09:01

## 2024-11-10 RX ADMIN — LISINOPRIL 20 MG: 20 TABLET ORAL at 09:01

## 2024-11-10 RX ADMIN — DILTIAZEM HYDROCHLORIDE 60 MG: 60 TABLET, FILM COATED ORAL at 14:28

## 2024-11-10 RX ADMIN — BUPROPION HYDROCHLORIDE 300 MG: 150 TABLET, EXTENDED RELEASE ORAL at 09:01

## 2024-11-10 RX ADMIN — LOPERAMIDE HYDROCHLORIDE 2 MG: 2 CAPSULE ORAL at 03:56

## 2024-11-10 RX ADMIN — DICYCLOMINE HYDROCHLORIDE 10 MG: 10 CAPSULE ORAL at 22:22

## 2024-11-10 RX ADMIN — METOPROLOL TARTRATE 100 MG: 100 TABLET, FILM COATED ORAL at 21:40

## 2024-11-10 RX ADMIN — AMOXICILLIN AND CLAVULANATE POTASSIUM 1 TABLET: 875; 125 TABLET, FILM COATED ORAL at 21:40

## 2024-11-10 RX ADMIN — ASPIRIN 81 MG: 81 TABLET, COATED ORAL at 09:01

## 2024-11-10 RX ADMIN — DILTIAZEM HYDROCHLORIDE 60 MG: 60 TABLET, FILM COATED ORAL at 21:41

## 2024-11-10 RX ADMIN — AMOXICILLIN AND CLAVULANATE POTASSIUM 1 TABLET: 875; 125 TABLET, FILM COATED ORAL at 09:01

## 2024-11-10 RX ADMIN — ATORVASTATIN CALCIUM 80 MG: 80 TABLET, FILM COATED ORAL at 21:41

## 2024-11-10 RX ADMIN — MAGNESIUM SULFATE HEPTAHYDRATE 2 G: 40 INJECTION, SOLUTION INTRAVENOUS at 10:09

## 2024-11-10 RX ADMIN — WARFARIN SODIUM 2 MG: 2 TABLET ORAL at 17:04

## 2024-11-10 RX ADMIN — DILTIAZEM HYDROCHLORIDE 60 MG: 60 TABLET, FILM COATED ORAL at 06:27

## 2024-11-10 RX ADMIN — IPRATROPIUM BROMIDE AND ALBUTEROL SULFATE 3 ML: 2.5; .5 SOLUTION RESPIRATORY (INHALATION) at 09:09

## 2024-11-10 ASSESSMENT — COGNITIVE AND FUNCTIONAL STATUS - GENERAL
TURNING FROM BACK TO SIDE WHILE IN FLAT BAD: A LITTLE
DRESSING REGULAR UPPER BODY CLOTHING: A LITTLE
STANDING UP FROM CHAIR USING ARMS: A LITTLE
HELP NEEDED FOR BATHING: A LOT
TOILETING: A LOT
CLIMB 3 TO 5 STEPS WITH RAILING: A LOT
MOVING FROM LYING ON BACK TO SITTING ON SIDE OF FLAT BED WITH BEDRAILS: A LITTLE
WALKING IN HOSPITAL ROOM: A LITTLE
MOBILITY SCORE: 17
DRESSING REGULAR LOWER BODY CLOTHING: A LOT
EATING MEALS: A LITTLE
PERSONAL GROOMING: A LITTLE
DAILY ACTIVITIY SCORE: 15
MOVING TO AND FROM BED TO CHAIR: A LITTLE

## 2024-11-10 ASSESSMENT — PAIN SCALES - GENERAL
PAINLEVEL_OUTOF10: 0 - NO PAIN
PAINLEVEL_OUTOF10: 0 - NO PAIN

## 2024-11-10 ASSESSMENT — PAIN - FUNCTIONAL ASSESSMENT: PAIN_FUNCTIONAL_ASSESSMENT: 0-10

## 2024-11-10 NOTE — NURSING NOTE
"House NP contacted about EKG changes. Pt is up in chair eating dinner, now has no complaints of any pain, shortness of breath, states the feeling \"passed\" that she had after the breathing treatment. House coverage states to encourage water, and to monitor pt after further aerosols.   "

## 2024-11-10 NOTE — PROGRESS NOTES
Yen Montoya  11/10/24  [id number]  Kelsey Plummer MD  This is a patient with a past medical history as detailed below admitted to the [facility] ED with chief complaint of 74 y.o. female presenting to [facility] on 11/3/2024 with pertinent medical history of atrial fibrillation on Coumadin, sick sinus syndrome status post pacemaker insertion, Former tobacco use, RA, obesity, systolic heart failure (3/2024 Echo: EF 40-45%), adrenal mass, CAD status post 8 PCI, HTN, HLD, PVD, hypothyroidism, peripheral neuropathy, anxiety, SAMAN on CPAP, type 2 diabetes, pulmonary embolism, DVT RLE and nephrolithiasis. Presented to an outside emergency room for complaint of nausea and vomiting x 4 days with right lower quadrant pain and dysuria. In addition, the patient complaints of intermittent fevers x1 day, dizziness, chest pain, and shortness of breath.  Patient is sleepy easily arousable.  Postop.      Assessment and plan   1-hydronephrosis admitted to the hospital pain control CT abdomen pelvis monitor electrolytes CBC lactic acid consulted urology taken to the OR for stent placement  2-SABRINA hydrate follow-up CMP monitor urine output  3-hypomagnesemia replace  4-urinary tract infection leukocytosis follow-up CBC follow-up culture started on IV ceftriaxone .  5 history of coronary artery disease currently has no chest pain   6 hyperlipidemia hypertension resume home medication  Discussed with the ED /consultants.      Review of other systems negative other than HPI  Past medical history    Epic& consultants notes reviewed  Most recent images Reviewed  Last EKG/Rhythm reviewed      Vital signs has been reviewed  GENERAL: o x 3 in distress.   SKIN:  No rashes .   ENT mucosa,  Normal/nose normal   NECK: no jugulovenous distention  NO lymphadenopathy   LUNGS:No wheezing,no ronchi  no rales.  CARDIAC:  S1 and S2  ABDOMEN: Abdomen soft, mild-tender.    EXTREMITIES: Extremities normal.   NEURO: non focal    PULSES: + pedal / radial   No  edema  No goiter   No carotid bruits.                   Past Medical History  She has a past medical history of Acute embolism and thrombosis of right popliteal vein (Multi) (2020), Anxiety, Atrial fib/flutter, transient (Multi), CAD (coronary artery disease), Calculus of ureter (2020), Chronic venous hypertension (idiopathic) with inflammation of left lower extremity, Chronic venous hypertension (idiopathic) with inflammation of right lower extremity, Chronic venous hypertension (idiopathic) with ulcer of unspecified lower extremity (CODE), Complete heart block, Hypertension, Hypothyroidism, Long term (current) use of anticoagulants (2022), SAMAN (obstructive sleep apnea), Osteoporosis, Other bursitis of hip, right hip, Other mechanical complication of other internal joint prosthesis, initial encounter (CMS-HCC) (2020), Other postprocedural complications and disorders of the circulatory system, not elsewhere classified (10/08/2020), Other specified joint disorders, right shoulder, Personal history of diseases of the blood and blood-forming organs and certain disorders involving the immune mechanism, Pulmonary embolism, PVD (peripheral vascular disease) (CMS-HCC), RA (rheumatoid arthritis), Renal calculi, Sick sinus syndrome (Multi), Toxic effect of unspecified metal, accidental (unintentional), initial encounter (2020), Unspecified fracture of lower end of unspecified femur, initial encounter for closed fracture (Multi) (2021), and UTI (urinary tract infection).    Surgical History  She has a past surgical history that includes Other surgical history (2021); XR chest pacemaker w fluoro (10/17/2021); Cardiac catheterization; Cholecystectomy;  section, low transverse; Tonsillectomy; Total hip arthroplasty (Bilateral); Total shoulder arthroplasty (Left); Total knee arthroplasty (Bilateral); Colonoscopy; pacemaker placement; Femur fracture surgery; Wrist surgery; Cardiac  electrophysiology mapping and ablation; and Coronary stent placement.     Social History  She reports that she quit smoking about 36 years ago. Her smoking use included cigarettes. She has never used smokeless tobacco. She reports current alcohol use. She reports that she does not use drugs.    Family History  Family History   Problem Relation Name Age of Onset    Other (ptca) Mother      Heart failure Father      Coronary artery disease Father      Breast cancer Sister      Breast cancer Sister          Allergies  Adhesive tape-silicones, Latex, and Valdecoxib          Last Recorded Vitals  /66 (BP Location: Left arm, Patient Position: Lying)   Pulse 69   Temp 35.1 °C (95.2 °F) (Temporal)   Resp 16   Wt 104 kg (228 lb 13.4 oz)   SpO2 100%          Kelsey Plummer MD

## 2024-11-10 NOTE — PROGRESS NOTES
Yen Montoya   11/09/2024  [id number]  Kelsey Plummer MD  This is a patient with a past medical history as detailed below admitted to the [facility] ED with chief complaint of 74 y.o. female presenting to [facility] on 11/3/2024 with pertinent medical history of atrial fibrillation on Coumadin, sick sinus syndrome status post pacemaker insertion, Former tobacco use, RA, obesity, systolic heart failure (3/2024 Echo: EF 40-45%), adrenal mass, CAD status post 8 PCI, HTN, HLD, PVD, hypothyroidism, peripheral neuropathy, anxiety, SAMAN on CPAP, type 2 diabetes, pulmonary embolism, DVT RLE and nephrolithiasis.  pain, and shortness of breath.   Overall better ,no cp no sob feels weak .       Assessment and plan   1-hydronephrosis s/p stent placement  2-SABRINA hydrate follow-up CMP    3- urinary tract infection leukocytosis follow-up CBC follow-up culture started on IV ceftriaxone .  4 history of coronary artery disease currently has no chest pain   5-   PE  follow up INR .  Discussed with the  consultants.      Review of other systems negative other than HPI  Past medical history    Epic& consultants notes reviewed  Most recent images Reviewed  Last EKG/Rhythm reviewed      Vital signs has been reviewed     SKIN:  No rashes .   ENT mucosa,  Normal/nose normal   NECK: no jugulovenous distention  NO lymphadenopathy   LUNGS:No wheezing,no ronchi  no rales.  CARDIAC:  S1 and S2  ABDOMEN: Abdomen soft, mild-tender.    EXTREMITIES: Extremities normal.   NEURO: non focal    PULSES: + pedal / radial   No edema                       Past Medical History  She has a past medical history of Acute embolism and thrombosis of right popliteal vein (Multi) (04/24/2020), Anxiety, Atrial fib/flutter, transient (Multi), CAD (coronary artery disease), Calculus of ureter (04/29/2020), Chronic venous hypertension (idiopathic) with inflammation of left lower extremity, Chronic venous hypertension (idiopathic) with inflammation of right lower extremity,  Chronic venous hypertension (idiopathic) with ulcer of unspecified lower extremity (CODE), Complete heart block, Hypertension, Hypothyroidism, Long term (current) use of anticoagulants (2022), SAMAN (obstructive sleep apnea), Osteoporosis, Other bursitis of hip, right hip, Other mechanical complication of other internal joint prosthesis, initial encounter (CMS-HCC) (2020), Other postprocedural complications and disorders of the circulatory system, not elsewhere classified (10/08/2020), Other specified joint disorders, right shoulder, Personal history of diseases of the blood and blood-forming organs and certain disorders involving the immune mechanism, Pulmonary embolism, PVD (peripheral vascular disease) (CMS-HCC), RA (rheumatoid arthritis), Renal calculi, Sick sinus syndrome (Multi), Toxic effect of unspecified metal, accidental (unintentional), initial encounter (2020), Unspecified fracture of lower end of unspecified femur, initial encounter for closed fracture (Multi) (2021), and UTI (urinary tract infection).    Surgical History  She has a past surgical history that includes Other surgical history (2021); XR chest pacemaker w fluoro (10/17/2021); Cardiac catheterization; Cholecystectomy;  section, low transverse; Tonsillectomy; Total hip arthroplasty (Bilateral); Total shoulder arthroplasty (Left); Total knee arthroplasty (Bilateral); Colonoscopy; pacemaker placement; Femur fracture surgery; Wrist surgery; Cardiac electrophysiology mapping and ablation; and Coronary stent placement.     Social History  She reports that she quit smoking about 36 years ago. Her smoking use included cigarettes. She has never used smokeless tobacco. She reports current alcohol use. She reports that she does not use drugs.    Family History  Family History   Problem Relation Name Age of Onset    Other (ptca) Mother      Heart failure Father      Coronary artery disease Father      Breast cancer  Sister      Breast cancer Sister          Allergies  Adhesive tape-silicones, Latex, and Valdecoxib          Last Recorded Vitals  /66 (BP Location: Left arm, Patient Position: Lying)   Pulse 69   Temp 35.1 °C (95.2 °F) (Temporal)   Resp 16   Wt 104 kg (228 lb 13.4 oz)   SpO2 100%          Kelsey Plummer MD

## 2024-11-10 NOTE — CARE PLAN
Problem: Skin  Goal: Participates in plan/prevention/treatment measures  Outcome: Progressing  Goal: Prevent/manage excess moisture  Outcome: Progressing  Goal: Prevent/minimize sheer/friction injuries  Outcome: Progressing  Goal: Promote/optimize nutrition  Outcome: Progressing  Goal: Promote skin healing  Outcome: Progressing     Problem: Fall/Injury  Goal: Not fall by end of shift  Outcome: Progressing  Goal: Be free from injury by end of the shift  Outcome: Progressing  Goal: Verbalize understanding of personal risk factors for fall in the hospital  Outcome: Progressing  Goal: Verbalize understanding of risk factor reduction measures to prevent injury from fall in the home  Outcome: Progressing  Goal: Use assistive devices by end of the shift  Outcome: Progressing  Goal: Pace activities to prevent fatigue by end of the shift  Outcome: Progressing     Problem: Infection related to problem list condition  Goal: Infection will resolve through treatment  Outcome: Progressing     Problem: Pain - Adult  Goal: Verbalizes/displays adequate comfort level or baseline comfort level  Outcome: Progressing     Problem: Safety - Adult  Goal: Free from fall injury  Outcome: Progressing     Problem: Discharge Planning  Goal: Discharge to home or other facility with appropriate resources  Outcome: Progressing     Problem: Chronic Conditions and Co-morbidities  Goal: Patient's chronic conditions and co-morbidity symptoms are monitored and maintained or improved  Outcome: Progressing     Problem: Diabetes  Goal: Achieve decreasing blood glucose levels by end of shift  Outcome: Progressing  Goal: Increase stability of blood glucose readings by end of shift  Outcome: Progressing  Goal: Decrease in ketones present in urine by end of shift  Outcome: Progressing  Goal: Maintain electrolyte levels within acceptable range throughout shift  Outcome: Progressing  Goal: Maintain glucose levels >70mg/dl to <250mg/dl throughout  shift  Outcome: Progressing  Goal: No changes in neurological exam by end of shift  Outcome: Progressing  Goal: Learn about and adhere to nutrition recommendations by end of shift  Outcome: Progressing  Goal: Vital signs within normal range for age by end of shift  Outcome: Progressing  Goal: Increase self care and/or family involovement by end of shift  Outcome: Progressing  Goal: Receive DSME education by end of shift  Outcome: Progressing   The patient's goals for the shift include      The clinical goals for the shift include Patient will have less than 4 loose stools thru end of this shift.    Pt did not have any loose stools today.  She got up to the chair.  Pt had visible changes on the monitor.  Did EKG and notified MD.  She felt like something was stuck in her chest since the breathing treatment.  Had to give report to another nurse as I was pulled back to my floor and she is following up

## 2024-11-10 NOTE — CARE PLAN
The clinical goals for the shift include Patient will have less than 4 loose stools thru end of this shift. Had 4-5 brown, watery stools. C-diff came back negative. Gave 1 dose of loperamide.       Problem: Skin  Goal: Participates in plan/prevention/treatment measures  Outcome: Progressing  Goal: Prevent/manage excess moisture  Outcome: Progressing  Goal: Prevent/minimize sheer/friction injuries  Outcome: Progressing  Goal: Promote/optimize nutrition  Outcome: Progressing  Goal: Promote skin healing  Outcome: Progressing     Problem: Fall/Injury  Goal: Not fall by end of shift  Outcome: Progressing  Goal: Be free from injury by end of the shift  Outcome: Progressing  Goal: Verbalize understanding of personal risk factors for fall in the hospital  Outcome: Progressing  Goal: Verbalize understanding of risk factor reduction measures to prevent injury from fall in the home  Outcome: Progressing  Goal: Use assistive devices by end of the shift  Outcome: Progressing  Goal: Pace activities to prevent fatigue by end of the shift  Outcome: Progressing     Problem: Infection related to problem list condition  Goal: Infection will resolve through treatment  Outcome: Progressing     Problem: Pain - Adult  Goal: Verbalizes/displays adequate comfort level or baseline comfort level  Outcome: Progressing     Problem: Discharge Planning  Goal: Discharge to home or other facility with appropriate resources  Outcome: Progressing     Problem: Diabetes  Goal: Achieve decreasing blood glucose levels by end of shift  Outcome: Progressing  Goal: Increase stability of blood glucose readings by end of shift  Outcome: Progressing  Goal: Decrease in ketones present in urine by end of shift  Outcome: Progressing  Goal: Maintain electrolyte levels within acceptable range throughout shift  Outcome: Progressing  Goal: Maintain glucose levels >70mg/dl to <250mg/dl throughout shift  Outcome: Progressing  Goal: No changes in neurological exam by  end of shift  Outcome: Progressing  Goal: Learn about and adhere to nutrition recommendations by end of shift  Outcome: Progressing  Goal: Vital signs within normal range for age by end of shift  Outcome: Progressing  Goal: Increase self care and/or family involovement by end of shift  Outcome: Progressing  Goal: Receive DSME education by end of shift  Outcome: Progressing

## 2024-11-11 LAB
ANION GAP SERPL CALC-SCNC: 11 MMOL/L (ref 10–20)
BUN SERPL-MCNC: 17 MG/DL (ref 6–23)
CALCIUM SERPL-MCNC: 10.4 MG/DL (ref 8.6–10.3)
CHLORIDE SERPL-SCNC: 101 MMOL/L (ref 98–107)
CO2 SERPL-SCNC: 28 MMOL/L (ref 21–32)
CREAT SERPL-MCNC: 1.07 MG/DL (ref 0.5–1.05)
EGFRCR SERPLBLD CKD-EPI 2021: 55 ML/MIN/1.73M*2
ERYTHROCYTE [DISTWIDTH] IN BLOOD BY AUTOMATED COUNT: 13.6 % (ref 11.5–14.5)
GLUCOSE BLD MANUAL STRIP-MCNC: 125 MG/DL (ref 74–99)
GLUCOSE BLD MANUAL STRIP-MCNC: 130 MG/DL (ref 74–99)
GLUCOSE BLD MANUAL STRIP-MCNC: 151 MG/DL (ref 74–99)
GLUCOSE BLD MANUAL STRIP-MCNC: 180 MG/DL (ref 74–99)
GLUCOSE SERPL-MCNC: 116 MG/DL (ref 74–99)
HCT VFR BLD AUTO: 40.7 % (ref 36–46)
HGB BLD-MCNC: 13 G/DL (ref 12–16)
INR PPP: 2 (ref 0.9–1.1)
MAGNESIUM SERPL-MCNC: 1.74 MG/DL (ref 1.6–2.4)
MCH RBC QN AUTO: 32.3 PG (ref 26–34)
MCHC RBC AUTO-ENTMCNC: 31.9 G/DL (ref 32–36)
MCV RBC AUTO: 101 FL (ref 80–100)
NRBC BLD-RTO: 0 /100 WBCS (ref 0–0)
PLATELET # BLD AUTO: 307 X10*3/UL (ref 150–450)
POTASSIUM SERPL-SCNC: 4.1 MMOL/L (ref 3.5–5.3)
PROTHROMBIN TIME: 23.1 SECONDS (ref 9.8–12.8)
RBC # BLD AUTO: 4.03 X10*6/UL (ref 4–5.2)
SODIUM SERPL-SCNC: 136 MMOL/L (ref 136–145)
WBC # BLD AUTO: 8.8 X10*3/UL (ref 4.4–11.3)

## 2024-11-11 PROCEDURE — 97535 SELF CARE MNGMENT TRAINING: CPT | Mod: GO,CO

## 2024-11-11 PROCEDURE — 2500000001 HC RX 250 WO HCPCS SELF ADMINISTERED DRUGS (ALT 637 FOR MEDICARE OP): Performed by: NURSE PRACTITIONER

## 2024-11-11 PROCEDURE — 97110 THERAPEUTIC EXERCISES: CPT | Mod: GO,CO

## 2024-11-11 PROCEDURE — 83735 ASSAY OF MAGNESIUM: CPT | Performed by: NURSE PRACTITIONER

## 2024-11-11 PROCEDURE — 80048 BASIC METABOLIC PNL TOTAL CA: CPT | Performed by: NURSE PRACTITIONER

## 2024-11-11 PROCEDURE — 97530 THERAPEUTIC ACTIVITIES: CPT | Mod: GO,CO

## 2024-11-11 PROCEDURE — 36415 COLL VENOUS BLD VENIPUNCTURE: CPT | Performed by: NURSE PRACTITIONER

## 2024-11-11 PROCEDURE — 2500000004 HC RX 250 GENERAL PHARMACY W/ HCPCS (ALT 636 FOR OP/ED): Performed by: NURSE PRACTITIONER

## 2024-11-11 PROCEDURE — 85027 COMPLETE CBC AUTOMATED: CPT | Performed by: NURSE PRACTITIONER

## 2024-11-11 PROCEDURE — 2500000001 HC RX 250 WO HCPCS SELF ADMINISTERED DRUGS (ALT 637 FOR MEDICARE OP): Performed by: PHYSICIAN ASSISTANT

## 2024-11-11 PROCEDURE — 2500000002 HC RX 250 W HCPCS SELF ADMINISTERED DRUGS (ALT 637 FOR MEDICARE OP, ALT 636 FOR OP/ED): Performed by: NURSE PRACTITIONER

## 2024-11-11 PROCEDURE — 82947 ASSAY GLUCOSE BLOOD QUANT: CPT

## 2024-11-11 PROCEDURE — 1200000002 HC GENERAL ROOM WITH TELEMETRY DAILY

## 2024-11-11 PROCEDURE — 85610 PROTHROMBIN TIME: CPT | Performed by: NURSE PRACTITIONER

## 2024-11-11 RX ORDER — DICYCLOMINE HYDROCHLORIDE 10 MG/1
20 CAPSULE ORAL ONCE
Status: COMPLETED | OUTPATIENT
Start: 2024-11-11 | End: 2024-11-11

## 2024-11-11 ASSESSMENT — COGNITIVE AND FUNCTIONAL STATUS - GENERAL
HELP NEEDED FOR BATHING: A LITTLE
WALKING IN HOSPITAL ROOM: A LITTLE
STANDING UP FROM CHAIR USING ARMS: A LITTLE
TOILETING: A LITTLE
TOILETING: A LITTLE
DAILY ACTIVITIY SCORE: 18
MOVING TO AND FROM BED TO CHAIR: A LITTLE
DRESSING REGULAR UPPER BODY CLOTHING: A LITTLE
DRESSING REGULAR LOWER BODY CLOTHING: A LITTLE
HELP NEEDED FOR BATHING: A LITTLE
PERSONAL GROOMING: A LITTLE
DRESSING REGULAR LOWER BODY CLOTHING: A LOT
DAILY ACTIVITIY SCORE: 19
PERSONAL GROOMING: A LITTLE
CLIMB 3 TO 5 STEPS WITH RAILING: A LITTLE
MOBILITY SCORE: 20
DRESSING REGULAR UPPER BODY CLOTHING: A LITTLE

## 2024-11-11 ASSESSMENT — ACTIVITIES OF DAILY LIVING (ADL)
BATHING_LEVEL_OF_ASSISTANCE: MODERATE ASSISTANCE
BATHING_COMMENTS: WITH WIPES
BATHING_WHERE_ASSESSED: EDGE OF BED
HOME_MANAGEMENT_TIME_ENTRY: 15

## 2024-11-11 ASSESSMENT — PAIN SCALES - GENERAL
PAINLEVEL_OUTOF10: 0 - NO PAIN

## 2024-11-11 ASSESSMENT — PAIN - FUNCTIONAL ASSESSMENT: PAIN_FUNCTIONAL_ASSESSMENT: 0-10

## 2024-11-11 NOTE — CARE PLAN
Problem: Skin  Goal: Participates in plan/prevention/treatment measures  Outcome: Progressing  Goal: Prevent/manage excess moisture  Outcome: Progressing  Goal: Prevent/minimize sheer/friction injuries  Outcome: Progressing  Goal: Promote/optimize nutrition  Outcome: Progressing  Goal: Promote skin healing  Outcome: Progressing     Problem: Fall/Injury  Goal: Not fall by end of shift  Outcome: Progressing  Goal: Be free from injury by end of the shift  Outcome: Progressing  Goal: Verbalize understanding of personal risk factors for fall in the hospital  Outcome: Progressing  Goal: Verbalize understanding of risk factor reduction measures to prevent injury from fall in the home  Outcome: Progressing  Goal: Use assistive devices by end of the shift  Outcome: Progressing  Goal: Pace activities to prevent fatigue by end of the shift  Outcome: Progressing     Problem: Infection related to problem list condition  Goal: Infection will resolve through treatment  Outcome: Progressing     Problem: Pain - Adult  Goal: Verbalizes/displays adequate comfort level or baseline comfort level  Outcome: Progressing     Problem: Safety - Adult  Goal: Free from fall injury  Outcome: Progressing     Problem: Discharge Planning  Goal: Discharge to home or other facility with appropriate resources  Outcome: Progressing     Problem: Chronic Conditions and Co-morbidities  Goal: Patient's chronic conditions and co-morbidity symptoms are monitored and maintained or improved  Outcome: Progressing     Problem: Diabetes  Goal: Achieve decreasing blood glucose levels by end of shift  Outcome: Progressing  Goal: Increase stability of blood glucose readings by end of shift  Outcome: Progressing  Goal: Decrease in ketones present in urine by end of shift  Outcome: Progressing  Goal: Maintain electrolyte levels within acceptable range throughout shift  Outcome: Progressing  Goal: Maintain glucose levels >70mg/dl to <250mg/dl throughout  shift  Outcome: Progressing  Goal: No changes in neurological exam by end of shift  Outcome: Progressing  Goal: Learn about and adhere to nutrition recommendations by end of shift  Outcome: Progressing  Goal: Vital signs within normal range for age by end of shift  Outcome: Progressing  Goal: Increase self care and/or family involovement by end of shift  Outcome: Progressing  Goal: Receive DSME education by end of shift  Outcome: Progressing   The patient's goals for the shift include      The clinical goals for the shift include pt will remain free from fall/injury

## 2024-11-11 NOTE — PROGRESS NOTES
Occupational Therapy    OT Treatment    Patient Name: Yen Montoya  MRN: [id number]  Today's Date: 11/11/2024  Time Calculation  Start Time: 1243  Stop Time: 1321  Time Calculation (min): 38 min       3115/3115-A    Assessment:  OT Assessment: Patient required min a for all mobility tasks this date, verbal cues provided for ww mgmt during mobility.  End of Session Communication: Bedside nurse  End of Session Patient Position: Up in chair, Alarm on  OT Assessment Results: Decreased ADL status, Decreased cognition, Decreased safe judgment during ADL, Decreased endurance, Decreased functional mobility, Decreased IADLs    Plan:  Treatment Interventions: ADL retraining, Functional transfer training  OT Frequency: 4 times per week  OT Discharge Recommendations: High intensity level of continued care  Treatment Interventions: ADL retraining, Functional transfer training  Subjective        11/11/24 1243   OT Last Visit   OT Received On 11/11/24   General   Reason for Referral ADL   Referred By Enoch   Past Medical History Relevant to Rehab Includes: Past Medical History  She has a past medical history of Acute embolism and thrombosis of right popliteal vein (Multi) (04/24/2020), Anxiety, Atrial fib/flutter, transient (Multi), CAD (coronary artery disease), Calculus of ureter (04/29/2020), Chronic venous hypertension (idiopathic) with inflammation of left lower extremity, Chronic venous hypertension (idiopathic) with inflammation of right lower extremity, Chronic venous hypertension (idiopathic) with ulcer of unspecified lower extremity (CODE), Complete heart block, Hypertension, Hypothyroidism, Long term (current) use of anticoagulants (01/20/2022), SAMAN (obstructive sleep apnea), Osteoporosis, Other bursitis of hip, right hip, Other mechanical complication of other internal joint prosthesis, initial encounter (CMS-[facility]) (05/22/2020), Other postprocedural complications and disorders of the circulatory system, not elsewhere  classified (10/08/2020), Other specified joint disorders, right shoulder, Personal history of diseases of the blood and blood-forming organs and certain disorders involving the immune mechanism, Pulmonary embolism, PVD (peripheral vascular disease) (CMS-[facility]), RA (rheumatoid arthritis), Renal calculi, Sick sinus syndrome (Multi), Toxic effect of unspecified metal, accidental (unintentional), initial encounter (2020), Unspecified fracture of lower end of unspecified femur, initial encounter for closed fracture (Multi) (2021), and UTI (urinary tract infection).     Surgical History  She has a past surgical history that includes Other surgical history (2021); XR chest pacemaker w fluoro (10/17/2021); Cardiac catheterization; Cholecystectomy;  section, low transverse; Tonsillectomy; Total hip arthroplasty (Bilateral); Total shoulder arthroplasty (Left); Total knee arthroplasty (Bilateral); Colonoscopy; pacemaker placement; Femur fracture surgery; Wrist surgery; Cardiac electrophysiology mapping and ablation; and Coronary stent placement.   Prior to Session Communication Bedside nurse   Patient Position Received Bed, 3 rail up;Alarm on   Preferred Learning Style verbal;visual   General Comment Patient is agreeable to therapy   Precautions   Medical Precautions Fall precautions   Pain Assessment   Pain Assessment 0-10   0-10 (Numeric) Pain Score 0 - No pain   Response to Interventions no pain noted   Cognition   Overall Cognitive Status WFL   Orientation Level Oriented X4   Grooming   Grooming Level of Assistance Setup;Close supervision   Grooming Where Assessed Edge of bed   Grooming Comments facial hygiene completed   UE Bathing   UE Bathing Level of Assistance Minimum assistance   UE Bathing Where Assessed Edge of bed   UE Bathing Comments with wipes, assist with back   LE Bathing   LE Bathing Level of Assistance Moderate assistance   LE Bathing Where Assessed Edge of bed   LE Bathing Comments  with wipes   UE Dressing   UE Dressing Level of Assistance Minimum assistance   UE Dressing Where Assessed Edge of bed   UE Dressing Comments to doff/pollo gown, mgmt of lines   LE Dressing   LE Dressing Yes   Sock Level of Assistance Maximum assistance   LE Dressing Where Assessed Recliner   LE Dressing Comments to doff/pollo socks   Toileting   Toileting Level of Assistance Moderate assistance   Where Assessed Bed level   Toileting Comments assist with niru care   Bed Mobility   Bed Mobility Yes   Bed Mobility 1   Bed Mobility 1 Supine to sitting   Level of Assistance 1 Minimum assistance   Bed Mobility Comments 1 hob elevated, assist with upright positioning   Transfers   Transfer Yes   Transfer 1   Transfer From 1 Bed to   Transfer to 1 Chair with arms   Technique 1 Stand to sit;Sit to stand   Transfer Device 1 Walker;Gait belt   Transfer Level of Assistance 1 Minimum assistance   Trials/Comments 1 assist with safe hand placement transfer   Therapeutic Exercise   Therapeutic Exercise Performed Yes   Therapeutic Exercise Activity 1 BUE AROM in multiple planes, 3 sets x10 reps, visual demo provided, rest breaks required for task completion, limited mobility noted in RUE   Therapeutic Activity   Therapeutic Activity Performed Yes   Therapeutic Activity 1 ambulation within room, no LOB noted   IP OT Assessment   OT Assessment Patient required min a for all mobility tasks this date, verbal cues provided for ww mgmt during mobility.   End of Session Communication Bedside nurse   End of Session Patient Position Up in chair;Alarm on   OT Assessment   OT Assessment Results Decreased ADL status;Decreased cognition;Decreased safe judgment during ADL;Decreased endurance;Decreased functional mobility;Decreased IADLs   Inpatient/Swing Bed or Outpatient   Inpatient/Swing Bed or Outpatient Inpatient   Inpatient Plan   Treatment Interventions ADL retraining;Functional transfer training   OT Frequency 4 times per week   OT  Discharge Recommendations High intensity level of continued care       Outcome Measures:[facility] Daily Activity  Putting on and taking off regular lower body clothing: A lot  Bathing (including washing, rinsing, drying): A little  Putting on and taking off regular upper body clothing: A little  Toileting, which includes using toilet, bedpan or urinal: A little  Taking care of personal grooming such as brushing teeth: A little  Eating Meals: None  Daily Activity - Total Score: 18  Education Documentation  Body Mechanics, taught by TREVOR Christensen at 11/11/2024  1:27 PM.  Learner: Patient  Readiness: Acceptance  Method: Explanation  Response: Verbalizes Understanding    Precautions, taught by TREVOR Christensen at 11/11/2024  1:27 PM.  Learner: Patient  Readiness: Acceptance  Method: Explanation  Response: Verbalizes Understanding    ADL Training, taught by TREVOR Christensen at 11/11/2024  1:27 PM.  Learner: Patient  Readiness: Acceptance  Method: Explanation  Response: Verbalizes Understanding    Education Comments  No comments found.      Goals:  Encounter Problems       Encounter Problems (Active)       OT Goals       Sup for ADL functional mobility with FWW.        Start:  11/05/24    Expected End:  11/19/24            Sup for sit/stand, bed/chair/commode with FWW.        Start:  11/05/24    Expected End:  11/19/24            Good (-) dynamic standing balance for ADL.        Start:  11/05/24    Expected End:  11/19/24            Tolerate 10 mins light functional activity.        Start:  11/05/24    Expected End:  11/19/24            Normal dynamic sitting balance for ADL.        Start:  11/05/24    Expected End:  11/19/24

## 2024-11-12 LAB
ANION GAP SERPL CALC-SCNC: 11 MMOL/L (ref 10–20)
BUN SERPL-MCNC: 19 MG/DL (ref 6–23)
CALCIUM SERPL-MCNC: 10.3 MG/DL (ref 8.6–10.3)
CHLORIDE SERPL-SCNC: 103 MMOL/L (ref 98–107)
CO2 SERPL-SCNC: 28 MMOL/L (ref 21–32)
CREAT SERPL-MCNC: 0.94 MG/DL (ref 0.5–1.05)
EGFRCR SERPLBLD CKD-EPI 2021: 64 ML/MIN/1.73M*2
ERYTHROCYTE [DISTWIDTH] IN BLOOD BY AUTOMATED COUNT: 13.5 % (ref 11.5–14.5)
GLUCOSE BLD MANUAL STRIP-MCNC: 130 MG/DL (ref 74–99)
GLUCOSE BLD MANUAL STRIP-MCNC: 136 MG/DL (ref 74–99)
GLUCOSE BLD MANUAL STRIP-MCNC: 146 MG/DL (ref 74–99)
GLUCOSE BLD MANUAL STRIP-MCNC: 168 MG/DL (ref 74–99)
GLUCOSE SERPL-MCNC: 124 MG/DL (ref 74–99)
HCT VFR BLD AUTO: 39.3 % (ref 36–46)
HGB BLD-MCNC: 12.9 G/DL (ref 12–16)
INR PPP: 1.7 (ref 0.9–1.1)
MAGNESIUM SERPL-MCNC: 1.61 MG/DL (ref 1.6–2.4)
MCH RBC QN AUTO: 32.5 PG (ref 26–34)
MCHC RBC AUTO-ENTMCNC: 32.8 G/DL (ref 32–36)
MCV RBC AUTO: 99 FL (ref 80–100)
NRBC BLD-RTO: 0 /100 WBCS (ref 0–0)
PLATELET # BLD AUTO: 302 X10*3/UL (ref 150–450)
POTASSIUM SERPL-SCNC: 3.7 MMOL/L (ref 3.5–5.3)
PROTHROMBIN TIME: 19.5 SECONDS (ref 9.8–12.8)
RBC # BLD AUTO: 3.97 X10*6/UL (ref 4–5.2)
SODIUM SERPL-SCNC: 138 MMOL/L (ref 136–145)
WBC # BLD AUTO: 9.2 X10*3/UL (ref 4.4–11.3)

## 2024-11-12 PROCEDURE — 82947 ASSAY GLUCOSE BLOOD QUANT: CPT

## 2024-11-12 PROCEDURE — 85610 PROTHROMBIN TIME: CPT | Performed by: NURSE PRACTITIONER

## 2024-11-12 PROCEDURE — 2500000001 HC RX 250 WO HCPCS SELF ADMINISTERED DRUGS (ALT 637 FOR MEDICARE OP): Performed by: NURSE PRACTITIONER

## 2024-11-12 PROCEDURE — 2500000005 HC RX 250 GENERAL PHARMACY W/O HCPCS: Performed by: NURSE PRACTITIONER

## 2024-11-12 PROCEDURE — 80048 BASIC METABOLIC PNL TOTAL CA: CPT | Performed by: NURSE PRACTITIONER

## 2024-11-12 PROCEDURE — 85027 COMPLETE CBC AUTOMATED: CPT | Performed by: NURSE PRACTITIONER

## 2024-11-12 PROCEDURE — 83735 ASSAY OF MAGNESIUM: CPT | Performed by: NURSE PRACTITIONER

## 2024-11-12 PROCEDURE — 97116 GAIT TRAINING THERAPY: CPT | Mod: GP,CQ

## 2024-11-12 PROCEDURE — 1200000002 HC GENERAL ROOM WITH TELEMETRY DAILY

## 2024-11-12 PROCEDURE — 36415 COLL VENOUS BLD VENIPUNCTURE: CPT | Performed by: NURSE PRACTITIONER

## 2024-11-12 PROCEDURE — 2500000002 HC RX 250 W HCPCS SELF ADMINISTERED DRUGS (ALT 637 FOR MEDICARE OP, ALT 636 FOR OP/ED): Performed by: NURSE PRACTITIONER

## 2024-11-12 PROCEDURE — 97110 THERAPEUTIC EXERCISES: CPT | Mod: GP,CQ

## 2024-11-12 PROCEDURE — 2500000004 HC RX 250 GENERAL PHARMACY W/ HCPCS (ALT 636 FOR OP/ED): Performed by: NURSE PRACTITIONER

## 2024-11-12 RX ORDER — TRAMADOL HYDROCHLORIDE 50 MG/1
50 TABLET ORAL ONCE
Status: COMPLETED | OUTPATIENT
Start: 2024-11-12 | End: 2024-11-12

## 2024-11-12 ASSESSMENT — PAIN DESCRIPTION - LOCATION: LOCATION: SHOULDER

## 2024-11-12 ASSESSMENT — PAIN DESCRIPTION - ORIENTATION: ORIENTATION: RIGHT

## 2024-11-12 ASSESSMENT — COGNITIVE AND FUNCTIONAL STATUS - GENERAL
TURNING FROM BACK TO SIDE WHILE IN FLAT BAD: A LITTLE
MOVING TO AND FROM BED TO CHAIR: A LITTLE
MOVING FROM LYING ON BACK TO SITTING ON SIDE OF FLAT BED WITH BEDRAILS: A LITTLE
WALKING IN HOSPITAL ROOM: A LOT
MOBILITY SCORE: 16
STANDING UP FROM CHAIR USING ARMS: A LITTLE
CLIMB 3 TO 5 STEPS WITH RAILING: A LOT

## 2024-11-12 ASSESSMENT — PAIN SCALES - GENERAL
PAINLEVEL_OUTOF10: 7
PAINLEVEL_OUTOF10: 7
PAINLEVEL_OUTOF10: 3

## 2024-11-12 ASSESSMENT — ACTIVITIES OF DAILY LIVING (ADL): EFFECT OF PAIN ON DAILY ACTIVITIES: .

## 2024-11-12 ASSESSMENT — PAIN - FUNCTIONAL ASSESSMENT: PAIN_FUNCTIONAL_ASSESSMENT: 0-10

## 2024-11-12 NOTE — PROGRESS NOTES
"   11/12/24 0843   Discharge Planning   Home or Post Acute Services Post acute facilities (Rehab/SNF/etc)   Type of Post Acute Facility Services Rehab   Expected Discharge Disposition Rehab   Does the patient need discharge transport arranged? Yes   RoundTrip coordination needed? Yes   Has discharge transport been arranged? No     Message sent to [facility] CELESTIN to inquire on updates for the appeal. Spoke to patient at bedside to update her and to inquire about a back up plan. Patient states if she is still denied with the appeal she will \"just go home\". Patient unsure if she would be agreeable to [facility]. CT team to follow.     1100: Received message that appeal is still pending at this time.   "

## 2024-11-12 NOTE — CARE PLAN
The patient's goals for the shift include sleep and comfort     The clinical goals for the shift include remain afebrile this shift      Problem: Skin  Goal: Participates in plan/prevention/treatment measures  Outcome: Progressing  Goal: Prevent/manage excess moisture  Outcome: Progressing  Goal: Prevent/minimize sheer/friction injuries  Outcome: Progressing  Goal: Promote/optimize nutrition  Outcome: Progressing  Goal: Promote skin healing  Outcome: Progressing     Problem: Fall/Injury  Goal: Not fall by end of shift  Outcome: Progressing  Goal: Be free from injury by end of the shift  Outcome: Progressing  Goal: Verbalize understanding of personal risk factors for fall in the hospital  Outcome: Progressing  Goal: Verbalize understanding of risk factor reduction measures to prevent injury from fall in the home  Outcome: Progressing  Goal: Use assistive devices by end of the shift  Outcome: Progressing  Goal: Pace activities to prevent fatigue by end of the shift  Outcome: Progressing     Problem: Infection related to problem list condition  Goal: Infection will resolve through treatment  Outcome: Progressing     Problem: Pain - Adult  Goal: Verbalizes/displays adequate comfort level or baseline comfort level  Outcome: Progressing     Problem: Safety - Adult  Goal: Free from fall injury  Outcome: Progressing     Problem: Discharge Planning  Goal: Discharge to home or other facility with appropriate resources  Outcome: Progressing     Problem: Chronic Conditions and Co-morbidities  Goal: Patient's chronic conditions and co-morbidity symptoms are monitored and maintained or improved  Outcome: Progressing     Problem: Diabetes  Goal: Achieve decreasing blood glucose levels by end of shift  Outcome: Progressing  Goal: Increase stability of blood glucose readings by end of shift  Outcome: Progressing  Goal: Decrease in ketones present in urine by end of shift  Outcome: Progressing  Goal: Maintain electrolyte levels  within acceptable range throughout shift  Outcome: Progressing  Goal: Maintain glucose levels >70mg/dl to <250mg/dl throughout shift  Outcome: Progressing  Goal: No changes in neurological exam by end of shift  Outcome: Progressing  Goal: Learn about and adhere to nutrition recommendations by end of shift  Outcome: Progressing  Goal: Vital signs within normal range for age by end of shift  Outcome: Progressing  Goal: Increase self care and/or family involovement by end of shift  Outcome: Progressing  Goal: Receive DSME education by end of shift  Outcome: Progressing

## 2024-11-12 NOTE — PROGRESS NOTES
Physical Therapy    Physical Therapy    Physical Therapy Treatment    Patient Name: Yen Montoya  MRN: [id number]  Today's Date: 11/12/2024  Time Calculation  Start Time: 1310  Stop Time: 1335  Time Calculation (min): 25 min     3115/3115-A       11/12/24 1310   PT  Visit   PT Received On 11/12/24   Response to Previous Treatment Patient with no complaints from previous session.   General   Reason for Referral ADL   Referred By Enoch   Past Medical History Relevant to Rehab Includes: Past Medical History  She has a past medical history of Acute embolism and thrombosis of right popliteal vein (Multi) (04/24/2020), Anxiety, Atrial fib/flutter, transient (Multi), CAD (coronary artery disease), Calculus of ureter (04/29/2020), Chronic venous hypertension (idiopathic) with inflammation of left lower extremity, Chronic venous hypertension (idiopathic) with inflammation of right lower extremity, Chronic venous hypertension (idiopathic) with ulcer of unspecified lower extremity (CODE), Complete heart block, Hypertension, Hypothyroidism, Long term (current) use of anticoagulants (01/20/2022), SAMAN (obstructive sleep apnea), Osteoporosis, Other bursitis of hip, right hip, Other mechanical complication of other internal joint prosthesis, initial encounter (CMS-HCC) (05/22/2020), Other postprocedural complications and disorders of the circulatory system, not elsewhere classified (10/08/2020), Other specified joint disorders, right shoulder, Personal history of diseases of the blood and blood-forming organs and certain disorders involving the immune mechanism, Pulmonary embolism, PVD (peripheral vascular disease) (CMS-HCC), RA (rheumatoid arthritis), Renal calculi, Sick sinus syndrome (Multi), Toxic effect of unspecified metal, accidental (unintentional), initial encounter (12/11/2020), Unspecified fracture of lower end of unspecified femur, initial encounter for closed fracture (Multi) (11/05/2021), and UTI (urinary tract  infection).     Surgical History  She has a past surgical history that includes Other surgical history (2021); XR chest pacemaker w fluoro (10/17/2021); Cardiac catheterization; Cholecystectomy;  section, low transverse; Tonsillectomy; Total hip arthroplasty (Bilateral); Total shoulder arthroplasty (Left); Total knee arthroplasty (Bilateral); Colonoscopy; pacemaker placement; Femur fracture surgery; Wrist surgery; Cardiac electrophysiology mapping and ablation; and Coronary stent placement.   Prior to Session Communication Bedside nurse   Patient Position Received Up in chair;Alarm on   General Comment Patient is agreeable to therapy, cleared for participation   Precautions   Medical Precautions Fall precautions   Pain Assessment   Pain Assessment 0-10   0-10 (Numeric) Pain Score 7   Pain Type Acute pain   Pain Location Shoulder   Pain Orientation Right   Effect of Pain on Daily Activities .   Pain Interventions Repositioned  (Nurse aware)   Cognition   Overall Cognitive Status WFL   Orientation Level Oriented X4   Therapeutic Exercise   Therapeutic Exercise Performed Yes   Therapeutic Exercise Activity 1 BLE therex x10 -20 reps, AO, GS,  LAQ, ABD, ADD, marching,  rest breaks required for task completion   Therapeutic Activity   Therapeutic Activity Performed Yes   Therapeutic Activity 1 x5 STS for increased strength and stability with functional transfers. Pt fatigues on #3, increased time and effort to complete last 2 transfers, CGA/[place]   Ambulation/Gait Training   Ambulation/Gait Training Performed Yes   Ambulation/Gait Training 1   Surface 1 Level tile   Device 1 Rolling walker   Gait Support Devices Gait belt   Assistance 1 Minimum assistance;Minimal verbal cues   Quality of Gait 1 NBOS;Diminished heel strike;Shuffling gait   Comments/Distance (ft) 1 20', 20', 30', 30' slow reciprocal gait, shuffling steps with decreased heel strike. Mildly unsteady, fatigues quickly requiring seated rest breaks.    Transfers   Transfer Yes   Transfer 1   Transfer From 1 Chair with arms to   Transfer to 1 Stand;Sit   Technique 1 Sit to stand;Stand to sit   Transfer Device 1 Walker;Gait belt   Transfer Level of Assistance 1 Minimum assistance;Contact guard;Minimal verbal cues   Activity Tolerance   Endurance Tolerates 10 - 20 min exercise with multiple rests   PT Assessment   PT Assessment Results Decreased strength;Decreased endurance;Impaired balance;Decreased mobility   Rehab Prognosis Good   Evaluation/Treatment Tolerance Patient limited by fatigue   End of Session Communication Bedside nurse   Assessment Comment Pt puts forth good effort, however, is limited by decreased activity tolerance and strength this session. Pt fatigues quickly requiring multiple seated rest breaks to complete activities. Increased time and effort for all activities.   End of Session Patient Position Up in chair;Alarm on     Outcome Measures:  [facility] Basic Mobility  Turning from your back to your side while in a flat bed without using bedrails: A little  Moving from lying on your back to sitting on the side of a flat bed without using bedrails: A little  Moving to and from bed to chair (including a wheelchair): A little  Standing up from a chair using your arms (e.g. wheelchair or bedside chair): A little  To walk in hospital room: A lot  Climbing 3-5 steps with railing: A lot  Basic Mobility - Total Score: 16                             EDUCATION:  Outpatient Education  Individual(s) Educated: Patient  Education Provided: Fall Risk  Education Documentation  Body Mechanics, taught by Mary Clements PTA at 11/12/2024  1:43 PM.  Learner: Patient  Readiness: Acceptance  Method: Explanation  Response: Verbalizes Understanding, Demonstrated Understanding, Needs Reinforcement    Precautions, taught by Mary Clements PTA at 11/12/2024  1:43 PM.  Learner: Patient  Readiness: Acceptance  Method: Explanation  Response: Verbalizes Understanding, Demonstrated  Understanding, Needs Reinforcement    ADL Training, taught by Mary Clements PTA at 11/12/2024  1:43 PM.  Learner: Patient  Readiness: Acceptance  Method: Explanation  Response: Verbalizes Understanding, Demonstrated Understanding, Needs Reinforcement    Body Mechanics, taught by Mary Clements PTA at 11/12/2024  1:43 PM.  Learner: Patient  Readiness: Acceptance  Method: Explanation  Response: Verbalizes Understanding, Demonstrated Understanding, Needs Reinforcement    Home Exercise Program, taught by Mary Clements PTA at 11/12/2024  1:43 PM.  Learner: Patient  Readiness: Acceptance  Method: Explanation  Response: Verbalizes Understanding, Demonstrated Understanding, Needs Reinforcement    Mobility Training, taught by Mary Clements PTA at 11/12/2024  1:43 PM.  Learner: Patient  Readiness: Acceptance  Method: Explanation  Response: Verbalizes Understanding, Demonstrated Understanding, Needs Reinforcement    Education Comments  No comments found.        GOALS:  Encounter Problems       Encounter Problems (Active)       PT Problem       Pt will be able to perform all bed mobility tasks with Mod I.  (Progressing)       Start:  11/04/24    Expected End:  11/18/24            Pt will perform all transfers with Mod I and FWW with proper safety mechanics.   (Progressing)       Start:  11/04/24    Expected End:  11/18/24            Pt will ambulate 100 ft with Mod I using FWW for improved functional independence.  (Progressing)       Start:  11/04/24    Expected End:  11/18/24            Pt will be able to negotiate 4 steps with 1 HR with SBA.  (Progressing)       Start:  11/04/24    Expected End:  11/18/24            Pt will be able to ambulate at least 100 ft with < or equal to 1 rest break while maintaining SpO2 > 90%.  (Progressing)       Start:  11/04/24    Expected End:  11/18/24               Pain - Adult

## 2024-11-12 NOTE — PROGRESS NOTES
Yen Montoya  11/11/24  [id number]  Kelsey Plummer MD  This is a patient with a past medical history as detailed below admitted to the [facility] ED with chief complaint of 74 y.o. female presenting to [facility] on 11/3/2024 with pertinent medical history of atrial fibrillation on Coumadin, sick sinus syndrome status post pacemaker insertion, Former tobacco use, RA, obesity, systolic heart failure (3/2024 Echo: EF 40-45%), adrenal mass, CAD status post 8 PCI, HTN, HLD, PVD, hypothyroidism, peripheral neuropathy, anxiety, SAMAN on CPAP, type 2 diabetes, pulmonary embolism, DVT RLE and nephrolithiasis.   Patient feels tired she has no chest pain shortness of breath or abdominal pain she just reports some abdominal cramps off-and-on   no dizziness no lightheadedness      Assessment and plan   1-hydronephrosis  s/p  stent placement  2-SABRINA hydrate follow-up CMP    3-hypomagnesemia replace  4-urinary tract infection leukocytosis follow-up CBC follow-up culture started on IV ceftriaxone .  5 history of coronary artery disease  s/p PCI  no chest pain    6.  Chronic systolic dysfunction heart failure stable   Discussed with the  consultants.  Awaiting for placement in rehab      Review of other systems negative other than HPI  Past medical history    Epic& consultants notes reviewed  Most recent images Reviewed  Last EKG/Rhythm reviewed      Vital signs has been reviewed  GENERAL: o x 3 in distress.   SKIN:  No rashes .   ENT mucosa,  Normal/nose normal   NECK: no jugulovenous distention  NO lymphadenopathy   LUNGS:No wheezing,no ronchi  no rales.  CARDIAC:  S1 and S2  ABDOMEN: Abdomen soft, mild-tender.    EXTREMITIES: Extremities normal.   NEURO: non focal    PULSES: + pedal / radial   No edema  No goiter   No carotid bruits.                   Past Medical History  She has a past medical history of Acute embolism and thrombosis of right popliteal vein (Multi) (04/24/2020), Anxiety, Atrial fib/flutter, transient (Multi), CAD  (coronary artery disease), Calculus of ureter (2020), Chronic venous hypertension (idiopathic) with inflammation of left lower extremity, Chronic venous hypertension (idiopathic) with inflammation of right lower extremity, Chronic venous hypertension (idiopathic) with ulcer of unspecified lower extremity (CODE), Complete heart block, Hypertension, Hypothyroidism, Long term (current) use of anticoagulants (2022), SAMAN (obstructive sleep apnea), Osteoporosis, Other bursitis of hip, right hip, Other mechanical complication of other internal joint prosthesis, initial encounter (CMS-HCC) (2020), Other postprocedural complications and disorders of the circulatory system, not elsewhere classified (10/08/2020), Other specified joint disorders, right shoulder, Personal history of diseases of the blood and blood-forming organs and certain disorders involving the immune mechanism, Pulmonary embolism, PVD (peripheral vascular disease) (CMS-HCC), RA (rheumatoid arthritis), Renal calculi, Sick sinus syndrome (Multi), Toxic effect of unspecified metal, accidental (unintentional), initial encounter (2020), Unspecified fracture of lower end of unspecified femur, initial encounter for closed fracture (Multi) (2021), and UTI (urinary tract infection).    Surgical History  She has a past surgical history that includes Other surgical history (2021); XR chest pacemaker w fluoro (10/17/2021); Cardiac catheterization; Cholecystectomy;  section, low transverse; Tonsillectomy; Total hip arthroplasty (Bilateral); Total shoulder arthroplasty (Left); Total knee arthroplasty (Bilateral); Colonoscopy; pacemaker placement; Femur fracture surgery; Wrist surgery; Cardiac electrophysiology mapping and ablation; and Coronary stent placement.     Social History  She reports that she quit smoking about 36 years ago. Her smoking use included cigarettes. She has never used smokeless tobacco. She reports current  alcohol use. She reports that she does not use drugs.    Family History  Family History   Problem Relation Name Age of Onset    Other (ptca) Mother      Heart failure Father      Coronary artery disease Father      Breast cancer Sister      Breast cancer Sister          Allergies  Adhesive tape-silicones, Latex, and Valdecoxib          Last Recorded Vitals  /76 (BP Location: Left arm, Patient Position: Lying)   Pulse 70   Temp 36.1 °C (97 °F)   Resp 18   Wt 104 kg (228 lb 13.4 oz)   SpO2 94%          Kelsey Plummer MD

## 2024-11-12 NOTE — PROGRESS NOTES
"Nutrition Initial Assessment:   Nutrition Assessment    Reason for Assessment: Length of stay    Patient is a 74 y.o. female presenting on 11/03 to [facility] with nausea and vomiting x 4 days with right lower quadrant pain and dysuria. Pt with hydronephrosis s/p stent placement. Discharge planning complicated by appeal process starting for acute care rehab.    Pertinent medical history of atrial fibrillation on Coumadin, sick sinus syndrome status post pacemaker insertion, Former tobacco use, RA, obesity, systolic heart failure (3/2024 Echo: EF 40-45%), adrenal mass, CAD status post 8 PCI, HTN, HLD, PVD, hypothyroidism, peripheral neuropathy, anxiety, SAMAN on CPAP, type 2 diabetes, pulmonary embolism, DVT RLE and nephrolithiasis     Nutrition History:  Energy Intake: Fair 50-75 % (CCD 60 gm/meal, Cardiac)  Vitamin/Herbal Supplement Use: Home meds include Vt B complex, Vt D3, Coenzyme Q10  Food Allergies/Intolerances:  None  GI Symptoms: Diarrhea  Oral Problems: None   0-10 (Numeric) Pain Score: 3      Anthropometrics:  Height: 175.3 cm (5' 9.02\")   Weight: 104 kg (228 lb 13.4 oz)   BMI (Calculated): 33.78  IBW/kg (Dietitian Calculated): 65.81 kg  Percent of IBW: 157.73 %       Weight History:   Wt Readings from Last 10 Encounters:   11/03/24 104 kg (228 lb 13.4 oz)   11/02/24 103 kg (226 lb 3.1 oz)   07/12/24 106 kg (233 lb)   01/04/24 101 kg (222 lb)   06/20/23 98.4 kg (217 lb)   02/20/23 106 kg (233 lb)   09/14/22 103 kg (227 lb 7 oz)   08/30/22 103 kg (227 lb 8 oz)   08/11/22 104 kg (229 lb 2 oz)   04/19/22 103 kg (227 lb)       Weight Change %:  Significant Weight Loss: No    Nutrition Focused Physical Exam Findings:    Subcutaneous Fat Loss:   Orbital Fat Pads: Well nourished (slightly bulging fat pads)  Buccal Fat Pads: Well nourished (full, rounded cheeks)  Triceps: Well nourished (ample fat tissue)  Ribs: Well nourished (chest is full, ribs do not show, slight to no protrusion of the iliac crest)  Muscle " Wasting:  Temporalis: Well nourished (well-defined muscle)  Pectoralis (Clavicular Region): Well nourished (clavicle not visible)  Deltoid/Trapezius: Well nourished (rounded appearance at arm, shoulder, neck)  Interosseous: Well nourished (muscle bulges)  Trapezius/Infraspinatus/Supraspinatus (Scapular Region): Defer  Gastrocnemius: Well nourished (well developed bulbous muscle)  Edema:  Edema: +1 trace  Edema Location: bilateral lower extremity  Physical Findings:  Hair: Negative  Eyes: Negative  Mouth: Negative  Nails: Negative  Skin: Negative (scabbing noted)    Nutrition Significant Labs:  CBC Trend:   Results from last 7 days   Lab Units 11/12/24  0537 11/11/24  0530 11/10/24  0648 11/09/24  0537   WBC AUTO x10*3/uL 9.2 8.8 9.1 8.7   RBC AUTO x10*6/uL 3.97* 4.03 4.24 3.81*   HEMOGLOBIN g/dL 12.9 13.0 13.9 12.4   HEMATOCRIT % 39.3 40.7 42.2 38.2   MCV fL 99 101* 100 100   PLATELETS AUTO x10*3/uL 302 307 317 280    , BMP Trend:   Results from last 7 days   Lab Units 11/12/24  0537 11/11/24  0530 11/10/24  0648 11/09/24  0537   GLUCOSE mg/dL 124* 116* 130* 118*   CALCIUM mg/dL 10.3 10.4* 10.6* 9.7   SODIUM mmol/L 138 136 139 138   POTASSIUM mmol/L 3.7 4.1 3.9 3.3*   CO2 mmol/L 28 28 27 28   CHLORIDE mmol/L 103 101 104 103   BUN mg/dL 19 17 16 20   CREATININE mg/dL 0.94 1.07* 1.00 0.92        Nutrition Specific Medications:  Scheduled medications  amoxicillin-pot clavulanate, 1 tablet, oral, q12h YESSI  aspirin, 81 mg, oral, Daily  atorvastatin, 80 mg, oral, Nightly  azaTHIOprine, 100 mg, oral, Daily  buPROPion XL, 300 mg, oral, Daily  cholecalciferol, 5,000 Units, oral, Daily  dilTIAZem, 60 mg, oral, q8h  haloperidol lactate, 1 mg, intravenous, Once  insulin lispro, 0-10 Units, subcutaneous, After meals & nightly  [Held by provider] insulin NPH (Isophane), 30 Units, subcutaneous, Nightly  lactobacillus acidophilus, 1 capsule, oral, Daily  levothyroxine, 100 mcg, oral, Daily before breakfast  lisinopril, 20 mg, oral,  Daily  metoprolol tartrate, 100 mg, oral, BID  warfarin, 2 mg, oral, Once per day on Sunday Tuesday Thursday Friday Saturday  warfarin, 4 mg, oral, Once per day on Monday Wednesday      Continuous medications     PRN medications  PRN medications: acetaminophen **OR** acetaminophen **OR** acetaminophen, dextrose, dextrose, furosemide, glucagon, glucagon, hydrALAZINE, hydrOXYzine HCL, ipratropium-albuteroL, melatonin, oxygen, polyethylene glycol     I/O:   Last BM Date: 11/12/24; Stool Appearance: Watery (11/12/24 0800)    Dietary Orders (From admission, onward)       Start     Ordered    11/12/24 1335  Oral nutritional supplements  Until discontinued        Comments: Send at 10am and 2pm.   Question Answer Comment   Deliver with  Send at 10am & 2pm   Select supplement: Product from home - please specify    Additional Details ENSURE COMPACT        11/12/24 1335    11/12/24 1334  Snacks  Until discontinued        Comments: Cheese & Crackers   Question:  Deliver with  Answer:    Comment:  HS SNACK    11/12/24 1334    11/12/24 1332  Adult diet Cardiac, Consistent Carb; CCD 60 gm/meal; 70 gm fat; 2 - 3 grams Sodium  Diet effective now        Comments: SMALL PORTIONS. 6 SMALL MEALS DAILY. PLEASE OPEN ALL CONTAINERS & CONDIMENTS UPON SERVING MEALS.   Question Answer Comment   Diet type Cardiac    Diet type Consistent Carb    Carb diet selection: CCD 60 gm/meal    Fat restriction: 70 gm fat    Sodium restriction: 2 - 3 grams Sodium        11/12/24 1333    11/03/24 2027  May Participate in Room Service  ( ROOM SERVICE MAY PARTICIPATE)  Once        Question:  .  Answer:  Yes    11/03/24 2026                     Estimated Needs:   Total Energy Estimated Needs (kCal): 2093 kCal  Method for Estimating Needs: MSJ (1610) x 1.3 x 1.0  Total Protein Estimated Needs (g): 104 g  Method for Estimating Needs: 1.0g/kg  Total Fluid Estimated Needs (mL): 2000 mL  Method for Estimating Needs: 1ml/kcal        Nutrition Diagnosis    Malnutrition Diagnosis  Patient has Malnutrition Diagnosis: No    Nutrition Diagnosis  Patient has Nutrition Diagnosis: Yes  Diagnosis Status (1): New  Nutrition Diagnosis 1: Inadequate oral intake  Related to (1): loss of appetite, early satiety, dysphagia  As Evidenced by (1): pt complains only eating 50% of what she orders.  Additional Nutrition Diagnosis: Diagnosis 2  Diagnosis Status (2): New  Nutrition Diagnosis 2: Self-feeding difficulity  Related to (2): unable to open containers  As Evidenced by (2): pt complains of increased weakness with opening drinks and condiments.       Nutrition Interventions/Recommendations         Nutrition Prescription:  Individualized Nutrition Prescription Provided for : Continue therapeutic diet for disease management.        Nutrition Interventions:   Interventions: Meals and snacks, Medical food supplement, Feeding assistance  Meals and Snacks: Fat-modified diet, Mineral-modified diet, Carbohydrate-modified diet, Modify schedule of foods/fluids, Modify composition of meals/snacks  Goal: Meals to provide 60-grams CHO at each meal, <70g fat and <3,000mg Sodium per day. Modify meals to provide Small Portions.  Medical Food Supplement: Commercial beverage  Goal: Supplement with ENSURE COMPACT (220 kcal/9g protein each) at 10am and 2pm. Cheese and crackers for HS.  Feeding Assistance: Meal set-up, Menu selection assistance  Goal: Containers and condiments to be opened by tray passer upon delivering her meal tray.  Additional Interventions: Pt cannot see menu (print too small). [employer] Operators to continue to assist pt with menu selection when she calls.         Nutrition Education:   Education Documentation  Nutrition Care Manual, taught by Fatimah Kirkland RDN, LD at 11/12/2024  1:29 PM.  Learner: Patient  Readiness: Acceptance  Method: Explanation  Response: Verbalizes Understanding  Comment: Reviewed menu selections and ways to incorporate more calories/protein into her daily  "intakes. Suggested 6 small meals and an option to incorporate an oral nutrition supplement. Discussed modifying diet to \"small portions\".               Nutrition Monitoring and Evaluation   Food/Nutrient Related History Monitoring  Monitoring and Evaluation Plan: Mealtime behavior  Amount of Food: Estimated amout of food  Criteria: Pt to consume >75% of meals    Body Composition/Growth/Weight History  Monitoring and Evaluation Plan: Weight  Weight: Measured weight  Criteria: Maintain stable weight.                   Time Spent (min): 45 minutes      "

## 2024-11-13 LAB
ANION GAP SERPL CALC-SCNC: 13 MMOL/L (ref 10–20)
BUN SERPL-MCNC: 21 MG/DL (ref 6–23)
CALCIUM SERPL-MCNC: 10.6 MG/DL (ref 8.6–10.3)
CHLORIDE SERPL-SCNC: 102 MMOL/L (ref 98–107)
CO2 SERPL-SCNC: 26 MMOL/L (ref 21–32)
CREAT SERPL-MCNC: 1 MG/DL (ref 0.5–1.05)
EGFRCR SERPLBLD CKD-EPI 2021: 60 ML/MIN/1.73M*2
ERYTHROCYTE [DISTWIDTH] IN BLOOD BY AUTOMATED COUNT: 13.6 % (ref 11.5–14.5)
GLUCOSE BLD MANUAL STRIP-MCNC: 107 MG/DL (ref 74–99)
GLUCOSE BLD MANUAL STRIP-MCNC: 131 MG/DL (ref 74–99)
GLUCOSE BLD MANUAL STRIP-MCNC: 150 MG/DL (ref 74–99)
GLUCOSE BLD MANUAL STRIP-MCNC: 174 MG/DL (ref 74–99)
GLUCOSE SERPL-MCNC: 116 MG/DL (ref 74–99)
HCT VFR BLD AUTO: 42.1 % (ref 36–46)
HGB BLD-MCNC: 13.3 G/DL (ref 12–16)
INR PPP: 1.8 (ref 0.9–1.1)
MAGNESIUM SERPL-MCNC: 1.55 MG/DL (ref 1.6–2.4)
MCH RBC QN AUTO: 32 PG (ref 26–34)
MCHC RBC AUTO-ENTMCNC: 31.6 G/DL (ref 32–36)
MCV RBC AUTO: 101 FL (ref 80–100)
NRBC BLD-RTO: 0 /100 WBCS (ref 0–0)
PLATELET # BLD AUTO: 281 X10*3/UL (ref 150–450)
POTASSIUM SERPL-SCNC: 3.7 MMOL/L (ref 3.5–5.3)
PROTHROMBIN TIME: 20.2 SECONDS (ref 9.8–12.8)
RBC # BLD AUTO: 4.16 X10*6/UL (ref 4–5.2)
SODIUM SERPL-SCNC: 137 MMOL/L (ref 136–145)
WBC # BLD AUTO: 9.3 X10*3/UL (ref 4.4–11.3)

## 2024-11-13 PROCEDURE — 36415 COLL VENOUS BLD VENIPUNCTURE: CPT | Performed by: NURSE PRACTITIONER

## 2024-11-13 PROCEDURE — 82374 ASSAY BLOOD CARBON DIOXIDE: CPT | Performed by: NURSE PRACTITIONER

## 2024-11-13 PROCEDURE — 97530 THERAPEUTIC ACTIVITIES: CPT | Mod: GO,CO

## 2024-11-13 PROCEDURE — 2500000004 HC RX 250 GENERAL PHARMACY W/ HCPCS (ALT 636 FOR OP/ED): Performed by: NURSE PRACTITIONER

## 2024-11-13 PROCEDURE — 2500000001 HC RX 250 WO HCPCS SELF ADMINISTERED DRUGS (ALT 637 FOR MEDICARE OP): Performed by: NURSE PRACTITIONER

## 2024-11-13 PROCEDURE — 97116 GAIT TRAINING THERAPY: CPT | Mod: GP,CQ

## 2024-11-13 PROCEDURE — 85610 PROTHROMBIN TIME: CPT | Performed by: NURSE PRACTITIONER

## 2024-11-13 PROCEDURE — 2500000002 HC RX 250 W HCPCS SELF ADMINISTERED DRUGS (ALT 637 FOR MEDICARE OP, ALT 636 FOR OP/ED): Performed by: NURSE PRACTITIONER

## 2024-11-13 PROCEDURE — 97530 THERAPEUTIC ACTIVITIES: CPT | Mod: GP,CQ

## 2024-11-13 PROCEDURE — 97535 SELF CARE MNGMENT TRAINING: CPT | Mod: GO,CO

## 2024-11-13 PROCEDURE — 85027 COMPLETE CBC AUTOMATED: CPT | Performed by: NURSE PRACTITIONER

## 2024-11-13 PROCEDURE — 83735 ASSAY OF MAGNESIUM: CPT | Performed by: NURSE PRACTITIONER

## 2024-11-13 PROCEDURE — 82947 ASSAY GLUCOSE BLOOD QUANT: CPT

## 2024-11-13 PROCEDURE — 1200000002 HC GENERAL ROOM WITH TELEMETRY DAILY

## 2024-11-13 RX ORDER — POTASSIUM CHLORIDE 20 MEQ/1
20 TABLET, EXTENDED RELEASE ORAL ONCE
Status: COMPLETED | OUTPATIENT
Start: 2024-11-13 | End: 2024-11-13

## 2024-11-13 RX ORDER — MAGNESIUM SULFATE HEPTAHYDRATE 40 MG/ML
2 INJECTION, SOLUTION INTRAVENOUS ONCE
Status: COMPLETED | OUTPATIENT
Start: 2024-11-13 | End: 2024-11-13

## 2024-11-13 ASSESSMENT — COGNITIVE AND FUNCTIONAL STATUS - GENERAL
WALKING IN HOSPITAL ROOM: A LITTLE
DAILY ACTIVITIY SCORE: 18
WALKING IN HOSPITAL ROOM: A LITTLE
MOVING TO AND FROM BED TO CHAIR: A LITTLE
STANDING UP FROM CHAIR USING ARMS: A LITTLE
DAILY ACTIVITIY SCORE: 19
CLIMB 3 TO 5 STEPS WITH RAILING: A LITTLE
CLIMB 3 TO 5 STEPS WITH RAILING: A LOT
MOBILITY SCORE: 20
TOILETING: A LITTLE
HELP NEEDED FOR BATHING: A LITTLE
PERSONAL GROOMING: A LITTLE
DRESSING REGULAR UPPER BODY CLOTHING: A LITTLE
DRESSING REGULAR LOWER BODY CLOTHING: A LITTLE
TURNING FROM BACK TO SIDE WHILE IN FLAT BAD: A LITTLE
MOVING TO AND FROM BED TO CHAIR: A LITTLE
DRESSING REGULAR LOWER BODY CLOTHING: A LOT
DRESSING REGULAR UPPER BODY CLOTHING: A LITTLE
TOILETING: A LITTLE
MOVING FROM LYING ON BACK TO SITTING ON SIDE OF FLAT BED WITH BEDRAILS: A LITTLE
PERSONAL GROOMING: A LITTLE
MOBILITY SCORE: 17
STANDING UP FROM CHAIR USING ARMS: A LITTLE
HELP NEEDED FOR BATHING: A LITTLE

## 2024-11-13 ASSESSMENT — PAIN - FUNCTIONAL ASSESSMENT: PAIN_FUNCTIONAL_ASSESSMENT: 0-10

## 2024-11-13 ASSESSMENT — ACTIVITIES OF DAILY LIVING (ADL): HOME_MANAGEMENT_TIME_ENTRY: 15

## 2024-11-13 ASSESSMENT — PAIN SCALES - GENERAL
PAINLEVEL_OUTOF10: 0 - NO PAIN
PAINLEVEL_OUTOF10: 5 - MODERATE PAIN
PAINLEVEL_OUTOF10: 0 - NO PAIN
PAINLEVEL_OUTOF10: 5 - MODERATE PAIN

## 2024-11-13 ASSESSMENT — PAIN DESCRIPTION - LOCATION: LOCATION: OTHER (COMMENT)

## 2024-11-13 NOTE — CARE PLAN
Problem: Skin  Goal: Participates in plan/prevention/treatment measures  Outcome: Progressing  Goal: Prevent/manage excess moisture  Outcome: Progressing  Goal: Prevent/minimize sheer/friction injuries  Outcome: Progressing  Goal: Promote/optimize nutrition  Outcome: Progressing  Goal: Promote skin healing  Outcome: Progressing     Problem: Fall/Injury  Goal: Not fall by end of shift  Outcome: Progressing  Goal: Be free from injury by end of the shift  Outcome: Progressing  Goal: Verbalize understanding of personal risk factors for fall in the hospital  Outcome: Progressing  Goal: Verbalize understanding of risk factor reduction measures to prevent injury from fall in the home  Outcome: Progressing  Goal: Use assistive devices by end of the shift  Outcome: Progressing  Goal: Pace activities to prevent fatigue by end of the shift  Outcome: Progressing     Problem: Infection related to problem list condition  Goal: Infection will resolve through treatment  Outcome: Progressing     Problem: Pain - Adult  Goal: Verbalizes/displays adequate comfort level or baseline comfort level  Outcome: Progressing     Problem: Safety - Adult  Goal: Free from fall injury  Outcome: Progressing     Problem: Discharge Planning  Goal: Discharge to home or other facility with appropriate resources  Outcome: Progressing     Problem: Chronic Conditions and Co-morbidities  Goal: Patient's chronic conditions and co-morbidity symptoms are monitored and maintained or improved  Outcome: Progressing     Problem: Diabetes  Goal: Achieve decreasing blood glucose levels by end of shift  Outcome: Progressing  Goal: Increase stability of blood glucose readings by end of shift  Outcome: Progressing  Goal: Decrease in ketones present in urine by end of shift  Outcome: Progressing  Goal: Maintain electrolyte levels within acceptable range throughout shift  Outcome: Progressing  Goal: Maintain glucose levels >70mg/dl to <250mg/dl throughout  shift  Outcome: Progressing  Goal: No changes in neurological exam by end of shift  Outcome: Progressing  Goal: Learn about and adhere to nutrition recommendations by end of shift  Outcome: Progressing  Goal: Vital signs within normal range for age by end of shift  Outcome: Progressing  Goal: Increase self care and/or family involovement by end of shift  Outcome: Progressing  Goal: Receive DSME education by end of shift  Outcome: Progressing     Problem: Nutrition  Goal: Oral intake greater 75%  Outcome: Progressing  Goal: Consume prescribed supplement  Outcome: Progressing  Goal: Adequate PO fluid intake  Outcome: Progressing  Goal: Lab values WNL  Outcome: Progressing  Goal: Electrolytes WNL  Outcome: Progressing  Goal: Maintain stable weight  Outcome: Progressing   The patient's goals for the shift include      The clinical goals for the shift include Pt will remain hemodynamically stable

## 2024-11-13 NOTE — PROGRESS NOTES
" Yen Montoya  11/12/24  [id number]  Kelsey Plummer MD  This is a patient with a past medical history as detailed below admitted to the [facility] ED with chief complaint of 74 y.o. female presenting to [facility] on 11/3/2024 with pertinent medical history of atrial fibrillation on Coumadin, sick sinus syndrome status post pacemaker insertion, Former tobacco use, RA, obesity, systolic heart failure (3/2024 Echo: EF 40-45%), adrenal mass, CAD status post 8 PCI, HTN, HLD, PVD, hypothyroidism, peripheral neuropathy, anxiety, SAMAN on CPAP, type 2 diabetes, pulmonary embolism, DVT RLE and nephrolithiasis. Presented to an outside emergency room for complaint of nausea and vomiting x 4 days with right lower quadrant pain and dysuria. In addition, the patient complaints of intermittent fevers x1 day, dizziness, chest pain, and shortness of breath.  No denies chest pain tightness pressure shortness of breath lightheadedness or dizziness feels fatigued has no energy wants to go to [place].      Assessment and plan   1-hydronephrosis admitted to the hospital pain control CT abdomen pelvis monitor electrolytes CBC lactic acid consulted urology status post stent placement pain subsided  2-SABRINA hydrate follow-up CMP monitor urine output  3-  debility  [place]   appeal. Spoke to patient at bedside to update her and to inquire about a back up plan. Patient states if she is still denied with the appeal she will \"just go home\"   4-urinary tract infection leukocytosis follow-up CBC follow-up culture started on IV ceftriaxone .  Antibiotics changed to Augmentin  5 history of coronary artery disease currently has no chest pain   6 hyperlipidemia hypertension resume home medication  7.  History of A-fib flutter follow-up PT/INR  Discussed with the  consultants.      Review of other systems negative other than HPI  Past medical history    Epic& consultants notes reviewed  Most recent images Reviewed  Last EKG/Rhythm reviewed      Vital signs has been " reviewed  GENERAL: o x 3 in distress.   SKIN:  No rashes .   ENT mucosa,  Normal/nose normal   NECK: no jugulovenous distention  NO lymphadenopathy   LUNGS:No wheezing,no ronchi  no rales.  CARDIAC:  S1 and S2  ABDOMEN: Abdomen soft, mild-tender.    EXTREMITIES: Extremities normal.   NEURO: non focal    PULSES: + pedal / radial           Past Medical History  She has a past medical history of Acute embolism and thrombosis of right popliteal vein (Multi) (04/24/2020), Anxiety, Atrial fib/flutter, transient (Multi), CAD (coronary artery disease), Calculus of ureter (04/29/2020), Chronic venous hypertension (idiopathic) with inflammation of left lower extremity, Chronic venous hypertension (idiopathic) with inflammation of right lower extremity, Chronic venous hypertension (idiopathic) with ulcer of unspecified lower extremity (CODE), Complete heart block, Hypertension, Hypothyroidism, Long term (current) use of anticoagulants (01/20/2022), SAMAN (obstructive sleep apnea), Osteoporosis, Other bursitis of hip, right hip, Other mechanical complication of other internal joint prosthesis, initial encounter (CMS-HCC) (05/22/2020), Other postprocedural complications and disorders of the circulatory system, not elsewhere classified (10/08/2020), Other specified joint disorders, right shoulder, Personal history of diseases of the blood and blood-forming organs and certain disorders involving the immune mechanism, Pulmonary embolism, PVD (peripheral vascular disease) (CMS-HCC), RA (rheumatoid arthritis), Renal calculi, Sick sinus syndrome (Multi), Toxic effect of unspecified metal, accidental (unintentional), initial encounter (12/11/2020), Unspecified fracture of lower end of unspecified femur, initial encounter for closed fracture (Multi) (11/05/2021), and UTI (urinary tract infection).    Surgical History  She has a past surgical history that includes Other surgical history (09/24/2021); XR chest pacemaker w fluoro  (10/17/2021); Cardiac catheterization; Cholecystectomy;  section, low transverse; Tonsillectomy; Total hip arthroplasty (Bilateral); Total shoulder arthroplasty (Left); Total knee arthroplasty (Bilateral); Colonoscopy; pacemaker placement; Femur fracture surgery; Wrist surgery; Cardiac electrophysiology mapping and ablation; and Coronary stent placement.     Social History  She reports that she quit smoking about 36 years ago. Her smoking use included cigarettes. She has never used smokeless tobacco. She reports current alcohol use. She reports that she does not use drugs.    Family History  Family History   Problem Relation Name Age of Onset    Other (ptca) Mother      Heart failure Father      Coronary artery disease Father      Breast cancer Sister      Breast cancer Sister                Last Recorded Vitals  /87 (BP Location: Left arm, Patient Position: Lying)   Pulse 70   Temp 35.8 °C (96.4 °F)   Resp 18   Wt 104 kg (228 lb 13.4 oz)   SpO2 99%          Kelsey Plummer MD

## 2024-11-13 NOTE — PROGRESS NOTES
11/13/24 1439   Discharge Planning   Type of Residence Private residence   Home or Post Acute Services Post acute facilities (Rehab/SNF/etc)   Expected Discharge Disposition Rehab   Does the patient need discharge transport arranged? Yes   RoundTrip coordination needed? Yes   Has discharge transport been arranged? No     Inquiry sent to [facility] on the status of the appeal.     1545: Appeal pending for facility.

## 2024-11-13 NOTE — PROGRESS NOTES
Occupational Therapy    OT Treatment    Patient Name: Yen Montoya  MRN: [id number]  Today's Date: 11/13/2024  Time Calculation  Start Time: 1251  Stop Time: 1320  Time Calculation (min): 29 min       3115/3115-A    Assessment:  OT Assessment: Patient required min a for all mobility tasks this date, verbal cues provided for ww mgmt during mobility.  Evaluation/Treatment Tolerance: Patient tolerated treatment well  End of Session Communication: Bedside nurse  End of Session Patient Position: Up in chair, Alarm on  OT Assessment Results: Decreased ADL status, Decreased cognition, Decreased safe judgment during ADL, Decreased endurance, Decreased functional mobility, Decreased IADLs  Evaluation/Treatment Tolerance: Patient tolerated treatment well    Plan:  Treatment Interventions: ADL retraining, Functional transfer training  OT Frequency: 4 times per week  OT Discharge Recommendations: High intensity level of continued care  Equipment Recommended upon Discharge: Wheeled walker  Treatment Interventions: ADL retraining, Functional transfer training  Subjective     Outcome Measures:[facility] Daily Activity  Putting on and taking off regular lower body clothing: A lot  Bathing (including washing, rinsing, drying): A little  Putting on and taking off regular upper body clothing: A little  Toileting, which includes using toilet, bedpan or urinal: A little  Taking care of personal grooming such as brushing teeth: A little  Eating Meals: None  Daily Activity - Total Score: 18         11/13/24 1251   OT Last Visit   OT Received On 11/13/24   General   Reason for Referral ADL   Prior to Session Communication Bedside nurse   Patient Position Received Bed, 2 rail up;Alarm on   Preferred Learning Style verbal;visual   General Comment Patient is agreeable to therapy, cleared for participation   Precautions   Medical Precautions Fall precautions   Pain Assessment   0-10 (Numeric) Pain Score 5 - Moderate pain   Pain Type Chronic pain    Pain Location Shoulder   Pain Orientation Right   Pain Interventions   (OT to tolerance)   Cognition   Orientation Level Oriented X4   Grooming   Grooming Level of Assistance Setup   Grooming Where Assessed Chair   Grooming Comments washed hands   LE Bathing   LE Bathing Comments Discussed possibility of using a bath tape on the bathtub bottom for better traction with standing. Pt has a tub bench but has slipped on the bathmat in the past.   UE Dressing   UE Dressing Level of Assistance Moderate assistance   UE Dressing Where Assessed Edge of bed   UE Dressing Comments Donned own as a robe. Pt has increased difficulty due to sore R shld   Toileting   Toileting Level of Assistance Minimum assistance   Where Assessed Bedside commode   Toileting Comments Pt asked for the commod and was able to complete niru care seated on commode   Bed Mobility   Bed Mobility   (max A to get to EOB, due to pain in R shld)   Functional Mobility   Functional Mobility Performed   (Pt ambulates in room at min A with chair follow for safety.)   Transfers   Transfer   (STS min A)   Toilet Transfers   Toilet Transfer to Standard bedside commode   Toilet Transfer Technique Ambulating   Toilet Transfers Minimal assistance   IP OT Assessment   OT Assessment Patient required min a for all mobility tasks this date, verbal cues provided for ww mgmt during mobility.   Evaluation/Treatment Tolerance Patient tolerated treatment well   End of Session Communication Bedside nurse   End of Session Patient Position Up in chair;Alarm on   OT Assessment   OT Assessment Results Decreased ADL status;Decreased cognition;Decreased safe judgment during ADL;Decreased endurance;Decreased functional mobility;Decreased IADLs   Education   Individual(s) Educated Patient   Education Provided Fall precautons   Patient Response to Education Patient/Caregiver Verbalized Understanding of Information   Education Comment Pt would benefit from continued reinforcement    Inpatient Plan   Treatment Interventions ADL retraining;Functional transfer training   OT Frequency 4 times per week   OT Discharge Recommendations High intensity level of continued care   Equipment Recommended upon Discharge Wheeled walker         Goals:  Encounter Problems       Encounter Problems (Active)       OT Goals       Sup for ADL functional mobility with FWW.        Start:  11/05/24    Expected End:  11/19/24            Sup for sit/stand, bed/chair/commode with FWW.        Start:  11/05/24    Expected End:  11/19/24            Good (-) dynamic standing balance for ADL.        Start:  11/05/24    Expected End:  11/19/24            Tolerate 10 mins light functional activity.        Start:  11/05/24    Expected End:  11/19/24            Normal dynamic sitting balance for ADL.        Start:  11/05/24    Expected End:  11/19/24

## 2024-11-14 LAB
ANION GAP SERPL CALC-SCNC: 11 MMOL/L (ref 10–20)
ATRIAL RATE: 70 BPM
BUN SERPL-MCNC: 22 MG/DL (ref 6–23)
CALCIUM SERPL-MCNC: 10.6 MG/DL (ref 8.6–10.3)
CHLORIDE SERPL-SCNC: 105 MMOL/L (ref 98–107)
CO2 SERPL-SCNC: 26 MMOL/L (ref 21–32)
CREAT SERPL-MCNC: 1.13 MG/DL (ref 0.5–1.05)
EGFRCR SERPLBLD CKD-EPI 2021: 51 ML/MIN/1.73M*2
ERYTHROCYTE [DISTWIDTH] IN BLOOD BY AUTOMATED COUNT: 13.6 % (ref 11.5–14.5)
GLUCOSE BLD MANUAL STRIP-MCNC: 112 MG/DL (ref 74–99)
GLUCOSE BLD MANUAL STRIP-MCNC: 118 MG/DL (ref 74–99)
GLUCOSE BLD MANUAL STRIP-MCNC: 123 MG/DL (ref 74–99)
GLUCOSE BLD MANUAL STRIP-MCNC: 132 MG/DL (ref 74–99)
GLUCOSE BLD MANUAL STRIP-MCNC: 192 MG/DL (ref 74–99)
GLUCOSE SERPL-MCNC: 114 MG/DL (ref 74–99)
HCT VFR BLD AUTO: 41.8 % (ref 36–46)
HGB BLD-MCNC: 12.9 G/DL (ref 12–16)
INR PPP: 1.5 (ref 0.9–1.1)
MAGNESIUM SERPL-MCNC: 1.83 MG/DL (ref 1.6–2.4)
MCH RBC QN AUTO: 31.9 PG (ref 26–34)
MCHC RBC AUTO-ENTMCNC: 30.9 G/DL (ref 32–36)
MCV RBC AUTO: 104 FL (ref 80–100)
NRBC BLD-RTO: 0 /100 WBCS (ref 0–0)
PLATELET # BLD AUTO: 284 X10*3/UL (ref 150–450)
POTASSIUM SERPL-SCNC: 4.5 MMOL/L (ref 3.5–5.3)
PROTHROMBIN TIME: 17.5 SECONDS (ref 9.8–12.8)
Q ONSET: 182 MS
QRS COUNT: 12 BEATS
QRS DURATION: 186 MS
QT INTERVAL: 456 MS
QTC CALCULATION(BAZETT): 492 MS
QTC FREDERICIA: 480 MS
R AXIS: 157 DEGREES
RBC # BLD AUTO: 4.04 X10*6/UL (ref 4–5.2)
SODIUM SERPL-SCNC: 137 MMOL/L (ref 136–145)
T AXIS: 24 DEGREES
T OFFSET: 410 MS
VENTRICULAR RATE: 70 BPM
WBC # BLD AUTO: 8.8 X10*3/UL (ref 4.4–11.3)

## 2024-11-14 PROCEDURE — 2500000001 HC RX 250 WO HCPCS SELF ADMINISTERED DRUGS (ALT 637 FOR MEDICARE OP): Performed by: NURSE PRACTITIONER

## 2024-11-14 PROCEDURE — 36415 COLL VENOUS BLD VENIPUNCTURE: CPT | Performed by: NURSE PRACTITIONER

## 2024-11-14 PROCEDURE — 85027 COMPLETE CBC AUTOMATED: CPT | Performed by: NURSE PRACTITIONER

## 2024-11-14 PROCEDURE — 97116 GAIT TRAINING THERAPY: CPT | Mod: GP,CQ

## 2024-11-14 PROCEDURE — 2500000002 HC RX 250 W HCPCS SELF ADMINISTERED DRUGS (ALT 637 FOR MEDICARE OP, ALT 636 FOR OP/ED): Performed by: NURSE PRACTITIONER

## 2024-11-14 PROCEDURE — 85610 PROTHROMBIN TIME: CPT | Performed by: NURSE PRACTITIONER

## 2024-11-14 PROCEDURE — 83735 ASSAY OF MAGNESIUM: CPT | Performed by: NURSE PRACTITIONER

## 2024-11-14 PROCEDURE — 1200000002 HC GENERAL ROOM WITH TELEMETRY DAILY

## 2024-11-14 PROCEDURE — 2500000004 HC RX 250 GENERAL PHARMACY W/ HCPCS (ALT 636 FOR OP/ED): Performed by: NURSE PRACTITIONER

## 2024-11-14 PROCEDURE — 97530 THERAPEUTIC ACTIVITIES: CPT | Mod: GP,CQ

## 2024-11-14 PROCEDURE — 97110 THERAPEUTIC EXERCISES: CPT | Mod: GP,CQ

## 2024-11-14 PROCEDURE — 80048 BASIC METABOLIC PNL TOTAL CA: CPT | Performed by: NURSE PRACTITIONER

## 2024-11-14 PROCEDURE — 82947 ASSAY GLUCOSE BLOOD QUANT: CPT

## 2024-11-14 RX ORDER — WARFARIN 2 MG/1
2 TABLET ORAL ONCE
Status: COMPLETED | OUTPATIENT
Start: 2024-11-14 | End: 2024-11-14

## 2024-11-14 RX ORDER — WATER
200 LIQUID (ML) MISCELLANEOUS
Status: COMPLETED | OUTPATIENT
Start: 2024-11-14 | End: 2024-11-14

## 2024-11-14 ASSESSMENT — COGNITIVE AND FUNCTIONAL STATUS - GENERAL
TOILETING: A LITTLE
CLIMB 3 TO 5 STEPS WITH RAILING: A LITTLE
TOILETING: A LITTLE
CLIMB 3 TO 5 STEPS WITH RAILING: A LITTLE
MOVING TO AND FROM BED TO CHAIR: A LITTLE
CLIMB 3 TO 5 STEPS WITH RAILING: A LOT
TURNING FROM BACK TO SIDE WHILE IN FLAT BAD: A LITTLE
MOBILITY SCORE: 20
DRESSING REGULAR LOWER BODY CLOTHING: A LITTLE
WALKING IN HOSPITAL ROOM: A LITTLE
STANDING UP FROM CHAIR USING ARMS: A LITTLE
DRESSING REGULAR LOWER BODY CLOTHING: A LITTLE
MOVING FROM LYING ON BACK TO SITTING ON SIDE OF FLAT BED WITH BEDRAILS: A LITTLE
WALKING IN HOSPITAL ROOM: A LITTLE
MOVING FROM LYING ON BACK TO SITTING ON SIDE OF FLAT BED WITH BEDRAILS: A LITTLE
DAILY ACTIVITIY SCORE: 20
HELP NEEDED FOR BATHING: A LITTLE
WALKING IN HOSPITAL ROOM: A LITTLE
MOVING TO AND FROM BED TO CHAIR: A LITTLE
MOVING TO AND FROM BED TO CHAIR: A LITTLE
MOBILITY SCORE: 16
MOBILITY SCORE: 18
TURNING FROM BACK TO SIDE WHILE IN FLAT BAD: A LOT
PERSONAL GROOMING: A LITTLE
HELP NEEDED FOR BATHING: A LITTLE
STANDING UP FROM CHAIR USING ARMS: A LITTLE
DRESSING REGULAR UPPER BODY CLOTHING: A LITTLE
DAILY ACTIVITIY SCORE: 19
STANDING UP FROM CHAIR USING ARMS: A LITTLE
DRESSING REGULAR UPPER BODY CLOTHING: A LITTLE

## 2024-11-14 ASSESSMENT — PAIN SCALES - GENERAL
PAINLEVEL_OUTOF10: 1
PAINLEVEL_OUTOF10: 0 - NO PAIN
PAINLEVEL_OUTOF10: 3

## 2024-11-14 ASSESSMENT — PAIN DESCRIPTION - LOCATION: LOCATION: HEAD

## 2024-11-14 ASSESSMENT — PAIN SCALES - WONG BAKER: WONGBAKER_NUMERICALRESPONSE: HURTS LITTLE BIT

## 2024-11-14 ASSESSMENT — PAIN - FUNCTIONAL ASSESSMENT: PAIN_FUNCTIONAL_ASSESSMENT: 0-10

## 2024-11-14 NOTE — PROGRESS NOTES
Physical Therapy    Physical Therapy Treatment    Patient Name: Yen Montoya  MRN: [id number]  Today's Date: 11/14/2024  Time Calculation  Start Time: 1115  Stop Time: 1155  Time Calculation (min): 40 min     3115/3115-A       11/14/24 1115   PT  Visit   PT Received On 11/14/24   General   Reason for Referral impaired mobility   Referred By Kelsey Plummer   Past Medical History Relevant to Rehab Past medical history of Acute embolism and thrombosis, Anxiety, Atrial fib/flutter, CAD (coronary artery disease), Calculus of ureter (04/29/2020),  Complete heart block, HTN, Hypothyroidism, Long term anticoagulants. SAMAN, Osteoporosis, Pulmonary embolism, PVD (peripheral vascular disease) RA, Renal calculi, Sick sinus syndrome,  Fracture of lower end of femur, UTI. Surgical History  She has a past surgical history that includes Other surgical history Pacemaker, Total hip arthroplasty, Total shoulder arthroplasty, Total knee arthroplasty  Femur fracture surgery; Wrist surgery   Prior to Session Communication Bedside nurse   Patient Position Received Alarm on;Bed, 2 rail up   Preferred Learning Style verbal;visual   General Comment Patient is agreeable to therapy, cleared for participation. C/o pain right shoulder.   Precautions   Medical Precautions Fall precautions   Precautions Comment Bed/Chair Alarm.   Cognition   Orientation Level Oriented X4   Therapeutic Exercise   Therapeutic Exercise Performed Yes   Therapeutic Exercise Activity 1 Seatd ex's AP, QS,GS, LAQ Seated hip flexion, Seated hipi abd/add x 10 reps.   Ambulation/Gait Training 1   Surface 1 Level tile   Device 1 Rolling walker   Gait Support Devices Gait belt   Assistance 1 Minimum assistance;Minimal verbal cues   Quality of Gait 1 NBOS;Diminished heel strike;Shuffling gait   Comments/Distance (ft) 1 Pt. ambulated 25'x5 with the wheeled walker. Pace was slow. Unsteady, Quick to fatugue. Neeed chair brought up to sit at each distance.   Transfers   Transfer    (Sit to stand from the with CGA  Cues for hand placement, Needed the walker for support. Stand to sit wiht CGA/min to steady. Cues to reach back for the chair. Minor loss of balance when reaching back for the chiar.)   Activity Tolerance   Endurance Tolerates 10 - 20 min exercise with multiple rests   PT Assessment   PT Assessment Results Decreased strength;Decreased range of motion;Decreased endurance;Impaired balance;Decreased mobility;Decreased safety awareness;Pain   Rehab Prognosis Good   End of Session Communication Bedside nurse   End of Session Patient Position Up in chair;Alarm on   PT Plan   PT Plan Ongoing PT   PT Frequency 5 times per week   PT Discharge Recommendations Moderate intensity level of continued care;High intensity level of continued care  (pending tolerance)   Equipment Recommended upon Discharge Wheeled walker       Assessment/Plan   PT Assessment  PT Assessment Results: Decreased strength, Decreased range of motion, Decreased endurance, Impaired balance, Decreased mobility, Decreased safety awareness, Pain  Rehab Prognosis: Good  End of Session Communication: Bedside nurse  End of Session Patient Position: Up in chair, Alarm on     PT Plan  Treatment/Interventions: Bed mobility, Transfer training, Gait training, Stair training, Balance training, Neuromuscular re-education, Strengthening, Endurance training, Range of motion, Therapeutic exercise, Therapeutic activity, Home exercise program  PT Plan: Ongoing PT  PT Frequency: 5 times per week  PT Discharge Recommendations: Moderate intensity level of continued care, High intensity level of continued care (pending tolerance)  Equipment Recommended upon Discharge: Wheeled walker  PT Recommended Transfer Status: Assist x1 (Min A)  PT - OK to Discharge: Yes    Outcome Measures:  [facility] Basic Mobility  Turning from your back to your side while in a flat bed without using bedrails: A little  Moving from lying on your back to sitting on the side  of a flat bed without using bedrails: A lot  Moving to and from bed to chair (including a wheelchair): A little  Standing up from a chair using your arms (e.g. wheelchair or bedside chair): A little  To walk in hospital room: A little  Climbing 3-5 steps with railing: A lot  Basic Mobility - Total Score: 16                             EDUCATION:  Outpatient Education  Individual(s) Educated: Patient  Education Provided: Fall Risk  Education Documentation  Body Mechanics, taught by Lamberto Dacosta PTA at 11/14/2024 12:26 PM.  Learner: Patient  Readiness: Acceptance  Method: Explanation, Demonstration  Response: Needs Reinforcement, Verbalizes Understanding    Home Exercise Program, taught by Lamberto Dacosta PTA at 11/14/2024 12:26 PM.  Learner: Patient  Readiness: Acceptance  Method: Explanation, Demonstration  Response: Needs Reinforcement, Verbalizes Understanding    Mobility Training, taught by Lamberto Dacosta PTA at 11/14/2024 12:26 PM.  Learner: Patient  Readiness: Acceptance  Method: Explanation, Demonstration  Response: Needs Reinforcement, Verbalizes Understanding    Education Comments  No comments found.        GOALS:  Encounter Problems       Encounter Problems (Active)       PT Problem       Pt will be able to perform all bed mobility tasks with Mod I.  (Progressing)       Start:  11/04/24    Expected End:  11/18/24            Pt will perform all transfers with Mod I and FWW with proper safety mechanics.   (Progressing)       Start:  11/04/24    Expected End:  11/18/24            Pt will ambulate 100 ft with Mod I using FWW for improved functional independence.  (Progressing)       Start:  11/04/24    Expected End:  11/18/24            Pt will be able to negotiate 4 steps with 1 HR with SBA.  (Progressing)       Start:  11/04/24    Expected End:  11/18/24            Pt will be able to ambulate at least 100 ft with < or equal to 1 rest break while maintaining SpO2 > 90%.  (Progressing)       Start:  11/04/24     Expected End:  11/18/24               Pain - Adult

## 2024-11-14 NOTE — PROGRESS NOTES
"Physical Therapy    Physical Therapy Treatment    Patient Name: Yen Montoya  MRN: [id number]  Today's Date: 11/13/2024  Time Calculation  Start Time: 1240  Stop Time: 1305  Time Calculation (min): 25 min     3115/3115-A       11/13/24 1240   PT  Visit   PT Received On 11/13/24   General   Reason for Referral impaired mobility   Referred By Kelsey Plummer   Past Medical History Relevant to Rehab Past medical history of Acute embolism and thrombosis, Anxiety, Atrial fib/flutter, CAD (coronary artery disease), Calculus of ureter (04/29/2020),  Complete heart block, HTN, Hypothyroidism, Long term anticoagulants. SAMAN, Osteoporosis, Pulmonary embolism, PVD (peripheral vascular disease) RA, Renal calculi, Sick sinus syndrome,  Fracture of lower end of femur, UTI. Surgical History :Pacemaker, Total hip arthroplasty, Total shoulder arthroplasty, Total knee arthroplasty  Femur fracture surgery; Wrist surgery   Prior to Session Communication Bedside nurse   Patient Position Received Alarm on;Bed, 2 rail up   Preferred Learning Style verbal;visual   General Comment Patient is agreeable to therapy, cleared for participation. C/o pain right shoulder.   Precautions   Medical Precautions Fall precautions   Precautions Comment Bed/Chair Alarm.   Cognition   Orientation Level Oriented X4   Bed Mobility 1   Bed Mobility 1 Supine to sitting   Level of Assistance 1 Minimal verbal cues;Moderate verbal cues   Bed Mobility Comments 1 Cues and assist to transfers OOB. Unsteady upon sitting. Pt. c/o being \"off balance\" in sitting.   Ambulation/Gait Training 1   Surface 1 Level tile   Device 1 Rolling walker   Gait Support Devices Gait belt   Assistance 1 Minimum assistance;Minimal verbal cues   Quality of Gait 1 NBOS;Diminished heel strike;Shuffling gait   Comments/Distance (ft) 1 Pt, ambulate 25'x1, 30'x2. 25'x2. Chair follow.   Transfers   Transfer   (Sit to stand from the bed with lobo min assist. Cues for hand placement, Needed the " walker for assist. Transfer bed to [facility] with assist x1.)   Activity Tolerance   Endurance Tolerates 10 - 20 min exercise with multiple rests   PT Assessment   PT Assessment Results Decreased strength;Decreased range of motion;Decreased endurance;Impaired balance;Decreased mobility;Decreased safety awareness;Pain   Rehab Prognosis Good   End of Session Communication Bedside nurse   End of Session Patient Position Up in chair;Alarm on   PT Plan   PT Plan Ongoing PT   PT Frequency 5 times per week   PT Discharge Recommendations Moderate intensity level of continued care;High intensity level of continued care  (pending tolerance)   Equipment Recommended upon Discharge Wheeled walker       Assessment/Plan   PT Assessment  PT Assessment Results: Decreased strength, Decreased range of motion, Decreased endurance, Impaired balance, Decreased mobility, Decreased safety awareness, Pain  Rehab Prognosis: Good  End of Session Communication: Bedside nurse  End of Session Patient Position: Up in chair, Alarm on     PT Plan  Treatment/Interventions: Bed mobility, Transfer training, Gait training, Stair training, Balance training, Neuromuscular re-education, Strengthening, Endurance training, Range of motion, Therapeutic exercise, Therapeutic activity, Home exercise program  PT Plan: Ongoing PT  PT Frequency: 5 times per week  PT Discharge Recommendations: Moderate intensity level of continued care, High intensity level of continued care (pending tolerance)  Equipment Recommended upon Discharge: Wheeled walker  PT Recommended Transfer Status: Assist x1 (Min A)  PT - OK to Discharge: Yes    Outcome Measures:  [facility] Basic Mobility  Turning from your back to your side while in a flat bed without using bedrails: A little  Moving from lying on your back to sitting on the side of a flat bed without using bedrails: A little  Moving to and from bed to chair (including a wheelchair): A little  Standing up from a chair using your arms (e.g.  wheelchair or bedside chair): A little  To walk in hospital room: A little  Climbing 3-5 steps with railing: A lot  Basic Mobility - Total Score: 17                             EDUCATION:  Outpatient Education  Individual(s) Educated: Patient  Education Provided: Fall Risk  Education Documentation  Body Mechanics, taught by Lamberto Dacosta PTA at 11/13/2024  6:47 PM.  Learner: Patient  Readiness: Acceptance  Method: Demonstration  Response: Needs Reinforcement, Verbalizes Understanding    Home Exercise Program, taught by Lamberto Dacosta PTA at 11/13/2024  6:47 PM.  Learner: Patient  Readiness: Acceptance  Method: Demonstration  Response: Needs Reinforcement, Verbalizes Understanding    Mobility Training, taught by Lamberto Dacosta PTA at 11/13/2024  6:47 PM.  Learner: Patient  Readiness: Acceptance  Method: Demonstration  Response: Needs Reinforcement, Verbalizes Understanding    Education Comments  No comments found.        GOALS:  Encounter Problems       Encounter Problems (Active)       PT Problem       Pt will be able to perform all bed mobility tasks with Mod I.  (Progressing)       Start:  11/04/24    Expected End:  11/18/24            Pt will perform all transfers with Mod I and FWW with proper safety mechanics.   (Progressing)       Start:  11/04/24    Expected End:  11/18/24            Pt will ambulate 100 ft with Mod I using FWW for improved functional independence.  (Progressing)       Start:  11/04/24    Expected End:  11/18/24            Pt will be able to negotiate 4 steps with 1 HR with SBA.  (Progressing)       Start:  11/04/24    Expected End:  11/18/24            Pt will be able to ambulate at least 100 ft with < or equal to 1 rest break while maintaining SpO2 > 90%.  (Progressing)       Start:  11/04/24    Expected End:  11/18/24               Pain - Adult

## 2024-11-14 NOTE — CARE PLAN
Problem: Skin  Goal: Participates in plan/prevention/treatment measures  Outcome: Progressing  Goal: Prevent/manage excess moisture  Outcome: Progressing  Goal: Prevent/minimize sheer/friction injuries  Outcome: Progressing  Goal: Promote/optimize nutrition  Outcome: Progressing  Goal: Promote skin healing  Outcome: Progressing     Problem: Fall/Injury  Goal: Not fall by end of shift  Outcome: Progressing  Goal: Be free from injury by end of the shift  Outcome: Progressing  Goal: Verbalize understanding of personal risk factors for fall in the hospital  Outcome: Progressing  Goal: Verbalize understanding of risk factor reduction measures to prevent injury from fall in the home  Outcome: Progressing  Goal: Use assistive devices by end of the shift  Outcome: Progressing  Goal: Pace activities to prevent fatigue by end of the shift  Outcome: Progressing     Problem: Infection related to problem list condition  Goal: Infection will resolve through treatment  Outcome: Progressing     Problem: Pain - Adult  Goal: Verbalizes/displays adequate comfort level or baseline comfort level  Outcome: Progressing     Problem: Safety - Adult  Goal: Free from fall injury  Outcome: Progressing     Problem: Discharge Planning  Goal: Discharge to home or other facility with appropriate resources  Outcome: Progressing     Problem: Chronic Conditions and Co-morbidities  Goal: Patient's chronic conditions and co-morbidity symptoms are monitored and maintained or improved  Outcome: Progressing     Problem: Diabetes  Goal: Achieve decreasing blood glucose levels by end of shift  Outcome: Progressing  Goal: Increase stability of blood glucose readings by end of shift  Outcome: Progressing  Goal: Decrease in ketones present in urine by end of shift  Outcome: Progressing  Goal: Maintain electrolyte levels within acceptable range throughout shift  Outcome: Progressing  Goal: Maintain glucose levels >70mg/dl to <250mg/dl throughout  shift  Outcome: Progressing  Goal: No changes in neurological exam by end of shift  Outcome: Progressing  Goal: Learn about and adhere to nutrition recommendations by end of shift  Outcome: Progressing  Goal: Vital signs within normal range for age by end of shift  Outcome: Progressing  Goal: Increase self care and/or family involovement by end of shift  Outcome: Progressing  Goal: Receive DSME education by end of shift  Outcome: Progressing     Problem: Nutrition  Goal: Oral intake greater 75%  Outcome: Progressing  Goal: Consume prescribed supplement  Outcome: Progressing  Goal: Adequate PO fluid intake  Outcome: Progressing  Goal: Lab values WNL  Outcome: Progressing  Goal: Electrolytes WNL  Outcome: Progressing  Goal: Maintain stable weight  Outcome: Progressing   The patient's goals for the shift include      The clinical goals for the shift include pt will remain free from fall/injury

## 2024-11-14 NOTE — PROGRESS NOTES
11/14/24 1250   Discharge Planning   Home or Post Acute Services Post acute facilities (Rehab/SNF/etc)   Type of Post Acute Facility Services Rehab;Skilled nursing   Does the patient need discharge transport arranged? Yes   RoundTrip coordination needed? Yes   Has discharge transport been arranged? No     Patient's admission to [facility] Acute rehab is still in appeal process. I discussed with patient a back up plan if it is upheld. She does not feel she can safely go home without additional therapy and care. Choice list given and patient's preference is [facility]. Message to [facility] to send referral to facility.     1414; [facility] out of network.  Denial upheld with [facility] [employer] to go to [facility] [place]. Updated patient and she asked me to send out several referrals around [place] and will go from there. Message sent to [facility] to send referrals to 1 [facility], 2. [facility], 3. [place], 3. [facility].

## 2024-11-14 NOTE — PROGRESS NOTES
11/14/24 0831   Discharge Planning   Home or Post Acute Services Post acute facilities (Rehab/SNF/etc)   Type of Post Acute Facility Services Rehab   Expected Discharge Disposition Rehab   Does the patient need discharge transport arranged? Yes   RoundTrip coordination needed? Yes   Has discharge transport been arranged? No     Inquiry sent to [facility] on the status of the appeal?    1220: Voice mail left for [place] liaison with [facility] [place] regarding the appeal. ( [phone])

## 2024-11-14 NOTE — PROGRESS NOTES
" Yen Montoya  11/13/24  [id number]  Kelsey Plummer MD  This is a patient with a past medical history as detailed below admitted to the [facility] ED with chief complaint of 74 y.o. female presenting to [facility] on 11/3/2024 with pertinent medical history of atrial fibrillation on Coumadin, sick sinus syndrome status post pacemaker insertion, Former tobacco use, RA, obesity, systolic heart failure (3/2024 Echo: EF 40-45%), adrenal mass, CAD status post 8 PCI, HTN, HLD, PVD, hypothyroidism, peripheral neuropathy, anxiety, SAMAN on CPAP, type 2 diabetes, pulmonary embolism, DVT RLE and nephrolithiasis. Presented to an outside emergency room for complaint of nausea and vomiting x 4 days with right lower quadrant pain and dysuria patient was taken to the OR by urology service underwent stent placement the patient continued to report improvement pain wise she has minimal pain no chest pain no shortness of breath she still feels weak has no energy awaiting for discharge to acute rehab.       Assessment and plan   1-hydronephrosis admitted to the hospital pain control CT abdomen pelvis monitor electrolytes CBC lactic acid consulted urology status post stent placement pain subsided  2-SABRINA hydrate follow-up CMP monitor urine output  3-  debility  [place]   appeal. Spoke to patient at bedside to update her and to inquire about a back up plan. Patient states if she is still denied with the appeal she will \"just go home\"   4-urinary tract infection status post antibiotics treatment  5 history of coronary artery disease currently has no chest pain   6 hypokalemia replaced  7.  History of A-fib flutter follow-up PT/INR  Discussed with the  consultants.      Review of other systems negative other than HPI  Past medical history    Epic& consultants notes reviewed  Most recent images Reviewed  Last EKG/Rhythm reviewed      Vital signs has been reviewed  GENERAL: o x 3 in distress.   SKIN:  No rashes .   ENT mucosa,  Normal/nose normal "   NECK: no jugulovenous distention  NO lymphadenopathy   LUNGS:No wheezing,no ronchi  no rales.  CARDIAC:  S1 and S2  ABDOMEN: Abdomen soft, mild-tender.    EXTREMITIES: Extremities normal.   NEURO: non focal    PULSES: + pedal / radial           Past Medical History  She has a past medical history of Acute embolism and thrombosis of right popliteal vein (Multi) (2020), Anxiety, Atrial fib/flutter, transient (Multi), CAD (coronary artery disease), Calculus of ureter (2020), Chronic venous hypertension (idiopathic) with inflammation of left lower extremity, Chronic venous hypertension (idiopathic) with inflammation of right lower extremity, Chronic venous hypertension (idiopathic) with ulcer of unspecified lower extremity (CODE), Complete heart block, Hypertension, Hypothyroidism, Long term (current) use of anticoagulants (2022), SAMAN (obstructive sleep apnea), Osteoporosis, Other bursitis of hip, right hip, Other mechanical complication of other internal joint prosthesis, initial encounter (CMS-HCC) (2020), Other postprocedural complications and disorders of the circulatory system, not elsewhere classified (10/08/2020), Other specified joint disorders, right shoulder, Personal history of diseases of the blood and blood-forming organs and certain disorders involving the immune mechanism, Pulmonary embolism, PVD (peripheral vascular disease) (CMS-HCC), RA (rheumatoid arthritis), Renal calculi, Sick sinus syndrome (Multi), Toxic effect of unspecified metal, accidental (unintentional), initial encounter (2020), Unspecified fracture of lower end of unspecified femur, initial encounter for closed fracture (Multi) (2021), and UTI (urinary tract infection).    Surgical History  She has a past surgical history that includes Other surgical history (2021); XR chest pacemaker w fluoro (10/17/2021); Cardiac catheterization; Cholecystectomy;  section, low transverse; Tonsillectomy;  Total hip arthroplasty (Bilateral); Total shoulder arthroplasty (Left); Total knee arthroplasty (Bilateral); Colonoscopy; pacemaker placement; Femur fracture surgery; Wrist surgery; Cardiac electrophysiology mapping and ablation; and Coronary stent placement.     Social History  She reports that she quit smoking about 36 years ago. Her smoking use included cigarettes. She has never used smokeless tobacco. She reports current alcohol use. She reports that she does not use drugs.    Family History  Family History   Problem Relation Name Age of Onset    Other (ptca) Mother      Heart failure Father      Coronary artery disease Father      Breast cancer Sister      Breast cancer Sister                Last Recorded Vitals  /59 (BP Location: Left arm, Patient Position: Lying)   Pulse 71   Temp 35.8 °C (96.4 °F) (Temporal)   Resp 16   Wt 104 kg (228 lb 13.4 oz)   SpO2 100%          Kelsey Plummer MD

## 2024-11-15 LAB
ANION GAP SERPL CALC-SCNC: 11 MMOL/L (ref 10–20)
APPEARANCE UR: CLEAR
BILIRUB UR STRIP.AUTO-MCNC: NEGATIVE MG/DL
BUN SERPL-MCNC: 22 MG/DL (ref 6–23)
CALCIUM SERPL-MCNC: 10.3 MG/DL (ref 8.6–10.3)
CAOX CRY #/AREA UR COMP ASSIST: ABNORMAL /HPF
CHLORIDE SERPL-SCNC: 104 MMOL/L (ref 98–107)
CO2 SERPL-SCNC: 25 MMOL/L (ref 21–32)
COLOR UR: YELLOW
CREAT SERPL-MCNC: 1.02 MG/DL (ref 0.5–1.05)
EGFRCR SERPLBLD CKD-EPI 2021: 58 ML/MIN/1.73M*2
ERYTHROCYTE [DISTWIDTH] IN BLOOD BY AUTOMATED COUNT: 13.4 % (ref 11.5–14.5)
GLUCOSE BLD MANUAL STRIP-MCNC: 115 MG/DL (ref 74–99)
GLUCOSE BLD MANUAL STRIP-MCNC: 124 MG/DL (ref 74–99)
GLUCOSE BLD MANUAL STRIP-MCNC: 141 MG/DL (ref 74–99)
GLUCOSE BLD MANUAL STRIP-MCNC: 166 MG/DL (ref 74–99)
GLUCOSE SERPL-MCNC: 111 MG/DL (ref 74–99)
GLUCOSE UR STRIP.AUTO-MCNC: NORMAL MG/DL
HCT VFR BLD AUTO: 38.9 % (ref 36–46)
HGB BLD-MCNC: 12.5 G/DL (ref 12–16)
INR PPP: 1.7 (ref 0.9–1.1)
KETONES UR STRIP.AUTO-MCNC: NEGATIVE MG/DL
LEUKOCYTE ESTERASE UR QL STRIP.AUTO: ABNORMAL
MAGNESIUM SERPL-MCNC: 1.75 MG/DL (ref 1.6–2.4)
MCH RBC QN AUTO: 32.2 PG (ref 26–34)
MCHC RBC AUTO-ENTMCNC: 32.1 G/DL (ref 32–36)
MCV RBC AUTO: 100 FL (ref 80–100)
MUCOUS THREADS #/AREA URNS AUTO: ABNORMAL /LPF
NITRITE UR QL STRIP.AUTO: NEGATIVE
NRBC BLD-RTO: 0 /100 WBCS (ref 0–0)
PH UR STRIP.AUTO: 6 [PH]
PLATELET # BLD AUTO: 282 X10*3/UL (ref 150–450)
POTASSIUM SERPL-SCNC: 3.9 MMOL/L (ref 3.5–5.3)
PROT UR STRIP.AUTO-MCNC: ABNORMAL MG/DL
PROTHROMBIN TIME: 19.3 SECONDS (ref 9.8–12.8)
RBC # BLD AUTO: 3.88 X10*6/UL (ref 4–5.2)
RBC # UR STRIP.AUTO: ABNORMAL /UL
RBC #/AREA URNS AUTO: >20 /HPF
SODIUM SERPL-SCNC: 136 MMOL/L (ref 136–145)
SP GR UR STRIP.AUTO: 1.01
SQUAMOUS #/AREA URNS AUTO: ABNORMAL /HPF
TRANS CELLS #/AREA UR COMP ASSIST: ABNORMAL /HPF
UROBILINOGEN UR STRIP.AUTO-MCNC: NORMAL MG/DL
WBC # BLD AUTO: 8.1 X10*3/UL (ref 4.4–11.3)
WBC #/AREA URNS AUTO: ABNORMAL /HPF
WBC CLUMPS #/AREA URNS AUTO: ABNORMAL /HPF

## 2024-11-15 PROCEDURE — 2500000004 HC RX 250 GENERAL PHARMACY W/ HCPCS (ALT 636 FOR OP/ED): Performed by: NURSE PRACTITIONER

## 2024-11-15 PROCEDURE — 2500000002 HC RX 250 W HCPCS SELF ADMINISTERED DRUGS (ALT 637 FOR MEDICARE OP, ALT 636 FOR OP/ED): Performed by: NURSE PRACTITIONER

## 2024-11-15 PROCEDURE — 85610 PROTHROMBIN TIME: CPT | Performed by: NURSE PRACTITIONER

## 2024-11-15 PROCEDURE — 82947 ASSAY GLUCOSE BLOOD QUANT: CPT

## 2024-11-15 PROCEDURE — 97535 SELF CARE MNGMENT TRAINING: CPT | Mod: GO,CO

## 2024-11-15 PROCEDURE — 2500000001 HC RX 250 WO HCPCS SELF ADMINISTERED DRUGS (ALT 637 FOR MEDICARE OP): Performed by: NURSE PRACTITIONER

## 2024-11-15 PROCEDURE — 36415 COLL VENOUS BLD VENIPUNCTURE: CPT | Performed by: NURSE PRACTITIONER

## 2024-11-15 PROCEDURE — 85027 COMPLETE CBC AUTOMATED: CPT | Performed by: NURSE PRACTITIONER

## 2024-11-15 PROCEDURE — 2500000005 HC RX 250 GENERAL PHARMACY W/O HCPCS: Performed by: NURSE PRACTITIONER

## 2024-11-15 PROCEDURE — 87086 URINE CULTURE/COLONY COUNT: CPT | Mod: STJLAB | Performed by: NURSE PRACTITIONER

## 2024-11-15 PROCEDURE — 83735 ASSAY OF MAGNESIUM: CPT | Performed by: NURSE PRACTITIONER

## 2024-11-15 PROCEDURE — 1200000002 HC GENERAL ROOM WITH TELEMETRY DAILY

## 2024-11-15 PROCEDURE — 97530 THERAPEUTIC ACTIVITIES: CPT | Mod: GO,CO

## 2024-11-15 PROCEDURE — 81001 URINALYSIS AUTO W/SCOPE: CPT | Performed by: NURSE PRACTITIONER

## 2024-11-15 PROCEDURE — 80048 BASIC METABOLIC PNL TOTAL CA: CPT | Performed by: NURSE PRACTITIONER

## 2024-11-15 RX ORDER — CEFTRIAXONE 1 G/50ML
1 INJECTION, SOLUTION INTRAVENOUS EVERY 24 HOURS
Status: DISCONTINUED | OUTPATIENT
Start: 2024-11-15 | End: 2024-11-18 | Stop reason: HOSPADM

## 2024-11-15 RX ORDER — WARFARIN 2 MG/1
1 TABLET ORAL ONCE
Status: COMPLETED | OUTPATIENT
Start: 2024-11-15 | End: 2024-11-15

## 2024-11-15 ASSESSMENT — ACTIVITIES OF DAILY LIVING (ADL): HOME_MANAGEMENT_TIME_ENTRY: 10

## 2024-11-15 ASSESSMENT — COGNITIVE AND FUNCTIONAL STATUS - GENERAL
HELP NEEDED FOR BATHING: A LITTLE
MOVING TO AND FROM BED TO CHAIR: A LITTLE
DRESSING REGULAR LOWER BODY CLOTHING: A LITTLE
MOBILITY SCORE: 20
CLIMB 3 TO 5 STEPS WITH RAILING: A LOT
TOILETING: A LITTLE
WALKING IN HOSPITAL ROOM: A LITTLE
DAILY ACTIVITIY SCORE: 21

## 2024-11-15 ASSESSMENT — PAIN SCALES - GENERAL
PAINLEVEL_OUTOF10: 2
PAINLEVEL_OUTOF10: 0 - NO PAIN
PAINLEVEL_OUTOF10: 3
PAINLEVEL_OUTOF10: 0 - NO PAIN

## 2024-11-15 ASSESSMENT — PAIN DESCRIPTION - LOCATION: LOCATION: HEAD

## 2024-11-15 NOTE — PROGRESS NOTES
Yen Montoya  11/14/24  [id number]  Kelsey Plummer MD  This is a patient with a past medical history as detailed below admitted to the [facility] ED with chief complaint of 74 y.o. female presenting to [facility] on 11/3/2024 with pertinent medical history of atrial fibrillation on Coumadin, sick sinus syndrome status post pacemaker insertion, Former tobacco use, RA, obesity, systolic heart failure (3/2024 Echo: EF 40-45%), adrenal mass, CAD status post 8 PCI, HTN, HLD, PVD, hypothyroidism, peripheral neuropathy, anxiety, SAMAN on CPAP, type 2 diabetes, pulmonary embolism, DVT RLE and nephrolithiasis. Presented to an outside emergency room for complaint of nausea and vomiting x 4 days with right lower quadrant pain and dysuria. In addition, the patient complaints of intermittent fevers x1 day, dizziness, chest pain, and shortness of breath.  No denies chest pain tightness pressure shortness of breath lightheadedness or dizziness feels fatigued has no energy wants to go to [place].  Acute rehab was declined patient is willing to go to skilled nursing unit       Assessment and plan   1-hydronephrosis admitted to the hospital pain control CT abdomen pelvis monitor electrolytes CBC lactic acid consulted urology status post stent placement pain subsided  2-SABRINA hydrate follow-up CMP monitor urine output  3-  debility  Ivone [place]   appeal. Spoke to patient at bedside to update her and to inquire about a back up plan.  Acute rehab denied plan for skilled nursing unit  4-urinary tract infection leukocytosis follow-up CBC follow-up culture started on IV ceftriaxone .  Antibiotics changed to Augmentin  5 history of coronary artery disease currently has no chest pain   6 hyperlipidemia hypertension resume home medication  7.  History of A-fib flutter follow-up PT/INR  Discussed with the  consultants.      Review of other systems negative other than HPI  Past medical history    Epic& consultants notes reviewed  Most recent images Reviewed  Last  EKG/Rhythm reviewed      Vital signs has been reviewed  No distress.   SKIN:  No rashes .   ENT mucosa,  Normal/nose normal   NECK: no jugulovenous distention  NO lymphadenopathy   LUNGS:No wheezing,no ronchi  no rales.  CARDIAC:  S1 and S2  ABDOMEN: Abdomen soft, mild-tender.    EXTREMITIES: Extremities normal.   NEURO: non focal    PULSES: + pedal / radial           Past Medical History  She has a past medical history of Acute embolism and thrombosis of right popliteal vein (Multi) (04/24/2020), Anxiety, Atrial fib/flutter, transient (Multi), CAD (coronary artery disease), Calculus of ureter (04/29/2020), Chronic venous hypertension (idiopathic) with inflammation of left lower extremity, Chronic venous hypertension (idiopathic) with inflammation of right lower extremity, Chronic venous hypertension (idiopathic) with ulcer of unspecified lower extremity (CODE), Complete heart block, Hypertension, Hypothyroidism, Long term (current) use of anticoagulants (01/20/2022), SAMAN (obstructive sleep apnea), Osteoporosis, Other bursitis of hip, right hip, Other mechanical complication of other internal joint prosthesis, initial encounter (CMS-HCC) (05/22/2020), Other postprocedural complications and disorders of the circulatory system, not elsewhere classified (10/08/2020), Other specified joint disorders, right shoulder, Personal history of diseases of the blood and blood-forming organs and certain disorders involving the immune mechanism, Pulmonary embolism, PVD (peripheral vascular disease) (CMS-HCC), RA (rheumatoid arthritis), Renal calculi, Sick sinus syndrome (Multi), Toxic effect of unspecified metal, accidental (unintentional), initial encounter (12/11/2020), Unspecified fracture of lower end of unspecified femur, initial encounter for closed fracture (Multi) (11/05/2021), and UTI (urinary tract infection).    Surgical History  She has a past surgical history that includes Other surgical history (09/24/2021); XR chest  pacemaker w fluoro (10/17/2021); Cardiac catheterization; Cholecystectomy;  section, low transverse; Tonsillectomy; Total hip arthroplasty (Bilateral); Total shoulder arthroplasty (Left); Total knee arthroplasty (Bilateral); Colonoscopy; pacemaker placement; Femur fracture surgery; Wrist surgery; Cardiac electrophysiology mapping and ablation; and Coronary stent placement.     Social History  She reports that she quit smoking about 36 years ago. Her smoking use included cigarettes. She has never used smokeless tobacco. She reports current alcohol use. She reports that she does not use drugs.    Family History  Family History   Problem Relation Name Age of Onset    Other (ptca) Mother      Heart failure Father      Coronary artery disease Father      Breast cancer Sister      Breast cancer Sister                Last Recorded Vitals  /86 (BP Location: Left arm, Patient Position: Lying)   Pulse 70   Temp 36.1 °C (97 °F) (Temporal)   Resp 18   Wt 104 kg (228 lb 13.4 oz)   SpO2 98%          Kelsey Plummer MD

## 2024-11-15 NOTE — CARE PLAN
The patient's goals for the shift include      The clinical goals for the shift include will remain free of injury      Problem: Fall/Injury  Goal: Be free from injury by end of the shift  Outcome: Progressing

## 2024-11-15 NOTE — CARE PLAN
Problem: Skin  Goal: Participates in plan/prevention/treatment measures  Outcome: Progressing  Goal: Prevent/manage excess moisture  Outcome: Progressing  Goal: Prevent/minimize sheer/friction injuries  Outcome: Progressing  Goal: Promote/optimize nutrition  Outcome: Progressing  Goal: Promote skin healing  Outcome: Progressing     Problem: Fall/Injury  Goal: Not fall by end of shift  Outcome: Progressing  Goal: Be free from injury by end of the shift  Outcome: Progressing  Goal: Verbalize understanding of personal risk factors for fall in the hospital  Outcome: Progressing  Goal: Verbalize understanding of risk factor reduction measures to prevent injury from fall in the home  Outcome: Progressing  Goal: Use assistive devices by end of the shift  Outcome: Progressing  Goal: Pace activities to prevent fatigue by end of the shift  Outcome: Progressing     Problem: Infection related to problem list condition  Goal: Infection will resolve through treatment  Outcome: Progressing     Problem: Pain - Adult  Goal: Verbalizes/displays adequate comfort level or baseline comfort level  Outcome: Progressing     Problem: Safety - Adult  Goal: Free from fall injury  Outcome: Progressing     Problem: Discharge Planning  Goal: Discharge to home or other facility with appropriate resources  Outcome: Progressing     Problem: Chronic Conditions and Co-morbidities  Goal: Patient's chronic conditions and co-morbidity symptoms are monitored and maintained or improved  Outcome: Progressing     Problem: Diabetes  Goal: Achieve decreasing blood glucose levels by end of shift  Outcome: Progressing  Goal: Increase stability of blood glucose readings by end of shift  Outcome: Progressing  Goal: Decrease in ketones present in urine by end of shift  Outcome: Progressing  Goal: Maintain electrolyte levels within acceptable range throughout shift  Outcome: Progressing  Goal: Maintain glucose levels >70mg/dl to <250mg/dl throughout  shift  Outcome: Progressing  Goal: No changes in neurological exam by end of shift  Outcome: Progressing  Goal: Learn about and adhere to nutrition recommendations by end of shift  Outcome: Progressing  Goal: Vital signs within normal range for age by end of shift  Outcome: Progressing  Goal: Increase self care and/or family involovement by end of shift  Outcome: Progressing  Goal: Receive DSME education by end of shift  Outcome: Progressing     Problem: Nutrition  Goal: Oral intake greater 75%  Outcome: Progressing  Goal: Consume prescribed supplement  Outcome: Progressing  Goal: Adequate PO fluid intake  Outcome: Progressing  Goal: Lab values WNL  Outcome: Progressing  Goal: Electrolytes WNL  Outcome: Progressing  Goal: Maintain stable weight  Outcome: Progressing   The patient's goals for the shift include      The clinical goals for the shift include pt will remain free from falls/injury t/o the shift

## 2024-11-15 NOTE — PROGRESS NOTES
11/15/24 0879   Discharge Planning   Home or Post Acute Services Post acute facilities (Rehab/SNF/etc)   Type of Post Acute Facility Services Skilled nursing     Met with patient at the bedside. FOC for SNF is [facility] per patient. Let facility know and asked direct precert team to start precert.

## 2024-11-15 NOTE — PROGRESS NOTES
Spiritual Care Visit    Clinical Encounter Type  Visited With: Patient  Routine Visit: Introduction  Continue Visiting: No                                            Taxonomy  Intended Effects: Promote sense of peace, Preserve dignity and respect, Meaning-making  Methods: Offer spiritual/Hinduism support  Interventions: Share words of hope and inspiration, [place]    Patient talked about her family.   listened and prayed at her request.

## 2024-11-15 NOTE — PROGRESS NOTES
Occupational Therapy    OT Treatment    Patient Name: Yen Montoya  MRN: [id number]  Today's Date: 11/15/2024  Time Calculation  Start Time: 1229  Stop Time: 1257  Time Calculation (min): 28 min       3115/3115-A    Assessment:  OT Assessment: Patient required min a for all mobility tasks this date, verbal cues provided for ww mgmt during mobility.  Evaluation/Treatment Tolerance: Patient tolerated treatment well  End of Session Communication: Bedside nurse  End of Session Patient Position: Up in chair, Alarm on  OT Assessment Results: Decreased ADL status, Decreased cognition, Decreased safe judgment during ADL, Decreased endurance, Decreased functional mobility, Decreased IADLs  Evaluation/Treatment Tolerance: Patient tolerated treatment well    Plan:  Treatment Interventions: ADL retraining, Functional transfer training  OT Frequency: 4 times per week  OT Discharge Recommendations: High intensity level of continued care  Equipment Recommended upon Discharge: Wheeled walker  Treatment Interventions: ADL retraining, Functional transfer training  Subjective     Outcome Measures:[facility] Daily Activity  Putting on and taking off regular lower body clothing: A lot  Bathing (including washing, rinsing, drying): A little  Putting on and taking off regular upper body clothing: A little  Toileting, which includes using toilet, bedpan or urinal: A little  Taking care of personal grooming such as brushing teeth: A little  Eating Meals: None  Daily Activity - Total Score: 18       11/15/24 1229   OT Last Visit   OT Received On 11/15/24   General   Reason for Referral impaired ADL's   Prior to Session Communication Bedside nurse   Patient Position Received Up in chair;Alarm on   Preferred Learning Style verbal;visual   General Comment pt agreeable to therapy. Pt declined ADL's   Precautions   Medical Precautions Fall precautions   Pain Assessment   0-10 (Numeric) Pain Score 0 - No pain   Cognition   Overall Cognitive Status  WFL   UE Dressing   UE Dressing Level of Assistance Moderate assistance   UE Dressing Where Assessed Chair   UE Dressing Comments donned gown like a robe. Instruction on sequecning, putting in more difficulty RUE first.   Functional Mobility   Functional Mobility Performed   (Pt ambulates at [facility] with ww support. Pt stopped once, staing she felt like she was losing her balance but recovered quickly.)   Transfers   Transfer   ([facility] STS.)   IP OT Assessment   Evaluation/Treatment Tolerance Patient tolerated treatment well   End of Session Communication Bedside nurse   End of Session Patient Position Up in chair;Alarm on   OT Assessment   OT Assessment Results Decreased ADL status;Decreased cognition;Decreased safe judgment during ADL;Decreased endurance;Decreased functional mobility;Decreased IADLs   Education   Individual(s) Educated Patient   Education Provided Fall precautons   Patient Response to Education Patient/Caregiver Verbalized Understanding of Information   Education Comment Pt would benefit from continued reinforcement   Inpatient Plan   Treatment Interventions ADL retraining;Functional transfer training   OT Frequency 4 times per week   OT Discharge Recommendations High intensity level of continued care   Equipment Recommended upon Discharge Wheeled walker           Goals:  Encounter Problems       Encounter Problems (Active)       OT Goals       Sup for ADL functional mobility with FWW.        Start:  11/05/24    Expected End:  11/19/24            Sup for sit/stand, bed/chair/commode with FWW.        Start:  11/05/24    Expected End:  11/19/24            Good (-) dynamic standing balance for ADL.        Start:  11/05/24    Expected End:  11/19/24            Tolerate 10 mins light functional activity.        Start:  11/05/24    Expected End:  11/19/24            Normal dynamic sitting balance for ADL.        Start:  11/05/24    Expected End:  11/19/24

## 2024-11-16 LAB
ANION GAP SERPL CALC-SCNC: 12 MMOL/L (ref 10–20)
BUN SERPL-MCNC: 20 MG/DL (ref 6–23)
CALCIUM SERPL-MCNC: 10.4 MG/DL (ref 8.6–10.3)
CHLORIDE SERPL-SCNC: 103 MMOL/L (ref 98–107)
CO2 SERPL-SCNC: 24 MMOL/L (ref 21–32)
CREAT SERPL-MCNC: 1.1 MG/DL (ref 0.5–1.05)
EGFRCR SERPLBLD CKD-EPI 2021: 53 ML/MIN/1.73M*2
ERYTHROCYTE [DISTWIDTH] IN BLOOD BY AUTOMATED COUNT: 13.5 % (ref 11.5–14.5)
GLUCOSE BLD MANUAL STRIP-MCNC: 108 MG/DL (ref 74–99)
GLUCOSE BLD MANUAL STRIP-MCNC: 126 MG/DL (ref 74–99)
GLUCOSE BLD MANUAL STRIP-MCNC: 172 MG/DL (ref 74–99)
GLUCOSE BLD MANUAL STRIP-MCNC: 237 MG/DL (ref 74–99)
GLUCOSE SERPL-MCNC: 109 MG/DL (ref 74–99)
HCT VFR BLD AUTO: 39.3 % (ref 36–46)
HGB BLD-MCNC: 12.5 G/DL (ref 12–16)
HOLD SPECIMEN: NORMAL
INR PPP: 1.8 (ref 0.9–1.1)
MAGNESIUM SERPL-MCNC: 1.56 MG/DL (ref 1.6–2.4)
MCH RBC QN AUTO: 32.1 PG (ref 26–34)
MCHC RBC AUTO-ENTMCNC: 31.8 G/DL (ref 32–36)
MCV RBC AUTO: 101 FL (ref 80–100)
NRBC BLD-RTO: 0 /100 WBCS (ref 0–0)
PLATELET # BLD AUTO: 296 X10*3/UL (ref 150–450)
POTASSIUM SERPL-SCNC: 3.8 MMOL/L (ref 3.5–5.3)
PROTHROMBIN TIME: 20.9 SECONDS (ref 9.8–12.8)
RBC # BLD AUTO: 3.9 X10*6/UL (ref 4–5.2)
SODIUM SERPL-SCNC: 135 MMOL/L (ref 136–145)
WBC # BLD AUTO: 9.2 X10*3/UL (ref 4.4–11.3)

## 2024-11-16 PROCEDURE — 1100000001 HC PRIVATE ROOM DAILY

## 2024-11-16 PROCEDURE — 82947 ASSAY GLUCOSE BLOOD QUANT: CPT

## 2024-11-16 PROCEDURE — 2500000004 HC RX 250 GENERAL PHARMACY W/ HCPCS (ALT 636 FOR OP/ED): Performed by: NURSE PRACTITIONER

## 2024-11-16 PROCEDURE — 36415 COLL VENOUS BLD VENIPUNCTURE: CPT | Performed by: NURSE PRACTITIONER

## 2024-11-16 PROCEDURE — 2500000002 HC RX 250 W HCPCS SELF ADMINISTERED DRUGS (ALT 637 FOR MEDICARE OP, ALT 636 FOR OP/ED): Performed by: NURSE PRACTITIONER

## 2024-11-16 PROCEDURE — 2500000001 HC RX 250 WO HCPCS SELF ADMINISTERED DRUGS (ALT 637 FOR MEDICARE OP): Performed by: NURSE PRACTITIONER

## 2024-11-16 PROCEDURE — 85610 PROTHROMBIN TIME: CPT | Performed by: NURSE PRACTITIONER

## 2024-11-16 PROCEDURE — 83735 ASSAY OF MAGNESIUM: CPT | Performed by: NURSE PRACTITIONER

## 2024-11-16 PROCEDURE — 80048 BASIC METABOLIC PNL TOTAL CA: CPT | Performed by: NURSE PRACTITIONER

## 2024-11-16 PROCEDURE — 85027 COMPLETE CBC AUTOMATED: CPT | Performed by: NURSE PRACTITIONER

## 2024-11-16 RX ORDER — WARFARIN 2 MG/1
1 TABLET ORAL ONCE
Status: COMPLETED | OUTPATIENT
Start: 2024-11-16 | End: 2024-11-16

## 2024-11-16 RX ORDER — LANOLIN ALCOHOL/MO/W.PET/CERES
400 CREAM (GRAM) TOPICAL DAILY
Status: DISCONTINUED | OUTPATIENT
Start: 2024-11-16 | End: 2024-11-18 | Stop reason: HOSPADM

## 2024-11-16 ASSESSMENT — COGNITIVE AND FUNCTIONAL STATUS - GENERAL
MOVING FROM LYING ON BACK TO SITTING ON SIDE OF FLAT BED WITH BEDRAILS: A LITTLE
MOBILITY SCORE: 18
CLIMB 3 TO 5 STEPS WITH RAILING: A LITTLE
TOILETING: A LITTLE
DAILY ACTIVITIY SCORE: 19
PERSONAL GROOMING: A LITTLE
DAILY ACTIVITIY SCORE: 21
HELP NEEDED FOR BATHING: A LITTLE
DRESSING REGULAR LOWER BODY CLOTHING: A LITTLE
DRESSING REGULAR LOWER BODY CLOTHING: A LITTLE
MOVING TO AND FROM BED TO CHAIR: A LITTLE
TOILETING: A LITTLE
HELP NEEDED FOR BATHING: A LITTLE
DRESSING REGULAR UPPER BODY CLOTHING: A LITTLE
TURNING FROM BACK TO SIDE WHILE IN FLAT BAD: A LITTLE
STANDING UP FROM CHAIR USING ARMS: A LITTLE
WALKING IN HOSPITAL ROOM: A LITTLE

## 2024-11-16 ASSESSMENT — PAIN SCALES - GENERAL
PAINLEVEL_OUTOF10: 0 - NO PAIN
PAINLEVEL_OUTOF10: 0 - NO PAIN

## 2024-11-16 NOTE — PROGRESS NOTES
Yen Montoya  11/15/24  [id number]  Kelsey Plummer MD  This is a patient with a past medical history as detailed below admitted to the [facility] ED with chief complaint of 74 y.o. female presenting to [facility] on 11/3/2024 with pertinent medical history of atrial fibrillation on Coumadin, sick sinus syndrome status post pacemaker insertion, Former tobacco use, RA, obesity, systolic heart failure (3/2024 Echo: EF 40-45%), adrenal mass, CAD status post 8 PCI, HTN, HLD, PVD, hypothyroidism, peripheral neuropathy, anxiety, SAMAN on CPAP, type 2 diabetes, pulmonary embolism, DVT RLE and nephrolithiasis. Presented to an outside emergency room for complaint of nausea and vomiting x 4 days with right lower quadrant pain and dysuria. In addition, the patient complaints of intermittent fevers x1 day, dizziness, chest pain, and shortness of breath.  No denies chest pain tightness pressure shortness of breath lightheadedness or dizziness feels fatigued has no energy wants to go to [place].  Patient today developed urinary symptoms urine analysis was positive initiated back on ceftriaxone  met with the patient for ECF placement       Assessment and plan   1-hydronephrosis   consulted urology status post stent placement pain subsided as above urinary symptoms urine analysis is positive sent for culture started on ceftriaxone  2-SABRINA hydrate follow-up CMP monitor urine output  3-  debility  Ivone [place]   appeal was denied .  Plan for ECF  4-urinary tract infection follow-up culture started on IV ceftriaxone .    5 history of coronary artery disease currently has no chest pain   6 hyperlipidemia hypertension resume home medication  7.  History of A-fib flutter follow-up PT/INR  Discussed with the  consultants.      Review of other systems negative other than HPI  Past medical history    Epic& consultants notes reviewed  Most recent images Reviewed  Last EKG/Rhythm reviewed      Vital signs has been reviewed  No distress.   SKIN:  No  rashes .   ENT mucosa,  Normal/nose normal   NECK: no jugulovenous distention  NO lymphadenopathy   LUNGS:No wheezing,no ronchi  no rales.  CARDIAC:  S1 and S2  ABDOMEN: Abdomen soft, mild-tender.    EXTREMITIES: Extremities normal.   NEURO: non focal    PULSES: + pedal / radial           Past Medical History  She has a past medical history of Acute embolism and thrombosis of right popliteal vein (Multi) (04/24/2020), Anxiety, Atrial fib/flutter, transient (Multi), CAD (coronary artery disease), Calculus of ureter (04/29/2020), Chronic venous hypertension (idiopathic) with inflammation of left lower extremity, Chronic venous hypertension (idiopathic) with inflammation of right lower extremity, Chronic venous hypertension (idiopathic) with ulcer of unspecified lower extremity (CODE), Complete heart block, Hypertension, Hypothyroidism, Long term (current) use of anticoagulants (01/20/2022), SAMAN (obstructive sleep apnea), Osteoporosis, Other bursitis of hip, right hip, Other mechanical complication of other internal joint prosthesis, initial encounter (CMS-HCC) (05/22/2020), Other postprocedural complications and disorders of the circulatory system, not elsewhere classified (10/08/2020), Other specified joint disorders, right shoulder, Personal history of diseases of the blood and blood-forming organs and certain disorders involving the immune mechanism, Pulmonary embolism, PVD (peripheral vascular disease) (CMS-HCC), RA (rheumatoid arthritis), Renal calculi, Sick sinus syndrome (Multi), Toxic effect of unspecified metal, accidental (unintentional), initial encounter (12/11/2020), Unspecified fracture of lower end of unspecified femur, initial encounter for closed fracture (Multi) (11/05/2021), and UTI (urinary tract infection).    Surgical History  She has a past surgical history that includes Other surgical history (09/24/2021); XR chest pacemaker w fluoro (10/17/2021); Cardiac catheterization; Cholecystectomy;   section, low transverse; Tonsillectomy; Total hip arthroplasty (Bilateral); Total shoulder arthroplasty (Left); Total knee arthroplasty (Bilateral); Colonoscopy; pacemaker placement; Femur fracture surgery; Wrist surgery; Cardiac electrophysiology mapping and ablation; and Coronary stent placement.     Social History  She reports that she quit smoking about 36 years ago. Her smoking use included cigarettes. She has never used smokeless tobacco. She reports current alcohol use. She reports that she does not use drugs.    Family History  Family History   Problem Relation Name Age of Onset    Other (ptca) Mother      Heart failure Father      Coronary artery disease Father      Breast cancer Sister      Breast cancer Sister                Last Recorded Vitals  /90 (BP Location: Left arm, Patient Position: Lying)   Pulse 70   Temp 35.6 °C (96.1 °F) (Temporal)   Resp 18   Wt 104 kg (228 lb 13.4 oz)   SpO2 97%          Kelsey Plummer MD

## 2024-11-16 NOTE — CARE PLAN
The patient's goals for the shift include      The clinical goals for the shift include safety maintained thru the end of shift    Safety maintained thru the end of shift.      Problem: Fall/Injury  Goal: Not fall by end of shift  Outcome: Progressing     Problem: Safety - Adult  Goal: Free from fall injury  Outcome: Progressing

## 2024-11-16 NOTE — PROGRESS NOTES
Yen Montoya  11/16/24  [id number]  Kelsey Plummer MD  This is a patient with a past medical history as detailed below admitted to the [facility] ED with chief complaint of 74 y.o. female presenting to [facility] on 11/3/2024 with pertinent medical history of atrial fibrillation on Coumadin, sick sinus syndrome status post pacemaker insertion, Former tobacco use, RA, obesity, systolic heart failure (3/2024 Echo: EF 40-45%), adrenal mass, CAD status post 8 PCI, HTN, HLD, PVD, hypothyroidism, peripheral neuropathy, anxiety, SAMAN on CPAP, type 2 diabetes, pulmonary embolism, DVT RLE and nephrolithiasis. Presented to an outside emergency room for complaint of nausea and vomiting x 4 days with right lower quadrant pain and dysuria patient was taken to the OR by urology service underwent stent placement the patient continued to report improvement pain wise she has minimal pain no chest pain no shortness of breath she  feels less weak no chest pain shortness of breath or abdominal pain she developed urinary symptoms she was placed back again on antibiotics      Assessment and plan   1-hydronephrosis  urology status post stent placement pain subsided  2-SABRINA hydrate follow-up CMP monitor urine output creatinine is improving  3-  debility  Ivone [place] denied   4-urinary tract infection status post antibiotics treatment restarted  5 history of coronary artery disease currently has no chest pain   6 hypokalemia replaced  7.  History of A-fib flutter follow-up PT/INR  Discussed with the  consultants.      Review of other systems negative other than HPI  Past medical history    Epic& consultants notes reviewed  Most recent images Reviewed  Last EKG/Rhythm reviewed      Vital signs has been reviewed  GENERAL: o x 3 in no  distress.   SKIN:  No rashes .   ENT mucosa,  Normal/nose normal   NECK: no jugulovenous distention  NO lymphadenopathy   LUNGS:No wheezing,no ronchi  no rales.  CARDIAC:  S1 and S2  ABDOMEN: Abdomen soft, mild-tender.     EXTREMITIES: Extremities normal.   NEURO: non focal    PULSES: + pedal / radial           Past Medical History  She has a past medical history of Acute embolism and thrombosis of right popliteal vein (Multi) (2020), Anxiety, Atrial fib/flutter, transient (Multi), CAD (coronary artery disease), Calculus of ureter (2020), Chronic venous hypertension (idiopathic) with inflammation of left lower extremity, Chronic venous hypertension (idiopathic) with inflammation of right lower extremity, Chronic venous hypertension (idiopathic) with ulcer of unspecified lower extremity (CODE), Complete heart block, Hypertension, Hypothyroidism, Long term (current) use of anticoagulants (2022), SAMAN (obstructive sleep apnea), Osteoporosis, Other bursitis of hip, right hip, Other mechanical complication of other internal joint prosthesis, initial encounter (CMS-HCC) (2020), Other postprocedural complications and disorders of the circulatory system, not elsewhere classified (10/08/2020), Other specified joint disorders, right shoulder, Personal history of diseases of the blood and blood-forming organs and certain disorders involving the immune mechanism, Pulmonary embolism, PVD (peripheral vascular disease) (CMS-HCC), RA (rheumatoid arthritis), Renal calculi, Sick sinus syndrome (Multi), Toxic effect of unspecified metal, accidental (unintentional), initial encounter (2020), Unspecified fracture of lower end of unspecified femur, initial encounter for closed fracture (Multi) (2021), and UTI (urinary tract infection).    Surgical History  She has a past surgical history that includes Other surgical history (2021); XR chest pacemaker w fluoro (10/17/2021); Cardiac catheterization; Cholecystectomy;  section, low transverse; Tonsillectomy; Total hip arthroplasty (Bilateral); Total shoulder arthroplasty (Left); Total knee arthroplasty (Bilateral); Colonoscopy; pacemaker placement; Femur  fracture surgery; Wrist surgery; Cardiac electrophysiology mapping and ablation; and Coronary stent placement.     Social History  She reports that she quit smoking about 36 years ago. Her smoking use included cigarettes. She has never used smokeless tobacco. She reports current alcohol use. She reports that she does not use drugs.    Family History  Family History   Problem Relation Name Age of Onset    Other (ptca) Mother      Heart failure Father      Coronary artery disease Father      Breast cancer Sister      Breast cancer Sister                Last Recorded Vitals  /62 (BP Location: Left arm, Patient Position: Lying)   Pulse 65   Temp 36 °C (96.8 °F) (Temporal)   Resp 18   Wt 96.3 kg (212 lb 6.4 oz)   SpO2 97%          Kelsey Plummer MD

## 2024-11-17 VITALS
HEIGHT: 69 IN | SYSTOLIC BLOOD PRESSURE: 121 MMHG | WEIGHT: 212.4 LBS | BODY MASS INDEX: 31.46 KG/M2 | RESPIRATION RATE: 18 BRPM | OXYGEN SATURATION: 99 % | HEART RATE: 72 BPM | DIASTOLIC BLOOD PRESSURE: 72 MMHG | TEMPERATURE: 96.4 F

## 2024-11-17 LAB
ANION GAP SERPL CALC-SCNC: 11 MMOL/L (ref 10–20)
APPEARANCE UR: CLEAR
BACTERIA UR CULT: ABNORMAL
BILIRUB UR STRIP.AUTO-MCNC: NEGATIVE MG/DL
BUN SERPL-MCNC: 23 MG/DL (ref 6–23)
CALCIUM SERPL-MCNC: 10.5 MG/DL (ref 8.6–10.3)
CHLORIDE SERPL-SCNC: 103 MMOL/L (ref 98–107)
CO2 SERPL-SCNC: 26 MMOL/L (ref 21–32)
COLOR UR: ABNORMAL
CREAT SERPL-MCNC: 1.01 MG/DL (ref 0.5–1.05)
EGFRCR SERPLBLD CKD-EPI 2021: 59 ML/MIN/1.73M*2
ERYTHROCYTE [DISTWIDTH] IN BLOOD BY AUTOMATED COUNT: 13.5 % (ref 11.5–14.5)
GLUCOSE BLD MANUAL STRIP-MCNC: 108 MG/DL (ref 74–99)
GLUCOSE BLD MANUAL STRIP-MCNC: 120 MG/DL (ref 74–99)
GLUCOSE BLD MANUAL STRIP-MCNC: 165 MG/DL (ref 74–99)
GLUCOSE BLD MANUAL STRIP-MCNC: 173 MG/DL (ref 74–99)
GLUCOSE BLD MANUAL STRIP-MCNC: 94 MG/DL (ref 74–99)
GLUCOSE SERPL-MCNC: 115 MG/DL (ref 74–99)
GLUCOSE UR STRIP.AUTO-MCNC: NORMAL MG/DL
HCT VFR BLD AUTO: 39.5 % (ref 36–46)
HGB BLD-MCNC: 12.6 G/DL (ref 12–16)
INR PPP: 1.9 (ref 0.9–1.1)
KETONES UR STRIP.AUTO-MCNC: NEGATIVE MG/DL
LEUKOCYTE ESTERASE UR QL STRIP.AUTO: ABNORMAL
MAGNESIUM SERPL-MCNC: 1.58 MG/DL (ref 1.6–2.4)
MCH RBC QN AUTO: 31.7 PG (ref 26–34)
MCHC RBC AUTO-ENTMCNC: 31.9 G/DL (ref 32–36)
MCV RBC AUTO: 99 FL (ref 80–100)
MUCOUS THREADS #/AREA URNS AUTO: ABNORMAL /LPF
NITRITE UR QL STRIP.AUTO: NEGATIVE
NRBC BLD-RTO: 0 /100 WBCS (ref 0–0)
PH UR STRIP.AUTO: 5.5 [PH]
PLATELET # BLD AUTO: 296 X10*3/UL (ref 150–450)
POTASSIUM SERPL-SCNC: 3.7 MMOL/L (ref 3.5–5.3)
PROT UR STRIP.AUTO-MCNC: ABNORMAL MG/DL
PROTHROMBIN TIME: 21.9 SECONDS (ref 9.8–12.8)
RBC # BLD AUTO: 3.98 X10*6/UL (ref 4–5.2)
RBC # UR STRIP.AUTO: ABNORMAL /UL
RBC #/AREA URNS AUTO: >20 /HPF
SODIUM SERPL-SCNC: 136 MMOL/L (ref 136–145)
SP GR UR STRIP.AUTO: 1.01
UROBILINOGEN UR STRIP.AUTO-MCNC: NORMAL MG/DL
WBC # BLD AUTO: 7.7 X10*3/UL (ref 4.4–11.3)
WBC #/AREA URNS AUTO: ABNORMAL /HPF

## 2024-11-17 PROCEDURE — 36415 COLL VENOUS BLD VENIPUNCTURE: CPT | Performed by: NURSE PRACTITIONER

## 2024-11-17 PROCEDURE — 85610 PROTHROMBIN TIME: CPT | Performed by: NURSE PRACTITIONER

## 2024-11-17 PROCEDURE — 2500000001 HC RX 250 WO HCPCS SELF ADMINISTERED DRUGS (ALT 637 FOR MEDICARE OP): Performed by: NURSE PRACTITIONER

## 2024-11-17 PROCEDURE — 2500000004 HC RX 250 GENERAL PHARMACY W/ HCPCS (ALT 636 FOR OP/ED): Performed by: NURSE PRACTITIONER

## 2024-11-17 PROCEDURE — 1100000001 HC PRIVATE ROOM DAILY

## 2024-11-17 PROCEDURE — 85027 COMPLETE CBC AUTOMATED: CPT | Performed by: NURSE PRACTITIONER

## 2024-11-17 PROCEDURE — 87086 URINE CULTURE/COLONY COUNT: CPT | Mod: STJLAB | Performed by: NURSE PRACTITIONER

## 2024-11-17 PROCEDURE — 83735 ASSAY OF MAGNESIUM: CPT | Performed by: NURSE PRACTITIONER

## 2024-11-17 PROCEDURE — 2500000002 HC RX 250 W HCPCS SELF ADMINISTERED DRUGS (ALT 637 FOR MEDICARE OP, ALT 636 FOR OP/ED): Performed by: NURSE PRACTITIONER

## 2024-11-17 PROCEDURE — 80048 BASIC METABOLIC PNL TOTAL CA: CPT | Performed by: NURSE PRACTITIONER

## 2024-11-17 PROCEDURE — 81001 URINALYSIS AUTO W/SCOPE: CPT | Performed by: NURSE PRACTITIONER

## 2024-11-17 PROCEDURE — 82947 ASSAY GLUCOSE BLOOD QUANT: CPT

## 2024-11-17 RX ORDER — WARFARIN 2 MG/1
1 TABLET ORAL ONCE
Status: COMPLETED | OUTPATIENT
Start: 2024-11-17 | End: 2024-11-17

## 2024-11-17 RX ORDER — LANOLIN ALCOHOL/MO/W.PET/CERES
800 CREAM (GRAM) TOPICAL ONCE
Status: COMPLETED | OUTPATIENT
Start: 2024-11-17 | End: 2024-11-17

## 2024-11-17 ASSESSMENT — COGNITIVE AND FUNCTIONAL STATUS - GENERAL
EATING MEALS: A LITTLE
MOVING TO AND FROM BED TO CHAIR: A LITTLE
DRESSING REGULAR UPPER BODY CLOTHING: A LITTLE
HELP NEEDED FOR BATHING: A LITTLE
WALKING IN HOSPITAL ROOM: A LITTLE
MOVING TO AND FROM BED TO CHAIR: A LITTLE
DAILY ACTIVITIY SCORE: 19
PERSONAL GROOMING: A LITTLE
STANDING UP FROM CHAIR USING ARMS: A LITTLE
CLIMB 3 TO 5 STEPS WITH RAILING: A LITTLE
MOBILITY SCORE: 20
DAILY ACTIVITIY SCORE: 18
WALKING IN HOSPITAL ROOM: A LITTLE
TOILETING: A LITTLE
CLIMB 3 TO 5 STEPS WITH RAILING: A LITTLE
TOILETING: A LITTLE
DRESSING REGULAR UPPER BODY CLOTHING: A LITTLE
DRESSING REGULAR LOWER BODY CLOTHING: A LITTLE
HELP NEEDED FOR BATHING: A LITTLE
TURNING FROM BACK TO SIDE WHILE IN FLAT BAD: A LITTLE
DRESSING REGULAR LOWER BODY CLOTHING: A LITTLE
STANDING UP FROM CHAIR USING ARMS: A LITTLE
MOBILITY SCORE: 18
PERSONAL GROOMING: A LITTLE
MOVING FROM LYING ON BACK TO SITTING ON SIDE OF FLAT BED WITH BEDRAILS: A LITTLE

## 2024-11-17 ASSESSMENT — PAIN - FUNCTIONAL ASSESSMENT: PAIN_FUNCTIONAL_ASSESSMENT: 0-10

## 2024-11-17 ASSESSMENT — PAIN SCALES - GENERAL
PAINLEVEL_OUTOF10: 5 - MODERATE PAIN
PAINLEVEL_OUTOF10: 3
PAINLEVEL_OUTOF10: 0 - NO PAIN

## 2024-11-17 NOTE — CARE PLAN
The patient's goals for the shift include      The clinical goals for the shift include Pt will remain hemodynamically stable all shift      Problem: Skin  Goal: Participates in plan/prevention/treatment measures  Outcome: Progressing  Goal: Prevent/manage excess moisture  Outcome: Progressing  Goal: Prevent/minimize sheer/friction injuries  11/17/2024 1849 by Roula Olivares RN  Outcome: Progressing  11/17/2024 0842 by Roula Olivares RN  Flowsheets (Taken 11/17/2024 0842)  Prevent/minimize sheer/friction injuries:   Use pull sheet   Increase activity/out of bed for meals   HOB 30 degrees or less  Goal: Promote/optimize nutrition  Outcome: Progressing     Problem: Fall/Injury  Goal: Not fall by end of shift  Outcome: Progressing  Goal: Be free from injury by end of the shift  Outcome: Progressing  Goal: Verbalize understanding of personal risk factors for fall in the hospital  Outcome: Progressing  Goal: Use assistive devices by end of the shift  Outcome: Progressing  Goal: Pace activities to prevent fatigue by end of the shift  Outcome: Progressing     Problem: Infection related to problem list condition  Goal: Infection will resolve through treatment  Outcome: Progressing     Problem: Safety - Adult  Goal: Free from fall injury  Outcome: Progressing

## 2024-11-17 NOTE — CARE PLAN
The patient's goals for the shift include  sleep and comfort    The clinical goals for the shift include remain free of falls and injury    Problem: Skin  Goal: Participates in plan/prevention/treatment measures  Outcome: Progressing  Goal: Prevent/manage excess moisture  Outcome: Progressing  Goal: Prevent/minimize sheer/friction injuries  Outcome: Progressing  Goal: Promote/optimize nutrition  Outcome: Progressing  Goal: Promote skin healing  Outcome: Progressing     Problem: Fall/Injury  Goal: Not fall by end of shift  Outcome: Progressing  Goal: Be free from injury by end of the shift  Outcome: Progressing  Goal: Verbalize understanding of personal risk factors for fall in the hospital  Outcome: Progressing  Goal: Verbalize understanding of risk factor reduction measures to prevent injury from fall in the home  Outcome: Progressing  Goal: Use assistive devices by end of the shift  Outcome: Progressing  Goal: Pace activities to prevent fatigue by end of the shift  Outcome: Progressing     Problem: Infection related to problem list condition  Goal: Infection will resolve through treatment  Outcome: Progressing     Problem: Pain - Adult  Goal: Verbalizes/displays adequate comfort level or baseline comfort level  Outcome: Progressing     Problem: Safety - Adult  Goal: Free from fall injury  Outcome: Progressing     Problem: Discharge Planning  Goal: Discharge to home or other facility with appropriate resources  Outcome: Progressing     Problem: Chronic Conditions and Co-morbidities  Goal: Patient's chronic conditions and co-morbidity symptoms are monitored and maintained or improved  Outcome: Progressing     Problem: Diabetes  Goal: Achieve decreasing blood glucose levels by end of shift  Outcome: Progressing  Goal: Increase stability of blood glucose readings by end of shift  Outcome: Progressing  Goal: Decrease in ketones present in urine by end of shift  Outcome: Progressing  Goal: Maintain electrolyte levels  within acceptable range throughout shift  Outcome: Progressing  Goal: Maintain glucose levels >70mg/dl to <250mg/dl throughout shift  Outcome: Progressing  Goal: No changes in neurological exam by end of shift  Outcome: Progressing  Goal: Learn about and adhere to nutrition recommendations by end of shift  Outcome: Progressing  Goal: Vital signs within normal range for age by end of shift  Outcome: Progressing  Goal: Increase self care and/or family involovement by end of shift  Outcome: Progressing  Goal: Receive DSME education by end of shift  Outcome: Progressing     Problem: Nutrition  Goal: Oral intake greater 75%  Outcome: Progressing  Goal: Consume prescribed supplement  Outcome: Progressing  Goal: Adequate PO fluid intake  Outcome: Progressing  Goal: Lab values WNL  Outcome: Progressing  Goal: Electrolytes WNL  Outcome: Progressing  Goal: Maintain stable weight  Outcome: Progressing

## 2024-11-18 VITALS
WEIGHT: 212.4 LBS | HEART RATE: 70 BPM | DIASTOLIC BLOOD PRESSURE: 74 MMHG | SYSTOLIC BLOOD PRESSURE: 135 MMHG | TEMPERATURE: 97.2 F | RESPIRATION RATE: 18 BRPM | BODY MASS INDEX: 31.46 KG/M2 | OXYGEN SATURATION: 98 % | HEIGHT: 69 IN

## 2024-11-18 LAB
ANION GAP SERPL CALC-SCNC: 10 MMOL/L (ref 10–20)
BUN SERPL-MCNC: 22 MG/DL (ref 6–23)
CALCIUM SERPL-MCNC: 10.4 MG/DL (ref 8.6–10.3)
CHLORIDE SERPL-SCNC: 104 MMOL/L (ref 98–107)
CO2 SERPL-SCNC: 28 MMOL/L (ref 21–32)
CREAT SERPL-MCNC: 0.98 MG/DL (ref 0.5–1.05)
EGFRCR SERPLBLD CKD-EPI 2021: 61 ML/MIN/1.73M*2
ERYTHROCYTE [DISTWIDTH] IN BLOOD BY AUTOMATED COUNT: 13.4 % (ref 11.5–14.5)
GLUCOSE BLD MANUAL STRIP-MCNC: 117 MG/DL (ref 74–99)
GLUCOSE SERPL-MCNC: 105 MG/DL (ref 74–99)
HCT VFR BLD AUTO: 40.1 % (ref 36–46)
HGB BLD-MCNC: 12.7 G/DL (ref 12–16)
HOLD SPECIMEN: NORMAL
INR PPP: 2 (ref 0.9–1.1)
MAGNESIUM SERPL-MCNC: 1.69 MG/DL (ref 1.6–2.4)
MCH RBC QN AUTO: 32 PG (ref 26–34)
MCHC RBC AUTO-ENTMCNC: 31.7 G/DL (ref 32–36)
MCV RBC AUTO: 101 FL (ref 80–100)
NRBC BLD-RTO: 0 /100 WBCS (ref 0–0)
PLATELET # BLD AUTO: 267 X10*3/UL (ref 150–450)
POTASSIUM SERPL-SCNC: 4.1 MMOL/L (ref 3.5–5.3)
PROTHROMBIN TIME: 22.5 SECONDS (ref 9.8–12.8)
RBC # BLD AUTO: 3.97 X10*6/UL (ref 4–5.2)
SODIUM SERPL-SCNC: 138 MMOL/L (ref 136–145)
WBC # BLD AUTO: 7.2 X10*3/UL (ref 4.4–11.3)

## 2024-11-18 PROCEDURE — 2500000001 HC RX 250 WO HCPCS SELF ADMINISTERED DRUGS (ALT 637 FOR MEDICARE OP): Performed by: NURSE PRACTITIONER

## 2024-11-18 PROCEDURE — 82947 ASSAY GLUCOSE BLOOD QUANT: CPT

## 2024-11-18 PROCEDURE — 2500000004 HC RX 250 GENERAL PHARMACY W/ HCPCS (ALT 636 FOR OP/ED): Performed by: NURSE PRACTITIONER

## 2024-11-18 PROCEDURE — 83735 ASSAY OF MAGNESIUM: CPT | Performed by: NURSE PRACTITIONER

## 2024-11-18 PROCEDURE — 85610 PROTHROMBIN TIME: CPT | Performed by: NURSE PRACTITIONER

## 2024-11-18 PROCEDURE — 36415 COLL VENOUS BLD VENIPUNCTURE: CPT | Performed by: NURSE PRACTITIONER

## 2024-11-18 PROCEDURE — 2500000002 HC RX 250 W HCPCS SELF ADMINISTERED DRUGS (ALT 637 FOR MEDICARE OP, ALT 636 FOR OP/ED): Performed by: NURSE PRACTITIONER

## 2024-11-18 PROCEDURE — 80048 BASIC METABOLIC PNL TOTAL CA: CPT | Performed by: NURSE PRACTITIONER

## 2024-11-18 PROCEDURE — 85027 COMPLETE CBC AUTOMATED: CPT | Performed by: NURSE PRACTITIONER

## 2024-11-18 ASSESSMENT — COGNITIVE AND FUNCTIONAL STATUS - GENERAL
PERSONAL GROOMING: A LITTLE
TOILETING: A LITTLE
MOBILITY SCORE: 19
MOVING TO AND FROM BED TO CHAIR: A LITTLE
STANDING UP FROM CHAIR USING ARMS: A LITTLE
CLIMB 3 TO 5 STEPS WITH RAILING: A LOT
DRESSING REGULAR LOWER BODY CLOTHING: A LITTLE
DAILY ACTIVITIY SCORE: 19
DRESSING REGULAR UPPER BODY CLOTHING: A LITTLE
WALKING IN HOSPITAL ROOM: A LITTLE
HELP NEEDED FOR BATHING: A LITTLE

## 2024-11-18 ASSESSMENT — PAIN SCALES - GENERAL: PAINLEVEL_OUTOF10: 0 - NO PAIN

## 2024-11-18 NOTE — CARE PLAN
The patient's goals for the shift include  sleep and comfort    The clinical goals for the shift include remain afebrile this shift      Problem: Skin  Goal: Participates in plan/prevention/treatment measures  Outcome: Progressing  Goal: Prevent/manage excess moisture  Outcome: Progressing  Goal: Prevent/minimize sheer/friction injuries  Outcome: Progressing  Goal: Promote/optimize nutrition  Outcome: Progressing  Goal: Promote skin healing  Outcome: Progressing     Problem: Fall/Injury  Goal: Not fall by end of shift  Outcome: Progressing  Goal: Be free from injury by end of the shift  Outcome: Progressing  Goal: Verbalize understanding of personal risk factors for fall in the hospital  Outcome: Progressing  Goal: Verbalize understanding of risk factor reduction measures to prevent injury from fall in the home  Outcome: Progressing  Goal: Use assistive devices by end of the shift  Outcome: Progressing  Goal: Pace activities to prevent fatigue by end of the shift  Outcome: Progressing     Problem: Infection related to problem list condition  Goal: Infection will resolve through treatment  Outcome: Progressing     Problem: Pain - Adult  Goal: Verbalizes/displays adequate comfort level or baseline comfort level  Outcome: Progressing     Problem: Safety - Adult  Goal: Free from fall injury  Outcome: Progressing     Problem: Discharge Planning  Goal: Discharge to home or other facility with appropriate resources  Outcome: Progressing     Problem: Chronic Conditions and Co-morbidities  Goal: Patient's chronic conditions and co-morbidity symptoms are monitored and maintained or improved  Outcome: Progressing     Problem: Diabetes  Goal: Achieve decreasing blood glucose levels by end of shift  Outcome: Progressing  Goal: Increase stability of blood glucose readings by end of shift  Outcome: Progressing  Goal: Decrease in ketones present in urine by end of shift  Outcome: Progressing  Goal: Maintain electrolyte levels  within acceptable range throughout shift  Outcome: Progressing  Goal: Maintain glucose levels >70mg/dl to <250mg/dl throughout shift  Outcome: Progressing  Goal: No changes in neurological exam by end of shift  Outcome: Progressing  Goal: Learn about and adhere to nutrition recommendations by end of shift  Outcome: Progressing  Goal: Vital signs within normal range for age by end of shift  Outcome: Progressing  Goal: Increase self care and/or family involovement by end of shift  Outcome: Progressing  Goal: Receive DSME education by end of shift  Outcome: Progressing     Problem: Nutrition  Goal: Oral intake greater 75%  Outcome: Progressing  Goal: Consume prescribed supplement  Outcome: Progressing  Goal: Adequate PO fluid intake  Outcome: Progressing  Goal: Lab values WNL  Outcome: Progressing  Goal: Electrolytes WNL  Outcome: Progressing  Goal: Maintain stable weight  Outcome: Progressing   .

## 2024-11-18 NOTE — NURSING NOTE
Discharge report called into [facility] and spoke with Chanelle-receiving nurse, IV access discontinued with tip intact.  [id number]

## 2024-11-18 NOTE — PROGRESS NOTES
11/18/24 0900   Discharge Planning   Living Arrangements Spouse/significant other   Support Systems Family members   Home or Post Acute Services Post acute facilities (Rehab/SNF/etc)   Type of Post Acute Facility Services Skilled nursing   Expected Discharge Disposition SNF   Does the patient need discharge transport arranged? Yes   RoundTrip coordination needed? Yes   Has discharge transport been arranged? No     Precert approved for [facility] and facility can accept today. Patient is concerned her  fell and fractured several ribs and was admitted to [facility] room 609 (ortho floor). She does not want to go to [facility] if he is discharged home. It is just the two of them and there would be no one to help him. I dicussed with her the concerns with her returning home without the additional therapy. She stated her  is her priority at this time. I reached out to the TCC, Franchesca on the unit, she has not had a chance to review his chart and therapy has not evaluated him. She will call with an update. Patient and bedside nurse have been updated.     1030: Message sent to [facility] to arrange for wheelchair transport to [facility].     1040: Per TCCChristo at [facility], therapy is recommending her spouse, Jean for moderate intensity therapy and he is agreeable to going to [facility]. [facility] case management will facilitate his discharge needs. Patient notified and she is agreeable to transferring to [facility] today. Wheelchair transport arranged for 1130. Patient notified, voice mail left for daughter, Teresa, and bedside notified. Dsc notified facility.

## 2024-11-18 NOTE — PROGRESS NOTES
Yen Montoya  11/17/24  [id number]  Kelsey Plummer MD  This is a patient with a past medical history as detailed below admitted to the [facility] ED with chief complaint of 74 y.o. female presenting to [facility] on 11/3/2024 with pertinent medical history of atrial fibrillation on Coumadin, sick sinus syndrome status post pacemaker insertion, Former tobacco use, RA, obesity, systolic heart failure (3/2024 Echo: EF 40-45%), adrenal mass, CAD status post 8 PCI, HTN, HLD, PVD, hypothyroidism, peripheral neuropathy, anxiety, SAMAN on CPAP, type 2 diabetes, pulmonary embolism, DVT RLE and nephrolithiasis. Presented to an outside emergency room for complaint of nausea and vomiting x 4 days with right lower quadrant pain and dysuria. In addition, the patient complaints of intermittent fevers x1 day, dizziness, chest pain, and shortness of breath.  No denies chest pain tightness pressure shortness of breath lightheadedness or dizziness feels fatigued has no energy wants to go to [place].   No chest pain shortness of breath or abdominal pain awaiting for the urine culture and placement to ECF      Assessment and plan   1-hydronephrosis   consulted urology status post stent placement pain subsided as above urinary symptoms urine analysis is positive sent for culture started on ceftriaxone awaiting for the culture results  2-SABRINA hydrate follow-up CMP better  3-  debility   PT OT ECF  4-urinary tract infection follow-up culture started on IV ceftriaxone .  As above  5 history of coronary artery disease currently has no chest pain   6  History of A-fib flutter follow-up PT/INR  Discussed with the  consultants.      Review of other systems negative other than HPI  Past medical history    Epic& consultants notes reviewed  Most recent images Reviewed  Last EKG/Rhythm reviewed      Vital signs has been reviewed  No distress.   SKIN:  No rashes .   ENT mucosa,  Normal/nose normal   NECK: no jugulovenous distention  NO lymphadenopathy   LUNGS:No  wheezing,no ronchi  no rales.  CARDIAC:  S1 and S2  ABDOMEN: Abdomen soft, mild-tender.    EXTREMITIES: Extremities normal.   NEURO: non focal    PULSES: + pedal / radial           Past Medical History  She has a past medical history of Acute embolism and thrombosis of right popliteal vein (Multi) (2020), Anxiety, Atrial fib/flutter, transient (Multi), CAD (coronary artery disease), Calculus of ureter (2020), Chronic venous hypertension (idiopathic) with inflammation of left lower extremity, Chronic venous hypertension (idiopathic) with inflammation of right lower extremity, Chronic venous hypertension (idiopathic) with ulcer of unspecified lower extremity (CODE), Complete heart block, Hypertension, Hypothyroidism, Long term (current) use of anticoagulants (2022), SAMAN (obstructive sleep apnea), Osteoporosis, Other bursitis of hip, right hip, Other mechanical complication of other internal joint prosthesis, initial encounter (CMS-HCC) (2020), Other postprocedural complications and disorders of the circulatory system, not elsewhere classified (10/08/2020), Other specified joint disorders, right shoulder, Personal history of diseases of the blood and blood-forming organs and certain disorders involving the immune mechanism, Pulmonary embolism, PVD (peripheral vascular disease) (CMS-HCC), RA (rheumatoid arthritis), Renal calculi, Sick sinus syndrome (Multi), Toxic effect of unspecified metal, accidental (unintentional), initial encounter (2020), Unspecified fracture of lower end of unspecified femur, initial encounter for closed fracture (Multi) (2021), and UTI (urinary tract infection).    Surgical History  She has a past surgical history that includes Other surgical history (2021); XR chest pacemaker w fluoro (10/17/2021); Cardiac catheterization; Cholecystectomy;  section, low transverse; Tonsillectomy; Total hip arthroplasty (Bilateral); Total shoulder arthroplasty  (Left); Total knee arthroplasty (Bilateral); Colonoscopy; pacemaker placement; Femur fracture surgery; Wrist surgery; Cardiac electrophysiology mapping and ablation; and Coronary stent placement.     Social History  She reports that she quit smoking about 36 years ago. Her smoking use included cigarettes. She has never used smokeless tobacco. She reports current alcohol use. She reports that she does not use drugs.    Family History  Family History   Problem Relation Name Age of Onset    Other (ptca) Mother      Heart failure Father      Coronary artery disease Father      Breast cancer Sister      Breast cancer Sister                Last Recorded Vitals  /72 (BP Location: Right arm, Patient Position: Lying)   Pulse 72   Temp 35.8 °C (96.4 °F)   Resp 18   Wt 96.3 kg (212 lb 6.4 oz)   SpO2 99%          Kelsey Plummer MD

## 2024-11-19 ENCOUNTER — OFFICE VISIT (OUTPATIENT)
Dept: GERIATRIC MEDICINE | Age: 74
End: 2024-11-19

## 2024-11-19 DIAGNOSIS — E03.9 ACQUIRED HYPOTHYROIDISM: ICD-10-CM

## 2024-11-19 DIAGNOSIS — F32.A DEPRESSION, UNSPECIFIED DEPRESSION TYPE: ICD-10-CM

## 2024-11-19 DIAGNOSIS — I50.22 CHRONIC SYSTOLIC CHF (CONGESTIVE HEART FAILURE) (HCC): ICD-10-CM

## 2024-11-19 DIAGNOSIS — I10 HYPERTENSION, UNSPECIFIED TYPE: ICD-10-CM

## 2024-11-19 DIAGNOSIS — E78.5 HYPERLIPIDEMIA, UNSPECIFIED HYPERLIPIDEMIA TYPE: ICD-10-CM

## 2024-11-19 DIAGNOSIS — I48.21 PERMANENT ATRIAL FIBRILLATION (HCC): ICD-10-CM

## 2024-11-19 DIAGNOSIS — N39.0 URINARY TRACT INFECTION WITHOUT HEMATURIA, SITE UNSPECIFIED: Primary | ICD-10-CM

## 2024-11-19 LAB — BACTERIA UR CULT: NORMAL

## 2024-11-19 NOTE — DISCHARGE SUMMARY
Internal Medicine discharge summery     PATIENT NAME: Yen Montoya    MRN: [id number]  Discharge  DATE:   11/18/2024        This is a patient with a past medical history as detailed below admitted to the [facility] ED with chief complaint of 74 y.o. female presenting to [facility] on 11/3/2024 with pertinent medical history of atrial fibrillation on Coumadin, sick sinus syndrome status post pacemaker insertion, Former tobacco use, RA, obesity, systolic heart failure (3/2024 Echo: EF 40-45%), adrenal mass, CAD status post 8 PCI, HTN, HLD, PVD, hypothyroidism, peripheral neuropathy, anxiety, SAMAN on CPAP, type 2 diabetes, pulmonary embolism, DVT RLE and nephrolithiasis. Presented to an outside emergency room for complaint of nausea and vomiting x 4 days with right lower quadrant pain and dysuria.         Assessment and plan   1-hydronephrosis   consulted urology status post stent placement    2-SABRINA    3-  debility   PT OT ECF  4-urinary tract infection    5 history of coronary artery disease currently has no chest pain   6  History of A-fib flutter follow-up PT/INR       No new complain no cp no sob no abd pain  Clear by consultants to D/C   meds reviewed /condition is stable    emphasize on F/u 1 week with PCP as well as consultants   Phone number and cards provided by the consultants and RN   Question answered   Total time over 30 ms   Last EKG reviewed   Last CXR reviewed   Epic& consultants notes reviewed  Labs reviewed      Vitals:    11/17/24 1300 11/17/24 1600 11/17/24 1942 11/18/24 0800   BP: 129/66 119/65 121/72 135/74   BP Location:  Left arm Right arm Left arm   Patient Position:  Lying Lying Lying   Pulse: 75 70 72 70   Resp:  16 18 18   Temp:  36 °C (96.8 °F) 35.8 °C (96.4 °F) 36.2 °C (97.2 °F)   TempSrc:  Temporal  Temporal   SpO2:  98% 99% 98%   Weight:       Height:         GENERAL:no  distress, cooperative   LUNGS: Lungs clear to auscultation. No wheezing,ronchi  or rales./CARDIAC:  S1 , S2;  ABDOMEN:  Abdomen soft, non-tender NEURO: same /no new rash   ENT unremarkable  Plan D/W consultants/PT/available family         Your medication list        START taking these medications        Instructions Last Dose Given Next Dose Due   dilTIAZem 60 mg immediate release tablet  Commonly known as: Cardizem      Take 1 tablet (60 mg) by mouth every 8 hours.       hydrALAZINE 25 mg tablet  Commonly known as: Apresoline      Take 1 tablet (25 mg) by mouth every 8 hours.       hydrALAZINE 25 mg tablet  Commonly known as: Apresoline      Take 1 tablet (25 mg) by mouth every 8 hours if needed (SBP > 180).       hydrOXYzine HCL 25 mg tablet  Commonly known as: Atarax      Take 1 tablet (25 mg) by mouth every 6 hours if needed for anxiety.              CHANGE how you take these medications        Instructions Last Dose Given Next Dose Due   lisinopril 20 mg tablet  What changed:   medication strength  how much to take      Take 1 tablet (20 mg) by mouth once daily. Do not fill before November 7, 2024.              CONTINUE taking these medications        Instructions Last Dose Given Next Dose Due   aspirin 81 mg EC tablet           atorvastatin 80 mg tablet  Commonly known as: Lipitor           azaTHIOprine 50 mg tablet  Commonly known as: Imuran           buPROPion  mg 24 hr tablet  Commonly known as: Wellbutrin XL           cholecalciferol 5,000 Units tablet  Commonly known as: Vitamin D-3           co-enzyme Q-10 30 mg capsule           furosemide 20 mg tablet  Commonly known as: Lasix      Take one tablet daily.  Take an additional tablet daily as needed for a 3 pound weight gain or more over night or 5 pounds in 5 days.       insulin regular 100 unit/mL injection  Commonly known as: HumuLIN R,NovoLIN R           levothyroxine 100 mcg tablet  Commonly known as: Synthroid, Levoxyl           metoprolol tartrate 50 mg tablet  Commonly known as: Lopressor      Take 2 tablets by mouth 2 times a day.       Vitamins B Complex  capsule  Generic drug: b complex vitamins           warfarin 2 mg tablet  Commonly known as: Coumadin      Take as directed. If you are unsure how to take this medication, talk to your nurse or doctor.       warfarin 2 mg tablet  Commonly known as: Coumadin      Take as directed. If you are unsure how to take this medication, talk to your nurse or doctor.              STOP taking these medications      insulin NPH (Isophane) 100 unit/mL injection  Commonly known as: HumuLIN N,NovoLIN N               ASK your doctor about these medications        Instructions Last Dose Given Next Dose Due   amoxicillin-pot clavulanate 875-125 mg tablet  Commonly known as: Augmentin  Ask about: Should I take this medication?      Take 1 tablet by mouth every 12 hours for 15 doses.       lactobacillus acidophilus capsule  Ask about: Should I take this medication?      Take 1 capsule by mouth once daily for 7 days.                 Where to Get Your Medications        These medications were sent to [facility] [address] [place] [address], [place] [address]   [address] , [facility] [address]      Phone: [phone]   lactobacillus acidophilus capsule       Information about where to get these medications is not yet available    Ask your nurse or doctor about these medications  amoxicillin-pot clavulanate 875-125 mg tablet  hydrALAZINE 25 mg tablet  hydrALAZINE 25 mg tablet  hydrOXYzine HCL 25 mg tablet  lisinopril 20 mg tablet              Discharge Meds     Medication List      START taking these medications     dilTIAZem 60 mg immediate release tablet; Commonly known as: Cardizem;   Take 1 tablet (60 mg) by mouth every 8 hours.   * hydrALAZINE 25 mg tablet; Commonly known as: Apresoline; Take 1 tablet   (25 mg) by mouth every 8 hours.   * hydrALAZINE 25 mg tablet; Commonly known as: Apresoline; Take 1 tablet   (25 mg) by mouth every 8 hours if needed (SBP > 180).   hydrOXYzine HCL 25 mg tablet; Commonly known as: Atarax; Take 1 tablet   (25  mg) by mouth every 6 hours if needed for anxiety.  * This list has 2 medication(s) that are the same as other medications   prescribed for you. Read the directions carefully, and ask your doctor or   other care provider to review them with you.     CHANGE how you take these medications     lisinopril 20 mg tablet; Take 1 tablet (20 mg) by mouth once daily. Do   not fill before November 7, 2024.; What changed: medication strength, how   much to take     CONTINUE taking these medications     aspirin 81 mg EC tablet   atorvastatin 80 mg tablet; Commonly known as: Lipitor   azaTHIOprine 50 mg tablet; Commonly known as: Imuran   buPROPion  mg 24 hr tablet; Commonly known as: Wellbutrin XL   cholecalciferol 5,000 Units tablet; Commonly known as: Vitamin D-3   co-enzyme Q-10 30 mg capsule   furosemide 20 mg tablet; Commonly known as: Lasix; Take one tablet   daily.  Take an additional tablet daily as needed for a 3 pound weight   gain or more over night or 5 pounds in 5 days.   insulin regular 100 unit/mL injection; Commonly known as: HumuLIN   R,NovoLIN R   levothyroxine 100 mcg tablet; Commonly known as: Synthroid, Levoxyl   metoprolol tartrate 50 mg tablet; Commonly known as: Lopressor; Take 2   tablets by mouth 2 times a day.   Vitamins B Complex capsule; Generic drug: b complex vitamins   * warfarin 2 mg tablet; Commonly known as: Coumadin; Take as directed.   If you are unsure how to take this medication, talk to your nurse or   doctor.   * warfarin 2 mg tablet; Commonly known as: Coumadin; Take as directed.   If you are unsure how to take this medication, talk to your nurse or   doctor.  * This list has 2 medication(s) that are the same as other medications   prescribed for you. Read the directions carefully, and ask your doctor or   other care provider to review them with you.     STOP taking these medications     insulin NPH (Isophane) 100 unit/mL injection; Commonly known as: HumuLIN   N,NovoLIN N     ASK  your doctor about these medications     amoxicillin-pot clavulanate 875-125 mg tablet; Commonly known as:   Augmentin; Take 1 tablet by mouth every 12 hours for 15 doses.; Ask about:   Should I take this medication?   lactobacillus acidophilus capsule; Take 1 capsule by mouth once daily   for 7 days.; Ask about: Should I take this medication?       Test Results Pending At Discharge  Pending Labs       Order Current Status    Urine Culture In process               Outpatient Follow-Up  Future Appointments   Date Time Provider Department Center   1/13/2025  9:40 AM [facility] [place]   1/13/2025 10:30 AM Ai Hdz, ALLA-CNP [id number] [place]         Kelsey Plummer MD

## 2024-11-20 RX ORDER — DILTIAZEM HCL 60 MG
60 TABLET ORAL 3 TIMES DAILY
COMMUNITY

## 2024-11-20 RX ORDER — HYDRALAZINE HYDROCHLORIDE 25 MG/1
25 TABLET, FILM COATED ORAL 3 TIMES DAILY
COMMUNITY

## 2024-11-20 RX ORDER — LISINOPRIL 20 MG/1
20 TABLET ORAL DAILY
COMMUNITY

## 2024-11-20 RX ORDER — HYDROXYZINE HYDROCHLORIDE 25 MG/1
25 TABLET, FILM COATED ORAL EVERY 6 HOURS PRN
COMMUNITY
Start: 2024-11-18 | End: 2024-12-02

## 2024-11-20 RX ORDER — UBIDECARENONE 30 MG
30 CAPSULE ORAL NIGHTLY
COMMUNITY

## 2024-11-20 RX ORDER — INSULIN LISPRO 100 [IU]/ML
0-6 INJECTION, SOLUTION INTRAVENOUS; SUBCUTANEOUS
COMMUNITY

## 2024-11-21 ENCOUNTER — OFFICE VISIT (OUTPATIENT)
Dept: GERIATRIC MEDICINE | Age: 74
End: 2024-11-21

## 2024-11-21 DIAGNOSIS — F32.A DEPRESSION, UNSPECIFIED DEPRESSION TYPE: ICD-10-CM

## 2024-11-21 DIAGNOSIS — I48.21 PERMANENT ATRIAL FIBRILLATION (HCC): ICD-10-CM

## 2024-11-21 DIAGNOSIS — E78.5 HYPERLIPIDEMIA, UNSPECIFIED HYPERLIPIDEMIA TYPE: ICD-10-CM

## 2024-11-21 DIAGNOSIS — N39.0 URINARY TRACT INFECTION WITHOUT HEMATURIA, SITE UNSPECIFIED: Primary | ICD-10-CM

## 2024-11-21 DIAGNOSIS — I50.22 CHRONIC SYSTOLIC CHF (CONGESTIVE HEART FAILURE) (HCC): ICD-10-CM

## 2024-11-21 DIAGNOSIS — E03.9 ACQUIRED HYPOTHYROIDISM: ICD-10-CM

## 2024-11-21 DIAGNOSIS — I10 HYPERTENSION, UNSPECIFIED TYPE: ICD-10-CM

## 2024-11-22 ENCOUNTER — OFFICE VISIT (OUTPATIENT)
Dept: GERIATRIC MEDICINE | Age: 74
End: 2024-11-22
Payer: COMMERCIAL

## 2024-11-22 DIAGNOSIS — I48.21 PERMANENT ATRIAL FIBRILLATION (HCC): ICD-10-CM

## 2024-11-22 DIAGNOSIS — I10 HYPERTENSION, UNSPECIFIED TYPE: ICD-10-CM

## 2024-11-22 DIAGNOSIS — E03.9 ACQUIRED HYPOTHYROIDISM: ICD-10-CM

## 2024-11-22 DIAGNOSIS — I50.22 CHRONIC SYSTOLIC CHF (CONGESTIVE HEART FAILURE) (HCC): ICD-10-CM

## 2024-11-22 DIAGNOSIS — F32.A DEPRESSION, UNSPECIFIED DEPRESSION TYPE: ICD-10-CM

## 2024-11-22 DIAGNOSIS — E78.5 HYPERLIPIDEMIA, UNSPECIFIED HYPERLIPIDEMIA TYPE: ICD-10-CM

## 2024-11-22 DIAGNOSIS — N39.0 URINARY TRACT INFECTION WITHOUT HEMATURIA, SITE UNSPECIFIED: Primary | ICD-10-CM

## 2024-11-22 PROCEDURE — 1123F ACP DISCUSS/DSCN MKR DOCD: CPT | Performed by: INTERNAL MEDICINE

## 2024-11-22 PROCEDURE — 99308 SBSQ NF CARE LOW MDM 20: CPT | Performed by: INTERNAL MEDICINE

## 2024-11-23 ENCOUNTER — OFFICE VISIT (OUTPATIENT)
Dept: GERIATRIC MEDICINE | Age: 74
End: 2024-11-23
Payer: COMMERCIAL

## 2024-11-23 DIAGNOSIS — N39.0 URINARY TRACT INFECTION WITHOUT HEMATURIA, SITE UNSPECIFIED: Primary | ICD-10-CM

## 2024-11-23 DIAGNOSIS — E03.9 ACQUIRED HYPOTHYROIDISM: ICD-10-CM

## 2024-11-23 DIAGNOSIS — E78.5 HYPERLIPIDEMIA, UNSPECIFIED HYPERLIPIDEMIA TYPE: ICD-10-CM

## 2024-11-23 DIAGNOSIS — I50.22 CHRONIC SYSTOLIC CHF (CONGESTIVE HEART FAILURE) (HCC): ICD-10-CM

## 2024-11-23 DIAGNOSIS — I48.21 PERMANENT ATRIAL FIBRILLATION (HCC): ICD-10-CM

## 2024-11-23 DIAGNOSIS — F32.A DEPRESSION, UNSPECIFIED DEPRESSION TYPE: ICD-10-CM

## 2024-11-23 DIAGNOSIS — I10 HYPERTENSION, UNSPECIFIED TYPE: ICD-10-CM

## 2024-11-23 PROCEDURE — 99308 SBSQ NF CARE LOW MDM 20: CPT | Performed by: INTERNAL MEDICINE

## 2024-11-23 PROCEDURE — 1123F ACP DISCUSS/DSCN MKR DOCD: CPT | Performed by: INTERNAL MEDICINE

## 2024-11-24 ENCOUNTER — OFFICE VISIT (OUTPATIENT)
Dept: GERIATRIC MEDICINE | Age: 74
End: 2024-11-24

## 2024-11-24 DIAGNOSIS — E78.5 HYPERLIPIDEMIA, UNSPECIFIED HYPERLIPIDEMIA TYPE: ICD-10-CM

## 2024-11-24 DIAGNOSIS — I10 HYPERTENSION, UNSPECIFIED TYPE: ICD-10-CM

## 2024-11-24 DIAGNOSIS — N39.0 URINARY TRACT INFECTION WITHOUT HEMATURIA, SITE UNSPECIFIED: Primary | ICD-10-CM

## 2024-11-24 DIAGNOSIS — E03.9 ACQUIRED HYPOTHYROIDISM: ICD-10-CM

## 2024-11-24 DIAGNOSIS — I50.22 CHRONIC SYSTOLIC CHF (CONGESTIVE HEART FAILURE) (HCC): ICD-10-CM

## 2024-11-24 DIAGNOSIS — F32.A DEPRESSION, UNSPECIFIED DEPRESSION TYPE: ICD-10-CM

## 2024-11-24 DIAGNOSIS — I48.21 PERMANENT ATRIAL FIBRILLATION (HCC): ICD-10-CM

## 2024-11-25 ENCOUNTER — OFFICE VISIT (OUTPATIENT)
Dept: GERIATRIC MEDICINE | Age: 74
End: 2024-11-25

## 2024-11-25 DIAGNOSIS — I48.21 PERMANENT ATRIAL FIBRILLATION (HCC): ICD-10-CM

## 2024-11-25 DIAGNOSIS — E03.9 ACQUIRED HYPOTHYROIDISM: ICD-10-CM

## 2024-11-25 DIAGNOSIS — F32.A DEPRESSION, UNSPECIFIED DEPRESSION TYPE: ICD-10-CM

## 2024-11-25 DIAGNOSIS — I10 HYPERTENSION, UNSPECIFIED TYPE: ICD-10-CM

## 2024-11-25 DIAGNOSIS — I50.22 CHRONIC SYSTOLIC CHF (CONGESTIVE HEART FAILURE) (HCC): ICD-10-CM

## 2024-11-25 DIAGNOSIS — E78.5 HYPERLIPIDEMIA, UNSPECIFIED HYPERLIPIDEMIA TYPE: ICD-10-CM

## 2024-11-25 DIAGNOSIS — N39.0 URINARY TRACT INFECTION WITHOUT HEMATURIA, SITE UNSPECIFIED: Primary | ICD-10-CM

## 2024-11-26 ENCOUNTER — OFFICE VISIT (OUTPATIENT)
Dept: GERIATRIC MEDICINE | Age: 74
End: 2024-11-26

## 2024-11-26 DIAGNOSIS — I10 HYPERTENSION, UNSPECIFIED TYPE: ICD-10-CM

## 2024-11-26 DIAGNOSIS — I50.22 CHRONIC SYSTOLIC CHF (CONGESTIVE HEART FAILURE) (HCC): ICD-10-CM

## 2024-11-26 DIAGNOSIS — N39.0 URINARY TRACT INFECTION WITHOUT HEMATURIA, SITE UNSPECIFIED: Primary | ICD-10-CM

## 2024-11-26 DIAGNOSIS — E78.5 HYPERLIPIDEMIA, UNSPECIFIED HYPERLIPIDEMIA TYPE: ICD-10-CM

## 2024-11-26 DIAGNOSIS — E03.9 ACQUIRED HYPOTHYROIDISM: ICD-10-CM

## 2024-11-26 DIAGNOSIS — F32.A DEPRESSION, UNSPECIFIED DEPRESSION TYPE: ICD-10-CM

## 2024-11-26 DIAGNOSIS — I48.21 PERMANENT ATRIAL FIBRILLATION (HCC): ICD-10-CM

## 2024-11-27 ENCOUNTER — OFFICE VISIT (OUTPATIENT)
Dept: GERIATRIC MEDICINE | Age: 74
End: 2024-11-27

## 2024-11-27 DIAGNOSIS — F32.A DEPRESSION, UNSPECIFIED DEPRESSION TYPE: ICD-10-CM

## 2024-11-27 DIAGNOSIS — N39.0 URINARY TRACT INFECTION WITHOUT HEMATURIA, SITE UNSPECIFIED: Primary | ICD-10-CM

## 2024-11-27 DIAGNOSIS — I50.22 CHRONIC SYSTOLIC CHF (CONGESTIVE HEART FAILURE) (HCC): ICD-10-CM

## 2024-11-27 DIAGNOSIS — I48.21 PERMANENT ATRIAL FIBRILLATION (HCC): ICD-10-CM

## 2024-11-27 DIAGNOSIS — I10 HYPERTENSION, UNSPECIFIED TYPE: ICD-10-CM

## 2024-11-27 DIAGNOSIS — E03.9 ACQUIRED HYPOTHYROIDISM: ICD-10-CM

## 2024-11-27 DIAGNOSIS — E78.5 HYPERLIPIDEMIA, UNSPECIFIED HYPERLIPIDEMIA TYPE: ICD-10-CM

## 2024-11-28 ENCOUNTER — OFFICE VISIT (OUTPATIENT)
Dept: GERIATRIC MEDICINE | Age: 74
End: 2024-11-28

## 2024-11-28 DIAGNOSIS — N39.0 URINARY TRACT INFECTION WITHOUT HEMATURIA, SITE UNSPECIFIED: Primary | ICD-10-CM

## 2024-11-29 ENCOUNTER — OFFICE VISIT (OUTPATIENT)
Dept: GERIATRIC MEDICINE | Age: 74
End: 2024-11-29

## 2024-11-29 DIAGNOSIS — N39.0 URINARY TRACT INFECTION WITHOUT HEMATURIA, SITE UNSPECIFIED: Primary | ICD-10-CM

## 2024-12-02 ENCOUNTER — OFFICE VISIT (OUTPATIENT)
Dept: GERIATRIC MEDICINE | Age: 74
End: 2024-12-02
Payer: COMMERCIAL

## 2024-12-02 DIAGNOSIS — N39.0 URINARY TRACT INFECTION WITHOUT HEMATURIA, SITE UNSPECIFIED: Primary | ICD-10-CM

## 2024-12-02 DIAGNOSIS — F32.A DEPRESSION, UNSPECIFIED DEPRESSION TYPE: ICD-10-CM

## 2024-12-02 DIAGNOSIS — I50.22 CHRONIC SYSTOLIC CHF (CONGESTIVE HEART FAILURE) (HCC): ICD-10-CM

## 2024-12-02 DIAGNOSIS — I10 HYPERTENSION, UNSPECIFIED TYPE: ICD-10-CM

## 2024-12-02 DIAGNOSIS — I48.21 PERMANENT ATRIAL FIBRILLATION (HCC): ICD-10-CM

## 2024-12-02 DIAGNOSIS — E03.9 ACQUIRED HYPOTHYROIDISM: ICD-10-CM

## 2024-12-02 DIAGNOSIS — E78.5 HYPERLIPIDEMIA, UNSPECIFIED HYPERLIPIDEMIA TYPE: ICD-10-CM

## 2024-12-02 PROBLEM — S06.9XAA INTRACRANIAL INJURY OF OTHER AND UNSPECIFIED NATURE, WITHOUT MENTION OF OPEN INTRACRANIAL WOUND, UNSPECIFIED STATE OF CONSCIOUSNESS: Status: RESOLVED | Noted: 2019-10-05 | Resolved: 2024-12-02

## 2024-12-02 PROCEDURE — 99316 NF DSCHRG MGMT 30 MIN+: CPT | Performed by: INTERNAL MEDICINE

## 2024-12-02 NOTE — PROGRESS NOTES
1 each 0    Continuous Blood Gluc Sensor (FREESTYLE ELENA 14 DAY SENSOR) MISC Every 2 weeks 2 each 06    Continuous Blood Gluc  (FREESTYLE ELENA READER) JOSÉ MIGUEL As directed 1 Device 0    blood glucose test strips (ONETOUCH VERIO) strip USE 1 STRIP TO CHECK GLUCOSE 4 TIMES DAILY 200 each 3    Insulin Syringe-Needle U-100 (AIMSCO INSULIN SYR ULTRA THIN) 31G X 5/16\" 0.3 ML MISC 1 each by Does not apply route 3 times daily 100 each 3    Blood Glucose Monitoring Suppl (ONETOUCH VERIO) w/Device KIT Give 1 meter to check bs Dx E11.65 1 kit 0    ONETOUCH DELICA LANCETS 33G MISC Use bid to check bs Dx E11.65 100 each 03    azaTHIOprine (IMURAN) 50 MG tablet Take 2 tablets by mouth daily Indications: Rheumatoid Arthritis      nitroGLYCERIN (NITROSTAT) 0.4 MG SL tablet Place 1 tablet under the tongue every 5 minutes as needed for Chest pain up to max of 3 total doses. If no relief after 1 dose, call 911.      Multiple Vitamins-Minerals (PRESERVISION AREDS 2) CAPS Take 1 capsule by mouth 2 times daily      B Complex Vitamins (VITAMIN B COMPLEX PO) Take 1 tablet by mouth daily Indications: Nutritional Support      Cholecalciferol (VITAMIN D3) 5000 UNITS TABS Take 1 tablet by mouth daily Indications: Nutritional Support       No current facility-administered medications for this visit.       Diet- regular cardiac     Activity as tolerated         Brief SNF Course--     This is an 74 y.o. female who is being seen for infected kidney stone and UTI with a stent placed and a f/u urology on 12/3. She worked with therapy She was deemed appropriate for discharge.           Discharge Diagnosis :    UTI/ Infected Kidney stone   A fib   Hx SSS s/p pacer   Rheumatoid arthritis  Chronic systolic CHF EF 40%   HTN  HLD   Hypothyroid  Hx DVT/PE   Depression         Follow up --       PCP in 1-2 weeks   Urology 12/3/24      ISABELL Ortiz DO     Electronically signed by: Alma Lara DO on 12/2/2024

## 2024-12-02 NOTE — PROGRESS NOTES
SNF PROGRESS NOTE      Cc- UTI/ kidney stone       Patient is a Lisseth Harper 74 y.o. female who is being seen at [facility] s/p hospital stay for a kidney stone and UTI. She had a stent placed while she was in the hospital and she was treated with IV rocephin.     Patient is sitting up in her room. She is pleasant. She is eating well and enjoying sharing a room with her . She is tolerating abx well.         Past Medical History:   Diagnosis Date    A-fib (HCC)     Aplasia of adrenal gland     CAD (coronary artery disease)     Diabetes mellitus (HCC)     DJD (degenerative joint disease)     HLD (hyperlipidemia)     Hypertension     Hypothyroidism     Obesity     CHANA (obstructive sleep apnea)     RA (rheumatoid arthritis) ([facility])     Rotator cuff tendinitis     RIGHT     Latex, Accupril [quinapril hcl], Adhesive tape, Bactine [lidocaine-benzalkonium], Other, Silicone, and Valdecoxib    VS reviewed    Gen- Alert and oriented x 3   Heart- RRR no murmur no LE edema   Lungs- CTA b/l no resp distress RA oxygen   Abd- bs x 4           Assessment and Plan    UTI/ Infected Kidney stone   Stent placed   F/u urology 12/3/24  Treated in hospital   A fib   On coumadin- will monitor levels   Metoprolol   Cardizem   Hx SSS s/p pacer   Rheumatoid arthritis  Imuran    Chronic systolic CHF EF 40%   Metoprolol   Lisinopril   Lasix   HTN  Metoprolol   Lisinopril   Hydralazine   HLD   Statin   Hypothyroid  Synthroid   Hx DVT/PE   On coumadin   Depression   Wellbutrin       Alma Lara DO, FACOI     Electronically signed by: Alma Lara DO on 11/23/2024

## 2024-12-04 NOTE — PROGRESS NOTES
SNF PROGRESS NOTE      Cc-  UTI/ kidney stone       Patient is a Lisseth Harper 74 y.o. female who is being seen at [facility] s/p hospital stay for a kidney stone and UTI. She had a stent placed while she was in the hospital and she was treated with IV rocephin.     Patient is sitting up in her room and eating breakfast with her . She is doing well and denies any pain.         Past Medical History:   Diagnosis Date    A-fib (HCC)     Aplasia of adrenal gland     CAD (coronary artery disease)     Diabetes mellitus (HCC)     DJD (degenerative joint disease)     HLD (hyperlipidemia)     Hypertension     Hypothyroidism     Obesity     CHANA (obstructive sleep apnea)     RA (rheumatoid arthritis) ([facility])     Rotator cuff tendinitis     RIGHT     Latex, Accupril [quinapril hcl], Adhesive tape, Bactine [lidocaine-benzalkonium], Other, Silicone, and Valdecoxib    VS reviewed      Gen- Alert and oriented x 3   Heart- RRR no murmur no LE edema   Lungs- CTA b/l no resp distress RA oxygen   Abd- bs x 4         Assessment and Plan    UTI/ Infected Kidney stone   Stent placed   F/u urology 12/3/24  Treated in hospital   A fib   On coumadin- will monitor levels   Metoprolol   Cardizem   Hx SSS s/p pacer   Rheumatoid arthritis  Imuran    Chronic systolic CHF EF 40%   Metoprolol   Lisinopril   Lasix   HTN  Metoprolol   Lisinopril   Hydralazine   HLD   Statin   Hypothyroid  Synthroid   Hx DVT/PE   On coumadin   Depression   Wellbutrin       Discharge planning underway.     ISABELL Ortiz DO     Electronically signed by: Alma Lara DO on 11/24/2024

## 2024-12-05 NOTE — PROGRESS NOTES
SNF PROGRESS NOTE      Cc- UTI/ kidney stone       Patient is a Lisseth Harper 74 y.o. female who is being seen at [facility] s/p hospital stay for a kidney stone and UTI. She had a stent placed while she was in the hospital and she was treated with IV rocephin.     Patient is eating well. She is sitting in the room and her  is keeping her company, She denies any pain.         Past Medical History:   Diagnosis Date    A-fib (HCC)     Aplasia of adrenal gland     CAD (coronary artery disease)     Diabetes mellitus (HCC)     DJD (degenerative joint disease)     HLD (hyperlipidemia)     Hypertension     Hypothyroidism     Obesity     CHANA (obstructive sleep apnea)     RA (rheumatoid arthritis) ([facility])     Rotator cuff tendinitis     RIGHT     Latex, Accupril [quinapril hcl], Adhesive tape, Bactine [lidocaine-benzalkonium], Other, Silicone, and Valdecoxib    VS reviewed      Gen- Alert and oriented x 3   Heart- RRR no murmur no LE edema   Lungs- CTA b/l no resp distress RA oxygen   Abd- bs x 4         Assessment and Plan    UTI/ Infected Kidney stone   Stent placed   F/u urology 12/3/24  Treated in hospital   A fib   On coumadin- will monitor levels   Metoprolol   Cardizem   Hx SSS s/p pacer   Rheumatoid arthritis  Imuran    Chronic systolic CHF EF 40%   Metoprolol   Lisinopril   Lasix   HTN  Metoprolol   Lisinopril   Hydralazine   HLD   Statin   Hypothyroid  Synthroid   Hx DVT/PE   On coumadin   Depression   Wellbutrin       ISABELL Ortiz DO     Electronically signed by: Alma Lara DO on 11/26/2024

## 2024-12-05 NOTE — PROGRESS NOTES
SNF PROGRESS NOTE      Cc- UTI/ kidney stone       Patient is a Lisseth Harper 74 y.o. female  who is being seen at [facility] s/p hospital stay for a kidney stone and UTI. She had a stent placed while she was in the hospital and she was treated with IV rocephin.     Patient is working on steps for therapy. She is eating well.  She is sitting up in the wheelchair in her room. She chats with her .         Past Medical History:   Diagnosis Date    A-fib (HCC)     Aplasia of adrenal gland     CAD (coronary artery disease)     Diabetes mellitus (HCC)     DJD (degenerative joint disease)     HLD (hyperlipidemia)     Hypertension     Hypothyroidism     Obesity     CHANA (obstructive sleep apnea)     RA (rheumatoid arthritis) ([facility])     Rotator cuff tendinitis     RIGHT     Latex, Accupril [quinapril hcl], Adhesive tape, Bactine [lidocaine-benzalkonium], Other, Silicone, and Valdecoxib    VS reviewed      Gen- Alert and oriented x 3   Heart- RRR no murmur no LE edema   Lungs- CTA b/l no resp distress RA oxygen   Abd- bs x 4       Assessment and Plan    UTI/ Infected Kidney stone   Stent placed   F/u urology 12/3/24  Treated in hospital   A fib   On coumadin- will monitor levels   Metoprolol   Cardizem   Hx SSS s/p pacer   Rheumatoid arthritis  Imuran    Chronic systolic CHF EF 40%   Metoprolol   Lisinopril   Lasix   HTN  Metoprolol   Lisinopril   Hydralazine   HLD   Statin   Hypothyroid  Synthroid   Hx DVT/PE   On coumadin   Depression   Wellbutrin         Discharge planned Dec 2       Alma Lara DO, ISABELL     Electronically signed by: Alma Lara DO on 11/25/2024

## 2024-12-09 ENCOUNTER — TELEPHONE (OUTPATIENT)
Dept: CARDIOLOGY | Facility: CLINIC | Age: 74
End: 2024-12-09
Payer: COMMERCIAL

## 2024-12-09 NOTE — TELEPHONE ENCOUNTER
Called and spoke to patient regarding appointment scheduled with Ai and device check on 01/13. Informed both device check and appointment was cancelled due to her resiging and we would call with new date and time for appointment once rescheduled

## 2024-12-10 NOTE — PROGRESS NOTES
SNF PROGRESS NOTE      Cc- UTI/ kidney stone       Patient is a Lisseth Harper 74 y.o. female who is being seen at [facility] s/p hospital stay for a kidney stone and UTI. She had a stent placed while she was in the hospital and she was treated with IV rocephin.     Patient is sitting in her room. She denies any complaints. No SOB. No CP.         Past Medical History:   Diagnosis Date    A-fib (HCC)     Aplasia of adrenal gland     CAD (coronary artery disease)     Diabetes mellitus (HCC)     DJD (degenerative joint disease)     HLD (hyperlipidemia)     Hypertension     Hypothyroidism     Obesity     CHANA (obstructive sleep apnea)     RA (rheumatoid arthritis) ([facility])     Rotator cuff tendinitis     RIGHT     Latex, Accupril [quinapril hcl], Adhesive tape, Bactine [lidocaine-benzalkonium], Other, Silicone, and Valdecoxib    VS reviewed    Gen- Alert and oriented x 3   Heart- RRR no murmur no LE edema   Lungs- CTA b/l no resp distress RA oxygen   Abd- bs x 4           Assessment and Plan    UTI/ Infected Kidney stone   Stent placed   F/u urology 12/3/24  Treated in hospital   A fib   On coumadin- will monitor levels   Metoprolol   Cardizem   Hx SSS s/p pacer   Rheumatoid arthritis  Imuran    Chronic systolic CHF EF 40%   Metoprolol   Lisinopril   Lasix   HTN  Metoprolol   Lisinopril   Hydralazine   HLD   Statin   Hypothyroid  Synthroid   Hx DVT/PE   On coumadin   Depression   Wellbutrin       ISABELL Ortiz DO     Electronically signed by: Alma Lara DO on 11/28/2024

## 2024-12-10 NOTE — PROGRESS NOTES
SNF PROGRESS NOTE      Cc- UTI/ kidney stone       Patient is a Lisseth Harper 74 y.o. female  who is being seen at [facility] s/p hospital stay for a kidney stone and UTI. She had a stent placed while she was in the hospital and she was treated with IV rocephin.     Patient is sitting up in her room and in the chair. She is pleasant, and she states that therapy is going well. She denies any SOB. She is eating well.         Past Medical History:   Diagnosis Date    A-fib (HCC)     Aplasia of adrenal gland     CAD (coronary artery disease)     Diabetes mellitus (HCC)     DJD (degenerative joint disease)     HLD (hyperlipidemia)     Hypertension     Hypothyroidism     Obesity     CHANA (obstructive sleep apnea)     RA (rheumatoid arthritis) ([facility])     Rotator cuff tendinitis     RIGHT     Latex, Accupril [quinapril hcl], Adhesive tape, Bactine [lidocaine-benzalkonium], Other, Silicone, and Valdecoxib    VS reviewed      Gen- Alert and oriented x 3   Heart- RRR no murmur no LE edema   Lungs- CTA b/l no resp distress RA oxygen   Abd- bs x 4         Assessment and Plan    UTI/ Infected Kidney stone   Stent placed   F/u urology 12/3/24  Treated in hospital   A fib   On coumadin- will monitor levels   Metoprolol   Cardizem   Hx SSS s/p pacer   Rheumatoid arthritis  Imuran    Chronic systolic CHF EF 40%   Metoprolol   Lisinopril   Lasix   HTN  Metoprolol   Lisinopril   Hydralazine   HLD   Statin   Hypothyroid  Synthroid   Hx DVT/PE   On coumadin   Depression   Wellbutrin       Alma Lara DO, FACOI     Electronically signed by: Alma Lara DO on 11/27/2024

## 2024-12-11 NOTE — PROGRESS NOTES
SNF PROGRESS NOTE      Cc- UTI/ kidney stone       Patient is a Lisseth Harper 74 y.o. female who is being seen at [facility] s/p hospital stay for a kidney stone and UTI. She had a stent placed while she was in the hospital and she was treated with IV rocephin.     Patient is doing well and sitting up eating well. She denies any complaints of pain.,         Past Medical History:   Diagnosis Date    A-fib (HCC)     Aplasia of adrenal gland     CAD (coronary artery disease)     Diabetes mellitus (HCC)     DJD (degenerative joint disease)     HLD (hyperlipidemia)     Hypertension     Hypothyroidism     Obesity     CHANA (obstructive sleep apnea)     RA (rheumatoid arthritis) ([facility])     Rotator cuff tendinitis     RIGHT     Latex, Accupril [quinapril hcl], Adhesive tape, Bactine [lidocaine-benzalkonium], Other, Silicone, and Valdecoxib    VS reviewed    Gen- Alert and oriented x 3   Heart- RRR no murmur no LE edema   Lungs- CTA b/l no resp distress RA oxygen   Abd- bs x 4           Assessment and Plan    UTI/ Infected Kidney stone   Stent placed   F/u urology 12/3/24  Treated in hospital   A fib   On coumadin- will monitor levels   Metoprolol   Cardizem   Hx SSS s/p pacer   Rheumatoid arthritis  Imuran    Chronic systolic CHF EF 40%   Metoprolol   Lisinopril   Lasix   HTN  Metoprolol   Lisinopril   Hydralazine   HLD   Statin   Hypothyroid  Synthroid   Hx DVT/PE   On coumadin   Depression   Wellbutrin       Alma Lara DO, ISABELL     Electronically signed by: Alma Lara DO on 11/29/2024

## 2025-01-07 ENCOUNTER — HOSPITAL ENCOUNTER (OUTPATIENT)
Facility: HOSPITAL | Age: 75
Setting detail: OUTPATIENT SURGERY
End: 2025-01-07
Attending: UROLOGY | Admitting: UROLOGY
Payer: COMMERCIAL

## 2025-01-13 ENCOUNTER — APPOINTMENT (OUTPATIENT)
Dept: CARDIOLOGY | Facility: HOSPITAL | Age: 75
End: 2025-01-13
Payer: COMMERCIAL

## 2025-01-13 ENCOUNTER — APPOINTMENT (OUTPATIENT)
Dept: CARDIOLOGY | Facility: CLINIC | Age: 75
End: 2025-01-13
Payer: COMMERCIAL

## 2025-01-21 ENCOUNTER — PRE-ADMISSION TESTING (OUTPATIENT)
Dept: PREADMISSION TESTING | Facility: HOSPITAL | Age: 75
End: 2025-01-21
Payer: COMMERCIAL

## 2025-02-04 ENCOUNTER — PRE-ADMISSION TESTING (OUTPATIENT)
Dept: PREADMISSION TESTING | Facility: HOSPITAL | Age: 75
End: 2025-02-04
Payer: COMMERCIAL

## 2025-02-14 ENCOUNTER — APPOINTMENT (OUTPATIENT)
Dept: CARDIOLOGY | Facility: CLINIC | Age: 75
End: 2025-02-14
Payer: COMMERCIAL

## 2025-02-14 VITALS
HEART RATE: 70 BPM | DIASTOLIC BLOOD PRESSURE: 78 MMHG | SYSTOLIC BLOOD PRESSURE: 154 MMHG | HEIGHT: 67 IN | WEIGHT: 220.1 LBS | BODY MASS INDEX: 34.55 KG/M2

## 2025-02-14 DIAGNOSIS — I82.5Y1 CHRONIC DEEP VEIN THROMBOSIS (DVT) OF PROXIMAL VEIN OF RIGHT LOWER EXTREMITY (MULTI): ICD-10-CM

## 2025-02-14 DIAGNOSIS — Z87.891 FORMER SMOKER: ICD-10-CM

## 2025-02-14 DIAGNOSIS — E78.2 MIXED HYPERLIPIDEMIA: ICD-10-CM

## 2025-02-14 DIAGNOSIS — I25.10 CAD S/P PERCUTANEOUS CORONARY ANGIOPLASTY: ICD-10-CM

## 2025-02-14 DIAGNOSIS — Z01.818 PRE-OPERATIVE CLEARANCE: ICD-10-CM

## 2025-02-14 DIAGNOSIS — Z98.61 CAD S/P PERCUTANEOUS CORONARY ANGIOPLASTY: ICD-10-CM

## 2025-02-14 DIAGNOSIS — I10 ESSENTIAL HYPERTENSION, BENIGN: ICD-10-CM

## 2025-02-14 DIAGNOSIS — I48.91 ATRIAL FIBRILLATION WITH RVR (MULTI): ICD-10-CM

## 2025-02-14 DIAGNOSIS — E66.811 OBESITY, CLASS I, BMI 30-34.9: ICD-10-CM

## 2025-02-14 DIAGNOSIS — G47.33 OSA (OBSTRUCTIVE SLEEP APNEA): ICD-10-CM

## 2025-02-14 DIAGNOSIS — I50.22 CHRONIC SYSTOLIC HEART FAILURE, ACC/AHA STAGE C: ICD-10-CM

## 2025-02-14 DIAGNOSIS — E11.42 TYPE 2 DIABETES MELLITUS WITH DIABETIC POLYNEUROPATHY, UNSPECIFIED WHETHER LONG TERM INSULIN USE: ICD-10-CM

## 2025-02-14 PROCEDURE — 4010F ACE/ARB THERAPY RXD/TAKEN: CPT | Performed by: INTERNAL MEDICINE

## 2025-02-14 PROCEDURE — 93000 ELECTROCARDIOGRAM COMPLETE: CPT | Performed by: INTERNAL MEDICINE

## 2025-02-14 PROCEDURE — 99214 OFFICE O/P EST MOD 30 MIN: CPT | Performed by: INTERNAL MEDICINE

## 2025-02-14 PROCEDURE — 1157F ADVNC CARE PLAN IN RCRD: CPT | Performed by: INTERNAL MEDICINE

## 2025-02-14 PROCEDURE — 3078F DIAST BP <80 MM HG: CPT | Performed by: INTERNAL MEDICINE

## 2025-02-14 PROCEDURE — 3008F BODY MASS INDEX DOCD: CPT | Performed by: INTERNAL MEDICINE

## 2025-02-14 PROCEDURE — 1159F MED LIST DOCD IN RCRD: CPT | Performed by: INTERNAL MEDICINE

## 2025-02-14 PROCEDURE — 1036F TOBACCO NON-USER: CPT | Performed by: INTERNAL MEDICINE

## 2025-02-14 PROCEDURE — 3077F SYST BP >= 140 MM HG: CPT | Performed by: INTERNAL MEDICINE

## 2025-02-14 RX ORDER — INSULIN LISPRO 100 [IU]/ML
0-6 INJECTION, SOLUTION INTRAVENOUS; SUBCUTANEOUS
COMMUNITY

## 2025-02-14 RX ORDER — ENOXAPARIN SODIUM 100 MG/ML
INJECTION SUBCUTANEOUS
Qty: 9 EACH | Refills: 0 | Status: SHIPPED | OUTPATIENT
Start: 2025-02-14

## 2025-02-14 NOTE — PATIENT INSTRUCTIONS
Patient is an acceptable cardiac risk for upcoming surgery.  Hold Aspirin 1 week prior to surgery.  Hold Coumadin 5 days prior to surgery starting on 2/15, resume 1 day post-op at current dosing.  START Lovenox 80 mg sub-q injections on 2/18 in morning and evening, 2/19 morning ONLY, NONE day of procedure.  Resume on 2/21 in am and pm, 2/22 in am and pm, and 2/23 in am and pm  GET INR done on 2/26.    Follow up office visit in 1 year.    Continue same medications/treatment.  Patient educated on proper medication use.  Patient educated on risk factor modification.  Please bring any lab results from other providers / physicians to your next appointment.    Please bring all medicines, vitamins and herbal supplements with you when you come to the office.    Prescriptions will not be filled unless you are compliant with your follow up appointments or have a follow up  appointment scheduled as per instruction of your physician.  Refills should be requested at the time of  Your visit.    Gloria VIDAL LPN, am scribing for and in the presence of Dr. Eladio Tolbert MD, FACC

## 2025-02-14 NOTE — PROGRESS NOTES
CARDIOLOGY OFFICE VISIT      CHIEF COMPLAINT  Patient being seen today for overdue follow up with vague dyspnea complaints    HISTORY OF PRESENT ILLNESS  Patient presents today with vague dyspnea complaints with mild chest heaviness for some time. Episodes lasting few times per month at rest. These are unchanged for quite some time.   She denies chest discomfort or symptoms him myocardial ischemia. She denies any major problem with dyspnea. She does use an inhaler which helps his breathing. She denies palpitations and syncope. She denies any problem with his current medication.     EKG- electronic ventricular paced. Normal function    Impression:  Permanent atrial fibrillation.   Coronary artery disease, no angina  Remote PCI  Chronic systolic heart failure. Recent exacerbation. NYHA IIc heart failure. LVEF 40 to 45% by echocardiogram July 2022.   Hypertension  Hyperlipidemia. Chronic. Stable.  Obstructive sleep apnea/CPAP  Diabetes mellitus.   Hypothyroidism.   Peripheral neuropathy on gabapentin.   History of DVT of lower extremity and pulmonary embolus  Obesity     Patient is an acceptable cardiovascular risk for planned kidney procedure with Dr. Henri MD assuming preoperative lab work is acceptable. Discussed holding Warfarin and bridging with Lovenox today. Patient given instructions.     Please excuse any errors in grammar or translation related to this dictation. Voice recognition software was utilized to prepare this document.      Past Medical History  Past Medical History:   Diagnosis Date    Acute embolism and thrombosis of right popliteal vein (Multi) 04/24/2020    Anxiety     Atrial fib/flutter, transient (Multi)     CAD (coronary artery disease)     Calculus of ureter 04/29/2020    Ureteral stone    Chronic venous hypertension (idiopathic) with inflammation of left lower extremity     Chronic venous hypertension (idiopathic) with inflammation of right lower extremity     Chronic venous hypertension  (idiopathic) with ulcer of unspecified lower extremity (CODE)     Complete heart block     Per Dr. Gracia    Hypertension     Hypothyroidism     Long term (current) use of anticoagulants 2022    Coumadin    SAMAN (obstructive sleep apnea)     on CPAP    Osteoporosis     Other bursitis of hip, right hip     Other mechanical complication of other internal joint prosthesis, initial encounter (CMS-HCC) 2020    Mechanical instability of hip prosthesis    Other postprocedural complications and disorders of the circulatory system, not elsewhere classified 10/08/2020    Necrosis of surgical wound    Other specified joint disorders, right shoulder     Mass of joint of right shoulder    Personal history of diseases of the blood and blood-forming organs and certain disorders involving the immune mechanism     History of immune disorder    Pulmonary embolism     PVD (peripheral vascular disease) (CMS-HCC)     RA (rheumatoid arthritis)     Renal calculi     Sick sinus syndrome (Multi)     Toxic effect of unspecified metal, accidental (unintentional), 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    Unspecified fracture of lower end of unspecified femur, initial encounter for closed fracture (Multi) 2021    Fracture of distal femur    UTI (urinary tract infection)        Social History  Social History     Tobacco Use    Smoking status: Former     Current packs/day: 0.00     Types: Cigarettes     Quit date:      Years since quittin.1    Smokeless tobacco: Never   Substance Use Topics    Alcohol use: Yes     Comment: occasionally, 3-4x a year    Drug use: Never       Family History     Family History   Problem Relation Name Age of Onset    Other (ptca) Mother      Heart failure Father      Coronary artery disease Father      Breast cancer Sister      Breast cancer Sister          Allergies:  Allergies   Allergen Reactions    Adhesive Tape-Silicones Rash    Latex Unknown    Valdecoxib Unknown        Outpatient  Medications:  Current Outpatient Medications   Medication Instructions    aspirin 81 mg, Daily    atorvastatin (LIPITOR) 80 mg, Daily    azaTHIOprine (Imuran) 50 mg tablet 2 tablets, Daily    b complex vitamins (Vitamins B Complex) capsule 1 capsule, Daily    buPROPion XL (Wellbutrin XL) 300 mg 24 hr tablet 1 tablet, oral, Daily    cholecalciferol (Vitamin D-3) 5,000 Units tablet 1 tablet, Daily    co-enzyme Q-10 30 mg, Daily    dilTIAZem (CARDIZEM) 60 mg, oral, Every 8 hours    furosemide (Lasix) 20 mg tablet Take one tablet daily.  Take an additional tablet daily as needed for a 3 pound weight gain or more over night or 5 pounds in 5 days.    hydrALAZINE (APRESOLINE) 25 mg, oral, Every 8 hours    hydrALAZINE (APRESOLINE) 25 mg, oral, Every 8 hours PRN    hydrOXYzine HCL (ATARAX) 25 mg, oral, Every 6 hours PRN    insulin lispro (HUMALOG) 0-6 Units    insulin regular (HUMULIN R,NOVOLIN R) 2-8 Units, 3 times daily before meals    levothyroxine (Synthroid, Levoxyl) 100 mcg tablet 1 tablet, Daily    lisinopril 20 mg, oral, Daily    metoprolol tartrate (LOPRESSOR) 100 mg, oral, 2 times daily    warfarin (COUMADIN) 2 mg, 5 times weekly          REVIEW OF SYSTEMS  Review of Systems   All other systems reviewed and are negative.        VITALS  Vitals:    02/14/25 1011   BP: 154/78   Pulse: 70       PHYSICAL EXAM  Vitals and nursing note reviewed.   Constitutional:       Appearance: Healthy appearance.   Eyes:      Conjunctiva/sclera: Conjunctivae normal.      Pupils: Pupils are equal, round, and reactive to light.   Pulmonary:      Effort: Pulmonary effort is normal.      Breath sounds: Normal breath sounds.   Cardiovascular:      PMI at left midclavicular line. Normal rate. Regular rhythm.      Murmurs: There is no murmur.      No gallop.  No click. No rub.   Pulses:     Intact distal pulses.   Edema:     Peripheral edema absent.   Musculoskeletal: Normal range of motion. Skin:     General: Skin is warm and dry.    Neurological:      Mental Status: Alert and oriented to person, place and time.           ASSESSMENT AND PLAN  Diagnoses and all orders for this visit:  Pre-operative clearance  Atrial fibrillation with RVR (Multi)  CAD S/P percutaneous coronary angioplasty  Chronic systolic heart failure, ACC/AHA stage C  Essential hypertension, benign  Mixed hyperlipidemia  SAMAN (obstructive sleep apnea)  Type 2 diabetes mellitus with diabetic polyneuropathy, unspecified whether long term insulin use  Obesity, Class I, BMI 30-34.9  Former smoker  Chronic deep vein thrombosis (DVT) of proximal vein of right lower extremity (Multi)      [unfilled]

## 2025-03-04 ENCOUNTER — APPOINTMENT (OUTPATIENT)
Dept: CARDIOLOGY | Facility: CLINIC | Age: 75
End: 2025-03-04
Payer: COMMERCIAL

## 2025-03-05 ENCOUNTER — TELEPHONE (OUTPATIENT)
Dept: CARDIOLOGY | Facility: CLINIC | Age: 75
End: 2025-03-05
Payer: COMMERCIAL

## 2025-04-22 ENCOUNTER — APPOINTMENT (OUTPATIENT)
Dept: CARDIOLOGY | Facility: CLINIC | Age: 75
End: 2025-04-22
Payer: COMMERCIAL

## 2025-07-02 ENCOUNTER — HOSPITAL ENCOUNTER (EMERGENCY)
Facility: HOSPITAL | Age: 75
Discharge: HOME | End: 2025-07-02
Attending: EMERGENCY MEDICINE
Payer: COMMERCIAL

## 2025-07-02 ENCOUNTER — APPOINTMENT (OUTPATIENT)
Dept: RADIOLOGY | Facility: HOSPITAL | Age: 75
End: 2025-07-02
Payer: COMMERCIAL

## 2025-07-02 VITALS
HEART RATE: 69 BPM | HEIGHT: 67 IN | WEIGHT: 220 LBS | SYSTOLIC BLOOD PRESSURE: 157 MMHG | DIASTOLIC BLOOD PRESSURE: 78 MMHG | TEMPERATURE: 97.2 F | BODY MASS INDEX: 34.53 KG/M2 | OXYGEN SATURATION: 94 % | RESPIRATION RATE: 18 BRPM

## 2025-07-02 DIAGNOSIS — S86.811A STRAIN OF CALF MUSCLE, RIGHT, INITIAL ENCOUNTER: Primary | ICD-10-CM

## 2025-07-02 LAB
ALBUMIN SERPL BCP-MCNC: 4.3 G/DL (ref 3.4–5)
ALP SERPL-CCNC: 73 U/L (ref 33–136)
ALT SERPL W P-5'-P-CCNC: 9 U/L (ref 7–45)
ANION GAP SERPL CALC-SCNC: 15 MMOL/L (ref 10–20)
APTT PPP: 31 SECONDS (ref 26–36)
AST SERPL W P-5'-P-CCNC: 19 U/L (ref 9–39)
BASOPHILS # BLD AUTO: 0.07 X10*3/UL (ref 0–0.1)
BASOPHILS NFR BLD AUTO: 1 %
BILIRUB SERPL-MCNC: 0.7 MG/DL (ref 0–1.2)
BUN SERPL-MCNC: 18 MG/DL (ref 6–23)
CALCIUM SERPL-MCNC: 10 MG/DL (ref 8.6–10.3)
CHLORIDE SERPL-SCNC: 102 MMOL/L (ref 98–107)
CO2 SERPL-SCNC: 27 MMOL/L (ref 21–32)
CREAT SERPL-MCNC: 1.04 MG/DL (ref 0.5–1.05)
EGFRCR SERPLBLD CKD-EPI 2021: 57 ML/MIN/1.73M*2
EOSINOPHIL # BLD AUTO: 0.26 X10*3/UL (ref 0–0.4)
EOSINOPHIL NFR BLD AUTO: 3.7 %
ERYTHROCYTE [DISTWIDTH] IN BLOOD BY AUTOMATED COUNT: 13.5 % (ref 11.5–14.5)
GLUCOSE SERPL-MCNC: 128 MG/DL (ref 74–99)
HCT VFR BLD AUTO: 39.5 % (ref 36–46)
HGB BLD-MCNC: 13.8 G/DL (ref 12–16)
IMM GRANULOCYTES # BLD AUTO: 0.03 X10*3/UL (ref 0–0.5)
IMM GRANULOCYTES NFR BLD AUTO: 0.4 % (ref 0–0.9)
INR PPP: 3.6 (ref 0.9–1.1)
LYMPHOCYTES # BLD AUTO: 2.42 X10*3/UL (ref 0.8–3)
LYMPHOCYTES NFR BLD AUTO: 34.4 %
MCH RBC QN AUTO: 32.5 PG (ref 26–34)
MCHC RBC AUTO-ENTMCNC: 34.9 G/DL (ref 32–36)
MCV RBC AUTO: 93 FL (ref 80–100)
MONOCYTES # BLD AUTO: 0.66 X10*3/UL (ref 0.05–0.8)
MONOCYTES NFR BLD AUTO: 9.4 %
NEUTROPHILS # BLD AUTO: 3.6 X10*3/UL (ref 1.6–5.5)
NEUTROPHILS NFR BLD AUTO: 51.1 %
NRBC BLD-RTO: 0 /100 WBCS (ref 0–0)
PLATELET # BLD AUTO: 200 X10*3/UL (ref 150–450)
POTASSIUM SERPL-SCNC: 3.6 MMOL/L (ref 3.5–5.3)
PROT SERPL-MCNC: 7.2 G/DL (ref 6.4–8.2)
PROTHROMBIN TIME: 39.7 SECONDS (ref 9.8–12.4)
RBC # BLD AUTO: 4.24 X10*6/UL (ref 4–5.2)
SODIUM SERPL-SCNC: 140 MMOL/L (ref 136–145)
WBC # BLD AUTO: 7 X10*3/UL (ref 4.4–11.3)

## 2025-07-02 PROCEDURE — 99284 EMERGENCY DEPT VISIT MOD MDM: CPT | Mod: 25 | Performed by: EMERGENCY MEDICINE

## 2025-07-02 PROCEDURE — 85610 PROTHROMBIN TIME: CPT | Performed by: EMERGENCY MEDICINE

## 2025-07-02 PROCEDURE — 80053 COMPREHEN METABOLIC PANEL: CPT | Performed by: EMERGENCY MEDICINE

## 2025-07-02 PROCEDURE — 93971 EXTREMITY STUDY: CPT

## 2025-07-02 PROCEDURE — 36415 COLL VENOUS BLD VENIPUNCTURE: CPT | Performed by: EMERGENCY MEDICINE

## 2025-07-02 PROCEDURE — 93971 EXTREMITY STUDY: CPT | Performed by: RADIOLOGY

## 2025-07-02 PROCEDURE — 85730 THROMBOPLASTIN TIME PARTIAL: CPT | Performed by: EMERGENCY MEDICINE

## 2025-07-02 PROCEDURE — 2500000001 HC RX 250 WO HCPCS SELF ADMINISTERED DRUGS (ALT 637 FOR MEDICARE OP): Performed by: EMERGENCY MEDICINE

## 2025-07-02 PROCEDURE — 85025 COMPLETE CBC W/AUTO DIFF WBC: CPT | Performed by: EMERGENCY MEDICINE

## 2025-07-02 RX ORDER — OXYCODONE AND ACETAMINOPHEN 5; 325 MG/1; MG/1
1 TABLET ORAL ONCE
Refills: 0 | Status: COMPLETED | OUTPATIENT
Start: 2025-07-02 | End: 2025-07-02

## 2025-07-02 RX ADMIN — OXYCODONE HYDROCHLORIDE AND ACETAMINOPHEN 1 TABLET: 5; 325 TABLET ORAL at 17:38

## 2025-07-02 ASSESSMENT — PAIN DESCRIPTION - DESCRIPTORS
DESCRIPTORS: ACHING
DESCRIPTORS: ACHING

## 2025-07-02 ASSESSMENT — PAIN - FUNCTIONAL ASSESSMENT
PAIN_FUNCTIONAL_ASSESSMENT: 0-10
PAIN_FUNCTIONAL_ASSESSMENT: 0-10

## 2025-07-02 ASSESSMENT — PAIN DESCRIPTION - PAIN TYPE: TYPE: ACUTE PAIN

## 2025-07-02 ASSESSMENT — PAIN DESCRIPTION - ONSET: ONSET: SUDDEN

## 2025-07-02 ASSESSMENT — PAIN SCALES - GENERAL
PAINLEVEL_OUTOF10: 5 - MODERATE PAIN
PAINLEVEL_OUTOF10: 5 - MODERATE PAIN

## 2025-07-02 ASSESSMENT — PAIN DESCRIPTION - PROGRESSION: CLINICAL_PROGRESSION: NOT CHANGED

## 2025-07-02 ASSESSMENT — PAIN DESCRIPTION - ORIENTATION: ORIENTATION: RIGHT

## 2025-07-02 ASSESSMENT — PAIN DESCRIPTION - LOCATION: LOCATION: LEG

## 2025-07-02 ASSESSMENT — PAIN DESCRIPTION - FREQUENCY: FREQUENCY: CONSTANT/CONTINUOUS

## 2025-07-02 NOTE — ED PROVIDER NOTES
Emergency Department Provider Note       History of Present Illness     History provided by: Patient  Limitations to History: None  External Records Reviewed with Brief Summary: None    HPI:  Yen Montoya is a 75 y.o. female with past medical history of A-fib, prior DVT, hypertension, hyperlipidemia currently on Eliquis and compliant does present with right calf pain.  In the past she has had DVT in this leg she did wake up from her nap and did have some spasm and tight sensation to the calf.  Denies any trauma.  Denies any infectious symptoms.  Denies any nausea or vomiting.  Denies any associated chest pain.    Physical Exam   Triage vitals:  T 36 °C (96.8 °F)  HR 66  /79  RR 20  O2 96 % None (Room air)    Physical Exam  Vitals and nursing note reviewed.   Constitutional:       General: She is not in acute distress.     Appearance: Normal appearance. She is normal weight. She is not ill-appearing.   HENT:      Head: Normocephalic and atraumatic.      Nose: Nose normal.      Mouth/Throat:      Mouth: Mucous membranes are moist.      Pharynx: Oropharynx is clear.   Eyes:      Extraocular Movements: Extraocular movements intact.      Conjunctiva/sclera: Conjunctivae normal.      Pupils: Pupils are equal, round, and reactive to light.   Cardiovascular:      Rate and Rhythm: Normal rate and regular rhythm.      Pulses: Normal pulses.      Heart sounds: Normal heart sounds.   Pulmonary:      Effort: Pulmonary effort is normal.      Breath sounds: Normal breath sounds. No stridor. No wheezing.   Abdominal:      General: Abdomen is flat. Bowel sounds are normal. There is no distension.      Palpations: Abdomen is soft.      Tenderness: There is no abdominal tenderness. There is no right CVA tenderness, left CVA tenderness, guarding or rebound.   Musculoskeletal:         General: Tenderness present. No deformity. Normal range of motion.      Cervical back: Normal range of motion and neck supple.       Comments: positive tenderness palpation of right calf.  No cellulitis.  No abscess.  Her Achilles tendon is intact.  She has full range of motion of the knee and the ankle.  I do not see a Baker's cyst.   Skin:     General: Skin is warm.      Capillary Refill: Capillary refill takes less than 2 seconds.      Coloration: Skin is not pale.      Findings: No bruising.   Neurological:      General: No focal deficit present.      Mental Status: She is alert and oriented to person, place, and time. Mental status is at baseline.      Sensory: No sensory deficit.      Motor: No weakness.   Psychiatric:         Mood and Affect: Mood normal.         Behavior: Behavior normal.         Thought Content: Thought content normal.         Judgment: Judgment normal.           Medical Decision Making & ED Course   Medical Decision Makin y.o. female with past medical history of A-fib, prior DVT, hypertension, hyperlipidemia on Eliquis is present with complaint of right calf pain.  Ultrasound was negative for DVT.  Her Achilles itself is intact.  She does have full range of motion of knee and ankle.  She is neuro vastly intact distal.  She does have sensation intact.  I do not see any signs for acute fracture or cellulitis or septic joint.  Does appear to be a likely muscle strain.  Will treat symptomatically with Tylenol and/or Motrin and ice and plan to follow-up with PMD.  Return precautions are discussed.  ----      Differential diagnoses considered include but are not limited to: strain dvt     Social Determinants of Health which Significantly Impact Care: Social Determinants of Health which Significantly Impact Care: None identified     EKG Independent Interpretation: EKG not obtained    Independent Result Review and Interpretation: Relevant laboratory and radiographic results were reviewed and independently interpreted by myself.  As necessary, they are commented on in the ED Course.    Chronic conditions affecting the  patient's care: As documented above in [facility]    The patient was discussed with the following consultants/services: None    Care Considerations: As documented above in [facility]    ED Course:  Diagnoses as of 07/02/25 1803   Strain of calf muscle, right, initial encounter       Disposition   As a result of the work-up, the patient was discharged home.  she was informed of her diagnosis and instructed to come back with any concerns or worsening of condition.  she and was agreeable to the plan as discussed above.  she was given the opportunity to ask questions.  All of the patient's questions were answered.    Procedures   Procedures        César Ellis MD  Emergency Medicine                                                       César Ellis MD  07/02/25 1803

## 2025-07-02 NOTE — DISCHARGE INSTRUCTIONS
Please apply ice for 15 minutes/h for 4 hours/day for the next 1 to 2 days to the calf to help with pain.  Please use Tylenol as needed to help with pain for the next 2 to 3 days.  Likely you have a muscle strain.  Please or D for worsening pain, difficulty walking, falls or any other concerning symptoms.

## 2025-08-17 ENCOUNTER — APPOINTMENT (OUTPATIENT)
Dept: CT IMAGING | Age: 75
End: 2025-08-17
Payer: COMMERCIAL

## 2025-08-17 ENCOUNTER — APPOINTMENT (OUTPATIENT)
Dept: GENERAL RADIOLOGY | Age: 75
End: 2025-08-17
Payer: COMMERCIAL

## 2025-08-17 ENCOUNTER — HOSPITAL ENCOUNTER (EMERGENCY)
Age: 75
Discharge: HOME | End: 2025-08-17
Attending: EMERGENCY MEDICINE
Payer: COMMERCIAL

## 2025-08-17 VITALS
HEART RATE: 70 BPM | BODY MASS INDEX: 34.56 KG/M2 | DIASTOLIC BLOOD PRESSURE: 89 MMHG | SYSTOLIC BLOOD PRESSURE: 172 MMHG | OXYGEN SATURATION: 93 % | RESPIRATION RATE: 18 BRPM | TEMPERATURE: 97.8 F | WEIGHT: 234 LBS

## 2025-08-17 DIAGNOSIS — J90 PLEURAL EFFUSION: Primary | ICD-10-CM

## 2025-08-17 DIAGNOSIS — R06.00 DYSPNEA, UNSPECIFIED TYPE: ICD-10-CM

## 2025-08-17 LAB
ALBUMIN SERPL-MCNC: 4 G/DL (ref 3.5–4.6)
ALP SERPL-CCNC: 67 U/L (ref 40–130)
ALT SERPL-CCNC: 10 U/L (ref 0–33)
ANION GAP SERPL CALCULATED.3IONS-SCNC: 12 MEQ/L (ref 9–15)
AST SERPL-CCNC: 27 U/L (ref 0–35)
BASOPHILS # BLD: 0.1 K/UL (ref 0–0.2)
BASOPHILS NFR BLD: 0.9 %
BILIRUB SERPL-MCNC: 0.8 MG/DL (ref 0.2–0.7)
BNP BLD-MCNC: 8471 PG/ML
BUN SERPL-MCNC: 13 MG/DL (ref 8–23)
CALCIUM SERPL-MCNC: 9.1 MG/DL (ref 8.5–9.9)
CHLORIDE SERPL-SCNC: 104 MEQ/L (ref 95–107)
CO2 SERPL-SCNC: 22 MEQ/L (ref 20–31)
CREAT SERPL-MCNC: 0.85 MG/DL (ref 0.5–0.9)
EOSINOPHIL # BLD: 0.4 K/UL (ref 0–0.7)
EOSINOPHIL NFR BLD: 5.3 %
ERYTHROCYTE [DISTWIDTH] IN BLOOD BY AUTOMATED COUNT: 14.5 % (ref 11.5–14.5)
GLOBULIN SER CALC-MCNC: 2.9 G/DL (ref 2.3–3.5)
GLUCOSE SERPL-MCNC: 136 MG/DL (ref 70–99)
HCT VFR BLD AUTO: 40.8 % (ref 37–47)
HGB BLD-MCNC: 13.9 G/DL (ref 12–16)
INR PPP: 1.7
LYMPHOCYTES # BLD: 2.2 K/UL (ref 1–4.8)
LYMPHOCYTES NFR BLD: 31.8 %
MCH RBC QN AUTO: 32.9 PG (ref 27–31.3)
MCHC RBC AUTO-ENTMCNC: 34.1 % (ref 33–37)
MCV RBC AUTO: 96.5 FL (ref 79.4–94.8)
MONOCYTES # BLD: 0.7 K/UL (ref 0.2–0.8)
MONOCYTES NFR BLD: 9.9 %
NEUTROPHILS # BLD: 3.5 K/UL (ref 1.4–6.5)
NEUTS SEG NFR BLD: 51.7 %
PLATELET # BLD AUTO: 230 K/UL (ref 130–400)
POTASSIUM SERPL-SCNC: 4.7 MEQ/L (ref 3.4–4.9)
PROT SERPL-MCNC: 6.9 G/DL (ref 6.3–8)
PROTHROMBIN TIME: 21.2 SEC (ref 12.3–14.9)
RBC # BLD AUTO: 4.23 M/UL (ref 4.2–5.4)
SODIUM SERPL-SCNC: 138 MEQ/L (ref 135–144)
TROPONIN, HIGH SENSITIVITY: 14 NG/L (ref 0–19)
TROPONIN, HIGH SENSITIVITY: 14 NG/L (ref 0–19)
WBC # BLD AUTO: 6.8 K/UL (ref 4.8–10.8)

## 2025-08-17 PROCEDURE — 36415 COLL VENOUS BLD VENIPUNCTURE: CPT

## 2025-08-17 PROCEDURE — 99285 EMERGENCY DEPT VISIT HI MDM: CPT

## 2025-08-17 PROCEDURE — 85610 PROTHROMBIN TIME: CPT

## 2025-08-17 PROCEDURE — 93005 ELECTROCARDIOGRAM TRACING: CPT | Performed by: EMERGENCY MEDICINE

## 2025-08-17 PROCEDURE — 96374 THER/PROPH/DIAG INJ IV PUSH: CPT

## 2025-08-17 PROCEDURE — 84484 ASSAY OF TROPONIN QUANT: CPT

## 2025-08-17 PROCEDURE — 6370000000 HC RX 637 (ALT 250 FOR IP): Performed by: EMERGENCY MEDICINE

## 2025-08-17 PROCEDURE — 6360000004 HC RX CONTRAST MEDICATION: Performed by: EMERGENCY MEDICINE

## 2025-08-17 PROCEDURE — 6360000002 HC RX W HCPCS: Performed by: EMERGENCY MEDICINE

## 2025-08-17 PROCEDURE — 71045 X-RAY EXAM CHEST 1 VIEW: CPT

## 2025-08-17 PROCEDURE — 85025 COMPLETE CBC W/AUTO DIFF WBC: CPT

## 2025-08-17 PROCEDURE — 83880 ASSAY OF NATRIURETIC PEPTIDE: CPT

## 2025-08-17 PROCEDURE — 80053 COMPREHEN METABOLIC PANEL: CPT

## 2025-08-17 PROCEDURE — 71275 CT ANGIOGRAPHY CHEST: CPT

## 2025-08-17 RX ORDER — FUROSEMIDE 10 MG/ML
40 INJECTION INTRAMUSCULAR; INTRAVENOUS ONCE
Status: COMPLETED | OUTPATIENT
Start: 2025-08-17 | End: 2025-08-17

## 2025-08-17 RX ORDER — NITROGLYCERIN 0.4 MG/1
0.4 TABLET SUBLINGUAL EVERY 5 MIN PRN
Status: DISCONTINUED | OUTPATIENT
Start: 2025-08-17 | End: 2025-08-17 | Stop reason: HOSPADM

## 2025-08-17 RX ORDER — IOPAMIDOL 755 MG/ML
75 INJECTION, SOLUTION INTRAVASCULAR
Status: COMPLETED | OUTPATIENT
Start: 2025-08-17 | End: 2025-08-17

## 2025-08-17 RX ADMIN — NITROGLYCERIN 0.4 MG: 0.4 TABLET SUBLINGUAL at 11:06

## 2025-08-17 RX ADMIN — IOPAMIDOL 75 ML: 755 INJECTION, SOLUTION INTRAVENOUS at 12:27

## 2025-08-17 RX ADMIN — FUROSEMIDE 40 MG: 10 INJECTION, SOLUTION INTRAMUSCULAR; INTRAVENOUS at 11:06

## 2025-08-17 ASSESSMENT — PAIN DESCRIPTION - LOCATION: LOCATION: CHEST

## 2025-08-17 ASSESSMENT — PAIN SCALES - GENERAL: PAINLEVEL_OUTOF10: 4

## 2025-08-17 ASSESSMENT — PAIN - FUNCTIONAL ASSESSMENT
PAIN_FUNCTIONAL_ASSESSMENT: 0-10
PAIN_FUNCTIONAL_ASSESSMENT: 0-10

## 2025-08-18 LAB
EKG ATRIAL RATE: 78 BPM
EKG DIAGNOSIS: NORMAL
EKG Q-T INTERVAL: 470 MS
EKG QRS DURATION: 184 MS
EKG QTC CALCULATION (BAZETT): 507 MS
EKG R AXIS: -64 DEGREES
EKG T AXIS: 95 DEGREES
EKG VENTRICULAR RATE: 70 BPM

## 2025-08-18 PROCEDURE — 93010 ELECTROCARDIOGRAM REPORT: CPT | Performed by: INTERNAL MEDICINE

## 2025-08-20 ENCOUNTER — TELEPHONE (OUTPATIENT)
Dept: CARDIOLOGY | Facility: CLINIC | Age: 75
End: 2025-08-20
Payer: COMMERCIAL

## 2025-08-24 ENCOUNTER — HOSPITAL ENCOUNTER (EMERGENCY)
Age: 75
Discharge: HOME | End: 2025-08-24
Attending: FAMILY MEDICINE
Payer: COMMERCIAL

## 2025-08-24 VITALS
HEART RATE: 70 BPM | OXYGEN SATURATION: 97 % | SYSTOLIC BLOOD PRESSURE: 154 MMHG | RESPIRATION RATE: 32 BRPM | BODY MASS INDEX: 33.87 KG/M2 | WEIGHT: 228.7 LBS | HEIGHT: 69 IN | DIASTOLIC BLOOD PRESSURE: 83 MMHG | TEMPERATURE: 98 F

## 2025-08-24 DIAGNOSIS — I10 UNCONTROLLED HYPERTENSION: Primary | ICD-10-CM

## 2025-08-24 PROCEDURE — 6370000000 HC RX 637 (ALT 250 FOR IP): Performed by: FAMILY MEDICINE

## 2025-08-24 PROCEDURE — 99284 EMERGENCY DEPT VISIT MOD MDM: CPT

## 2025-08-24 RX ORDER — METOPROLOL TARTRATE 50 MG
50 TABLET ORAL ONCE
Status: COMPLETED | OUTPATIENT
Start: 2025-08-24 | End: 2025-08-24

## 2025-08-24 RX ORDER — HYDRALAZINE HYDROCHLORIDE 50 MG/1
25 TABLET, FILM COATED ORAL EVERY 8 HOURS SCHEDULED
Status: DISCONTINUED | OUTPATIENT
Start: 2025-08-24 | End: 2025-08-24 | Stop reason: HOSPADM

## 2025-08-24 RX ADMIN — HYDRALAZINE HYDROCHLORIDE 25 MG: 50 TABLET ORAL at 13:03

## 2025-08-24 RX ADMIN — METOPROLOL TARTRATE 50 MG: 50 TABLET, FILM COATED ORAL at 13:02

## 2025-08-24 ASSESSMENT — LIFESTYLE VARIABLES
HOW MANY STANDARD DRINKS CONTAINING ALCOHOL DO YOU HAVE ON A TYPICAL DAY: PATIENT DOES NOT DRINK
HOW OFTEN DO YOU HAVE A DRINK CONTAINING ALCOHOL: NEVER

## 2025-08-24 ASSESSMENT — PAIN - FUNCTIONAL ASSESSMENT: PAIN_FUNCTIONAL_ASSESSMENT: 0-10

## 2025-08-24 ASSESSMENT — PAIN SCALES - GENERAL: PAINLEVEL_OUTOF10: 0

## 2025-08-27 ENCOUNTER — APPOINTMENT (OUTPATIENT)
Dept: CARDIOLOGY | Facility: CLINIC | Age: 75
End: 2025-08-27
Payer: COMMERCIAL

## 2025-08-28 ENCOUNTER — OFFICE VISIT (OUTPATIENT)
Dept: CARDIOLOGY | Facility: CLINIC | Age: 75
End: 2025-08-28
Payer: COMMERCIAL

## 2025-08-28 VITALS
DIASTOLIC BLOOD PRESSURE: 80 MMHG | SYSTOLIC BLOOD PRESSURE: 150 MMHG | HEART RATE: 68 BPM | HEIGHT: 67 IN | WEIGHT: 225 LBS | BODY MASS INDEX: 35.31 KG/M2

## 2025-08-28 DIAGNOSIS — I10 ESSENTIAL HYPERTENSION, BENIGN: ICD-10-CM

## 2025-08-28 DIAGNOSIS — I82.5Y1 CHRONIC DEEP VEIN THROMBOSIS (DVT) OF PROXIMAL VEIN OF RIGHT LOWER EXTREMITY: ICD-10-CM

## 2025-08-28 DIAGNOSIS — G47.33 OSA (OBSTRUCTIVE SLEEP APNEA): ICD-10-CM

## 2025-08-28 DIAGNOSIS — I48.91 ATRIAL FIBRILLATION WITH RVR (MULTI): ICD-10-CM

## 2025-08-28 DIAGNOSIS — E78.2 MIXED HYPERLIPIDEMIA: ICD-10-CM

## 2025-08-28 DIAGNOSIS — E03.8 OTHER SPECIFIED HYPOTHYROIDISM: ICD-10-CM

## 2025-08-28 DIAGNOSIS — Z87.891 FORMER SMOKER: ICD-10-CM

## 2025-08-28 DIAGNOSIS — I50.22 CHRONIC SYSTOLIC HEART FAILURE, ACC/AHA STAGE C: ICD-10-CM

## 2025-08-28 DIAGNOSIS — Z98.61 CAD S/P PERCUTANEOUS CORONARY ANGIOPLASTY: ICD-10-CM

## 2025-08-28 DIAGNOSIS — I25.10 CAD S/P PERCUTANEOUS CORONARY ANGIOPLASTY: ICD-10-CM

## 2025-08-28 DIAGNOSIS — E11.42 TYPE 2 DIABETES MELLITUS WITH DIABETIC POLYNEUROPATHY, UNSPECIFIED WHETHER LONG TERM INSULIN USE: ICD-10-CM

## 2025-08-28 PROCEDURE — 3079F DIAST BP 80-89 MM HG: CPT | Performed by: INTERNAL MEDICINE

## 2025-08-28 PROCEDURE — 1159F MED LIST DOCD IN RCRD: CPT | Performed by: INTERNAL MEDICINE

## 2025-08-28 PROCEDURE — 99214 OFFICE O/P EST MOD 30 MIN: CPT | Performed by: INTERNAL MEDICINE

## 2025-08-28 PROCEDURE — 1036F TOBACCO NON-USER: CPT | Performed by: INTERNAL MEDICINE

## 2025-08-28 PROCEDURE — 3077F SYST BP >= 140 MM HG: CPT | Performed by: INTERNAL MEDICINE

## 2025-08-28 PROCEDURE — 3008F BODY MASS INDEX DOCD: CPT | Performed by: INTERNAL MEDICINE

## 2025-08-28 RX ORDER — POTASSIUM CHLORIDE 20 MEQ/1
20 TABLET, EXTENDED RELEASE ORAL DAILY
COMMUNITY
End: 2025-08-28 | Stop reason: ALTCHOICE

## 2025-08-28 RX ORDER — DILTIAZEM HYDROCHLORIDE 30 MG/1
30 TABLET, FILM COATED ORAL 3 TIMES DAILY
COMMUNITY

## 2025-08-28 RX ORDER — SPIRONOLACTONE 25 MG/1
25 TABLET ORAL DAILY
Qty: 30 TABLET | Refills: 11 | Status: SHIPPED | OUTPATIENT
Start: 2025-08-28 | End: 2026-08-28

## 2025-08-28 RX ORDER — LISINOPRIL 20 MG/1
20 TABLET ORAL DAILY
COMMUNITY

## 2025-08-28 RX ORDER — ACETAMINOPHEN 650 MG/1
650 SUPPOSITORY RECTAL EVERY 4 HOURS PRN
COMMUNITY
End: 2025-08-28 | Stop reason: WASHOUT

## 2025-08-28 RX ORDER — SULFASALAZINE 500 MG/1
500 TABLET ORAL 2 TIMES DAILY
COMMUNITY

## 2025-08-28 RX ORDER — DILTIAZEM HYDROCHLORIDE 60 MG/1
60 TABLET, FILM COATED ORAL 3 TIMES DAILY
COMMUNITY

## 2025-08-28 RX ORDER — PREDNISONE 20 MG/1
20 TABLET ORAL DAILY
COMMUNITY

## 2025-08-28 RX ORDER — GABAPENTIN 400 MG/1
400 CAPSULE ORAL 3 TIMES DAILY
COMMUNITY

## 2025-09-04 RX ORDER — SPIRONOLACTONE 25 MG/1
25 TABLET ORAL DAILY
Qty: 30 TABLET | Refills: 11 | Status: SHIPPED | OUTPATIENT
Start: 2025-09-04

## 2025-10-15 ENCOUNTER — APPOINTMENT (OUTPATIENT)
Dept: CARDIOLOGY | Facility: CLINIC | Age: 75
End: 2025-10-15
Payer: COMMERCIAL

## 2026-02-13 ENCOUNTER — APPOINTMENT (OUTPATIENT)
Dept: CARDIOLOGY | Facility: CLINIC | Age: 76
End: 2026-02-13
Payer: COMMERCIAL

## (undated) DEVICE — GUIDEWIRE, STRAIGHT, ZIPWIRE, .035 X 150CM, STANDARD